# Patient Record
Sex: MALE | Race: WHITE | NOT HISPANIC OR LATINO | Employment: OTHER | URBAN - METROPOLITAN AREA
[De-identification: names, ages, dates, MRNs, and addresses within clinical notes are randomized per-mention and may not be internally consistent; named-entity substitution may affect disease eponyms.]

---

## 2018-01-22 ENCOUNTER — HOSPITAL ENCOUNTER (OUTPATIENT)
Dept: OTHER | Facility: HOSPITAL | Age: 68
Setting detail: SPECIMEN
Discharge: HOME | End: 2018-01-22
Attending: NURSE PRACTITIONER | Admitting: NURSE PRACTITIONER

## 2018-01-22 LAB
ALBUMIN SERPL-MCNC: 4.4 G/DL (ref 3.5–4.8)
ALBUMIN/GLOB SERPL: 1.7 {RATIO} (ref 1–1.7)
ALP SERPL-CCNC: 55 IU/L (ref 32–91)
ALT SERPL-CCNC: 12 IU/L (ref 17–63)
ANION GAP SERPL CALC-SCNC: 10.4 MMOL/L (ref 10–20)
AST SERPL-CCNC: 17 IU/L (ref 15–41)
BILIRUB SERPL-MCNC: 1.1 MG/DL (ref 0.3–1.2)
BUN SERPL-MCNC: 8 MG/DL (ref 8–20)
BUN/CREAT SERPL: 10 (ref 6.2–20.3)
CALCIUM SERPL-MCNC: 9.5 MG/DL (ref 8.9–10.3)
CHLORIDE SERPL-SCNC: 101 MMOL/L (ref 101–111)
CHOLEST SERPL-MCNC: 165 MG/DL
CHOLEST/HDLC SERPL: 3.8 {RATIO}
CONV CO2: 33 MMOL/L (ref 22–32)
CONV LDL CHOLESTEROL DIRECT: 104 MG/DL (ref 0–100)
CONV TOTAL PROTEIN: 7 G/DL (ref 6.1–7.9)
CREAT UR-MCNC: 0.8 MG/DL (ref 0.7–1.2)
GLOBULIN UR ELPH-MCNC: 2.6 G/DL (ref 2.5–3.8)
GLUCOSE SERPL-MCNC: 85 MG/DL (ref 65–99)
HDLC SERPL-MCNC: 43 MG/DL
LDLC/HDLC SERPL: 2.4 {RATIO}
LIPID INTERPRETATION: ABNORMAL
POTASSIUM SERPL-SCNC: 4.4 MMOL/L (ref 3.6–5.1)
SODIUM SERPL-SCNC: 140 MMOL/L (ref 136–144)
TRIGL SERPL-MCNC: 98 MG/DL
VLDLC SERPL CALC-MCNC: 18.2 MG/DL

## 2018-01-23 LAB
HCV AB SER DONR QL: NORMAL
HCV AB SER DONR QL: NORMAL

## 2018-01-29 ENCOUNTER — HOSPITAL ENCOUNTER (OUTPATIENT)
Dept: CARDIOLOGY | Facility: HOSPITAL | Age: 68
Discharge: HOME | End: 2018-01-29
Attending: NURSE PRACTITIONER | Admitting: NURSE PRACTITIONER

## 2019-03-07 ENCOUNTER — HOSPITAL ENCOUNTER (OUTPATIENT)
Dept: OTHER | Facility: HOSPITAL | Age: 69
Discharge: HOME | End: 2019-03-07
Attending: FAMILY MEDICINE | Admitting: FAMILY MEDICINE

## 2019-03-07 LAB
ALBUMIN SERPL-MCNC: 4.5 G/DL (ref 3.5–4.8)
ALBUMIN/GLOB SERPL: 2 {RATIO} (ref 1–1.7)
ALP SERPL-CCNC: 76 IU/L (ref 32–91)
ALT SERPL-CCNC: 10 IU/L (ref 17–63)
ANION GAP SERPL CALC-SCNC: 15.2 MMOL/L (ref 10–20)
AST SERPL-CCNC: 17 IU/L (ref 15–41)
BASOPHILS # BLD AUTO: 0 10*3/UL (ref 0–0.2)
BASOPHILS NFR BLD AUTO: 1 % (ref 0–2)
BILIRUB SERPL-MCNC: 0.9 MG/DL (ref 0.3–1.2)
BILIRUB UR QL STRIP: NEGATIVE MG/DL
BUN SERPL-MCNC: 5 MG/DL (ref 8–20)
BUN/CREAT SERPL: 4.5 (ref 6.2–20.3)
CALCIUM SERPL-MCNC: 9.5 MG/DL (ref 8.9–10.3)
CASTS URNS QL MICRO: NORMAL /[LPF]
CHLORIDE SERPL-SCNC: 94 MMOL/L (ref 101–111)
CHOLEST SERPL-MCNC: 155 MG/DL
CHOLEST/HDLC SERPL: 3.8 {RATIO}
COLOR UR: YELLOW
CONV BACTERIA IN URINE MICRO: NEGATIVE
CONV CLARITY OF URINE: CLEAR
CONV CO2: 27 MMOL/L (ref 22–32)
CONV HYALINE CASTS IN URINE MICRO: 0 /[LPF] (ref 0–5)
CONV LDL CHOLESTEROL DIRECT: 94 MG/DL (ref 0–100)
CONV PROTEIN IN URINE BY AUTOMATED TEST STRIP: NEGATIVE MG/DL
CONV SMALL ROUND CELLS: NORMAL /[HPF]
CONV TOTAL PROTEIN: 6.8 G/DL (ref 6.1–7.9)
CONV UROBILINOGEN IN URINE BY AUTOMATED TEST STRIP: 1 MG/DL
CREAT UR-MCNC: 1.1 MG/DL (ref 0.7–1.2)
CULTURE INDICATED?: NORMAL
DIFFERENTIAL METHOD BLD: (no result)
EOSINOPHIL # BLD AUTO: 0.2 10*3/UL (ref 0–0.3)
EOSINOPHIL # BLD AUTO: 4 % (ref 0–3)
ERYTHROCYTE [DISTWIDTH] IN BLOOD BY AUTOMATED COUNT: 13.6 % (ref 11.5–14.5)
GLOBULIN UR ELPH-MCNC: 2.3 G/DL (ref 2.5–3.8)
GLUCOSE SERPL-MCNC: 91 MG/DL (ref 65–99)
GLUCOSE UR QL: NEGATIVE MG/DL
HCT VFR BLD AUTO: 39.4 % (ref 40–54)
HDLC SERPL-MCNC: 41 MG/DL
HGB BLD-MCNC: 13.4 G/DL (ref 14–18)
HGB UR QL STRIP: NEGATIVE
KETONES UR QL STRIP: NEGATIVE MG/DL
LDLC/HDLC SERPL: 2.3 {RATIO}
LEUKOCYTE ESTERASE UR QL STRIP: NEGATIVE
LIPID INTERPRETATION: NORMAL
LYMPHOCYTES # BLD AUTO: 2 10*3/UL (ref 0.8–4.8)
LYMPHOCYTES NFR BLD AUTO: 36 % (ref 18–42)
MAGNESIUM SERPL-MCNC: 1.9 MG/DL (ref 1.8–2.5)
MCH RBC QN AUTO: 32.5 PG (ref 26–32)
MCHC RBC AUTO-ENTMCNC: 34.1 G/DL (ref 32–36)
MCV RBC AUTO: 95.3 FL (ref 80–94)
MONOCYTES # BLD AUTO: 0.6 10*3/UL (ref 0.1–1.3)
MONOCYTES NFR BLD AUTO: 10 % (ref 2–11)
NEUTROPHILS # BLD AUTO: 2.8 10*3/UL (ref 2.3–8.6)
NEUTROPHILS NFR BLD AUTO: 49 % (ref 50–75)
NITRITE UR QL STRIP: NEGATIVE
NRBC BLD AUTO-RTO: 0 /100{WBCS}
NRBC/RBC NFR BLD MANUAL: 0 10*3/UL
PH UR STRIP.AUTO: 6 [PH] (ref 4.5–8)
PLATELET # BLD AUTO: 201 10*3/UL (ref 150–450)
PMV BLD AUTO: 7.1 FL (ref 7.4–10.4)
POTASSIUM SERPL-SCNC: 4.2 MMOL/L (ref 3.6–5.1)
RBC # BLD AUTO: 4.14 10*6/UL (ref 4.6–6)
RBC #/AREA URNS HPF: 0 /[HPF] (ref 0–3)
SODIUM SERPL-SCNC: 132 MMOL/L (ref 136–144)
SP GR UR: 1.01 (ref 1–1.03)
SPERM URNS QL MICRO: NORMAL /[HPF]
SQUAMOUS SPT QL MICRO: 0 /[HPF] (ref 0–5)
TRIGL SERPL-MCNC: 112 MG/DL
UNIDENT CRYS URNS QL MICRO: NORMAL /[HPF]
VLDLC SERPL CALC-MCNC: 20.1 MG/DL
WBC # BLD AUTO: 5.5 10*3/UL (ref 4.5–11.5)
WBC #/AREA URNS HPF: 1 /[HPF] (ref 0–5)
YEAST SPEC QL WET PREP: NORMAL /[HPF]

## 2019-03-21 ENCOUNTER — HOSPITAL ENCOUNTER (OUTPATIENT)
Dept: CT IMAGING | Facility: HOSPITAL | Age: 69
Discharge: HOME | End: 2019-03-21
Attending: FAMILY MEDICINE | Admitting: FAMILY MEDICINE

## 2019-03-21 LAB — CREAT BLDA-MCNC: 1.2 MG/DL (ref 0.6–1.3)

## 2019-03-22 ENCOUNTER — HOSPITAL ENCOUNTER (OUTPATIENT)
Dept: GENERAL RADIOLOGY | Facility: HOSPITAL | Age: 69
Discharge: HOME | End: 2019-03-22
Attending: FAMILY MEDICINE | Admitting: FAMILY MEDICINE

## 2019-04-18 ENCOUNTER — HOSPITAL ENCOUNTER (OUTPATIENT)
Dept: GENERAL RADIOLOGY | Facility: HOSPITAL | Age: 69
Discharge: HOME | End: 2019-04-18
Attending: FAMILY MEDICINE | Admitting: FAMILY MEDICINE

## 2019-05-02 ENCOUNTER — CONVERSION ENCOUNTER (OUTPATIENT)
Dept: CARDIOLOGY | Facility: CLINIC | Age: 69
End: 2019-05-02

## 2019-06-03 VITALS — BODY MASS INDEX: 18.44 KG/M2 | WEIGHT: 136 LBS

## 2019-07-02 PROBLEM — E78.5 HYPERLIPIDEMIA: Status: ACTIVE | Noted: 2019-07-02

## 2019-11-07 ENCOUNTER — TELEPHONE (OUTPATIENT)
Dept: FAMILY MEDICINE CLINIC | Facility: CLINIC | Age: 69
End: 2019-11-07

## 2019-11-08 ENCOUNTER — TRANSCRIBE ORDERS (OUTPATIENT)
Dept: ADMINISTRATIVE | Facility: HOSPITAL | Age: 69
End: 2019-11-08

## 2019-11-08 DIAGNOSIS — I71.40 ABDOMINAL AORTIC ANEURYSM (AAA) WITHOUT RUPTURE (HCC): Primary | ICD-10-CM

## 2019-11-08 RX ORDER — ONDANSETRON 4 MG/1
4 TABLET, FILM COATED ORAL EVERY 8 HOURS PRN
Qty: 60 TABLET | Refills: 1 | Status: SHIPPED | OUTPATIENT
Start: 2019-11-08 | End: 2020-01-06 | Stop reason: SDUPTHER

## 2019-11-08 NOTE — TELEPHONE ENCOUNTER
Pt has AAA, no appetite and frequent nausea. I called and discussed condition with Charmaine, pt is also hearing impaired. He has a f/u with vascular Dr. Rafael cantrell as well. Sent jeancarlos to [facility].

## 2019-11-12 RX ORDER — ONDANSETRON 4 MG/1
TABLET, FILM COATED ORAL
Qty: 20 TABLET | Refills: 0 | OUTPATIENT
Start: 2019-11-12

## 2020-01-06 ENCOUNTER — OFFICE VISIT (OUTPATIENT)
Dept: FAMILY MEDICINE CLINIC | Facility: CLINIC | Age: 70
End: 2020-01-06

## 2020-01-06 VITALS
HEART RATE: 76 BPM | TEMPERATURE: 98.4 F | BODY MASS INDEX: 21.54 KG/M2 | SYSTOLIC BLOOD PRESSURE: 112 MMHG | RESPIRATION RATE: 16 BRPM | DIASTOLIC BLOOD PRESSURE: 70 MMHG | HEIGHT: 72 IN | WEIGHT: 159 LBS

## 2020-01-06 DIAGNOSIS — I10 ESSENTIAL HYPERTENSION: ICD-10-CM

## 2020-01-06 DIAGNOSIS — I71.40 ABDOMINAL AORTIC ANEURYSM (AAA) WITHOUT RUPTURE (HCC): ICD-10-CM

## 2020-01-06 DIAGNOSIS — Z12.2 ENCOUNTER FOR SCREENING FOR LUNG CANCER: ICD-10-CM

## 2020-01-06 DIAGNOSIS — Z72.0 TOBACCO ABUSE: ICD-10-CM

## 2020-01-06 DIAGNOSIS — F41.8 ANXIETY WITH DEPRESSION: ICD-10-CM

## 2020-01-06 DIAGNOSIS — I25.810 CORONARY ARTERY DISEASE INVOLVING CORONARY BYPASS GRAFT OF NATIVE HEART WITHOUT ANGINA PECTORIS: Primary | ICD-10-CM

## 2020-01-06 DIAGNOSIS — E78.5 HYPERLIPIDEMIA, UNSPECIFIED HYPERLIPIDEMIA TYPE: ICD-10-CM

## 2020-01-06 DIAGNOSIS — R35.1 BENIGN PROSTATIC HYPERPLASIA WITH NOCTURIA: ICD-10-CM

## 2020-01-06 DIAGNOSIS — J43.9 PULMONARY EMPHYSEMA, UNSPECIFIED EMPHYSEMA TYPE (HCC): ICD-10-CM

## 2020-01-06 DIAGNOSIS — Z87.891 PERSONAL HISTORY OF NICOTINE DEPENDENCE: ICD-10-CM

## 2020-01-06 DIAGNOSIS — N40.1 BENIGN PROSTATIC HYPERPLASIA WITH NOCTURIA: ICD-10-CM

## 2020-01-06 PROBLEM — Z12.11 ENCOUNTER FOR SCREENING FOR MALIGNANT NEOPLASM OF COLON: Status: ACTIVE | Noted: 2018-01-22

## 2020-01-06 PROBLEM — K04.7 DENTAL INFECTION: Status: ACTIVE | Noted: 2017-03-14

## 2020-01-06 PROBLEM — Z12.5 ENCOUNTER FOR SCREENING FOR MALIGNANT NEOPLASM OF PROSTATE: Status: ACTIVE | Noted: 2018-01-22

## 2020-01-06 PROBLEM — F17.210 SMOKING GREATER THAN 30 PACK YEARS: Status: ACTIVE | Noted: 2018-01-22

## 2020-01-06 PROBLEM — R05.3 CHRONIC COUGH: Status: ACTIVE | Noted: 2019-03-07

## 2020-01-06 PROBLEM — K30 DELAYED GASTRIC EMPTYING: Status: ACTIVE | Noted: 2019-03-13

## 2020-01-06 PROBLEM — R26.81 UNSTEADY GAIT: Status: ACTIVE | Noted: 2019-03-12

## 2020-01-06 PROBLEM — R63.4 WEIGHT LOSS: Status: ACTIVE | Noted: 2019-03-07

## 2020-01-06 PROBLEM — F03.90 DEMENTIA: Status: ACTIVE | Noted: 2019-03-07

## 2020-01-06 PROBLEM — R03.0 ELEVATED BLOOD PRESSURE READING WITHOUT DIAGNOSIS OF HYPERTENSION: Status: ACTIVE | Noted: 2018-04-24

## 2020-01-06 PROBLEM — Z20.2 EXPOSURE TO VENEREAL DISEASE: Status: ACTIVE | Noted: 2018-01-22

## 2020-01-06 PROBLEM — R53.83 FATIGUE: Status: ACTIVE | Noted: 2019-03-07

## 2020-01-06 PROBLEM — L57.0 ACTINIC KERATOSES: Status: ACTIVE | Noted: 2017-12-20

## 2020-01-06 PROBLEM — R11.2 NAUSEA AND VOMITING: Status: ACTIVE | Noted: 2019-03-13

## 2020-01-06 PROBLEM — Z23 ENCOUNTER FOR IMMUNIZATION: Status: ACTIVE | Noted: 2018-01-22

## 2020-01-06 PROCEDURE — 99214 OFFICE O/P EST MOD 30 MIN: CPT | Performed by: FAMILY MEDICINE

## 2020-01-06 RX ORDER — ONDANSETRON 4 MG/1
4 TABLET, FILM COATED ORAL EVERY 8 HOURS PRN
Qty: 60 TABLET | Refills: 3 | Status: SHIPPED | OUTPATIENT
Start: 2020-01-06 | End: 2020-04-23

## 2020-01-06 RX ORDER — HYDROXYZINE HYDROCHLORIDE 25 MG/1
25 TABLET, FILM COATED ORAL EVERY 6 HOURS PRN
Qty: 90 TABLET | Refills: 3 | Status: SHIPPED | OUTPATIENT
Start: 2020-01-06 | End: 2020-03-24

## 2020-01-06 RX ORDER — TAMSULOSIN HYDROCHLORIDE 0.4 MG/1
1 CAPSULE ORAL DAILY
Qty: 90 CAPSULE | Refills: 3 | Status: SHIPPED | OUTPATIENT
Start: 2020-01-06 | End: 2020-12-28

## 2020-01-06 RX ORDER — ESCITALOPRAM OXALATE 10 MG/1
10 TABLET ORAL DAILY
Qty: 90 TABLET | Refills: 3 | Status: SHIPPED | OUTPATIENT
Start: 2020-01-06 | End: 2020-12-28

## 2020-01-06 RX ORDER — CHOLECALCIFEROL (VITAMIN D3) 125 MCG
5 CAPSULE ORAL
COMMUNITY
End: 2021-08-20

## 2020-01-06 RX ORDER — CARVEDILOL 3.12 MG/1
3.12 TABLET ORAL 2 TIMES DAILY WITH MEALS
Qty: 90 TABLET | Refills: 3 | Status: SHIPPED | OUTPATIENT
Start: 2020-01-06 | End: 2020-08-26

## 2020-01-06 RX ORDER — ATORVASTATIN CALCIUM 20 MG/1
20 TABLET, FILM COATED ORAL DAILY
Qty: 90 TABLET | Refills: 3 | Status: SHIPPED | OUTPATIENT
Start: 2020-01-06 | End: 2020-12-28

## 2020-01-06 NOTE — PROGRESS NOTES
Chief Complaint   Patient presents with   • Anxiety     New pt     HPI  Alexei Merino III is a 69 y.o. male that presents to establish care and discuss multiple medical problems.    AAA: 4.6cm aneurysm- has been stable for the last year. Due for repeat CT scan in 6 months. Follows w/ Dr Hall    HTN: 112/70 today. He is complaint w/ Coreg.     HLD: not taking any cholesterol medicine. Last LDL 94    CAD: hx MI 3/2016. Patient had 3V CABG. He follows w/ Ernestina. Patient is complaint w/ ASA and Coreg but has discontinued his statin.     COPD: not much cough but gets SOB w/ minimal activity. This makes him anxious. He goes on to later deny much of any SOB     Review of Systems   Constitutional: Negative for chills, fever and unexpected weight loss.   HENT: Negative for congestion, rhinorrhea and sore throat.    Eyes: Negative for visual disturbance.   Respiratory: Positive for cough and shortness of breath.    Cardiovascular: Negative for chest pain and palpitations.   Gastrointestinal: Negative for abdominal pain, constipation, diarrhea, nausea and vomiting.   Genitourinary: Negative for difficulty urinating and dysuria.   Musculoskeletal: Negative for arthralgias and joint swelling.   Skin: Negative for rash and skin lesions.   Neurological: Negative for weakness and headache.   Psychiatric/Behavioral: Negative for depressed mood. The patient is nervous/anxious.      The following portions of the patient's history were reviewed and updated as appropriate: problem list, past medical history, past surgical history, allergies, current medications, past social history and past family history.    Problem List Tab  Patient History Tab  Immunizations Tab  Medications Tab  Chart Review Tab  Care Everywhere Tab  Synopsis Tab    PE  Vitals:    01/06/20 1452   BP: 112/70   Pulse: 76   Resp: 16   Temp: 98.4 °F (36.9 °C)     Body mass index is 21.56 kg/m².  General: Well nourished, NAD  Head: AT/NC  Eyes: EOMI, anicteric  sclera  ENT: MMM w/o erythema  Neck: Supple, no thyromegaly or LAD. No carotid bruit  Resp: CTAB, SCR, BS equal  CV: RRR w/o m/r/g; 2+ pulses  GI: Soft, NT/ND, +BS  MSK: FROM, no deformity, no edema  Skin: Warm, dry, intact  Neuro: Alert and oriented. No focal deficits  Psych: Appropriate mood and affect    Imaging  No Images in the past 120 days found..    Assessment/Plan   Alexei Merino III is a 69 y.o. male that presents for   Chief Complaint   Patient presents with   • Anxiety     New pt     Alexei was seen today for anxiety.    Diagnoses and all orders for this visit:    Coronary artery disease involving coronary bypass graft of native heart without angina pectoris: follows w/ Ernestina. Has not been on statin, stating that his cholesterol has been good. Will restart today given hx CAD s/p CABG x3  -     atorvastatin (LIPITOR) 20 MG tablet; Take 1 tablet by mouth Daily.   - Cont home ASA, Coreg    Abdominal aortic aneurysm (AAA) without rupture (CMS/HCC): 4.6cm on last scan but has been stable over the last year   - Due for repeat CT in 6 months   - Follows w/ Dr Hall    Essential hypertension: well controlled  -     carvedilol (COREG) 3.125 MG tablet; Take 1 tablet by mouth 2 (Two) Times a Day With Meals.    Hyperlipidemia, unspecified hyperlipidemia type  -     atorvastatin (LIPITOR) 20 MG tablet; Take 1 tablet by mouth Daily.    Anxiety with depression  -     hydrOXYzine (ATARAX) 25 MG tablet; Take 1 tablet by mouth Every 6 (Six) Hours As Needed for Anxiety.  -     escitalopram (LEXAPRO) 10 MG tablet; Take 1 tablet by mouth Daily.    Pulmonary emphysema, unspecified emphysema type (CMS/HCC): low threshold to initiate Spiriva but pt inconsistent w/ complaints  -     Full Pulmonary Function Test With Bronchodilator; Future    Tobacco abuse  -     CT chest low dose wo; Future    Personal history of nicotine dependence   -     CT chest low dose wo; Future    Encounter for screening for lung cancer    Benign  prostatic hyperplasia with nocturia  -    Start tamsulosin (FLOMAX) 0.4 MG capsule 24 hr capsule; Take 1 capsule by mouth Daily.    Other orders  -     ondansetron (ZOFRAN) 4 MG tablet; Take 1 tablet by mouth Every 8 (Eight) Hours As Needed for Nausea or Vomiting.      Preventative:  Colonoscopy: 4/2019, due 2024  PSA: 0.67 (3/2019)  CT chest: 4/2019  Shingles: Recommended  Pneumonia: Completed  Tdap: Deferred  Influenza: out, deferred    F/U in 3 months for Medicare Wellness

## 2020-02-24 ENCOUNTER — OFFICE VISIT (OUTPATIENT)
Dept: FAMILY MEDICINE CLINIC | Facility: CLINIC | Age: 70
End: 2020-02-24

## 2020-02-24 VITALS
HEART RATE: 96 BPM | HEIGHT: 72 IN | WEIGHT: 140.8 LBS | RESPIRATION RATE: 20 BRPM | BODY MASS INDEX: 19.07 KG/M2 | TEMPERATURE: 98.4 F | SYSTOLIC BLOOD PRESSURE: 135 MMHG | DIASTOLIC BLOOD PRESSURE: 94 MMHG

## 2020-02-24 DIAGNOSIS — R63.4 WEIGHT LOSS: ICD-10-CM

## 2020-02-24 DIAGNOSIS — J01.01 ACUTE RECURRENT MAXILLARY SINUSITIS: Primary | ICD-10-CM

## 2020-02-24 PROCEDURE — 99213 OFFICE O/P EST LOW 20 MIN: CPT | Performed by: INTERNAL MEDICINE

## 2020-02-24 RX ORDER — CEFUROXIME AXETIL 500 MG/1
500 TABLET ORAL 2 TIMES DAILY
Qty: 20 TABLET | Refills: 0 | Status: SHIPPED | OUTPATIENT
Start: 2020-02-24 | End: 2021-05-19

## 2020-02-24 NOTE — PROGRESS NOTES
Subjective   Alexei Merino III is a 69 y.o. male.     Pt says his sxs have been ongoing since nov  Has had nasal congestion  Lack of appetite  Foods don't taste right    No cough or sore throat  No abd pain  Has had unintentional weight loss bc foods don't taste good to him    Pt says these sxs have occurred in the past  And resolved when he's treated for sinusitis       The following portions of the patient's history were reviewed and updated as appropriate: allergies, current medications, past family history, past medical history, past social history, past surgical history and problem list.    Review of Systems   Constitutional: Negative for fatigue and fever.   HENT: Positive for congestion. Negative for ear pain, rhinorrhea and sore throat.    Eyes: Negative for blurred vision and itching.   Respiratory: Negative for cough and shortness of breath.    Cardiovascular: Negative for chest pain and palpitations.   Gastrointestinal: Negative for abdominal pain, diarrhea and vomiting.   Endocrine: Negative for polydipsia and polyuria.   Genitourinary: Negative for dysuria, frequency, hematuria and urgency.   Musculoskeletal: Negative for joint swelling and myalgias.   Skin: Negative for rash and skin lesions.   Neurological: Negative for dizziness, numbness and headache.   Psychiatric/Behavioral: Negative for depressed mood. The patient is not nervous/anxious.          Current Outpatient Medications:   •  aspirin 325 MG tablet, Take 81 mg by mouth Daily., Disp: , Rfl:   •  atorvastatin (LIPITOR) 20 MG tablet, Take 1 tablet by mouth Daily., Disp: 90 tablet, Rfl: 3  •  carvedilol (COREG) 3.125 MG tablet, Take 1 tablet by mouth 2 (Two) Times a Day With Meals., Disp: 90 tablet, Rfl: 3  •  escitalopram (LEXAPRO) 10 MG tablet, Take 1 tablet by mouth Daily., Disp: 90 tablet, Rfl: 3  •  hydrOXYzine (ATARAX) 25 MG tablet, Take 1 tablet by mouth Every 6 (Six) Hours As Needed for Anxiety., Disp: 90 tablet, Rfl: 3  •  melatonin 5  "MG tablet tablet, Take 5 mg by mouth., Disp: , Rfl:   •  ondansetron (ZOFRAN) 4 MG tablet, Take 1 tablet by mouth Every 8 (Eight) Hours As Needed for Nausea or Vomiting., Disp: 60 tablet, Rfl: 3  •  tamsulosin (FLOMAX) 0.4 MG capsule 24 hr capsule, Take 1 capsule by mouth Daily., Disp: 90 capsule, Rfl: 3    Objective   /94 (BP Location: Left arm, Patient Position: Sitting, Cuff Size: Adult)   Pulse 96   Temp 98.4 °F (36.9 °C) (Oral)   Resp 20   Ht 182.9 cm (72\")   Wt 63.9 kg (140 lb 12.8 oz)   BMI 19.10 kg/m²   Physical Exam   Constitutional: He is oriented to person, place, and time. He appears well-developed and well-nourished. No distress.   HENT:   Head: Normocephalic and atraumatic.   Right Ear: External ear normal.   Left Ear: External ear normal.   Mouth/Throat: Oropharynx is clear and moist. No oropharyngeal exudate.   Eyes: Conjunctivae and EOM are normal. Right eye exhibits no discharge. Left eye exhibits no discharge. No scleral icterus.   Neck: Normal range of motion. Neck supple. No thyromegaly present.   Cardiovascular: Normal rate, regular rhythm and normal heart sounds. Exam reveals no gallop and no friction rub.   No murmur heard.  Pulmonary/Chest: Effort normal and breath sounds normal. No respiratory distress. He has no wheezes. He has no rales.   Abdominal: Soft. Bowel sounds are normal. He exhibits no distension. There is no tenderness. There is no guarding.   Musculoskeletal: Normal range of motion. He exhibits no edema or deformity.   Lymphadenopathy:     He has no cervical adenopathy.   Neurological: He is alert and oriented to person, place, and time. No cranial nerve deficit.   Skin: Skin is warm and dry. No rash noted. He is not diaphoretic. No erythema.   Psychiatric: He has a normal mood and affect. His behavior is normal. Thought content normal.   Vitals reviewed.        Assessment/Plan   Problems Addressed this Visit        Respiratory    Sinusitis, acute - Primary       " Other    Weight loss        Will treat with ceftin but since sxs are not all c/w sinusitis (I.e. TTP of sinuses)  Worried that unintentional weight loss, loss of taste and appetite  Are secondary to another occult disease, such as cancer  Pt has f/u appt with dr lópez in about 6 weeks  And pt agrees to keep appt unless sxs do not improve - then will f/u sooner         Procedures

## 2020-03-24 DIAGNOSIS — F41.8 ANXIETY WITH DEPRESSION: ICD-10-CM

## 2020-03-24 RX ORDER — HYDROXYZINE HYDROCHLORIDE 25 MG/1
TABLET, FILM COATED ORAL
Qty: 90 TABLET | Refills: 2 | Status: SHIPPED | OUTPATIENT
Start: 2020-03-24 | End: 2020-03-27

## 2020-03-26 DIAGNOSIS — F41.8 ANXIETY WITH DEPRESSION: ICD-10-CM

## 2020-03-27 RX ORDER — HYDROXYZINE HYDROCHLORIDE 25 MG/1
TABLET, FILM COATED ORAL
Qty: 90 TABLET | Refills: 2 | Status: SHIPPED | OUTPATIENT
Start: 2020-03-27 | End: 2020-06-17

## 2020-04-23 RX ORDER — ONDANSETRON 4 MG/1
TABLET, FILM COATED ORAL
Qty: 60 TABLET | Refills: 2 | Status: SHIPPED | OUTPATIENT
Start: 2020-04-23 | End: 2020-07-29

## 2020-05-28 RX ORDER — DOXYCYCLINE HYCLATE 100 MG/1
100 CAPSULE ORAL 2 TIMES DAILY
Qty: 20 CAPSULE | Refills: 0 | Status: SHIPPED | OUTPATIENT
Start: 2020-05-28 | End: 2020-11-04

## 2020-06-17 DIAGNOSIS — F41.8 ANXIETY WITH DEPRESSION: ICD-10-CM

## 2020-06-17 RX ORDER — HYDROXYZINE HYDROCHLORIDE 25 MG/1
TABLET, FILM COATED ORAL
Qty: 90 TABLET | Refills: 1 | Status: SHIPPED | OUTPATIENT
Start: 2020-06-17 | End: 2020-09-17

## 2020-07-29 RX ORDER — ONDANSETRON 4 MG/1
TABLET, FILM COATED ORAL
Qty: 60 TABLET | Refills: 1 | Status: SHIPPED | OUTPATIENT
Start: 2020-07-29 | End: 2020-09-09

## 2020-08-26 DIAGNOSIS — I10 ESSENTIAL HYPERTENSION: ICD-10-CM

## 2020-08-26 RX ORDER — CARVEDILOL 3.12 MG/1
TABLET ORAL
Qty: 90 TABLET | Refills: 2 | Status: SHIPPED | OUTPATIENT
Start: 2020-08-26 | End: 2021-01-17

## 2020-09-09 RX ORDER — ONDANSETRON 4 MG/1
TABLET, FILM COATED ORAL
Qty: 60 TABLET | Refills: 0 | Status: SHIPPED | OUTPATIENT
Start: 2020-09-09 | End: 2020-09-30

## 2020-09-17 DIAGNOSIS — F41.8 ANXIETY WITH DEPRESSION: ICD-10-CM

## 2020-09-17 RX ORDER — HYDROXYZINE HYDROCHLORIDE 25 MG/1
TABLET, FILM COATED ORAL
Qty: 32 TABLET | Refills: 1 | Status: SHIPPED | OUTPATIENT
Start: 2020-09-17 | End: 2020-10-08

## 2020-09-30 RX ORDER — ONDANSETRON 4 MG/1
TABLET, FILM COATED ORAL
Qty: 60 TABLET | Refills: 0 | Status: SHIPPED | OUTPATIENT
Start: 2020-09-30 | End: 2020-11-04 | Stop reason: SDUPTHER

## 2020-10-08 DIAGNOSIS — F41.8 ANXIETY WITH DEPRESSION: ICD-10-CM

## 2020-10-08 RX ORDER — HYDROXYZINE HYDROCHLORIDE 25 MG/1
TABLET, FILM COATED ORAL
Qty: 32 TABLET | Refills: 1 | Status: SHIPPED | OUTPATIENT
Start: 2020-10-08 | End: 2020-10-26

## 2020-10-26 DIAGNOSIS — F41.8 ANXIETY WITH DEPRESSION: ICD-10-CM

## 2020-10-26 RX ORDER — HYDROXYZINE HYDROCHLORIDE 25 MG/1
TABLET, FILM COATED ORAL
Qty: 60 TABLET | Refills: 3 | Status: SHIPPED | OUTPATIENT
Start: 2020-10-26 | End: 2020-12-31

## 2020-10-27 RX ORDER — ONDANSETRON 4 MG/1
TABLET, FILM COATED ORAL
Qty: 60 TABLET | Refills: 0 | OUTPATIENT
Start: 2020-10-27

## 2020-11-02 ENCOUNTER — TELEPHONE (OUTPATIENT)
Dept: FAMILY MEDICINE CLINIC | Facility: CLINIC | Age: 70
End: 2020-11-02

## 2020-11-02 NOTE — TELEPHONE ENCOUNTER
I am concerned as to why he continues to need Zofran. That is why my previous response was-    Needs appt to discuss need. Overdue for MW appt

## 2020-11-02 NOTE — TELEPHONE ENCOUNTER
Spoke with patient's significant other Jessica and she said he has to take Zofran three times a day every day or he cannot eat.  Said this is due to the AAA.  Scheduled an appt with [facility] on 11/4/2020 at 3pm to discuss.  Couldn't wait to see MEB.

## 2020-11-02 NOTE — TELEPHONE ENCOUNTER
ANGELA IS CALLING IN TO REQUEST NEW MEDICATION PRESCRIPTIONS ON PTS        ondansetron (ZOFRAN) 4 MG tablet   PT HAS NO MORE REFILLS ON THIS BUT PT NEEDS TO HAVE THIS FILLED.  WAS DENIED BY DR VINES    CALL BACK  [phone]  PHARMACY    [address]   [phone]

## 2020-11-04 ENCOUNTER — OFFICE VISIT (OUTPATIENT)
Dept: FAMILY MEDICINE CLINIC | Facility: CLINIC | Age: 70
End: 2020-11-04

## 2020-11-04 VITALS
BODY MASS INDEX: 20.05 KG/M2 | TEMPERATURE: 98.4 F | SYSTOLIC BLOOD PRESSURE: 120 MMHG | DIASTOLIC BLOOD PRESSURE: 80 MMHG | WEIGHT: 148 LBS | HEART RATE: 84 BPM | HEIGHT: 72 IN | OXYGEN SATURATION: 98 %

## 2020-11-04 DIAGNOSIS — R11.0 NAUSEA: Primary | ICD-10-CM

## 2020-11-04 PROCEDURE — 99213 OFFICE O/P EST LOW 20 MIN: CPT | Performed by: NURSE PRACTITIONER

## 2020-11-04 RX ORDER — ONDANSETRON 4 MG/1
4 TABLET, FILM COATED ORAL EVERY 8 HOURS PRN
Qty: 30 TABLET | Refills: 0 | Status: SHIPPED | OUTPATIENT
Start: 2020-11-04 | End: 2020-11-23

## 2020-11-04 NOTE — PROGRESS NOTES
"Cruzito Merino III is a 70 y.o. male.     Chief Complaint   Patient presents with   • Nausea       /80 (BP Location: Left arm, Patient Position: Sitting, Cuff Size: Adult)   Pulse 84   Temp 98.4 °F (36.9 °C) (Temporal)   Ht 182.9 cm (72\")   Wt 67.1 kg (148 lb)   SpO2 98%   BMI 20.07 kg/m²     BP Readings from Last 3 Encounters:   11/04/20 120/80   02/24/20 135/94   01/06/20 112/70       Wt Readings from Last 3 Encounters:   11/04/20 67.1 kg (148 lb)   02/24/20 63.9 kg (140 lb 12.8 oz)   01/06/20 72.1 kg (159 lb)       Pt comes in today to discuss upset stomach and need for zofran. He was asked to make an appt by Dr. Mckeon to discuss the need for taking it TID. Pt states he has always been told that he has a \"nervous stomach\" and the zofran helps to calm it. Says he gets the urge to belch and takes zofran, belches,  and then feels better. No vomiting.   Feels it all started after having his CABG and he developed anxiety.   Pepto also helps. Has been without it for about 1 week and doing ok.          The following portions of the patient's history were reviewed and updated as appropriate: allergies, current medications, past family history, past medical history, past social history, past surgical history and problem list.    Review of Systems   Gastrointestinal: Positive for nausea and indigestion. Negative for abdominal distention, abdominal pain, constipation, diarrhea and vomiting.       Objective   Physical Exam  Constitutional:       Appearance: He is well-developed.   Eyes:      Pupils: Pupils are equal, round, and reactive to light.   Cardiovascular:      Rate and Rhythm: Normal rate and regular rhythm.   Pulmonary:      Effort: Pulmonary effort is normal.      Breath sounds: Normal breath sounds.   Neurological:      Mental Status: He is alert and oriented to person, place, and time.           Diagnoses and all orders for this visit:    1. Nausea (Primary)  -     ondansetron (ZOFRAN) " 4 MG tablet; Take 1 tablet by mouth Every 8 (Eight) Hours As Needed for Nausea or Vomiting.  Dispense: 30 tablet; Refill: 0    will rx zofran to take prn. Discussed need to only take it as needed. He will also try gas-x otc and other options so he is not overusing zofran  During this office visit, we discussed the pertinent aspects of the visit and treatment recommendations. Pt verbalizes understanding. Follow up was discussed. Patient was given the opportunity to ask questions and discuss other concerns.       Return if symptoms worsen or fail to improve.

## 2020-11-23 DIAGNOSIS — R11.0 NAUSEA: ICD-10-CM

## 2020-11-23 RX ORDER — ONDANSETRON 4 MG/1
4 TABLET, FILM COATED ORAL DAILY PRN
Qty: 30 TABLET | Refills: 0 | Status: SHIPPED | OUTPATIENT
Start: 2020-11-23 | End: 2020-12-15

## 2020-12-15 DIAGNOSIS — R11.0 NAUSEA: ICD-10-CM

## 2020-12-15 RX ORDER — ONDANSETRON 4 MG/1
TABLET, FILM COATED ORAL
Qty: 30 TABLET | Refills: 0 | Status: SHIPPED | OUTPATIENT
Start: 2020-12-15 | End: 2021-01-08

## 2020-12-27 DIAGNOSIS — R35.1 BENIGN PROSTATIC HYPERPLASIA WITH NOCTURIA: ICD-10-CM

## 2020-12-27 DIAGNOSIS — F41.8 ANXIETY WITH DEPRESSION: ICD-10-CM

## 2020-12-27 DIAGNOSIS — N40.1 BENIGN PROSTATIC HYPERPLASIA WITH NOCTURIA: ICD-10-CM

## 2020-12-27 DIAGNOSIS — E78.5 HYPERLIPIDEMIA, UNSPECIFIED HYPERLIPIDEMIA TYPE: ICD-10-CM

## 2020-12-27 DIAGNOSIS — I25.810 CORONARY ARTERY DISEASE INVOLVING CORONARY BYPASS GRAFT OF NATIVE HEART WITHOUT ANGINA PECTORIS: ICD-10-CM

## 2020-12-28 RX ORDER — ESCITALOPRAM OXALATE 10 MG/1
TABLET ORAL
Qty: 90 TABLET | Refills: 2 | Status: SHIPPED | OUTPATIENT
Start: 2020-12-28 | End: 2021-08-25

## 2020-12-28 RX ORDER — ATORVASTATIN CALCIUM 20 MG/1
TABLET, FILM COATED ORAL
Qty: 90 TABLET | Refills: 2 | Status: SHIPPED | OUTPATIENT
Start: 2020-12-28 | End: 2021-05-21 | Stop reason: SDUPTHER

## 2020-12-28 RX ORDER — TAMSULOSIN HYDROCHLORIDE 0.4 MG/1
CAPSULE ORAL
Qty: 90 CAPSULE | Refills: 2 | Status: SHIPPED | OUTPATIENT
Start: 2020-12-28 | End: 2021-08-25

## 2020-12-31 DIAGNOSIS — F41.8 ANXIETY WITH DEPRESSION: ICD-10-CM

## 2020-12-31 RX ORDER — HYDROXYZINE HYDROCHLORIDE 25 MG/1
TABLET, FILM COATED ORAL
Qty: 60 TABLET | Refills: 3 | Status: SHIPPED | OUTPATIENT
Start: 2020-12-31 | End: 2021-03-02

## 2021-01-07 DIAGNOSIS — R11.0 NAUSEA: ICD-10-CM

## 2021-01-08 RX ORDER — ONDANSETRON 4 MG/1
TABLET, FILM COATED ORAL
Qty: 30 TABLET | Refills: 0 | Status: SHIPPED | OUTPATIENT
Start: 2021-01-08 | End: 2021-01-25

## 2021-01-17 DIAGNOSIS — I10 ESSENTIAL HYPERTENSION: ICD-10-CM

## 2021-01-17 RX ORDER — CARVEDILOL 3.12 MG/1
TABLET ORAL
Qty: 90 TABLET | Refills: 1 | Status: SHIPPED | OUTPATIENT
Start: 2021-01-17 | End: 2021-04-19

## 2021-01-25 DIAGNOSIS — R11.0 NAUSEA: ICD-10-CM

## 2021-01-25 RX ORDER — ONDANSETRON 4 MG/1
TABLET, FILM COATED ORAL
Qty: 30 TABLET | Refills: 0 | Status: SHIPPED | OUTPATIENT
Start: 2021-01-25 | End: 2021-02-15

## 2021-02-15 DIAGNOSIS — R11.0 NAUSEA: ICD-10-CM

## 2021-02-15 RX ORDER — ONDANSETRON 4 MG/1
4 TABLET, FILM COATED ORAL EVERY 12 HOURS PRN
Qty: 30 TABLET | Refills: 0 | Status: SHIPPED | OUTPATIENT
Start: 2021-02-15 | End: 2021-03-10

## 2021-02-15 NOTE — TELEPHONE ENCOUNTER
Last saw ernestina    Airway patent, Nasal mucosa clear. Mouth with normal mucosa. Throat has no vesicles, no oropharyngeal exudates and uvula is midline.

## 2021-03-02 DIAGNOSIS — F41.8 ANXIETY WITH DEPRESSION: ICD-10-CM

## 2021-03-02 RX ORDER — HYDROXYZINE HYDROCHLORIDE 25 MG/1
TABLET, FILM COATED ORAL
Qty: 60 TABLET | Refills: 1 | Status: SHIPPED | OUTPATIENT
Start: 2021-03-02 | End: 2021-03-18

## 2021-03-10 DIAGNOSIS — R11.0 NAUSEA: ICD-10-CM

## 2021-03-10 RX ORDER — ONDANSETRON 4 MG/1
TABLET, FILM COATED ORAL
Qty: 30 TABLET | Refills: 0 | Status: SHIPPED | OUTPATIENT
Start: 2021-03-10 | End: 2021-03-24

## 2021-03-18 DIAGNOSIS — F41.8 ANXIETY WITH DEPRESSION: ICD-10-CM

## 2021-03-18 RX ORDER — HYDROXYZINE HYDROCHLORIDE 25 MG/1
TABLET, FILM COATED ORAL
Qty: 60 TABLET | Refills: 1 | Status: SHIPPED | OUTPATIENT
Start: 2021-03-18 | End: 2021-03-24

## 2021-03-23 DIAGNOSIS — R11.0 NAUSEA: ICD-10-CM

## 2021-03-24 DIAGNOSIS — F41.8 ANXIETY WITH DEPRESSION: ICD-10-CM

## 2021-03-24 DIAGNOSIS — R11.0 NAUSEA: ICD-10-CM

## 2021-03-24 RX ORDER — ONDANSETRON 4 MG/1
TABLET, FILM COATED ORAL
Qty: 30 TABLET | Refills: 0 | OUTPATIENT
Start: 2021-03-24

## 2021-03-24 RX ORDER — HYDROXYZINE HYDROCHLORIDE 25 MG/1
TABLET, FILM COATED ORAL
Qty: 32 TABLET | Refills: 1 | Status: SHIPPED | OUTPATIENT
Start: 2021-03-24 | End: 2021-06-01

## 2021-03-24 RX ORDER — ONDANSETRON 4 MG/1
TABLET, FILM COATED ORAL
Qty: 30 TABLET | Refills: 0 | Status: SHIPPED | OUTPATIENT
Start: 2021-03-24 | End: 2021-04-07

## 2021-04-06 DIAGNOSIS — R11.0 NAUSEA: ICD-10-CM

## 2021-04-07 RX ORDER — ONDANSETRON 4 MG/1
TABLET, FILM COATED ORAL
Qty: 30 TABLET | Refills: 0 | Status: SHIPPED | OUTPATIENT
Start: 2021-04-07 | End: 2021-04-19 | Stop reason: SDUPTHER

## 2021-04-09 ENCOUNTER — TRANSCRIBE ORDERS (OUTPATIENT)
Dept: ADMINISTRATIVE | Facility: HOSPITAL | Age: 71
End: 2021-04-09

## 2021-04-09 DIAGNOSIS — I71.40 ABDOMINAL AORTIC ANEURYSM (AAA) WITHOUT RUPTURE (HCC): Primary | ICD-10-CM

## 2021-04-12 DIAGNOSIS — R11.0 NAUSEA: ICD-10-CM

## 2021-04-13 ENCOUNTER — HOSPITAL ENCOUNTER (OUTPATIENT)
Dept: ULTRASOUND IMAGING | Facility: HOSPITAL | Age: 71
Discharge: HOME | End: 2021-04-13

## 2021-04-13 RX ORDER — ONDANSETRON 4 MG/1
TABLET, FILM COATED ORAL
Qty: 30 TABLET | Refills: 0 | OUTPATIENT
Start: 2021-04-13

## 2021-04-15 ENCOUNTER — APPOINTMENT (OUTPATIENT)
Dept: ULTRASOUND IMAGING | Facility: HOSPITAL | Age: 71
End: 2021-04-15

## 2021-04-18 DIAGNOSIS — R11.0 NAUSEA: ICD-10-CM

## 2021-04-18 DIAGNOSIS — I10 ESSENTIAL HYPERTENSION: ICD-10-CM

## 2021-04-19 ENCOUNTER — HOSPITAL ENCOUNTER (OUTPATIENT)
Dept: ULTRASOUND IMAGING | Facility: HOSPITAL | Age: 71
End: 2021-04-19

## 2021-04-19 ENCOUNTER — TELEPHONE (OUTPATIENT)
Dept: FAMILY MEDICINE CLINIC | Facility: CLINIC | Age: 71
End: 2021-04-19

## 2021-04-19 DIAGNOSIS — R11.0 NAUSEA: ICD-10-CM

## 2021-04-19 RX ORDER — ONDANSETRON 4 MG/1
TABLET, FILM COATED ORAL
Qty: 30 TABLET | Refills: 0 | OUTPATIENT
Start: 2021-04-19

## 2021-04-19 RX ORDER — CARVEDILOL 3.12 MG/1
TABLET ORAL
Qty: 90 TABLET | Refills: 0 | Status: SHIPPED | OUTPATIENT
Start: 2021-04-19 | End: 2021-08-25

## 2021-04-19 RX ORDER — ONDANSETRON 4 MG/1
4 TABLET, FILM COATED ORAL EVERY 12 HOURS PRN
Qty: 30 TABLET | Refills: 0 | Status: SHIPPED | OUTPATIENT
Start: 2021-04-19 | End: 2021-05-19

## 2021-04-19 RX ORDER — ONDANSETRON 4 MG/1
4 TABLET, FILM COATED ORAL EVERY 12 HOURS PRN
Qty: 30 TABLET | Refills: 0 | OUTPATIENT
Start: 2021-04-19

## 2021-04-19 NOTE — TELEPHONE ENCOUNTER
Gave complete message to Jessica at 5:03pm.  Patient is scheduled to see you on 5/19.  That was the soonest afternoon appt they could get a few weeks ago.  Jessica is wondering if there is a lower dose of Zofran you can send in to get him thru until he sees you.  He has had to cancel two tests at the hospital because he is so weak from not eating.  They will definitely keep the appt on 5/19.

## 2021-04-19 NOTE — TELEPHONE ENCOUNTER
May issue with the Zofran as that he has been requiring the medication multiple times a day for months.  Zofran is typically used for acute illness or perhaps chemotherapy as a cause of nausea and vomiting.  However, the fact that this is chronic and ongoing for many months now makes me concerned about an underlying etiology for his nausea that we are missing.  Zofran is simply a Band-Aid and I am concerned that by continued refills we are missing the bigger picture.  I have requested numerous times that he make an appointment over the last 6 months.  I understand that he saw Dejah Owen back in November but I have never had an opportunity to evaluate this increased in severe nausea requiring such high amounts of Zofran.  Furthermore taking this medication multiple times a day every single day can cause abnormalities to the electrical conduction of his heart.  I do not have an EKG that shows that his electrical conduction is appropriate and taking such large amounts of Zofran for so long could put him at risk for a life-threatening arrhythmia.  This is my concern with continuing to write a medication without the opportunity to examine or evaluate him personally.  With all of that being said, if he would like more Zofran, I will send it in.  However, I think all of this is more appropriately discussed at an appointment

## 2021-04-19 NOTE — TELEPHONE ENCOUNTER
Patient's generic Zofran has been denied the past two days and they are wondering why.  Patient has only eaten a bowl of soup in the last week and he has thrown that up.  He needs his Zofran to try to keep something down.  It does cause his dizziness but he is going to have to deal with that if you would please reconsider and send in his Zofran.  He has not taken the Carvedilol for the past month because it makes him thrown up more.  If you won't prescribe the Zofran, they would like to know why.

## 2021-04-19 NOTE — TELEPHONE ENCOUNTER
Caller: Jessica Arias    Relationship: Emergency Contact    Best call back number: [phone]    Medication needed:   Requested Prescriptions     Pending Prescriptions Disp Refills   • ondansetron (ZOFRAN) 4 MG tablet 30 tablet 0     Sig: Take 1 tablet by mouth Every 12 (Twelve) Hours As Needed for Nausea or Vomiting.       When do you need the refill by: ASAP    What additional details did the patient provide when requesting the medication: PATIENT IS OUT OF MEDICATION    Does the patient have less than a 3 day supply:  [x] Yes  [] No    What is the patient's preferred pharmacy: [facility] [place] [address] - [phone] [facility] [phone] FX

## 2021-04-19 NOTE — TELEPHONE ENCOUNTER
I have tried to wean this by altering his prescriptions but it does not appear he is taking it as prescribed.  I have attempted to reduce the tablets to 4 mg and only allow it to be taken twice daily but seeing as he needs a refill now, he must be taking more than it is prescribed.  This is another reason that it is difficult for me to continue to give refills.  Nonetheless, I have sent it into his pharmacy.  I recommend taking it sparingly and only when absolutely necessary.  I generally do not see a dependence to Zofran but given the severity of his nausea and the chronicity of Zofran use, I am concerned that that this could be occurring

## 2021-04-19 NOTE — TELEPHONE ENCOUNTER
Caller: Angela Arias    Relationship: Emergency Contact    Best call back number: [phone]    What medications are you currently taking:   Current Outpatient Medications on File Prior to Visit   Medication Sig Dispense Refill   • aspirin 325 MG tablet Take 81 mg by mouth Daily.     • atorvastatin (LIPITOR) 20 MG tablet TAKE ONE TABLET BY MOUTH DAILY 90 tablet 2   • carvedilol (COREG) 3.125 MG tablet TAKE ONE TABLET BY MOUTH TWICE A DAY WITH MEALS 90 tablet 0   • cefuroxime (CEFTIN) 500 MG tablet Take 1 tablet by mouth 2 (Two) Times a Day. 20 tablet 0   • escitalopram (LEXAPRO) 10 MG tablet TAKE ONE TABLET BY MOUTH DAILY 90 tablet 2   • hydrOXYzine (ATARAX) 25 MG tablet TAKE ONE TABLET BY MOUTH EVERY 6 HOURS AS NEEDED FOR ANXIETY 32 tablet 1   • melatonin 5 MG tablet tablet Take 5 mg by mouth.     • ondansetron (ZOFRAN) 4 MG tablet TAKE ONE TABLET BY MOUTH EVERY 12 HOURS AS NEEDED FOR NAUSEA AND VOMITING 30 tablet 0   • tamsulosin (FLOMAX) 0.4 MG capsule 24 hr capsule TAKE ONE CAPSULE BY MOUTH DAILY 90 capsule 2   • [DISCONTINUED] carvedilol (COREG) 3.125 MG tablet TAKE ONE TABLET BY MOUTH TWICE A DAY WITH MEALS 90 tablet 1     No current facility-administered medications on file prior to visit.        When did you start taking these medications: OVER YEAR    Which medication are you concerned about:  ondansetron (ZOFRAN) 4 MG tablet    carvedilol (COREG) 3.125 MG             Who prescribed you this medication: DR. VINES     What are your concerns:ANGELA SAYS THAT HE HAS BEEN WITHOUT OF HIS NAUSEA MEDICATION FOR A WEEK , SHE IS WANTING TO KNOW IF DR. VINES WOULD PRESCRIBE A DIFFERENT MEDICATION FOR HIS NAUSEA BECAUSE ZOPHAN IS CAUSING DIZZINESS. SHE SAYS HE HAS BEEN WITHOUT MEDICATION FOR A WEEK. HE IS NEEDING A MEDICATION SENT TO PHARMACY TO CONTROLL VOMITING, NAUSEA. HE IS CURRENTLY NOT TAKING CARVEDILOL AS WELL , BECAUSE CAUSING  VOMITING.     How long have you been taking these medications: 1 YEAR      How long have you had these concerns: WEEK

## 2021-05-14 ENCOUNTER — HOSPITAL ENCOUNTER (OUTPATIENT)
Dept: ULTRASOUND IMAGING | Facility: HOSPITAL | Age: 71
Discharge: HOME | End: 2021-05-14
Admitting: THORACIC SURGERY (CARDIOTHORACIC VASCULAR SURGERY)

## 2021-05-14 DIAGNOSIS — I71.40 ABDOMINAL AORTIC ANEURYSM (AAA) WITHOUT RUPTURE (HCC): ICD-10-CM

## 2021-05-14 PROCEDURE — 76775 US EXAM ABDO BACK WALL LIM: CPT

## 2021-05-18 ENCOUNTER — TRANSCRIBE ORDERS (OUTPATIENT)
Dept: ADMINISTRATIVE | Facility: HOSPITAL | Age: 71
End: 2021-05-18

## 2021-05-18 DIAGNOSIS — R11.0 NAUSEA: ICD-10-CM

## 2021-05-18 DIAGNOSIS — I71.40 ABDOMINAL AORTIC ANEURYSM WITHOUT RUPTURE (HCC): Primary | ICD-10-CM

## 2021-05-19 ENCOUNTER — OFFICE VISIT (OUTPATIENT)
Dept: FAMILY MEDICINE CLINIC | Facility: CLINIC | Age: 71
End: 2021-05-19

## 2021-05-19 ENCOUNTER — LAB (OUTPATIENT)
Dept: FAMILY MEDICINE CLINIC | Facility: CLINIC | Age: 71
End: 2021-05-19

## 2021-05-19 VITALS
BODY MASS INDEX: 17.88 KG/M2 | OXYGEN SATURATION: 98 % | HEIGHT: 72 IN | SYSTOLIC BLOOD PRESSURE: 130 MMHG | HEART RATE: 89 BPM | RESPIRATION RATE: 16 BRPM | DIASTOLIC BLOOD PRESSURE: 60 MMHG | WEIGHT: 132 LBS | TEMPERATURE: 97.8 F

## 2021-05-19 DIAGNOSIS — Z12.5 ENCOUNTER FOR SCREENING FOR MALIGNANT NEOPLASM OF PROSTATE: ICD-10-CM

## 2021-05-19 DIAGNOSIS — I25.810 CORONARY ARTERY DISEASE INVOLVING CORONARY BYPASS GRAFT OF NATIVE HEART WITHOUT ANGINA PECTORIS: ICD-10-CM

## 2021-05-19 DIAGNOSIS — Z00.00 PREVENTATIVE HEALTH CARE: ICD-10-CM

## 2021-05-19 DIAGNOSIS — D64.9 ANEMIA, UNSPECIFIED TYPE: ICD-10-CM

## 2021-05-19 DIAGNOSIS — I71.40 ABDOMINAL AORTIC ANEURYSM (AAA) WITHOUT RUPTURE (HCC): ICD-10-CM

## 2021-05-19 DIAGNOSIS — J44.9 CHRONIC OBSTRUCTIVE PULMONARY DISEASE, UNSPECIFIED COPD TYPE (HCC): ICD-10-CM

## 2021-05-19 DIAGNOSIS — E55.9 VITAMIN D DEFICIENCY, UNSPECIFIED: ICD-10-CM

## 2021-05-19 DIAGNOSIS — R11.0 NAUSEA: Primary | ICD-10-CM

## 2021-05-19 PROBLEM — F41.9 ANXIETY: Status: ACTIVE | Noted: 2021-05-19

## 2021-05-19 PROBLEM — Z23 ENCOUNTER FOR IMMUNIZATION: Status: RESOLVED | Noted: 2018-01-22 | Resolved: 2021-05-19

## 2021-05-19 PROBLEM — R63.6 UNDERWEIGHT: Status: ACTIVE | Noted: 2021-05-19

## 2021-05-19 PROBLEM — R03.0 ELEVATED BLOOD PRESSURE READING WITHOUT DIAGNOSIS OF HYPERTENSION: Status: RESOLVED | Noted: 2018-04-24 | Resolved: 2021-05-19

## 2021-05-19 PROCEDURE — 84439 ASSAY OF FREE THYROXINE: CPT | Performed by: FAMILY MEDICINE

## 2021-05-19 PROCEDURE — 82607 VITAMIN B-12: CPT | Performed by: FAMILY MEDICINE

## 2021-05-19 PROCEDURE — 82306 VITAMIN D 25 HYDROXY: CPT | Performed by: FAMILY MEDICINE

## 2021-05-19 PROCEDURE — 83036 HEMOGLOBIN GLYCOSYLATED A1C: CPT | Performed by: FAMILY MEDICINE

## 2021-05-19 PROCEDURE — 36415 COLL VENOUS BLD VENIPUNCTURE: CPT | Performed by: FAMILY MEDICINE

## 2021-05-19 PROCEDURE — G0103 PSA SCREENING: HCPCS | Performed by: FAMILY MEDICINE

## 2021-05-19 PROCEDURE — 99214 OFFICE O/P EST MOD 30 MIN: CPT | Performed by: FAMILY MEDICINE

## 2021-05-19 PROCEDURE — 82728 ASSAY OF FERRITIN: CPT | Performed by: FAMILY MEDICINE

## 2021-05-19 PROCEDURE — 85025 COMPLETE CBC W/AUTO DIFF WBC: CPT | Performed by: FAMILY MEDICINE

## 2021-05-19 PROCEDURE — 80061 LIPID PANEL: CPT | Performed by: FAMILY MEDICINE

## 2021-05-19 PROCEDURE — 80053 COMPREHEN METABOLIC PANEL: CPT | Performed by: FAMILY MEDICINE

## 2021-05-19 PROCEDURE — 83540 ASSAY OF IRON: CPT | Performed by: FAMILY MEDICINE

## 2021-05-19 PROCEDURE — 84466 ASSAY OF TRANSFERRIN: CPT | Performed by: FAMILY MEDICINE

## 2021-05-19 PROCEDURE — 84443 ASSAY THYROID STIM HORMONE: CPT | Performed by: FAMILY MEDICINE

## 2021-05-19 PROCEDURE — 93000 ELECTROCARDIOGRAM COMPLETE: CPT | Performed by: FAMILY MEDICINE

## 2021-05-19 RX ORDER — METOCLOPRAMIDE 5 MG/1
5 TABLET ORAL
Qty: 90 TABLET | Refills: 2 | Status: SHIPPED | OUTPATIENT
Start: 2021-05-19 | End: 2021-12-28

## 2021-05-19 RX ORDER — TIOTROPIUM BROMIDE INHALATION SPRAY 3.12 UG/1
2 SPRAY, METERED RESPIRATORY (INHALATION)
Qty: 4 G | Refills: 5 | Status: SHIPPED | OUTPATIENT
Start: 2021-05-19 | End: 2023-02-14 | Stop reason: SDUPTHER

## 2021-05-19 RX ORDER — ONDANSETRON 4 MG/1
TABLET, FILM COATED ORAL
Qty: 30 TABLET | Refills: 0 | Status: SHIPPED | OUTPATIENT
Start: 2021-05-19 | End: 2021-06-08

## 2021-05-19 NOTE — PROGRESS NOTES
"Chief Complaint   Patient presents with   • Nausea   • Anxiety     HPI  Alexei Merino III is a 70 y.o. male that presents for   Chief Complaint   Patient presents with   • Nausea   • Anxiety     Nausea: patient reports nausea for the last 3 years. This began w/ CABG 3 years ago. Not eating makes his diet worse. Denies heartburn/reflux. Does regurgitate food if he doesn't eat. Rare vomiting, just nauseous. No abd pain/distention, dysphagia. Taking one Zofran daily and hydroxyzine BID w/ reasonable control. Never tried metoclopramide. He has always eaten small meals- \"like a bird.\"    AAA: recent US reveals growing aneurysm w/ size 5.3cm. Currently following w/ vascular surgery- Rafael. Plans for surgical repair following CT to better visualize aneurysm    COPD: continues to smoke. He complains of notable SOB but only mild, non-productive cough.    CAD: s/p 3V CABG 3/2016. Maintained on ASA 81, atorvastatin 20 daily. Unable to tolerate COREG due to nausea. He denies CP. He continues to smoke. Follows w/ [facility]- Ernestina    Review of Systems   Constitutional: Negative for chills and fever.   HENT: Negative for trouble swallowing.    Eyes: Negative for visual disturbance.   Respiratory: Positive for cough and shortness of breath.    Cardiovascular: Negative for chest pain and palpitations.   Gastrointestinal: Positive for nausea. Negative for abdominal distention, abdominal pain, vomiting, GERD and indigestion.     The following portions of the patient's history were reviewed and updated as appropriate: problem list, past medical history, past surgical history, allergies, current medication    Problem List Tab  Patient History Tab  Immunizations Tab  Medications Tab  Chart Review Tab  Care Everywhere Tab  Synopsis Tab    PE  Vitals:    05/19/21 1450   BP: 130/60   Pulse: 89   Resp: 16   Temp: 97.8 °F (36.6 °C)   SpO2: 98%     Body mass index is 17.9 kg/m².  General: Well nourished, NAD  Head: AT/NC  Eyes: EOMI, anicteric " sclera  ENT: Hearing loss  Resp: CTAB, SCR, BS equal. Diffusely diminished  CV: RRR w/o m/r/g; 2+ pulses  GI: Soft, NT/ND, +BS  MSK: FROM, no deformity, no edema  Skin: Warm, dry, intact  Neuro: Alert and oriented. No focal deficits  Psych: Appropriate mood and affect    Imaging  US Aorta Limited    Result Date: 5/14/2021  IMPRESSION :  1. There is evidence of an infrarenal abdominal aortic aneurysm measuring approximately 4.5 x 5.3 cm in its greatest dimensions. This is an interval change. Previously the abdominal aorta measured 3.7 x 4.3 cm in its greatest dimension. I would recommend vascular surgical consultation in light of the interval change from the previous examination.  Electronically Signed By-Ford Morris MD On:5/14/2021 3:37 PM This report was finalized on [id number] by  Ford Morris MD.      Assessment/Plan   Alexei Merino III is a 70 y.o. male that presents for   Chief Complaint   Patient presents with   • Nausea   • Anxiety     Diagnoses and all orders for this visit:    1. Nausea (Primary): Based on history of chronic nausea and regurgitation of food, I am concerned about delayed gastric emptying or poor motility.  Will obtain emptying study as below.  Would also like to trial metoclopramide to see how this does for patient's symptoms.  Patient has been maintained on regular Zofran multiple times a day for months.  We will thus obtain EKG today to obtain baseline QT interval.  This was obtained today and .  -     NM Gastric Emptying; Future  -     ECG 12 Lead  -     Start metoclopramide (REGLAN) 5 MG tablet; Take 1 tablet by mouth 3 (Three) Times a Day Before Meals.  Dispense: 90 tablet; Refill: 2  - Continue Zofran 1-2 times daily as needed  - Consider GI referral    2. Abdominal aortic aneurysm (AAA) without rupture (CMS/HCC): Recent imaging with 5.3 cm abdominal aortic aneurysm.  This is grown more than 1 cm in the last 2 years.  Currently following with vascular surgery with  plan for upcoming surgery   -CT abdomen per vascular   -Continue vascular surgery [id number]   -Counseled regarding importance of smoking cessation    3. Chronic obstructive pulmonary disease, unspecified COPD type (CMS/HCC): Emphysema seen on imaging.  He has not had PFTs.  -     Start tiotropium bromide monohydrate (Spiriva Respimat) 2.5 MCG/ACT aerosol solution inhaler; Inhale 2 puffs Daily.  Dispense: 4 g; Refill: 5  -     Full Pulmonary Function Test With Bronchodilator & ABG; Future  - Counseled regarding importance of smoking cessation    4. Coronary artery disease involving coronary bypass graft of native heart without angina pectoris: s/p 3V CABG 3/2016   -Continue home aspirin 81, atorvastatin 40, Coreg 3.125   -Continue cardiology [id number]    5. Preventative health care  -     CBC & Differential  -     Comprehensive Metabolic Panel  -     Hemoglobin A1c  -     Lipid Panel  -     TSH  -     T4, Free  -     Vitamin D 25 Hydroxy  -     Vitamin B12  -     PSA Screen    6. Vitamin D deficiency, unspecified   -     Vitamin D 25 Hydroxy    7. Encounter for screening for malignant neoplasm of prostate   -     PSA Screen    8. Anemia, unspecified type: This was added on after initial labs revealed anemia with hemoglobin 12.0  -     Ferritin  -     Iron and TIBC     Return in about 3 months (around 8/19/2021) for Medicare Wellness.

## 2021-05-20 DIAGNOSIS — D64.9 ANEMIA, UNSPECIFIED TYPE: Primary | ICD-10-CM

## 2021-05-20 LAB
25(OH)D3 SERPL-MCNC: 12 NG/ML (ref 30–100)
ALBUMIN SERPL-MCNC: 4.6 G/DL (ref 3.5–5.2)
ALBUMIN/GLOB SERPL: 2 G/DL
ALP SERPL-CCNC: 89 U/L (ref 39–117)
ALT SERPL W P-5'-P-CCNC: 16 U/L (ref 1–41)
ANION GAP SERPL CALCULATED.3IONS-SCNC: 9.6 MMOL/L (ref 5–15)
AST SERPL-CCNC: 14 U/L (ref 1–40)
BASOPHILS # BLD AUTO: 0.03 10*3/MM3 (ref 0–0.2)
BASOPHILS NFR BLD AUTO: 0.6 % (ref 0–1.5)
BILIRUB SERPL-MCNC: 0.4 MG/DL (ref 0–1.2)
BUN SERPL-MCNC: 9 MG/DL (ref 8–23)
BUN/CREAT SERPL: 11 (ref 7–25)
CALCIUM SPEC-SCNC: 9.4 MG/DL (ref 8.6–10.5)
CHLORIDE SERPL-SCNC: 94 MMOL/L (ref 98–107)
CHOLEST SERPL-MCNC: 220 MG/DL (ref 0–200)
CO2 SERPL-SCNC: 29.4 MMOL/L (ref 22–29)
CREAT SERPL-MCNC: 0.82 MG/DL (ref 0.76–1.27)
DEPRECATED RDW RBC AUTO: 42.8 FL (ref 37–54)
EOSINOPHIL # BLD AUTO: 0.11 10*3/MM3 (ref 0–0.4)
EOSINOPHIL NFR BLD AUTO: 2.1 % (ref 0.3–6.2)
ERYTHROCYTE [DISTWIDTH] IN BLOOD BY AUTOMATED COUNT: 13.9 % (ref 12.3–15.4)
FERRITIN SERPL-MCNC: 190 NG/ML (ref 30–400)
GFR SERPL CREATININE-BSD FRML MDRD: 93 ML/MIN/1.73
GLOBULIN UR ELPH-MCNC: 2.3 GM/DL
GLUCOSE SERPL-MCNC: 89 MG/DL (ref 65–99)
HBA1C MFR BLD: 5.6 % (ref 3.5–5.6)
HCT VFR BLD AUTO: 34.9 % (ref 37.5–51)
HDLC SERPL-MCNC: 60 MG/DL (ref 40–60)
HGB BLD-MCNC: 12 G/DL (ref 13–17.7)
IMM GRANULOCYTES # BLD AUTO: 0.02 10*3/MM3 (ref 0–0.05)
IMM GRANULOCYTES NFR BLD AUTO: 0.4 % (ref 0–0.5)
IRON 24H UR-MRATE: 65 MCG/DL (ref 59–158)
IRON SATN MFR SERPL: 13 % (ref 20–50)
LDLC SERPL CALC-MCNC: 142 MG/DL (ref 0–100)
LDLC/HDLC SERPL: 2.32 {RATIO}
LYMPHOCYTES # BLD AUTO: 1.47 10*3/MM3 (ref 0.7–3.1)
LYMPHOCYTES NFR BLD AUTO: 28.5 % (ref 19.6–45.3)
MCH RBC QN AUTO: 29.6 PG (ref 26.6–33)
MCHC RBC AUTO-ENTMCNC: 34.4 G/DL (ref 31.5–35.7)
MCV RBC AUTO: 86.2 FL (ref 79–97)
MONOCYTES # BLD AUTO: 0.75 10*3/MM3 (ref 0.1–0.9)
MONOCYTES NFR BLD AUTO: 14.6 % (ref 5–12)
NEUTROPHILS NFR BLD AUTO: 2.77 10*3/MM3 (ref 1.7–7)
NEUTROPHILS NFR BLD AUTO: 53.8 % (ref 42.7–76)
NRBC BLD AUTO-RTO: 0 /100 WBC (ref 0–0.2)
PLATELET # BLD AUTO: 245 10*3/MM3 (ref 140–450)
PMV BLD AUTO: 9 FL (ref 6–12)
POTASSIUM SERPL-SCNC: 5.2 MMOL/L (ref 3.5–5.2)
PROT SERPL-MCNC: 6.9 G/DL (ref 6–8.5)
PSA SERPL-MCNC: 0.9 NG/ML (ref 0–4)
RBC # BLD AUTO: 4.05 10*6/MM3 (ref 4.14–5.8)
SODIUM SERPL-SCNC: 133 MMOL/L (ref 136–145)
T4 FREE SERPL-MCNC: 1 NG/DL (ref 0.93–1.7)
TIBC SERPL-MCNC: 487 MCG/DL (ref 298–536)
TRANSFERRIN SERPL-MCNC: 327 MG/DL (ref 200–360)
TRIGL SERPL-MCNC: 104 MG/DL (ref 0–150)
TSH SERPL DL<=0.05 MIU/L-ACNC: 1.4 UIU/ML (ref 0.27–4.2)
VIT B12 BLD-MCNC: 279 PG/ML (ref 211–946)
VLDLC SERPL-MCNC: 18 MG/DL (ref 5–40)
WBC # BLD AUTO: 5.15 10*3/MM3 (ref 3.4–10.8)

## 2021-05-21 DIAGNOSIS — E78.5 HYPERLIPIDEMIA, UNSPECIFIED HYPERLIPIDEMIA TYPE: ICD-10-CM

## 2021-05-21 DIAGNOSIS — I25.810 CORONARY ARTERY DISEASE INVOLVING CORONARY BYPASS GRAFT OF NATIVE HEART WITHOUT ANGINA PECTORIS: ICD-10-CM

## 2021-05-21 RX ORDER — ATORVASTATIN CALCIUM 40 MG/1
40 TABLET, FILM COATED ORAL DAILY
Qty: 90 TABLET | Refills: 3 | Status: SHIPPED | OUTPATIENT
Start: 2021-05-21 | End: 2021-08-25

## 2021-05-21 RX ORDER — ERGOCALCIFEROL 1.25 MG/1
50000 CAPSULE ORAL WEEKLY
Qty: 12 CAPSULE | Refills: 1 | Status: SHIPPED | OUTPATIENT
Start: 2021-05-21 | End: 2021-11-23

## 2021-05-24 ENCOUNTER — HOSPITAL ENCOUNTER (OUTPATIENT)
Dept: CT IMAGING | Facility: HOSPITAL | Age: 71
Discharge: HOME | End: 2021-05-24
Admitting: THORACIC SURGERY (CARDIOTHORACIC VASCULAR SURGERY)

## 2021-05-24 DIAGNOSIS — I71.40 ABDOMINAL AORTIC ANEURYSM WITHOUT RUPTURE (HCC): ICD-10-CM

## 2021-05-24 PROCEDURE — 0 IOPAMIDOL PER 1 ML: Performed by: THORACIC SURGERY (CARDIOTHORACIC VASCULAR SURGERY)

## 2021-05-24 PROCEDURE — 74174 CTA ABD&PLVS W/CONTRAST: CPT

## 2021-05-24 RX ADMIN — IOPAMIDOL 100 ML: 755 INJECTION, SOLUTION INTRAVENOUS at 13:16

## 2021-05-28 DIAGNOSIS — F41.8 ANXIETY WITH DEPRESSION: ICD-10-CM

## 2021-06-01 RX ORDER — HYDROXYZINE HYDROCHLORIDE 25 MG/1
TABLET, FILM COATED ORAL
Qty: 60 TABLET | Refills: 2 | Status: SHIPPED | OUTPATIENT
Start: 2021-06-01 | End: 2021-07-27

## 2021-06-07 DIAGNOSIS — R11.0 NAUSEA: ICD-10-CM

## 2021-06-08 RX ORDER — ONDANSETRON 4 MG/1
TABLET, FILM COATED ORAL
Qty: 30 TABLET | Refills: 0 | Status: SHIPPED | OUTPATIENT
Start: 2021-06-08 | End: 2021-07-23

## 2021-07-22 DIAGNOSIS — R11.0 NAUSEA: ICD-10-CM

## 2021-07-23 RX ORDER — ONDANSETRON 4 MG/1
TABLET, FILM COATED ORAL
Qty: 30 TABLET | Refills: 0 | Status: SHIPPED | OUTPATIENT
Start: 2021-07-23 | End: 2021-08-06

## 2021-07-24 DIAGNOSIS — F41.8 ANXIETY WITH DEPRESSION: ICD-10-CM

## 2021-07-27 RX ORDER — HYDROXYZINE HYDROCHLORIDE 25 MG/1
TABLET, FILM COATED ORAL
Qty: 60 TABLET | Refills: 1 | Status: SHIPPED | OUTPATIENT
Start: 2021-07-27 | End: 2021-08-20

## 2021-08-06 DIAGNOSIS — R11.0 NAUSEA: ICD-10-CM

## 2021-08-09 RX ORDER — ONDANSETRON 4 MG/1
TABLET, FILM COATED ORAL
Qty: 30 TABLET | Refills: 1 | Status: SHIPPED | OUTPATIENT
Start: 2021-08-09 | End: 2021-09-03

## 2021-08-20 DIAGNOSIS — F41.8 ANXIETY WITH DEPRESSION: ICD-10-CM

## 2021-08-20 RX ORDER — HYDROXYZINE HYDROCHLORIDE 25 MG/1
TABLET, FILM COATED ORAL
Qty: 60 TABLET | Refills: 1 | Status: SHIPPED | OUTPATIENT
Start: 2021-08-20 | End: 2021-09-03 | Stop reason: SDUPTHER

## 2021-08-20 RX ORDER — HYDROXYZINE HYDROCHLORIDE 25 MG/1
TABLET, FILM COATED ORAL
Qty: 32 TABLET | Refills: 3 | Status: SHIPPED | OUTPATIENT
Start: 2021-08-20 | End: 2021-12-23 | Stop reason: SDUPTHER

## 2021-08-25 ENCOUNTER — OFFICE VISIT (OUTPATIENT)
Dept: FAMILY MEDICINE CLINIC | Facility: CLINIC | Age: 71
End: 2021-08-25

## 2021-08-25 VITALS
HEIGHT: 72 IN | SYSTOLIC BLOOD PRESSURE: 138 MMHG | HEART RATE: 82 BPM | BODY MASS INDEX: 19.37 KG/M2 | OXYGEN SATURATION: 98 % | DIASTOLIC BLOOD PRESSURE: 80 MMHG | RESPIRATION RATE: 16 BRPM | TEMPERATURE: 98.3 F | WEIGHT: 143 LBS

## 2021-08-25 DIAGNOSIS — I71.40 ABDOMINAL AORTIC ANEURYSM (AAA) WITHOUT RUPTURE (HCC): ICD-10-CM

## 2021-08-25 DIAGNOSIS — R93.7 ABNORMAL FINDINGS ON DIAGNOSTIC IMAGING OF OTHER PARTS OF MUSCULOSKELETAL SYSTEM: ICD-10-CM

## 2021-08-25 DIAGNOSIS — I25.810 CORONARY ARTERY DISEASE INVOLVING CORONARY BYPASS GRAFT OF NATIVE HEART WITHOUT ANGINA PECTORIS: ICD-10-CM

## 2021-08-25 DIAGNOSIS — R11.2 NAUSEA AND VOMITING, INTRACTABILITY OF VOMITING NOT SPECIFIED, UNSPECIFIED VOMITING TYPE: ICD-10-CM

## 2021-08-25 DIAGNOSIS — Z87.891 PERSONAL HISTORY OF NICOTINE DEPENDENCE: ICD-10-CM

## 2021-08-25 DIAGNOSIS — Z00.00 MEDICARE ANNUAL WELLNESS VISIT, SUBSEQUENT: Primary | ICD-10-CM

## 2021-08-25 DIAGNOSIS — D64.9 ANEMIA, UNSPECIFIED TYPE: ICD-10-CM

## 2021-08-25 DIAGNOSIS — E78.5 HYPERLIPIDEMIA, UNSPECIFIED HYPERLIPIDEMIA TYPE: ICD-10-CM

## 2021-08-25 PROBLEM — F41.9 ANXIETY: Status: RESOLVED | Noted: 2021-05-19 | Resolved: 2021-08-25

## 2021-08-25 PROCEDURE — 99213 OFFICE O/P EST LOW 20 MIN: CPT | Performed by: FAMILY MEDICINE

## 2021-08-25 PROCEDURE — G0439 PPPS, SUBSEQ VISIT: HCPCS | Performed by: FAMILY MEDICINE

## 2021-08-25 NOTE — PROGRESS NOTES
The ABCs of the Annual Wellness Visit  Subsequent Medicare Wellness Visit    Chief Complaint   Patient presents with   • Medicare Wellness-subsequent       Subjective   History of Present Illness:  Alexei Merino III is a 70 y.o. male who presents for a Subsequent Medicare Wellness Visit.    Nausea: patient reports that he was never contacted about a gastric emptying study. He has been taking the metoclopramide and feels it has been helping. He is currently using the Zofran just once daily.     AAA: follows w/ surgery- [facility] for growing aneurysm. Patient is not sure when his f/u appt is.     CAD: s/p 3V CABG 3/2016. Maintained on ASA 81, atorvastatin 40 daily. He denies CP, palpitations, SOB. He continues to smoke. BP well controlled today. Follows w/ [facility]- Ernest[facility]    HLD: lipid panel from 3 months ago revealed persistently elevated cholesterol and atorvastatin was increased to 40mg daily. No myalgias or other complaints on this medicine.     Anemia: patient was started on Fe supplement 3 months ago due to concern for Fe deficiency. He has been taking this most everyday. He denies bloody stool    HEALTH RISK ASSESSMENT    Recent Hospitalizations:  No hospitalization(s) within the last year.    Current Medical Providers:  Patient Care Team:  Raheem Mckeon MD as PCP - General (Internal Medicine)    Smoking Status:  Social History     Tobacco Use   Smoking Status Current Every Day Smoker   • Packs/day: 2.00   • Years: 50.00   • Pack years: 100.00   Smokeless Tobacco Never Used   Trying to cut back    Alcohol Consumption:  Social History     Substance and Sexual Activity   Alcohol Use No     Depression Screen:   PHQ-2/PHQ-9 Depression Screening 5/19/2021   Little interest or pleasure in doing things 2   Feeling down, depressed, or hopeless 3   Trouble falling or staying asleep, or sleeping too much 2   Feeling tired or having little energy 3   Poor appetite or overeating 3   Feeling bad about yourself - or that  you are a failure or have let yourself or your family down 0   Trouble concentrating on things, such as reading the newspaper or watching television 0   Moving or speaking so slowly that other people could have noticed. Or the opposite - being so fidgety or restless that you have been moving around a lot more than usual 0   Thoughts that you would be better off dead, or of hurting yourself in some way 0   Total Score 13   If you checked off any problems, how difficult have these problems made it for you to do your work, take care of things at home, or get along with other people? Somewhat difficult   Denies feeling down/depressed at this time. Mostly related to being inside so much.     Fall Risk Screen:  FREDI Fall Risk Assessment was completed, and patient is at HIGH risk for falls. Assessment completed on:5/19/2021  No falls in the last year    Health Habits and Functional and Cognitive Screening:  Functional & Cognitive Status 8/25/2021   Do you have difficulty preparing food and eating? No   Do you have difficulty bathing yourself, getting dressed or grooming yourself? No   Do you have difficulty using the toilet? No   Do you have difficulty moving around from place to place? No   Do you have trouble with steps or getting out of a bed or a chair? No   Current Diet Well Balanced Diet   Dental Exam Up to date   Eye Exam Not up to date   Exercise (times per week) 7 times per week   Current Exercises Include Walking   Do you need help using the phone?  No   Are you deaf or do you have serious difficulty hearing?  No   Do you need help with transportation? No   Do you need help shopping? No   Do you need help preparing meals?  No   Do you need help with housework?  No   Do you need help with laundry? No   Do you need help taking your medications? No   Do you need help managing money? No   Do you ever drive or ride in a car without wearing a seat belt? No   Have you felt unusual stress, anger or loneliness in the  last month? Yes   Who do you live with? Spouse   If you need help, do you have trouble finding someone available to you? No   Have you been bothered in the last four weeks by sexual problems? No   Do you have difficulty concentrating, remembering or making decisions? No     Does the patient have evidence of cognitive impairment? No    Asprin use counseling:Does not need ASA (and currently is not on it)    Age-appropriate Screening Schedule:  Refer to the list below for future screening recommendations based on patient's age, sex and/or medical conditions. Orders for these recommended tests are listed in the plan section. The patient has been provided with a written plan.    Health Maintenance   Topic Date Due   • TDAP/TD VACCINES (1 - Tdap) Never done   • ZOSTER VACCINE (1 of 2) 05/28/2022 (Originally 10/7/2000)   • INFLUENZA VACCINE  10/01/2021   • LIPID PANEL  05/19/2022          The following portions of the patient's history were reviewed and updated as appropriate: allergies, current medications, past family history, past medical history, past social history, past surgical history and problem list.    Outpatient Medications Prior to Visit   Medication Sig Dispense Refill   • aspirin (aspirin) 81 MG EC tablet Take 81 mg by mouth Daily.     • ergocalciferol (ERGOCALCIFEROL) 1.25 MG (17203 UT) capsule Take 1 capsule by mouth 1 (One) Time Per Week. 12 capsule 1   • hydrOXYzine (ATARAX) 25 MG tablet TAKE ONE TABLET BY MOUTH EVERY 6 HOURS AS NEEDED FOR ANXIETY 60 tablet 1   • hydrOXYzine (ATARAX) 25 MG tablet TAKE ONE TABLET BY MOUTH EVERY 6 HOURS AS NEEDED FOR ANXIETY 32 tablet 3   • Iron, Ferrous Sulfate, 325 (65 Fe) MG tablet Take 1 tablet by mouth Daily. 90 tablet 1   • metoclopramide (REGLAN) 5 MG tablet Take 1 tablet by mouth 3 (Three) Times a Day Before Meals. 90 tablet 2   • tiotropium bromide monohydrate (Spiriva Respimat) 2.5 MCG/ACT aerosol solution inhaler Inhale 2 puffs Daily. 4 g 5   • ondansetron  (ZOFRAN) 4 MG tablet TAKE ONE TABLET BY MOUTH EVERY 12 HOURS AS NEEDED FOR NAUSEA AND VOMITING 30 tablet 1   • atorvastatin (LIPITOR) 40 MG tablet Take 1 tablet by mouth Daily. 90 tablet 3   • carvedilol (COREG) 3.125 MG tablet TAKE ONE TABLET BY MOUTH TWICE A DAY WITH MEALS 90 tablet 0   • escitalopram (LEXAPRO) 10 MG tablet TAKE ONE TABLET BY MOUTH DAILY 90 tablet 2   • tamsulosin (FLOMAX) 0.4 MG capsule 24 hr capsule TAKE ONE CAPSULE BY MOUTH DAILY 90 capsule 2     No facility-administered medications prior to visit.     Patient Active Problem List   Diagnosis   • S/P CABG x 3   • Coronary artery disease   • Hyperlipidemia   • Hypertension   • Abdominal aortic aneurysm (AAA) (CMS/HCC)   • Actinic keratoses   • Anxiety with depression   • Chronic cough   • Decreased hearing, bilateral   • Delayed gastric emptying   • Dementia (CMS/HCC)   • Dental infection   • Encounter for screening for malignant neoplasm of colon   • Exposure to venereal disease   • Fatigue   • Hay fever   • Nausea and vomiting   • Tobacco abuse counseling   • Chronic sinusitis   • Sinusitis, acute   • Smoking greater than 30 pack years   • Tobacco use   • Unsteady gait   • Weight loss   • Underweight   • Chronic obstructive pulmonary disease (CMS/HCC)     Advanced Care Planning:  ACP discussion was held with the patient during this visit. Patient has an advance directive in EMR which is still valid.     Review of Systems   Constitutional: Negative for chills, fatigue and unexpected weight change.   HENT: Negative for congestion and rhinorrhea.    Eyes: Negative for visual disturbance.   Respiratory: Negative for cough and shortness of breath.    Cardiovascular: Negative for chest pain and palpitations.   Gastrointestinal: Positive for nausea. Negative for abdominal pain, blood in stool, constipation, diarrhea and vomiting.   Genitourinary: Negative for difficulty urinating.   Musculoskeletal: Positive for arthralgias. Negative for joint  "swelling and myalgias.   Skin: Negative for rash.   Neurological: Negative for dizziness, weakness, light-headedness and headaches.   Psychiatric/Behavioral: Negative for dysphoric mood. The patient is not nervous/anxious.      Compared to one year ago, the patient feels his physical health is better.  Compared to one year ago, the patient feels his mental health is better.    Reviewed chart for potential of high risk medication in the elderly: yes  Reviewed chart for potential of harmful drug interactions in the elderly:yes    Objective     Vitals:    08/25/21 1311   BP: 138/80   BP Location: Right arm   Patient Position: Sitting   Cuff Size: Adult   Pulse: 82   Resp: 16   Temp: 98.3 °F (36.8 °C)   TempSrc: Oral   SpO2: 98%   Weight: 64.9 kg (143 lb)   Height: 182.9 cm (72\")     Body mass index is 19.39 kg/m².  Discussed the patient's BMI with him. The BMI is in the acceptable range.    Physical Exam  Constitutional:       General: He is not in acute distress.     Appearance: He is well-developed.   HENT:      Head: Normocephalic and atraumatic.      Right Ear: Tympanic membrane and external ear normal. There is no impacted cerumen.      Left Ear: Tympanic membrane and external ear normal. There is no impacted cerumen.      Ears:      Comments: Hearing loss     Nose: Nose normal.      Mouth/Throat:      Mouth: Mucous membranes are moist.      Pharynx: No oropharyngeal exudate or posterior oropharyngeal erythema.   Eyes:      General: No scleral icterus.        Right eye: No discharge.         Left eye: No discharge.      Extraocular Movements: Extraocular movements intact.      Conjunctiva/sclera: Conjunctivae normal.      Pupils: Pupils are equal, round, and reactive to light.   Neck:      Thyroid: No thyromegaly.      Vascular: No carotid bruit.   Cardiovascular:      Rate and Rhythm: Normal rate and regular rhythm.      Heart sounds: Normal heart sounds. No murmur heard.     Pulmonary:      Effort: Pulmonary " effort is normal. No respiratory distress.      Breath sounds: Normal breath sounds. No wheezing or rales.   Abdominal:      General: Bowel sounds are normal. There is no distension.      Palpations: Abdomen is soft.      Tenderness: There is no abdominal tenderness.   Musculoskeletal:         General: No deformity. Normal range of motion.      Cervical back: Normal range of motion and neck supple.   Lymphadenopathy:      Cervical: No cervical adenopathy.   Skin:     General: Skin is warm and dry.      Findings: No rash.   Neurological:      General: No focal deficit present.      Mental Status: He is alert and oriented to person, place, and time. Mental status is at baseline.      Cranial Nerves: No cranial nerve deficit.      Sensory: No sensory deficit.   Psychiatric:         Behavior: Behavior normal.         Thought Content: Thought content normal.           Assessment/Plan   Medicare Risks and Personalized Health Plan  CMS Preventative Services Quick Reference  Abdominal Aortic Aneurysm Screening  Advance Directive Discussion  Cardiovascular risk  Chronic Pain   Colon Cancer Screening  Dementia/Memory   Depression/Dysphoria  Diabetic Lab Screening   Fall Risk  Immunizations Discussed/Encouraged (specific immunizations; Tdap, Influenza, Shingrix and COVID19 )  Lung Cancer Risk  Osteoporosis Risk  Polypharmacy  Prostate Cancer Screening     The above risks/problems have been discussed with the patient.  Pertinent information has been shared with the patient in the After Visit Summary.  Follow up plans and orders are seen below in the Assessment/Plan Section.    Diagnoses and all orders for this visit:    1. Medicare annual wellness visit, subsequent (Primary)  -      CT Chest Low Dose Cancer Screening WO; Future  -     CBC (No Diff)  -     Lipid Panel  -     DEXA Bone Density Axial; Future  -     Comprehensive Metabolic Panel  - Recent labs reviewed.  No need to repeat entire panel today  -  Counseled regarding  diet, exercise and preventative health maintenance items/immunizations below  - Counseled regarding receiving 2nd code immunization    2. Nausea and vomiting, intractability of vomiting not specified, unspecified vomiting type: Patient with chronic nausea/vomiting concerning for gastroparesis.  Gastric emptying study previously ordered but never completed.  Patient states he was never contacted.  Will reorder today.  In the meantime, patient reports his symptoms have improved on metoclopramide  -     NM Gastric Emptying; Future  - Continue home metoclopramide 5 mg 3 times daily  - Continue home Zofran as needed    3. Abdominal aortic aneurysm (AAA) without rupture (CMS/HCC): Currently with 4.6 cm infrarenal abdominal aortic aneurysm.  This will require monitoring every 6 months at this time.  Currently following with surgeon.    -Continue surgery [id number]   -Repeat imaging due November 2021    4. Coronary artery disease involving coronary bypass graft of native heart without angina pectoris: s/p 3V CABG 3/2016   -Continue home aspirin 81 and atorvastatin 40 daily    5. Hyperlipidemia, unspecified hyperlipidemia type  -     Lipid Panel  - Continue home atorvastatin 40 mg daily    6. Anemia, unspecified type: As seen on labs from May 2021.  Concerning for iron deficiency.  Patient was started on iron supplement and will repeat labs at this time.  May need colonoscopy  -     CBC (No Diff)  -     Iron and TIBC  -     Ferritin  - Continue home iron supplement    7. Personal history of nicotine dependence   -      CT Chest Low Dose Cancer Screening WO; Future    8. Abnormal findings on diagnostic imaging of other parts of musculoskeletal system   -     DEXA Bone Density Axial; Future    Preventative:  Colonoscopy: 4/2019, due 2024- report requested  PSA: 0.897 (5/2021)  CT chest: 4/2019, ordered today  AAA: follows w/ thoracic surgery ([facility])  DEXA: Ordered today  Shingles: Recommended  Pneumonia: Completed  Pneumovax 11/2015  Tdap: Recommended, deferred  Influenza: 11/2020, recommended  COVID: Completed 1 of 2 ([employer])    Follow Up:  Return in about 4 months (around 12/25/2021) for Recheck- 30min- N/V, AAA.     An After Visit Summary and PPPS were given to the patient.

## 2021-08-26 RX ORDER — ATORVASTATIN CALCIUM 40 MG/1
40 TABLET, FILM COATED ORAL DAILY
Qty: 90 TABLET | Refills: 3 | Status: SHIPPED | OUTPATIENT
Start: 2021-08-26 | End: 2021-10-27 | Stop reason: SDUPTHER

## 2021-09-02 ENCOUNTER — TELEPHONE (OUTPATIENT)
Dept: FAMILY MEDICINE CLINIC | Facility: CLINIC | Age: 71
End: 2021-09-02

## 2021-09-02 NOTE — TELEPHONE ENCOUNTER
Pt was supposed to have labs drawl while at the office and he left without them getting drawn. Can you please call pt and have him schedule a lab appt. Please and thank you. Labs are ordered

## 2021-09-03 DIAGNOSIS — F41.8 ANXIETY WITH DEPRESSION: ICD-10-CM

## 2021-09-03 DIAGNOSIS — R11.0 NAUSEA: ICD-10-CM

## 2021-09-03 RX ORDER — HYDROXYZINE HYDROCHLORIDE 25 MG/1
25 TABLET, FILM COATED ORAL EVERY 6 HOURS PRN
Qty: 60 TABLET | Refills: 1 | Status: SHIPPED | OUTPATIENT
Start: 2021-09-03 | End: 2021-10-28 | Stop reason: SDUPTHER

## 2021-09-03 RX ORDER — ONDANSETRON 4 MG/1
TABLET, FILM COATED ORAL
Qty: 30 TABLET | Refills: 1 | Status: SHIPPED | OUTPATIENT
Start: 2021-09-03 | End: 2021-10-15 | Stop reason: SDUPTHER

## 2021-09-03 RX ORDER — ONDANSETRON 4 MG/1
4 TABLET, FILM COATED ORAL EVERY 12 HOURS PRN
Qty: 30 TABLET | Refills: 1 | Status: SHIPPED | OUTPATIENT
Start: 2021-09-03 | End: 2021-10-15 | Stop reason: SDUPTHER

## 2021-10-15 RX ORDER — ONDANSETRON 4 MG/1
TABLET, FILM COATED ORAL
Qty: 30 TABLET | Refills: 0 | Status: SHIPPED | OUTPATIENT
Start: 2021-10-15 | End: 2021-10-27 | Stop reason: SDUPTHER

## 2021-10-27 DIAGNOSIS — I25.810 CORONARY ARTERY DISEASE INVOLVING CORONARY BYPASS GRAFT OF NATIVE HEART WITHOUT ANGINA PECTORIS: ICD-10-CM

## 2021-10-27 DIAGNOSIS — E78.5 HYPERLIPIDEMIA, UNSPECIFIED HYPERLIPIDEMIA TYPE: ICD-10-CM

## 2021-10-27 RX ORDER — ATORVASTATIN CALCIUM 40 MG/1
40 TABLET, FILM COATED ORAL DAILY
Qty: 90 TABLET | Refills: 1 | Status: SHIPPED | OUTPATIENT
Start: 2021-10-27 | End: 2022-01-14

## 2021-10-27 RX ORDER — ONDANSETRON 4 MG/1
4 TABLET, FILM COATED ORAL EVERY 12 HOURS PRN
Qty: 30 TABLET | Refills: 0 | Status: SHIPPED | OUTPATIENT
Start: 2021-10-27 | End: 2021-11-09

## 2021-10-28 DIAGNOSIS — F41.8 ANXIETY WITH DEPRESSION: ICD-10-CM

## 2021-10-28 RX ORDER — HYDROXYZINE HYDROCHLORIDE 25 MG/1
25 TABLET, FILM COATED ORAL EVERY 6 HOURS PRN
Qty: 60 TABLET | Refills: 1 | Status: SHIPPED | OUTPATIENT
Start: 2021-10-28 | End: 2021-11-18

## 2021-11-09 RX ORDER — ONDANSETRON 4 MG/1
TABLET, FILM COATED ORAL
Qty: 30 TABLET | Refills: 0 | Status: SHIPPED | OUTPATIENT
Start: 2021-11-09 | End: 2021-11-18

## 2021-11-17 DIAGNOSIS — F41.8 ANXIETY WITH DEPRESSION: ICD-10-CM

## 2021-11-17 RX ORDER — HYDROXYZINE HYDROCHLORIDE 25 MG/1
25 TABLET, FILM COATED ORAL EVERY 6 HOURS PRN
Qty: 60 TABLET | Refills: 1 | Status: CANCELLED | OUTPATIENT
Start: 2021-11-17

## 2021-11-17 RX ORDER — ONDANSETRON 4 MG/1
4 TABLET, FILM COATED ORAL EVERY 12 HOURS PRN
Qty: 30 TABLET | Refills: 0 | Status: CANCELLED | OUTPATIENT
Start: 2021-11-17

## 2021-11-18 RX ORDER — ONDANSETRON 4 MG/1
TABLET, FILM COATED ORAL
Qty: 30 TABLET | Refills: 0 | Status: SHIPPED | OUTPATIENT
Start: 2021-11-18 | End: 2021-11-28 | Stop reason: SDUPTHER

## 2021-11-18 RX ORDER — HYDROXYZINE HYDROCHLORIDE 25 MG/1
TABLET, FILM COATED ORAL
Qty: 60 TABLET | Refills: 1 | Status: SHIPPED | OUTPATIENT
Start: 2021-11-18 | End: 2021-12-28

## 2021-11-23 RX ORDER — ERGOCALCIFEROL 1.25 MG/1
CAPSULE ORAL
Qty: 12 CAPSULE | Refills: 1 | Status: SHIPPED | OUTPATIENT
Start: 2021-11-23

## 2021-11-29 RX ORDER — ONDANSETRON 4 MG/1
TABLET, FILM COATED ORAL
Qty: 30 TABLET | Refills: 0 | OUTPATIENT
Start: 2021-11-29

## 2021-11-29 RX ORDER — ONDANSETRON 4 MG/1
4 TABLET, FILM COATED ORAL EVERY 12 HOURS PRN
Qty: 30 TABLET | Refills: 0 | Status: SHIPPED | OUTPATIENT
Start: 2021-11-29 | End: 2021-12-28

## 2021-11-29 RX ORDER — ONDANSETRON 4 MG/1
4 TABLET, FILM COATED ORAL EVERY 12 HOURS PRN
Qty: 30 TABLET | Refills: 0 | Status: SHIPPED | OUTPATIENT
Start: 2021-11-29 | End: 2021-12-23 | Stop reason: SDUPTHER

## 2021-12-20 ENCOUNTER — TRANSCRIBE ORDERS (OUTPATIENT)
Dept: ADMINISTRATIVE | Facility: HOSPITAL | Age: 71
End: 2021-12-20

## 2021-12-20 DIAGNOSIS — I71.40 ABDOMINAL AORTIC ANEURYSM (AAA) WITHOUT RUPTURE (HCC): Primary | ICD-10-CM

## 2021-12-23 ENCOUNTER — HOSPITAL ENCOUNTER (OUTPATIENT)
Dept: ULTRASOUND IMAGING | Facility: HOSPITAL | Age: 71
Discharge: HOME | End: 2021-12-23
Admitting: THORACIC SURGERY (CARDIOTHORACIC VASCULAR SURGERY)

## 2021-12-23 DIAGNOSIS — F41.8 ANXIETY WITH DEPRESSION: ICD-10-CM

## 2021-12-23 DIAGNOSIS — I71.40 ABDOMINAL AORTIC ANEURYSM (AAA) WITHOUT RUPTURE (HCC): ICD-10-CM

## 2021-12-23 PROCEDURE — 76775 US EXAM ABDO BACK WALL LIM: CPT

## 2021-12-23 RX ORDER — ONDANSETRON 4 MG/1
4 TABLET, FILM COATED ORAL EVERY 12 HOURS PRN
Qty: 30 TABLET | Refills: 0 | Status: SHIPPED | OUTPATIENT
Start: 2021-12-23 | End: 2022-01-30

## 2021-12-23 RX ORDER — HYDROXYZINE HYDROCHLORIDE 25 MG/1
TABLET, FILM COATED ORAL
Qty: 60 TABLET | Refills: 1 | Status: CANCELLED | OUTPATIENT
Start: 2021-12-23

## 2021-12-23 RX ORDER — HYDROXYZINE HYDROCHLORIDE 25 MG/1
25 TABLET, FILM COATED ORAL EVERY 6 HOURS PRN
Qty: 32 TABLET | Refills: 0 | Status: SHIPPED | OUTPATIENT
Start: 2021-12-23 | End: 2021-12-28 | Stop reason: SDUPTHER

## 2021-12-28 ENCOUNTER — LAB (OUTPATIENT)
Dept: FAMILY MEDICINE CLINIC | Facility: CLINIC | Age: 71
End: 2021-12-28

## 2021-12-28 ENCOUNTER — OFFICE VISIT (OUTPATIENT)
Dept: FAMILY MEDICINE CLINIC | Facility: CLINIC | Age: 71
End: 2021-12-28

## 2021-12-28 VITALS
SYSTOLIC BLOOD PRESSURE: 140 MMHG | HEIGHT: 72 IN | OXYGEN SATURATION: 99 % | RESPIRATION RATE: 18 BRPM | WEIGHT: 133.6 LBS | HEART RATE: 84 BPM | DIASTOLIC BLOOD PRESSURE: 82 MMHG | BODY MASS INDEX: 18.1 KG/M2 | TEMPERATURE: 97.8 F

## 2021-12-28 DIAGNOSIS — R26.81 UNSTEADY GAIT: ICD-10-CM

## 2021-12-28 DIAGNOSIS — F41.8 ANXIETY WITH DEPRESSION: ICD-10-CM

## 2021-12-28 DIAGNOSIS — R63.6 LOW WEIGHT: ICD-10-CM

## 2021-12-28 DIAGNOSIS — I71.40 ABDOMINAL AORTIC ANEURYSM (AAA) WITHOUT RUPTURE (HCC): Primary | ICD-10-CM

## 2021-12-28 DIAGNOSIS — H91.90 HEARING LOSS, UNSPECIFIED HEARING LOSS TYPE, UNSPECIFIED LATERALITY: ICD-10-CM

## 2021-12-28 DIAGNOSIS — R11.2 NAUSEA AND VOMITING, INTRACTABILITY OF VOMITING NOT SPECIFIED, UNSPECIFIED VOMITING TYPE: ICD-10-CM

## 2021-12-28 DIAGNOSIS — D64.9 ANEMIA, UNSPECIFIED TYPE: ICD-10-CM

## 2021-12-28 PROBLEM — Z12.11 ENCOUNTER FOR SCREENING FOR MALIGNANT NEOPLASM OF COLON: Status: RESOLVED | Noted: 2018-01-22 | Resolved: 2021-12-28

## 2021-12-28 PROCEDURE — 36415 COLL VENOUS BLD VENIPUNCTURE: CPT | Performed by: FAMILY MEDICINE

## 2021-12-28 PROCEDURE — 85045 AUTOMATED RETICULOCYTE COUNT: CPT | Performed by: FAMILY MEDICINE

## 2021-12-28 PROCEDURE — 99214 OFFICE O/P EST MOD 30 MIN: CPT | Performed by: FAMILY MEDICINE

## 2021-12-28 PROCEDURE — 80061 LIPID PANEL: CPT | Performed by: FAMILY MEDICINE

## 2021-12-28 PROCEDURE — 85027 COMPLETE CBC AUTOMATED: CPT | Performed by: FAMILY MEDICINE

## 2021-12-28 PROCEDURE — 84466 ASSAY OF TRANSFERRIN: CPT | Performed by: FAMILY MEDICINE

## 2021-12-28 PROCEDURE — 80053 COMPREHEN METABOLIC PANEL: CPT | Performed by: FAMILY MEDICINE

## 2021-12-28 PROCEDURE — 82728 ASSAY OF FERRITIN: CPT | Performed by: FAMILY MEDICINE

## 2021-12-28 PROCEDURE — 83540 ASSAY OF IRON: CPT | Performed by: FAMILY MEDICINE

## 2021-12-28 RX ORDER — TAMSULOSIN HYDROCHLORIDE 0.4 MG/1
1 CAPSULE ORAL DAILY
COMMUNITY
End: 2023-03-14

## 2021-12-28 RX ORDER — HYDROXYZINE HYDROCHLORIDE 25 MG/1
25 TABLET, FILM COATED ORAL EVERY 6 HOURS PRN
Qty: 60 TABLET | Refills: 3 | Status: SHIPPED | OUTPATIENT
Start: 2021-12-28 | End: 2022-01-03 | Stop reason: SDUPTHER

## 2021-12-28 NOTE — PROGRESS NOTES
Chief Complaint   Patient presents with   • COPD   • Hypertension   • Hyperlipidemia   • GI Problem     HPI  Alexei Merino III is a 71 y.o. male that presents for   Chief Complaint   Patient presents with   • COPD   • Hypertension   • Hyperlipidemia   • GI Problem     AAA: recent US revealed AAA has increased to 5.9cm. He was recently seen by vascular surgery and has f/u on Jan 17. They are currently planning repair. No abdominal or back pain.    Nausea: chronic for years. Reports having had this to a certain degree all his life. Patient is maintained on Zofran 1-2x/day. He was unable to tolerate metoclopramide. Has not had the gastric emptying study that was ordered at last visit. Nausea is largely stable. Weight down 10lbs from visit 4 months ago.    Anemia: denies blood in stool. Labs from May 2021 concerning for anemia. He was started on Fe supplement but never obtained repeat labs in August. Denies blood in stool    Falls: patient has had 3 falls since his appt 4 months ago. He denies dizziness. Patient also reports wearing out easy and diffuse weakness.     Review of Systems   Constitutional: Positive for fatigue and unexpected weight loss. Negative for fever.   HENT: Positive for hearing loss.    Gastrointestinal: Positive for nausea. Negative for abdominal pain and blood in stool.   Musculoskeletal: Positive for gait problem. Negative for back pain.   Neurological: Positive for memory problem.     The following portions of the patient's history were reviewed and updated as appropriate: problem list, past medical history, past surgical history, allergies, current medication    Problem List Tab  Patient History Tab  Immunizations Tab  Medications Tab  Chart Review Tab  Care Everywhere Tab  Synopsis Tab    PE  Vitals:    12/28/21 1412   BP: 140/82   Pulse: 84   Resp: 18   Temp: 97.8 °F (36.6 °C)   SpO2: 99%     Body mass index is 18.12 kg/m².  General: Well nourished, NAD.  Severe hearing loss  Head:  AT/NC  Eyes: EOMI, anicteric sclera  Resp: CTAB, SCR, BS equal  CV: RRR w/o m/r/g; 2+ pulses  GI: Soft, NT/ND, +BS  MSK: FROM, no deformity, no edema  Skin: Warm, dry, intact  Neuro: Alert and oriented. No focal deficits  Psych: Appropriate mood and affect    Pulsatile abdominal mass    Imaging  US Aorta Limited    Result Date: 12/23/2021  IMPRESSION : Interval increase in the size of the distal abdominal aortic aneurysm.  Electronically Signed By-Marco Lombardo MD On:12/23/2021 1:22 PM This report was finalized on [id number] by  Marco Lombardo MD.      Assessment/Plan   Alexei Merino III is a 71 y.o. male that presents for   Chief Complaint   Patient presents with   • COPD   • Hypertension   • Hyperlipidemia   • GI Problem     Diagnoses and all orders for this visit:    1. Abdominal aortic aneurysm (AAA) without rupture (HCC) (Primary): Recent ultrasound reviewed revealing 5.9 cm aneurysm.  This is notably increased from previous   -Recommend vascular surgery follow-up for repair    2. Nausea and vomiting, intractability of vomiting not specified, unspecified vomiting type: Continues to have nausea which has been chronic for years.  Maintained on Zofran daily.  This is concerning to me.  Gastric emptying study ordered at last visit was never completed.  Will reorder today as well as referral to gastroenterology for other potential recommendations  -     Ambulatory Referral to Physical Therapy Evaluate and treat  -     NM Gastric Emptying; Future  -     Ambulatory Referral to Gastroenterology    3. Anemia, unspecified type: As noted on previous labs.  Repeat blood counts and iron stores never repeated.  Will obtain those today  -     Ambulatory Referral to Physical Therapy Evaluate and treat    4. Unsteady gait: Patient reports numerous falls since his last visit.  This is likely combination of multiple factors including low weight as consequence of his persistent nausea/vomiting, deconditioning/poor exercise  tolerance  -     Ambulatory Referral to Physical Therapy Evaluate and treat    5. Low weight  -     Ambulatory Referral to Physical Therapy Evaluate and treat    6. Hearing loss, unspecified hearing loss type, unspecified laterality  -     Ambulatory Referral to Audiology     Return in about 4 months (around 4/28/2022) for Recheck- 30min.

## 2021-12-29 ENCOUNTER — TELEPHONE (OUTPATIENT)
Dept: ONCOLOGY | Facility: CLINIC | Age: 71
End: 2021-12-29

## 2021-12-29 DIAGNOSIS — D64.9 ANEMIA, UNSPECIFIED TYPE: Primary | ICD-10-CM

## 2021-12-29 LAB
ALBUMIN SERPL-MCNC: 4.3 G/DL (ref 3.5–5.2)
ALBUMIN/GLOB SERPL: 2 G/DL
ALP SERPL-CCNC: 90 U/L (ref 39–117)
ALT SERPL W P-5'-P-CCNC: 12 U/L (ref 1–41)
ANION GAP SERPL CALCULATED.3IONS-SCNC: 9.1 MMOL/L (ref 5–15)
AST SERPL-CCNC: 16 U/L (ref 1–40)
BILIRUB SERPL-MCNC: 0.6 MG/DL (ref 0–1.2)
BUN SERPL-MCNC: 11 MG/DL (ref 8–23)
BUN/CREAT SERPL: 10.5 (ref 7–25)
CALCIUM SPEC-SCNC: 9.4 MG/DL (ref 8.6–10.5)
CHLORIDE SERPL-SCNC: 99 MMOL/L (ref 98–107)
CHOLEST SERPL-MCNC: 166 MG/DL (ref 0–200)
CO2 SERPL-SCNC: 27.9 MMOL/L (ref 22–29)
CREAT SERPL-MCNC: 1.05 MG/DL (ref 0.76–1.27)
DEPRECATED RDW RBC AUTO: 51.4 FL (ref 37–54)
ERYTHROCYTE [DISTWIDTH] IN BLOOD BY AUTOMATED COUNT: 16.4 % (ref 12.3–15.4)
FERRITIN SERPL-MCNC: 141 NG/ML (ref 30–400)
GFR SERPL CREATININE-BSD FRML MDRD: 70 ML/MIN/1.73
GLOBULIN UR ELPH-MCNC: 2.2 GM/DL
GLUCOSE SERPL-MCNC: 91 MG/DL (ref 65–99)
HCT VFR BLD AUTO: 33.2 % (ref 37.5–51)
HDLC SERPL-MCNC: 46 MG/DL (ref 40–60)
HGB BLD-MCNC: 11.6 G/DL (ref 13–17.7)
IRON 24H UR-MRATE: 94 MCG/DL (ref 59–158)
IRON SATN MFR SERPL: 22 % (ref 20–50)
LDLC SERPL CALC-MCNC: 108 MG/DL (ref 0–100)
LDLC/HDLC SERPL: 2.34 {RATIO}
MCH RBC QN AUTO: 30.3 PG (ref 26.6–33)
MCHC RBC AUTO-ENTMCNC: 34.9 G/DL (ref 31.5–35.7)
MCV RBC AUTO: 86.7 FL (ref 79–97)
PLATELET # BLD AUTO: 244 10*3/MM3 (ref 140–450)
PMV BLD AUTO: 9.6 FL (ref 6–12)
POTASSIUM SERPL-SCNC: 4.8 MMOL/L (ref 3.5–5.2)
PROT SERPL-MCNC: 6.5 G/DL (ref 6–8.5)
RBC # BLD AUTO: 3.83 10*6/MM3 (ref 4.14–5.8)
RETICS # AUTO: 0.03 10*6/MM3 (ref 0.02–0.13)
RETICS/RBC NFR AUTO: 0.79 % (ref 0.7–1.9)
SODIUM SERPL-SCNC: 136 MMOL/L (ref 136–145)
TIBC SERPL-MCNC: 426 MCG/DL (ref 298–536)
TRANSFERRIN SERPL-MCNC: 286 MG/DL (ref 200–360)
TRIGL SERPL-MCNC: 62 MG/DL (ref 0–150)
VLDLC SERPL-MCNC: 12 MG/DL (ref 5–40)
WBC NRBC COR # BLD: 4.74 10*3/MM3 (ref 3.4–10.8)

## 2022-01-03 DIAGNOSIS — F41.8 ANXIETY WITH DEPRESSION: ICD-10-CM

## 2022-01-03 RX ORDER — HYDROXYZINE HYDROCHLORIDE 25 MG/1
25 TABLET, FILM COATED ORAL EVERY 6 HOURS PRN
Qty: 60 TABLET | Refills: 3 | Status: SHIPPED | OUTPATIENT
Start: 2022-01-03 | End: 2022-06-01

## 2022-01-12 ENCOUNTER — HOSPITAL ENCOUNTER (OUTPATIENT)
Dept: NUCLEAR MEDICINE | Facility: HOSPITAL | Age: 72
Discharge: HOME | End: 2022-01-12

## 2022-01-12 DIAGNOSIS — R11.2 NAUSEA AND VOMITING, INTRACTABILITY OF VOMITING NOT SPECIFIED, UNSPECIFIED VOMITING TYPE: ICD-10-CM

## 2022-01-12 PROCEDURE — A9540 TC99M MAA: HCPCS | Performed by: FAMILY MEDICINE

## 2022-01-12 PROCEDURE — 0 TECHNETIUM ALBUMIN AGGREGATED: Performed by: FAMILY MEDICINE

## 2022-01-12 PROCEDURE — 78264 GASTRIC EMPTYING IMG STUDY: CPT

## 2022-01-12 RX ADMIN — KIT FOR THE PREPARATION OF TECHNETIUM TC 99M ALBUMIN AGGREGATED 1 DOSE: 2.5 INJECTION, POWDER, FOR SOLUTION INTRAVENOUS at 07:51

## 2022-01-13 RX ORDER — ERYTHROMYCIN 250 MG/1
250 TABLET, COATED ORAL 3 TIMES DAILY
Qty: 90 TABLET | Refills: 2 | Status: SHIPPED | OUTPATIENT
Start: 2022-01-13 | End: 2022-02-10

## 2022-01-13 NOTE — PROGRESS NOTES
Hematology/Oncology Outpatient Consultation    Patient name: Alexei Merino III  : 1950  MRN: [id number]  Primary Care Physician: Raheem Mckeon MD  Referring Physician: Raheem Mckeon, *  Reason For Consult:     Chief Complaint   Patient presents with   • Appointment     Anemia       History of Present Illness:    This is a 71-year-old male who was referred secondary to anemia.  Review of his blood work from 2021 white count was 4.7, hemoglobin 11.6 and platelets 244.  His ferritin was 114 the rest of his iron panel was essentially unremarkable  In May 2021 patient had B12 level which was low normal at 279, his ferritin at that time was 190 and his dresser his iron panel showed a slightly iron saturation decreased to 13% had normal thyroid function test and PSA was less than 1.    He had chemistry panel BUN was 11, creatinine is 1 and liver function tests were normal.    Today his white count is 5.56, hemoglobin is 11.8, MCV is normal at 86 and platelets are 198 with 46% neutrophils, 36% lymphocytes there is mild monocytosis at 13.5% otherwise    He had a colonoscopy approximately 3 years ago and four polyps were removed by Dr. Calloway.  He denies any obvious GI source of blood loss or blood in his urine    He has a longtime history of significant fatigue which has been going on for years    He was placed on oral iron supplementation 1 tablet daily for about 4 weeks now.  He denies any significant issues with oral iron supplementation.    He was recently diagnosed with AAA at 5.9 cm and will be scheduled for surgery for repair in the near future.  Patient is scheduled to see Dr. Mendez next week for surgical discussions    Past Medical History:   Diagnosis Date   • Anxiety    • Aortic aneurysm (HCC)    • Coronary artery disease    • Emphysema lung (HCC)    • [place] (hard of hearing)    • Hyperlipidemia    • Hypertension    • Myocardial infarction (HCC)        Past Surgical History:    Procedure Laterality Date   • CORONARY ARTERY BYPASS GRAFT  03/13/2016    X3  Dr Hong         Current Outpatient Medications:   •  ferrous gluconate 324 (37.5 Fe) MG tablet tablet, Take  by mouth Daily With Breakfast., Disp: , Rfl:   •  aspirin (aspirin) 81 MG EC tablet, Take 81 mg by mouth Daily., Disp: , Rfl:   •  erythromycin base (E-MYCIN) 250 MG tablet, Take 1 tablet by mouth 3 (Three) Times a Day., Disp: 90 tablet, Rfl: 2  •  hydrOXYzine (ATARAX) 25 MG tablet, Take 1 tablet by mouth Every 6 (Six) Hours As Needed for Anxiety. for anxiety, Disp: 60 tablet, Rfl: 3  •  ondansetron (ZOFRAN) 4 MG tablet, Take 1 tablet by mouth Every 12 (Twelve) Hours As Needed for Nausea or Vomiting., Disp: 30 tablet, Rfl: 0  •  tamsulosin (FLOMAX) 0.4 MG capsule 24 hr capsule, Take 1 capsule by mouth Daily., Disp: , Rfl:   •  tiotropium bromide monohydrate (Spiriva Respimat) 2.5 MCG/ACT aerosol solution inhaler, Inhale 2 puffs Daily., Disp: 4 g, Rfl: 5  •  vitamin D (ERGOCALCIFEROL) 1.25 MG (73337 UT) capsule capsule, TAKE ONE CAPSULE BY MOUTH ONE TIME PER WEEK, Disp: 12 capsule, Rfl: 1    Allergies   Allergen Reactions   • Morphine Anaphylaxis       Immunization History   Administered Date(s) Administered   • COVID-19 (PFIZER) 03/17/2021   • Flu Vaccine Intradermal Quad 18-64YR 11/25/2013, 01/06/2015   • Flu Vaccine Quad PF >36MO 11/30/2015   • Fluzone High Dose =>65 Years ([employer] ONLY) 01/19/2017, 11/10/2020   • Pneumococcal Polysaccharide (PPSV23) 11/30/2015   • Pneumococcal, Unspecified 01/22/2018       Family History   Problem Relation Age of Onset   • Hypertension Other    • Heart disease Mother    • Heart disease Father        Cancer-related family history is not on file.    Social History     Tobacco Use   • Smoking status: Current Every Day Smoker     Packs/day: 2.00     Years: 50.00     Pack years: 100.00   • Smokeless tobacco: Never Used   Vaping Use   • Vaping Use: Never used   Substance Use Topics   • Alcohol use:  "No   • Drug use: No       ROS:    Review of Systems   Constitutional: Negative for chills and fever.   HENT: Negative for ear pain, mouth sores, nosebleeds and sore throat.    Eyes: Negative for photophobia and visual disturbance.   Respiratory: Negative for wheezing and stridor.    Cardiovascular: Negative for chest pain and palpitations.   Gastrointestinal: Negative for abdominal pain, diarrhea, nausea and vomiting.   Endocrine: Negative for cold intolerance and heat intolerance.   Genitourinary: Negative for dysuria and hematuria.   Musculoskeletal: Negative for joint swelling and neck stiffness.   Skin: Negative for color change and rash.   Neurological: Negative for seizures and syncope.   Hematological: Negative for adenopathy.        No obvious bleeding   Psychiatric/Behavioral: Negative for agitation, confusion and hallucinations.       Objective:    Vitals:    01/14/22 1156   BP: 162/92   Pulse: 72   Resp: 18   Temp: 97.5 °F (36.4 °C)   TempSrc: Infrared   Weight: 62.1 kg (137 lb)   Height: 182.9 cm (72\")   PainSc: 0-No pain     Body mass index is 18.58 kg/m².    ECOG  (0) Fully active, able to carry on all predisease performance without restriction    Physical Exam:  Physical Exam  Vitals and nursing note reviewed.   Constitutional:       General: He is not in acute distress.     Appearance: He is not diaphoretic.   HENT:      Head: Normocephalic and atraumatic.   Eyes:      General: No scleral icterus.        Right eye: No discharge.         Left eye: No discharge.      Conjunctiva/sclera: Conjunctivae normal.   Neck:      Thyroid: No thyromegaly.   Cardiovascular:      Rate and Rhythm: Normal rate and regular rhythm.      Heart sounds: Normal heart sounds. No friction rub. No gallop.    Pulmonary:      Effort: Pulmonary effort is normal. No respiratory distress.      Breath sounds: No stridor. No wheezing.   Abdominal:      General: Bowel sounds are normal.      Palpations: Abdomen is soft. There is no " mass.      Tenderness: There is no abdominal tenderness. There is no guarding or rebound.   Musculoskeletal:         General: No tenderness. Normal range of motion.      Cervical back: Normal range of motion and neck supple.   Lymphadenopathy:      Cervical: No cervical adenopathy.   Skin:     General: Skin is warm.      Findings: No erythema or rash.   Neurological:      Mental Status: He is alert and oriented to person, place, and time.      Motor: No abnormal muscle tone.   Psychiatric:         Behavior: Behavior normal.         RECENT LABS    WBC   Date Value Ref Range Status   01/14/2022 5.56 3.40 - 10.80 10*3/mm3 Final     RBC   Date Value Ref Range Status   01/14/2022 3.99 (L) 4.14 - 5.80 10*6/mm3 Final     Hemoglobin   Date Value Ref Range Status   01/14/2022 11.8 (L) 13.0 - 17.7 g/dL Final     Hematocrit   Date Value Ref Range Status   01/14/2022 34.6 (L) 37.5 - 51.0 % Final     MCV   Date Value Ref Range Status   01/14/2022 86.7 79.0 - 97.0 fL Final     MCH   Date Value Ref Range Status   01/14/2022 29.6 26.6 - 33.0 pg Final     MCHC   Date Value Ref Range Status   01/14/2022 34.1 31.5 - 35.7 g/dL Final     RDW   Date Value Ref Range Status   01/14/2022 18.3 (H) 12.3 - 15.4 % Final     RDW-SD   Date Value Ref Range Status   01/14/2022 56.6 (H) 37.0 - 54.0 fl Final     MPV   Date Value Ref Range Status   01/14/2022 8.9 6.0 - 12.0 fL Final     Platelets   Date Value Ref Range Status   01/14/2022 198 140 - 450 10*3/mm3 Final     Neutrophil %   Date Value Ref Range Status   01/14/2022 46.9 42.7 - 76.0 % Final     Lymphocyte %   Date Value Ref Range Status   01/14/2022 36.3 19.6 - 45.3 % Final     Monocyte %   Date Value Ref Range Status   01/14/2022 13.5 (H) 5.0 - 12.0 % Final     Eosinophil %   Date Value Ref Range Status   01/14/2022 2.2 0.3 - 6.2 % Final     Basophil %   Date Value Ref Range Status   01/14/2022 1.1 0.0 - 1.5 % Final     Immature Grans %   Date Value Ref Range Status   05/19/2021 0.4 0.0 -  0.5 % Final     Neutrophils, Absolute   Date Value Ref Range Status   01/14/2022 2.61 1.70 - 7.00 10*3/mm3 Final     Lymphocytes, Absolute   Date Value Ref Range Status   01/14/2022 2.02 0.70 - 3.10 10*3/mm3 Final     Monocytes, Absolute   Date Value Ref Range Status   01/14/2022 0.75 0.10 - 0.90 10*3/mm3 Final     Eosinophils, Absolute   Date Value Ref Range Status   01/14/2022 0.12 0.00 - 0.40 10*3/mm3 Final     Basophils, Absolute   Date Value Ref Range Status   01/14/2022 0.06 0.00 - 0.20 10*3/mm3 Final     Immature Grans, Absolute   Date Value Ref Range Status   05/19/2021 0.02 0.00 - 0.05 10*3/mm3 Final     nRBC   Date Value Ref Range Status   05/19/2021 0.0 0.0 - 0.2 /100 WBC Final       Lab Results   Component Value Date    GLUCOSE 91 12/28/2021    BUN 11 12/28/2021    CREATININE 1.05 12/28/2021    EGFRIFNONA 70 12/28/2021    EGFRIFAFRI >60 03/21/2019    BCR 10.5 12/28/2021    K 4.8 12/28/2021    CO2 27.9 12/28/2021    CALCIUM 9.4 12/28/2021    ALBUMIN 4.30 12/28/2021    LABIL2 2.0 (H) 03/07/2019    AST 16 12/28/2021    ALT 12 12/28/2021         Assessment/Plan   Anemia, unspecified type  - CBC & Differential      1. Normocytic anemia  2. Low normal B12 rule out B12 deficiency  3. AAA 5.9 for surgical repair soon.  Patient follows up with Dr. Mendez      Plans    · Begin B12 injection 1000 mcg IM daily for 3 days   · Check serum methylmalonic acid level, serum transferrin receptor assay, SPEP with ESSIE, reticulocyte count, haptoglobin and LDH as well as serum folate level  · Follow-up with Dr. Mendez for repair of AAA  · Follow-up with me in 4 weeks  · Begin B12 injections today  · All questions answered    Patient verbalized understanding and is in agreement of the above plan.              I spent 45 total minutes, face-to-face, caring for Alexei today.  80% of this time involved counseling and/or coordination of care as documented within this note.

## 2022-01-14 ENCOUNTER — CONSULT (OUTPATIENT)
Dept: ONCOLOGY | Facility: CLINIC | Age: 72
End: 2022-01-14

## 2022-01-14 ENCOUNTER — HOSPITAL ENCOUNTER (OUTPATIENT)
Dept: ONCOLOGY | Facility: HOSPITAL | Age: 72
Setting detail: INFUSION SERIES
Discharge: HOME | End: 2022-01-14

## 2022-01-14 ENCOUNTER — LAB (OUTPATIENT)
Dept: LAB | Facility: HOSPITAL | Age: 72
End: 2022-01-14

## 2022-01-14 VITALS
HEART RATE: 72 BPM | SYSTOLIC BLOOD PRESSURE: 162 MMHG | TEMPERATURE: 97.5 F | HEIGHT: 72 IN | DIASTOLIC BLOOD PRESSURE: 92 MMHG | RESPIRATION RATE: 18 BRPM | BODY MASS INDEX: 18.56 KG/M2 | WEIGHT: 137 LBS

## 2022-01-14 DIAGNOSIS — E53.8 B12 DEFICIENCY: Primary | ICD-10-CM

## 2022-01-14 DIAGNOSIS — D64.9 ANEMIA, UNSPECIFIED TYPE: Primary | ICD-10-CM

## 2022-01-14 DIAGNOSIS — D64.9 ANEMIA, UNSPECIFIED TYPE: ICD-10-CM

## 2022-01-14 LAB
BASOPHILS # BLD AUTO: 0.06 10*3/MM3 (ref 0–0.2)
BASOPHILS NFR BLD AUTO: 1.1 % (ref 0–1.5)
DEPRECATED RDW RBC AUTO: 56.6 FL (ref 37–54)
EOSINOPHIL # BLD AUTO: 0.12 10*3/MM3 (ref 0–0.4)
EOSINOPHIL NFR BLD AUTO: 2.2 % (ref 0.3–6.2)
ERYTHROCYTE [DISTWIDTH] IN BLOOD BY AUTOMATED COUNT: 18.3 % (ref 12.3–15.4)
FOLATE SERPL-MCNC: <2 NG/ML (ref 4.78–24.2)
HAPTOGLOB SERPL-MCNC: 124 MG/DL (ref 30–200)
HCT VFR BLD AUTO: 34.6 % (ref 37.5–51)
HGB BLD-MCNC: 11.8 G/DL (ref 13–17.7)
LYMPHOCYTES # BLD AUTO: 2.02 10*3/MM3 (ref 0.7–3.1)
LYMPHOCYTES NFR BLD AUTO: 36.3 % (ref 19.6–45.3)
MCH RBC QN AUTO: 29.6 PG (ref 26.6–33)
MCHC RBC AUTO-ENTMCNC: 34.1 G/DL (ref 31.5–35.7)
MCV RBC AUTO: 86.7 FL (ref 79–97)
MONOCYTES # BLD AUTO: 0.75 10*3/MM3 (ref 0.1–0.9)
MONOCYTES NFR BLD AUTO: 13.5 % (ref 5–12)
NEUTROPHILS NFR BLD AUTO: 2.61 10*3/MM3 (ref 1.7–7)
NEUTROPHILS NFR BLD AUTO: 46.9 % (ref 42.7–76)
PLATELET # BLD AUTO: 198 10*3/MM3 (ref 140–450)
PMV BLD AUTO: 8.9 FL (ref 6–12)
RBC # BLD AUTO: 3.99 10*6/MM3 (ref 4.14–5.8)
RETICS # AUTO: 0.02 10*6/MM3 (ref 0.02–0.13)
RETICS/RBC NFR AUTO: 0.62 % (ref 0.7–1.9)
WBC NRBC COR # BLD: 5.56 10*3/MM3 (ref 3.4–10.8)

## 2022-01-14 PROCEDURE — 85025 COMPLETE CBC W/AUTO DIFF WBC: CPT

## 2022-01-14 PROCEDURE — 36415 COLL VENOUS BLD VENIPUNCTURE: CPT

## 2022-01-14 PROCEDURE — 25010000002 CYANOCOBALAMIN PER 1000 MCG: Performed by: INTERNAL MEDICINE

## 2022-01-14 PROCEDURE — 82746 ASSAY OF FOLIC ACID SERUM: CPT

## 2022-01-14 PROCEDURE — 85045 AUTOMATED RETICULOCYTE COUNT: CPT

## 2022-01-14 PROCEDURE — 83010 ASSAY OF HAPTOGLOBIN QUANT: CPT

## 2022-01-14 PROCEDURE — 96372 THER/PROPH/DIAG INJ SC/IM: CPT

## 2022-01-14 PROCEDURE — 99204 OFFICE O/P NEW MOD 45 MIN: CPT | Performed by: INTERNAL MEDICINE

## 2022-01-14 RX ORDER — CYANOCOBALAMIN 1000 UG/ML
1000 INJECTION, SOLUTION INTRAMUSCULAR; SUBCUTANEOUS ONCE
Status: COMPLETED | OUTPATIENT
Start: 2022-01-14 | End: 2022-01-14

## 2022-01-14 RX ORDER — FERROUS GLUCONATE 324(37.5)
TABLET ORAL
COMMUNITY

## 2022-01-14 RX ADMIN — CYANOCOBALAMIN 1000 MCG: 1000 INJECTION INTRAMUSCULAR; SUBCUTANEOUS at 13:00

## 2022-01-14 NOTE — PROGRESS NOTES
Patient was added on after seeing the MD for his first B12 injection. Injection given and patient back Monday for another injection. Appts given, patient denies further needs at this time.

## 2022-01-17 ENCOUNTER — TELEPHONE (OUTPATIENT)
Dept: ONCOLOGY | Facility: CLINIC | Age: 72
End: 2022-01-17

## 2022-01-17 ENCOUNTER — PATIENT ROUNDING (BHMG ONLY) (OUTPATIENT)
Dept: ONCOLOGY | Facility: CLINIC | Age: 72
End: 2022-01-17

## 2022-01-17 ENCOUNTER — TRANSCRIBE ORDERS (OUTPATIENT)
Dept: ADMINISTRATIVE | Facility: HOSPITAL | Age: 72
End: 2022-01-17

## 2022-01-17 ENCOUNTER — HOSPITAL ENCOUNTER (OUTPATIENT)
Dept: ONCOLOGY | Facility: HOSPITAL | Age: 72
Setting detail: INFUSION SERIES
Discharge: HOME | End: 2022-01-17

## 2022-01-17 VITALS — BODY MASS INDEX: 18.83 KG/M2 | HEIGHT: 72 IN | WEIGHT: 139 LBS

## 2022-01-17 DIAGNOSIS — E53.8 B12 DEFICIENCY: Primary | ICD-10-CM

## 2022-01-17 DIAGNOSIS — I71.40 ABDOMINAL AORTIC ANEURYSM (AAA) WITHOUT RUPTURE: Primary | ICD-10-CM

## 2022-01-17 LAB
ALBUMIN SERPL ELPH-MCNC: 4.1 G/DL (ref 2.9–4.4)
ALBUMIN/GLOB SERPL: 1.6 {RATIO} (ref 0.7–1.7)
ALPHA1 GLOB SERPL ELPH-MCNC: 0.3 G/DL (ref 0–0.4)
ALPHA2 GLOB SERPL ELPH-MCNC: 0.7 G/DL (ref 0.4–1)
B-GLOBULIN SERPL ELPH-MCNC: 1 G/DL (ref 0.7–1.3)
GAMMA GLOB SERPL ELPH-MCNC: 0.7 G/DL (ref 0.4–1.8)
GLOBULIN SER-MCNC: 2.7 G/DL (ref 2.2–3.9)
IGA SERPL-MCNC: 179 MG/DL (ref 61–437)
IGG SERPL-MCNC: 713 MG/DL (ref 603–1613)
IGM SERPL-MCNC: 35 MG/DL (ref 15–143)
INTERPRETATION SERPL IEP-IMP: NORMAL
LABORATORY COMMENT REPORT: NORMAL
M PROTEIN SERPL ELPH-MCNC: NORMAL G/DL
PROT SERPL-MCNC: 6.8 G/DL (ref 6–8.5)

## 2022-01-17 PROCEDURE — 96372 THER/PROPH/DIAG INJ SC/IM: CPT

## 2022-01-17 PROCEDURE — 25010000002 CYANOCOBALAMIN PER 1000 MCG: Performed by: INTERNAL MEDICINE

## 2022-01-17 RX ORDER — CYANOCOBALAMIN 1000 UG/ML
1000 INJECTION, SOLUTION INTRAMUSCULAR; SUBCUTANEOUS ONCE
Status: COMPLETED | OUTPATIENT
Start: 2022-01-17 | End: 2022-01-17

## 2022-01-17 RX ORDER — FOLIC ACID 1 MG/1
1 TABLET ORAL DAILY
Qty: 30 TABLET | Refills: 3 | Status: SHIPPED | OUTPATIENT
Start: 2022-01-17

## 2022-01-17 RX ADMIN — CYANOCOBALAMIN 1000 MCG: 1000 INJECTION INTRAMUSCULAR; SUBCUTANEOUS at 13:07

## 2022-01-17 NOTE — TELEPHONE ENCOUNTER
----- Message from Georgiana Jefferson MD sent at 1/17/2022 10:37 AM EST -----  Begin folic acid 1 mg po daily #30 refills 3

## 2022-01-17 NOTE — PROGRESS NOTES
January 17, 2022    Hello, may I speak with Alexei Merino III?    My name is Kimberlee      I am  with [facility]  [address] [place] [address] [phone].    Before we get started may I verify your date of birth? 1950    I am calling to officially welcome you to our practice and ask about your recent visit. Is this a good time to talk? no    Tell me about your visit with us. What things went well?  I was able to send a My Chart message       We're always looking for ways to make our patients' experiences even better. Do you have recommendations on ways we may improve?  no    Overall were you satisfied with your first visit to our practice? yes       I appreciate you taking the time to speak with me today. Is there anything else I can do for you? no      Thank you, and have a great day.

## 2022-01-17 NOTE — TELEPHONE ENCOUNTER
Spoke to pt's partner about need for folic acid. She asked if folic acid is different from iron and I explained that it is different and he should continue his iron. She verbalized understanding.

## 2022-01-18 ENCOUNTER — HOSPITAL ENCOUNTER (OUTPATIENT)
Dept: ONCOLOGY | Facility: HOSPITAL | Age: 72
Setting detail: INFUSION SERIES
Discharge: HOME | End: 2022-01-18

## 2022-01-18 VITALS — BODY MASS INDEX: 18.31 KG/M2 | WEIGHT: 135.2 LBS | HEIGHT: 72 IN

## 2022-01-18 DIAGNOSIS — E53.8 B12 DEFICIENCY: Primary | ICD-10-CM

## 2022-01-18 PROCEDURE — 25010000002 CYANOCOBALAMIN PER 1000 MCG: Performed by: INTERNAL MEDICINE

## 2022-01-18 PROCEDURE — 96372 THER/PROPH/DIAG INJ SC/IM: CPT

## 2022-01-18 RX ORDER — CYANOCOBALAMIN 1000 UG/ML
1000 INJECTION, SOLUTION INTRAMUSCULAR; SUBCUTANEOUS ONCE
Status: COMPLETED | OUTPATIENT
Start: 2022-01-18 | End: 2022-01-18

## 2022-01-18 RX ADMIN — CYANOCOBALAMIN 1000 MCG: 1000 INJECTION INTRAMUSCULAR; SUBCUTANEOUS at 13:15

## 2022-01-19 LAB
METHYLMALONATE SERPL-SCNC: 325 NMOL/L (ref 0–378)
STFR SERPL-SCNC: 11 NMOL/L (ref 12.2–27.3)

## 2022-01-24 ENCOUNTER — HOSPITAL ENCOUNTER (OUTPATIENT)
Dept: CT IMAGING | Facility: HOSPITAL | Age: 72
Discharge: HOME | End: 2022-01-24
Admitting: THORACIC SURGERY (CARDIOTHORACIC VASCULAR SURGERY)

## 2022-01-24 DIAGNOSIS — I71.40 ABDOMINAL AORTIC ANEURYSM (AAA) WITHOUT RUPTURE: ICD-10-CM

## 2022-01-24 PROCEDURE — 0 IOPAMIDOL PER 1 ML: Performed by: THORACIC SURGERY (CARDIOTHORACIC VASCULAR SURGERY)

## 2022-01-24 PROCEDURE — 74174 CTA ABD&PLVS W/CONTRAST: CPT

## 2022-01-24 RX ADMIN — IOPAMIDOL 100 ML: 755 INJECTION, SOLUTION INTRAVENOUS at 14:10

## 2022-01-26 ENCOUNTER — HOSPITAL ENCOUNTER (EMERGENCY)
Facility: HOSPITAL | Age: 72
Discharge: HOME | End: 2022-01-27
Attending: EMERGENCY MEDICINE | Admitting: EMERGENCY MEDICINE

## 2022-01-26 ENCOUNTER — TELEPHONE (OUTPATIENT)
Dept: FAMILY MEDICINE CLINIC | Facility: CLINIC | Age: 72
End: 2022-01-26

## 2022-01-26 ENCOUNTER — APPOINTMENT (OUTPATIENT)
Dept: GENERAL RADIOLOGY | Facility: HOSPITAL | Age: 72
End: 2022-01-26

## 2022-01-26 DIAGNOSIS — K62.89 RECTAL PAIN: ICD-10-CM

## 2022-01-26 DIAGNOSIS — K56.41 FECAL IMPACTION: Primary | ICD-10-CM

## 2022-01-26 LAB
ALBUMIN SERPL-MCNC: 4.2 G/DL (ref 3.5–5.2)
ALBUMIN/GLOB SERPL: 1.7 G/DL
ALP SERPL-CCNC: 73 U/L (ref 39–117)
ALT SERPL W P-5'-P-CCNC: 11 U/L (ref 1–41)
ANION GAP SERPL CALCULATED.3IONS-SCNC: 11 MMOL/L (ref 5–15)
AST SERPL-CCNC: 15 U/L (ref 1–40)
BASOPHILS # BLD AUTO: 0 10*3/MM3 (ref 0–0.2)
BASOPHILS NFR BLD AUTO: 0.5 % (ref 0–1.5)
BILIRUB SERPL-MCNC: 1.3 MG/DL (ref 0–1.2)
BUN SERPL-MCNC: 14 MG/DL (ref 8–23)
BUN/CREAT SERPL: 14.1 (ref 7–25)
CALCIUM SPEC-SCNC: 9.5 MG/DL (ref 8.6–10.5)
CHLORIDE SERPL-SCNC: 96 MMOL/L (ref 98–107)
CO2 SERPL-SCNC: 27 MMOL/L (ref 22–29)
CREAT SERPL-MCNC: 0.99 MG/DL (ref 0.76–1.27)
DEPRECATED RDW RBC AUTO: 58.6 FL (ref 37–54)
EOSINOPHIL # BLD AUTO: 0 10*3/MM3 (ref 0–0.4)
EOSINOPHIL NFR BLD AUTO: 0.1 % (ref 0.3–6.2)
ERYTHROCYTE [DISTWIDTH] IN BLOOD BY AUTOMATED COUNT: 19.9 % (ref 12.3–15.4)
GFR SERPL CREATININE-BSD FRML MDRD: 75 ML/MIN/1.73
GLOBULIN UR ELPH-MCNC: 2.5 GM/DL
GLUCOSE SERPL-MCNC: 111 MG/DL (ref 65–99)
HCT VFR BLD AUTO: 33.7 % (ref 37.5–51)
HGB BLD-MCNC: 11.8 G/DL (ref 13–17.7)
LYMPHOCYTES # BLD AUTO: 1.3 10*3/MM3 (ref 0.7–3.1)
LYMPHOCYTES NFR BLD AUTO: 15.5 % (ref 19.6–45.3)
MCH RBC QN AUTO: 30.6 PG (ref 26.6–33)
MCHC RBC AUTO-ENTMCNC: 35 G/DL (ref 31.5–35.7)
MCV RBC AUTO: 87.5 FL (ref 79–97)
MONOCYTES # BLD AUTO: 1.1 10*3/MM3 (ref 0.1–0.9)
MONOCYTES NFR BLD AUTO: 13.4 % (ref 5–12)
NEUTROPHILS NFR BLD AUTO: 5.7 10*3/MM3 (ref 1.7–7)
NEUTROPHILS NFR BLD AUTO: 70.5 % (ref 42.7–76)
NRBC BLD AUTO-RTO: 0 /100 WBC (ref 0–0.2)
PLATELET # BLD AUTO: 190 10*3/MM3 (ref 140–450)
PMV BLD AUTO: 6.6 FL (ref 6–12)
POTASSIUM SERPL-SCNC: 4.6 MMOL/L (ref 3.5–5.2)
PROT SERPL-MCNC: 6.7 G/DL (ref 6–8.5)
RBC # BLD AUTO: 3.85 10*6/MM3 (ref 4.14–5.8)
SARS-COV-2 RNA PNL SPEC NAA+PROBE: NOT DETECTED
SODIUM SERPL-SCNC: 134 MMOL/L (ref 136–145)
WBC NRBC COR # BLD: 8.1 10*3/MM3 (ref 3.4–10.8)

## 2022-01-26 PROCEDURE — 25010000002 HYDROMORPHONE 1 MG/ML SOLUTION: Performed by: EMERGENCY MEDICINE

## 2022-01-26 PROCEDURE — 99284 EMERGENCY DEPT VISIT MOD MDM: CPT

## 2022-01-26 PROCEDURE — 87086 URINE CULTURE/COLONY COUNT: CPT | Performed by: NURSE PRACTITIONER

## 2022-01-26 PROCEDURE — 80053 COMPREHEN METABOLIC PANEL: CPT | Performed by: EMERGENCY MEDICINE

## 2022-01-26 PROCEDURE — 85025 COMPLETE CBC W/AUTO DIFF WBC: CPT | Performed by: EMERGENCY MEDICINE

## 2022-01-26 PROCEDURE — 81001 URINALYSIS AUTO W/SCOPE: CPT | Performed by: EMERGENCY MEDICINE

## 2022-01-26 PROCEDURE — 87635 SARS-COV-2 COVID-19 AMP PRB: CPT | Performed by: EMERGENCY MEDICINE

## 2022-01-26 PROCEDURE — 71045 X-RAY EXAM CHEST 1 VIEW: CPT

## 2022-01-26 PROCEDURE — 96374 THER/PROPH/DIAG INJ IV PUSH: CPT

## 2022-01-26 RX ORDER — SODIUM CHLORIDE 0.9 % (FLUSH) 0.9 %
10 SYRINGE (ML) INJECTION AS NEEDED
Status: DISCONTINUED | OUTPATIENT
Start: 2022-01-26 | End: 2022-01-27 | Stop reason: HOSPADM

## 2022-01-26 RX ORDER — BISACODYL 5 MG/1
10 TABLET, DELAYED RELEASE ORAL ONCE
Status: COMPLETED | OUTPATIENT
Start: 2022-01-26 | End: 2022-01-26

## 2022-01-26 RX ADMIN — BISACODYL 10 MG: 5 TABLET, COATED ORAL at 21:03

## 2022-01-26 RX ADMIN — HYDROMORPHONE HYDROCHLORIDE 0.25 MG: 1 INJECTION, SOLUTION INTRAMUSCULAR; INTRAVENOUS; SUBCUTANEOUS at 21:40

## 2022-01-26 NOTE — TELEPHONE ENCOUNTER
Caller: Angela Arias    Relationship to patient: Emergency Contact    Best call back number:[phone]    Where are you experiencing symptoms: DEMENTIA, FALLS, ABDOMINAL ANEURYSM, INCONTINENCE     CAREGIVER ANGELA WANTED TO INFORM DR VINES THAT SHE IS TAKING CHIP TO [facility] TO BE ADMITTED. NO CALL BACK NECCESSARY

## 2022-01-27 VITALS
WEIGHT: 132 LBS | HEIGHT: 69 IN | TEMPERATURE: 98.5 F | BODY MASS INDEX: 19.55 KG/M2 | OXYGEN SATURATION: 98 % | HEART RATE: 60 BPM | RESPIRATION RATE: 18 BRPM | SYSTOLIC BLOOD PRESSURE: 160 MMHG | DIASTOLIC BLOOD PRESSURE: 80 MMHG

## 2022-01-27 LAB
BACTERIA UR QL AUTO: ABNORMAL /HPF
BILIRUB UR QL STRIP: NEGATIVE
CLARITY UR: CLEAR
COLOR UR: ABNORMAL
GLUCOSE UR STRIP-MCNC: NEGATIVE MG/DL
HGB UR QL STRIP.AUTO: ABNORMAL
HYALINE CASTS UR QL AUTO: ABNORMAL /LPF
KETONES UR QL STRIP: NEGATIVE
LEUKOCYTE ESTERASE UR QL STRIP.AUTO: ABNORMAL
NITRITE UR QL STRIP: NEGATIVE
PH UR STRIP.AUTO: 5.5 [PH] (ref 5–8)
PROT UR QL STRIP: ABNORMAL
RBC # UR STRIP: ABNORMAL /HPF
REF LAB TEST METHOD: ABNORMAL
SP GR UR STRIP: 1.02 (ref 1–1.03)
SQUAMOUS #/AREA URNS HPF: ABNORMAL /HPF
UROBILINOGEN UR QL STRIP: ABNORMAL
WBC # UR STRIP: ABNORMAL /HPF

## 2022-01-27 RX ORDER — DOCUSATE SODIUM 100 MG/1
100 CAPSULE, LIQUID FILLED ORAL 2 TIMES DAILY PRN
Qty: 14 CAPSULE | Refills: 0 | Status: SHIPPED | OUTPATIENT
Start: 2022-01-27 | End: 2023-03-14

## 2022-01-28 LAB — BACTERIA SPEC AEROBE CULT: NO GROWTH

## 2022-01-30 RX ORDER — ONDANSETRON 4 MG/1
TABLET, FILM COATED ORAL
Qty: 30 TABLET | Refills: 0 | Status: SHIPPED | OUTPATIENT
Start: 2022-01-30 | End: 2022-02-14

## 2022-01-31 ENCOUNTER — PATIENT OUTREACH (OUTPATIENT)
Dept: CASE MANAGEMENT | Facility: OTHER | Age: 72
End: 2022-01-31

## 2022-01-31 NOTE — OUTREACH NOTE
Ambulatory Case Management Note    General & Health Literacy Assessment    Questions/Answers      Most Recent Value   Assessment Completed With --  [Jessica Arias]   Living Arrangement --  [Caregiver]   Type of Residence Private Residence   Home Care Services No   Equiptment Used at Home --  [Shower chair]   Other Issues Hearing Impairment   Bed or Wheelchair Confined No   Difficulty Keeping Appointments No   Chronic Pain No   How often do you have someone help you read hospital materials? Often   How often do you have problems learning about your medical condition because of difficulty understanding written information? Often   How often do you have a problem understanding what is told to you about your medical condition? Often   How confident are you filling out medical forms by yourself? Not at all   Health Literacy Moderate        Care Evaluation    Questions/Answers      Most Recent Value   Suggested Appointments --  [Make appointment with PCP]   Other Patient Education/Resources  24/[facility] Nurse Call Line   24/7 Nurse Call Line Education Method Verbal   Advanced Directives: Patient Has   Medication Adherence Medications understood   Healthy Lifestyle (Self-Efficacy) recognizes when to contact medical assistance,  self-reports important symptoms to medical professional      Patient Outreach    Spoke with patient's caregiver, Jessica, over the phone. Follow up call for ED visit on 1/26/2022. Colace not picked up as she has concerns this will upset his stomach, patient is using Miralax. Discussed AVS with Caregiver, encouraged water intake. Reminded caregiver of 24/7 Nurse Call Line. Jessica will scheduled PCP appointment. Per Jessica, patient has an appointment with surgery today regarding AAA. Unable to further outreach, caregiver getting patient ready for his appointment. Encouraged caregiver to call Nurse Line or PCP with further needs/questions.     Candi Rahman RN  Ambulatory Case  Management    1/31/2022, 10:45 EST

## 2022-02-08 NOTE — PROGRESS NOTES
Hematology/Oncology Outpatient Consultation    Patient name: Alexei Merino III  : 1950  MRN: [id number]  Primary Care Physician: Raheem Mckeon MD  Referring Physician: Raheem Mckeon, *  Reason For Consult:     Chief Complaint   Patient presents with   • Follow-up     Anemia       History of Present Illness:    This is a 71-year-old male who was referred secondary to anemia.  Review of his blood work from 2021 white count was 4.7, hemoglobin 11.6 and platelets 244.  His ferritin was 114 the rest of his iron panel was essentially unremarkable  In May 2021 patient had B12 level which was low normal at 279, his ferritin at that time was 190 and his dresser his iron panel showed a slightly iron saturation decreased to 13% had normal thyroid function test and PSA was less than 1.    He had chemistry panel BUN was 11, creatinine is 1 and liver function tests were normal.    Today his white count is 5.56, hemoglobin is 11.8, MCV is normal at 86 and platelets are 198 with 46% neutrophils, 36% lymphocytes there is mild monocytosis at 13.5% otherwise    He had a colonoscopy approximately 3 years ago and four polyps were removed by Dr. Calloway.  He denies any obvious GI source of blood loss or blood in his urine    He has a longtime history of significant fatigue which has been going on for years    He was placed on oral iron supplementation 1 tablet daily for about 4 weeks now.  He denies any significant issues with oral iron supplementation.    He was recently diagnosed with AAA at 5.9 cm and will be scheduled for surgery for repair in the near future.  Patient is scheduled to see Dr. Mendez next week for surgical discussions      · 2021 serum folate was less than 2.  Patient was placed on folic acid 1 mg p.o. daily soluble transferrin receptor assay was low at 11 ruling out iron deficiency, methylmalonic acid level was normal at 325 SPEP with ESSIE did not reveal any monoclonal protein,  reticulocyte count was low at 0.62, haptoglobin was normal at 124.    Past Medical History:   Diagnosis Date   • Anxiety    • Aortic aneurysm (HCC)    • Coronary artery disease    • Emphysema lung (HCC)    • [place] (hard of hearing)    • Hyperlipidemia    • Hypertension    • Myocardial infarction (HCC)        Past Surgical History:   Procedure Laterality Date   • CORONARY ARTERY BYPASS GRAFT  03/13/2016    X3  Dr Hong         Current Outpatient Medications:   •  aspirin (aspirin) 81 MG EC tablet, Take 81 mg by mouth Daily., Disp: , Rfl:   •  docusate sodium (COLACE) 100 MG capsule, Take 1 capsule by mouth 2 (Two) Times a Day As Needed for Constipation., Disp: 14 capsule, Rfl: 0  •  ferrous gluconate 324 (37.5 Fe) MG tablet tablet, Take  by mouth Daily With Breakfast., Disp: , Rfl:   •  folic acid (FOLVITE) 1 MG tablet, Take 1 tablet by mouth Daily., Disp: 30 tablet, Rfl: 3  •  hydrOXYzine (ATARAX) 25 MG tablet, Take 1 tablet by mouth Every 6 (Six) Hours As Needed for Anxiety. for anxiety, Disp: 60 tablet, Rfl: 3  •  ondansetron (ZOFRAN) 4 MG tablet, TAKE ONE TABLET BY MOUTH EVERY 12 HOURS AS NEEDED FOR NAUSEA AND VOMITING, Disp: 30 tablet, Rfl: 0  •  tamsulosin (FLOMAX) 0.4 MG capsule 24 hr capsule, Take 1 capsule by mouth Daily., Disp: , Rfl:   •  tiotropium bromide monohydrate (Spiriva Respimat) 2.5 MCG/ACT aerosol solution inhaler, Inhale 2 puffs Daily., Disp: 4 g, Rfl: 5  •  vitamin D (ERGOCALCIFEROL) 1.25 MG (75716 UT) capsule capsule, TAKE ONE CAPSULE BY MOUTH ONE TIME PER WEEK, Disp: 12 capsule, Rfl: 1    Allergies   Allergen Reactions   • Morphine Anaphylaxis       Immunization History   Administered Date(s) Administered   • COVID-19 (PFIZER) PURPLE CAP 03/17/2021   • Flu Vaccine Intradermal Quad 18-64YR 11/25/2013, 01/06/2015   • Flu Vaccine Quad PF >36MO 11/30/2015   • Fluzone High Dose =>65 Years ([employer] ONLY) 01/19/2017, 11/10/2020   • Pneumococcal Polysaccharide (PPSV23) 11/30/2015   • Pneumococcal,  "Unspecified 01/22/2018       Family History   Problem Relation Age of Onset   • Hypertension Other    • Heart disease Mother    • Heart disease Father        Cancer-related family history is not on file.    Social History     Tobacco Use   • Smoking status: Current Every Day Smoker     Packs/day: 2.00     Years: 50.00     Pack years: 100.00   • Smokeless tobacco: Never Used   Vaping Use   • Vaping Use: Never used   Substance Use Topics   • Alcohol use: No   • Drug use: No       I have reviewed and confirmed the accuracy of the patient's history: Chief complaint, HPI, ROS and Subjective as entered by the MA/LPN/RN. Georgiana Jefferson MD 02/10/22       ROS:    Review of Systems   Constitutional: Negative for chills and fever.   HENT: Negative for ear pain, mouth sores, nosebleeds and sore throat.    Eyes: Negative for photophobia and visual disturbance.   Respiratory: Negative for wheezing and stridor.    Cardiovascular: Negative for chest pain and palpitations.   Gastrointestinal: Negative for abdominal pain, diarrhea, nausea and vomiting.   Endocrine: Negative for cold intolerance and heat intolerance.   Genitourinary: Negative for dysuria and hematuria.   Musculoskeletal: Negative for joint swelling and neck stiffness.   Skin: Negative for color change and rash.   Neurological: Negative for seizures and syncope.   Hematological: Negative for adenopathy.        No obvious bleeding   Psychiatric/Behavioral: Negative for agitation, confusion and hallucinations.       Objective:    Vitals:    02/10/22 1410   BP: 156/83   Pulse: 69   Resp: 18   Temp: 97.3 °F (36.3 °C)   TempSrc: Infrared   Weight: 61.2 kg (135 lb)   Height: 182.9 cm (72\")   PainSc: 0-No pain     Body mass index is 18.31 kg/m².    ECOG  (0) Fully active, able to carry on all predisease performance without restriction    Physical Exam:  Physical Exam  Vitals and nursing note reviewed.   Constitutional:       General: He is not in acute distress.     " Appearance: He is not diaphoretic.   HENT:      Head: Normocephalic and atraumatic.   Eyes:      General: No scleral icterus.        Right eye: No discharge.         Left eye: No discharge.      Conjunctiva/sclera: Conjunctivae normal.   Neck:      Thyroid: No thyromegaly.   Cardiovascular:      Rate and Rhythm: Normal rate and regular rhythm.      Heart sounds: Normal heart sounds. No friction rub. No gallop.    Pulmonary:      Effort: Pulmonary effort is normal. No respiratory distress.      Breath sounds: No stridor. No wheezing.   Abdominal:      General: Bowel sounds are normal.      Palpations: Abdomen is soft. There is no mass.      Tenderness: There is no abdominal tenderness. There is no guarding or rebound.   Musculoskeletal:         General: No tenderness. Normal range of motion.      Cervical back: Normal range of motion and neck supple.   Lymphadenopathy:      Cervical: No cervical adenopathy.   Skin:     General: Skin is warm.      Findings: No erythema or rash.   Neurological:      Mental Status: He is alert and oriented to person, place, and time.      Motor: No abnormal muscle tone.   Psychiatric:         Behavior: Behavior normal.     I have reexamined the patient and the results are consistent with the previously documented exam. Georgiana Jefferson MD     RECENT LABS    WBC   Date Value Ref Range Status   02/10/2022 5.59 3.40 - 10.80 10*3/mm3 Final     RBC   Date Value Ref Range Status   02/10/2022 3.77 (L) 4.14 - 5.80 10*6/mm3 Final     Hemoglobin   Date Value Ref Range Status   02/10/2022 11.7 (L) 13.0 - 17.7 g/dL Final     Hematocrit   Date Value Ref Range Status   02/10/2022 35.2 (L) 37.5 - 51.0 % Final     MCV   Date Value Ref Range Status   02/10/2022 93.4 79.0 - 97.0 fL Final     MCH   Date Value Ref Range Status   02/10/2022 31.0 26.6 - 33.0 pg Final     MCHC   Date Value Ref Range Status   02/10/2022 33.2 31.5 - 35.7 g/dL Final     RDW   Date Value Ref Range Status   02/10/2022 17.5  (H) 12.3 - 15.4 % Final     RDW-SD   Date Value Ref Range Status   02/10/2022 57.0 (H) 37.0 - 54.0 fl Final     MPV   Date Value Ref Range Status   02/10/2022 8.8 6.0 - 12.0 fL Final     Platelets   Date Value Ref Range Status   02/10/2022 225 140 - 450 10*3/mm3 Final     Neutrophil %   Date Value Ref Range Status   02/10/2022 48.9 42.7 - 76.0 % Final     Lymphocyte %   Date Value Ref Range Status   02/10/2022 35.8 19.6 - 45.3 % Final     Monocyte %   Date Value Ref Range Status   02/10/2022 12.3 (H) 5.0 - 12.0 % Final     Eosinophil %   Date Value Ref Range Status   02/10/2022 2.3 0.3 - 6.2 % Final     Basophil %   Date Value Ref Range Status   02/10/2022 0.7 0.0 - 1.5 % Final     Immature Grans %   Date Value Ref Range Status   05/19/2021 0.4 0.0 - 0.5 % Final     Neutrophils, Absolute   Date Value Ref Range Status   02/10/2022 2.73 1.70 - 7.00 10*3/mm3 Final     Lymphocytes, Absolute   Date Value Ref Range Status   02/10/2022 2.00 0.70 - 3.10 10*3/mm3 Final     Monocytes, Absolute   Date Value Ref Range Status   02/10/2022 0.69 0.10 - 0.90 10*3/mm3 Final     Eosinophils, Absolute   Date Value Ref Range Status   02/10/2022 0.13 0.00 - 0.40 10*3/mm3 Final     Basophils, Absolute   Date Value Ref Range Status   02/10/2022 0.04 0.00 - 0.20 10*3/mm3 Final     Immature Grans, Absolute   Date Value Ref Range Status   05/19/2021 0.02 0.00 - 0.05 10*3/mm3 Final     nRBC   Date Value Ref Range Status   01/26/2022 0.0 0.0 - 0.2 /100 WBC Final       Lab Results   Component Value Date    GLUCOSE 111 (H) 01/26/2022    BUN 14 01/26/2022    CREATININE 0.99 01/26/2022    EGFRIFNONA 75 01/26/2022    EGFRIFAFRI >60 03/21/2019    BCR 14.1 01/26/2022    K 4.6 01/26/2022    CO2 27.0 01/26/2022    CALCIUM 9.5 01/26/2022    PROTENTOTREF 6.8 01/14/2022    ALBUMIN 4.20 01/26/2022    LABIL2 1.6 01/14/2022    AST 15 01/26/2022    ALT 11 01/26/2022         Assessment/Plan   B12 deficiency  - CBC & Differential      1. Normocytic anemia, iron  studies ruled out iron deficiency  2. Low normal B12 rule out B12 deficiency.  Methylmalonic acid level was within acceptable limits  3. Folate deficiency: Folic acid 1 mg p.o. daily was initiated  4. AAA 5.9 for surgical repair soon.  Patient follows up with Dr. Mendez.  Surgery has been scheduled third week in February 2022      Plans    · Discontinue B12 injections since his methylmalonic acid level was within normal limits  · Reviewed results of serum methylmalonic acid level, serum transferrin receptor assay, SPEP with ESSIE, reticulocyte count, haptoglobin and LDH as well as serum folate level  · Continue folic acid 1 mg p.o. daily  · Follow-up with Dr. Mendez for repair of AAA  · Follow-up with me in end of April 2022  · All questions answered    Patient verbalized understanding and is in agreement of the above plan.              I spent 30 total minutes, face-to-face, caring for Alexei today.  90% of this time involved counseling and/or coordination of care as documented within this note.

## 2022-02-10 ENCOUNTER — APPOINTMENT (OUTPATIENT)
Dept: LAB | Facility: HOSPITAL | Age: 72
End: 2022-02-10

## 2022-02-10 ENCOUNTER — OFFICE VISIT (OUTPATIENT)
Dept: ONCOLOGY | Facility: CLINIC | Age: 72
End: 2022-02-10

## 2022-02-10 VITALS
HEIGHT: 72 IN | BODY MASS INDEX: 18.28 KG/M2 | DIASTOLIC BLOOD PRESSURE: 83 MMHG | WEIGHT: 135 LBS | RESPIRATION RATE: 18 BRPM | TEMPERATURE: 97.3 F | HEART RATE: 69 BPM | SYSTOLIC BLOOD PRESSURE: 156 MMHG

## 2022-02-10 DIAGNOSIS — E53.8 B12 DEFICIENCY: Primary | ICD-10-CM

## 2022-02-10 DIAGNOSIS — D64.9 ANEMIA, UNSPECIFIED TYPE: Primary | ICD-10-CM

## 2022-02-10 LAB
BASOPHILS # BLD AUTO: 0.04 10*3/MM3 (ref 0–0.2)
BASOPHILS NFR BLD AUTO: 0.7 % (ref 0–1.5)
DEPRECATED RDW RBC AUTO: 57 FL (ref 37–54)
EOSINOPHIL # BLD AUTO: 0.13 10*3/MM3 (ref 0–0.4)
EOSINOPHIL NFR BLD AUTO: 2.3 % (ref 0.3–6.2)
ERYTHROCYTE [DISTWIDTH] IN BLOOD BY AUTOMATED COUNT: 17.5 % (ref 12.3–15.4)
HCT VFR BLD AUTO: 35.2 % (ref 37.5–51)
HGB BLD-MCNC: 11.7 G/DL (ref 13–17.7)
HOLD SPECIMEN: NORMAL
LYMPHOCYTES # BLD AUTO: 2 10*3/MM3 (ref 0.7–3.1)
LYMPHOCYTES NFR BLD AUTO: 35.8 % (ref 19.6–45.3)
MCH RBC QN AUTO: 31 PG (ref 26.6–33)
MCHC RBC AUTO-ENTMCNC: 33.2 G/DL (ref 31.5–35.7)
MCV RBC AUTO: 93.4 FL (ref 79–97)
MONOCYTES # BLD AUTO: 0.69 10*3/MM3 (ref 0.1–0.9)
MONOCYTES NFR BLD AUTO: 12.3 % (ref 5–12)
NEUTROPHILS NFR BLD AUTO: 2.73 10*3/MM3 (ref 1.7–7)
NEUTROPHILS NFR BLD AUTO: 48.9 % (ref 42.7–76)
PLATELET # BLD AUTO: 225 10*3/MM3 (ref 140–450)
PMV BLD AUTO: 8.8 FL (ref 6–12)
RBC # BLD AUTO: 3.77 10*6/MM3 (ref 4.14–5.8)
WBC NRBC COR # BLD: 5.59 10*3/MM3 (ref 3.4–10.8)

## 2022-02-10 PROCEDURE — 36415 COLL VENOUS BLD VENIPUNCTURE: CPT | Performed by: INTERNAL MEDICINE

## 2022-02-10 PROCEDURE — 85025 COMPLETE CBC W/AUTO DIFF WBC: CPT | Performed by: INTERNAL MEDICINE

## 2022-02-10 PROCEDURE — 99214 OFFICE O/P EST MOD 30 MIN: CPT | Performed by: INTERNAL MEDICINE

## 2022-02-14 RX ORDER — ONDANSETRON 4 MG/1
TABLET, FILM COATED ORAL
Qty: 30 TABLET | Refills: 0 | Status: SHIPPED | OUTPATIENT
Start: 2022-02-14 | End: 2022-03-04

## 2022-03-04 RX ORDER — ONDANSETRON 4 MG/1
TABLET, FILM COATED ORAL
Qty: 30 TABLET | Refills: 0 | Status: SHIPPED | OUTPATIENT
Start: 2022-03-04 | End: 2022-03-22

## 2022-03-07 ENCOUNTER — TELEPHONE (OUTPATIENT)
Dept: CASE MANAGEMENT | Facility: OTHER | Age: 72
End: 2022-03-07

## 2022-03-14 ENCOUNTER — PATIENT OUTREACH (OUTPATIENT)
Dept: CASE MANAGEMENT | Facility: OTHER | Age: 72
End: 2022-03-14

## 2022-03-14 NOTE — OUTREACH NOTE
AMBULATORY CASE MANAGEMENT NOTE    Name and Relationship of Patient/Support Person: Alexei Merino III E - Self     Patient Outreach    Attempted to contact patient today as a routine follow up call. Left  for return call. Unable to reach patient since last outreach on 1/31/2022. ACKAYLIN-RN can be reached at [phone] should patient or his caregiver have questions/needs in the future.     RUBIA EWING  Ambulatory Case Management    3/14/2022, 09:52 EDT

## 2022-03-22 RX ORDER — ONDANSETRON 4 MG/1
TABLET, FILM COATED ORAL
Qty: 30 TABLET | Refills: 0 | Status: SHIPPED | OUTPATIENT
Start: 2022-03-22 | End: 2022-04-06

## 2022-04-06 RX ORDER — ONDANSETRON 4 MG/1
TABLET, FILM COATED ORAL
Qty: 30 TABLET | Refills: 0 | Status: SHIPPED | OUTPATIENT
Start: 2022-04-06 | End: 2022-04-19

## 2022-04-19 RX ORDER — ONDANSETRON 4 MG/1
TABLET, FILM COATED ORAL
Qty: 30 TABLET | Refills: 0 | Status: SHIPPED | OUTPATIENT
Start: 2022-04-19 | End: 2022-05-09

## 2022-04-26 ENCOUNTER — APPOINTMENT (OUTPATIENT)
Dept: LAB | Facility: HOSPITAL | Age: 72
End: 2022-04-26

## 2022-05-09 RX ORDER — ONDANSETRON 4 MG/1
TABLET, FILM COATED ORAL
Qty: 30 TABLET | Refills: 0 | Status: SHIPPED | OUTPATIENT
Start: 2022-05-09 | End: 2022-06-09

## 2022-06-01 DIAGNOSIS — F41.8 ANXIETY WITH DEPRESSION: ICD-10-CM

## 2022-06-01 RX ORDER — HYDROXYZINE HYDROCHLORIDE 25 MG/1
TABLET, FILM COATED ORAL
Qty: 60 TABLET | Refills: 3 | Status: SHIPPED | OUTPATIENT
Start: 2022-06-01 | End: 2022-08-15

## 2022-06-09 RX ORDER — ONDANSETRON 4 MG/1
TABLET, FILM COATED ORAL
Qty: 30 TABLET | Refills: 0 | Status: SHIPPED | OUTPATIENT
Start: 2022-06-09 | End: 2022-07-01 | Stop reason: SDUPTHER

## 2022-07-01 RX ORDER — ONDANSETRON 4 MG/1
4 TABLET, FILM COATED ORAL EVERY 12 HOURS PRN
Qty: 30 TABLET | Refills: 1 | Status: SHIPPED | OUTPATIENT
Start: 2022-07-01 | End: 2022-08-29

## 2022-08-12 DIAGNOSIS — F41.8 ANXIETY WITH DEPRESSION: ICD-10-CM

## 2022-08-15 RX ORDER — HYDROXYZINE HYDROCHLORIDE 25 MG/1
TABLET, FILM COATED ORAL
Qty: 60 TABLET | Refills: 3 | Status: SHIPPED | OUTPATIENT
Start: 2022-08-15 | End: 2022-10-13

## 2022-08-29 RX ORDER — ONDANSETRON 4 MG/1
TABLET, FILM COATED ORAL
Qty: 30 TABLET | Refills: 0 | Status: SHIPPED | OUTPATIENT
Start: 2022-08-29 | End: 2022-09-06 | Stop reason: SDUPTHER

## 2022-09-06 RX ORDER — ONDANSETRON 4 MG/1
4 TABLET, FILM COATED ORAL EVERY 12 HOURS PRN
Qty: 30 TABLET | Refills: 0 | Status: SHIPPED | OUTPATIENT
Start: 2022-09-06 | End: 2022-09-27

## 2022-09-27 RX ORDER — ONDANSETRON 4 MG/1
TABLET, FILM COATED ORAL
Qty: 30 TABLET | Refills: 0 | Status: SHIPPED | OUTPATIENT
Start: 2022-09-27 | End: 2022-10-13

## 2022-10-13 DIAGNOSIS — F41.8 ANXIETY WITH DEPRESSION: ICD-10-CM

## 2022-10-13 RX ORDER — HYDROXYZINE HYDROCHLORIDE 25 MG/1
TABLET, FILM COATED ORAL
Qty: 60 TABLET | Refills: 3 | Status: SHIPPED | OUTPATIENT
Start: 2022-10-13 | End: 2022-12-19

## 2022-10-13 RX ORDER — ONDANSETRON 4 MG/1
TABLET, FILM COATED ORAL
Qty: 30 TABLET | Refills: 0 | Status: SHIPPED | OUTPATIENT
Start: 2022-10-13 | End: 2022-11-01

## 2022-11-01 RX ORDER — ONDANSETRON 4 MG/1
TABLET, FILM COATED ORAL
Qty: 30 TABLET | Refills: 0 | Status: SHIPPED | OUTPATIENT
Start: 2022-11-01 | End: 2022-11-20

## 2022-11-20 RX ORDER — ONDANSETRON 4 MG/1
TABLET, FILM COATED ORAL
Qty: 30 TABLET | Refills: 0 | Status: SHIPPED | OUTPATIENT
Start: 2022-11-20 | End: 2022-12-07

## 2022-12-07 RX ORDER — ONDANSETRON 4 MG/1
TABLET, FILM COATED ORAL
Qty: 30 TABLET | Refills: 0 | Status: SHIPPED | OUTPATIENT
Start: 2022-12-07 | End: 2022-12-21 | Stop reason: SDUPTHER

## 2022-12-19 DIAGNOSIS — F41.8 ANXIETY WITH DEPRESSION: ICD-10-CM

## 2022-12-19 RX ORDER — ONDANSETRON 4 MG/1
TABLET, FILM COATED ORAL
Qty: 30 TABLET | Refills: 0 | OUTPATIENT
Start: 2022-12-19

## 2022-12-19 RX ORDER — HYDROXYZINE HYDROCHLORIDE 25 MG/1
TABLET, FILM COATED ORAL
Qty: 60 TABLET | Refills: 3 | Status: SHIPPED | OUTPATIENT
Start: 2022-12-19 | End: 2023-02-27

## 2022-12-21 RX ORDER — ONDANSETRON 4 MG/1
4 TABLET, FILM COATED ORAL EVERY 8 HOURS PRN
Qty: 30 TABLET | Refills: 0 | Status: SHIPPED | OUTPATIENT
Start: 2022-12-21 | End: 2023-01-12 | Stop reason: SDUPTHER

## 2023-01-12 RX ORDER — ONDANSETRON 4 MG/1
4 TABLET, FILM COATED ORAL EVERY 8 HOURS PRN
Qty: 30 TABLET | Refills: 0 | Status: SHIPPED | OUTPATIENT
Start: 2023-01-12 | End: 2023-02-01

## 2023-02-01 RX ORDER — ONDANSETRON 4 MG/1
TABLET, FILM COATED ORAL
Qty: 30 TABLET | Refills: 0 | Status: SHIPPED | OUTPATIENT
Start: 2023-02-01 | End: 2023-02-14 | Stop reason: SDUPTHER

## 2023-02-10 RX ORDER — ONDANSETRON 4 MG/1
TABLET, FILM COATED ORAL
Qty: 30 TABLET | Refills: 0 | OUTPATIENT
Start: 2023-02-10

## 2023-02-13 RX ORDER — ONDANSETRON 4 MG/1
4 TABLET, FILM COATED ORAL EVERY 8 HOURS PRN
Qty: 30 TABLET | Refills: 0 | OUTPATIENT
Start: 2023-02-13

## 2023-02-14 DIAGNOSIS — J44.9 CHRONIC OBSTRUCTIVE PULMONARY DISEASE, UNSPECIFIED COPD TYPE: ICD-10-CM

## 2023-02-14 RX ORDER — ONDANSETRON 4 MG/1
4 TABLET, FILM COATED ORAL EVERY 8 HOURS PRN
Qty: 30 TABLET | Refills: 2 | Status: SHIPPED | OUTPATIENT
Start: 2023-02-14 | End: 2023-03-27

## 2023-02-14 RX ORDER — TIOTROPIUM BROMIDE INHALATION SPRAY 3.12 UG/1
2 SPRAY, METERED RESPIRATORY (INHALATION)
Qty: 4 G | Refills: 5 | Status: SHIPPED | OUTPATIENT
Start: 2023-02-14 | End: 2023-03-14

## 2023-02-14 RX ORDER — ONDANSETRON 4 MG/1
4 TABLET, FILM COATED ORAL EVERY 8 HOURS PRN
Qty: 30 TABLET | Refills: 0 | OUTPATIENT
Start: 2023-02-14

## 2023-02-14 NOTE — TELEPHONE ENCOUNTER
Signed this a week ago and we keep getting multiple requests daily.   Patient has not been here since 2021.     Last refill was 2/1/23 #30  Do you want to give more than #30?

## 2023-02-15 ENCOUNTER — TELEPHONE (OUTPATIENT)
Dept: FAMILY MEDICINE CLINIC | Facility: CLINIC | Age: 73
End: 2023-02-15
Payer: COMMERCIAL

## 2023-02-15 NOTE — TELEPHONE ENCOUNTER
Caller: Jessica Arias    Relationship to patient: Emergency Contact    Best call back number: [phone]    Patient is needing:     PATIENT'S FIANCE CALLED AND SAID THAT SHE THINKS THE PATIENT NEEDS A COLONOSCOPY    SHE SAID HE HAD BEEN TOLD BY PROVIDERS TO GET ONE FOR YEARS AND NEVER HAS, SHE SAID LAST YEAR HE HAD A FECAL IMPACTION AND SHE IS CONCERNED THAT HE MAY HAVE ONE AGAIN, BUT DID NOT GIVE A REASON TO THINK THAT     SHE SAID THAT DR. VINES HAD TOLD HER TO GIVE HIM MIRALAX AND SHE HAS BEEN DOING THAT AND SHE SAID HE HAS BEEN HAVING REGULAR BOWEL MOVEMENTS, BUT SHE IS JUST CONCERNED AND WANTING TO SEE IF DR. VINES WILL ORDER THE COLONOSCOPY    HUB ADVISED THE PATIENT'S FIANCE THAT USUALLY THE PROVIDER WOULD ORDER THAT AT AN APPOINTMENT, SHE SAID SHE IS WANTING TO SEE IF EITHER HIS NEXT APPOINTMENT CAN BE MOVED UP OR IF HE CAN ORDER THE COLONOSCOPY NOW     SHE IS ON HIS  VERBAL    REQUESTED CALLBACK

## 2023-02-16 NOTE — TELEPHONE ENCOUNTER
HUB TO SHARE: LEFT VM LETTING PATIENT KNOW THAT WE CANNOT MOVE HIS WELLNESS EXAM SOONER BUT HE CAN SCHEDULE A SEPARATE VISIT WITH AN APRN TO DISCUSS HIS ISSUES THEN SEE DR VINES IN MARCH AS WELL.

## 2023-02-26 DIAGNOSIS — F41.8 ANXIETY WITH DEPRESSION: ICD-10-CM

## 2023-02-27 RX ORDER — HYDROXYZINE HYDROCHLORIDE 25 MG/1
TABLET, FILM COATED ORAL
Qty: 60 TABLET | Refills: 3 | Status: SHIPPED | OUTPATIENT
Start: 2023-02-27

## 2023-03-14 ENCOUNTER — OFFICE VISIT (OUTPATIENT)
Dept: FAMILY MEDICINE CLINIC | Facility: CLINIC | Age: 73
End: 2023-03-14
Payer: MEDICARE

## 2023-03-14 VITALS
RESPIRATION RATE: 18 BRPM | WEIGHT: 137 LBS | HEART RATE: 67 BPM | BODY MASS INDEX: 18.58 KG/M2 | OXYGEN SATURATION: 97 % | SYSTOLIC BLOOD PRESSURE: 160 MMHG | DIASTOLIC BLOOD PRESSURE: 80 MMHG

## 2023-03-14 DIAGNOSIS — Z12.5 ENCOUNTER FOR SCREENING FOR MALIGNANT NEOPLASM OF PROSTATE: ICD-10-CM

## 2023-03-14 DIAGNOSIS — I71.40 ABDOMINAL AORTIC ANEURYSM (AAA) WITHOUT RUPTURE, UNSPECIFIED PART: ICD-10-CM

## 2023-03-14 DIAGNOSIS — Z87.891 PERSONAL HISTORY OF NICOTINE DEPENDENCE: ICD-10-CM

## 2023-03-14 DIAGNOSIS — E55.9 VITAMIN D DEFICIENCY, UNSPECIFIED: ICD-10-CM

## 2023-03-14 DIAGNOSIS — I25.810 CORONARY ARTERY DISEASE INVOLVING CORONARY BYPASS GRAFT OF NATIVE HEART WITHOUT ANGINA PECTORIS: ICD-10-CM

## 2023-03-14 DIAGNOSIS — I10 PRIMARY HYPERTENSION: ICD-10-CM

## 2023-03-14 DIAGNOSIS — Z23 ENCOUNTER FOR IMMUNIZATION: ICD-10-CM

## 2023-03-14 DIAGNOSIS — E78.5 HYPERLIPIDEMIA, UNSPECIFIED HYPERLIPIDEMIA TYPE: ICD-10-CM

## 2023-03-14 DIAGNOSIS — K59.00 CONSTIPATION, UNSPECIFIED CONSTIPATION TYPE: ICD-10-CM

## 2023-03-14 DIAGNOSIS — K30 DELAYED GASTRIC EMPTYING: ICD-10-CM

## 2023-03-14 DIAGNOSIS — Z00.00 MEDICARE ANNUAL WELLNESS VISIT, SUBSEQUENT: Primary | ICD-10-CM

## 2023-03-14 DIAGNOSIS — D64.9 ANEMIA, UNSPECIFIED TYPE: ICD-10-CM

## 2023-03-14 NOTE — PROGRESS NOTES
The ABCs of the Annual Wellness Visit  Subsequent Medicare Wellness Visit    Cruzito Merino III is a 72 y.o. male who presents for a Subsequent Medicare Wellness Visit.    The following portions of the patient's history were reviewed and   updated as appropriate: allergies, current medications, past family history, past medical history, past social history, past surgical history and problem list.    Compared to one year ago, the patient feels his physical   health is the same.    Compared to one year ago, the patient feels his mental   health is worse.    Recent Hospitalizations:  He was not admitted to the hospital during the last year.     Current Medical Providers:  Patient Care Team:  Raheem Mckeon MD as PCP - General (Internal Medicine)    Outpatient Medications Prior to Visit   Medication Sig Dispense Refill   • aspirin 81 MG EC tablet Take 1 tablet by mouth Daily.     • ferrous gluconate 324 (37.5 Fe) MG tablet tablet Take  by mouth Daily With Breakfast.     • folic acid (FOLVITE) 1 MG tablet Take 1 tablet by mouth Daily. 30 tablet 3   • hydrOXYzine (ATARAX) 25 MG tablet TAKE ONE TABLET BY MOUTH EVERY 6 HOURS AS NEEDED FOR ANXIETY 60 tablet 3   • vitamin D (ERGOCALCIFEROL) 1.25 MG (96149 UT) capsule capsule TAKE ONE CAPSULE BY MOUTH ONE TIME PER WEEK 12 capsule 1   • ondansetron (ZOFRAN) 4 MG tablet Take 1 tablet by mouth Every 8 (Eight) Hours As Needed for Nausea or Vomiting. 30 tablet 2   • docusate sodium (COLACE) 100 MG capsule Take 1 capsule by mouth 2 (Two) Times a Day As Needed for Constipation. 14 capsule 0   • tamsulosin (FLOMAX) 0.4 MG capsule 24 hr capsule Take 1 capsule by mouth Daily.     • tiotropium bromide monohydrate (Spiriva Respimat) 2.5 MCG/ACT aerosol solution inhaler Inhale 2 puffs Daily. 4 g 5     No facility-administered medications prior to visit.     No opioid medication identified on active medication list. I have reviewed chart for other potential  high risk  "medication/s and harmful drug interactions in the elderly.        Aspirin is on active medication list. Aspirin use is indicated based on review of current medical condition/s. Pros and cons of this therapy have been discussed today. Benefits of this medication outweigh potential harm.  Patient has been encouraged to continue taking this medication.  .    Patient Active Problem List   Diagnosis   • S/P CABG x 3   • Coronary artery disease   • Hyperlipidemia   • Hypertension   • Abdominal aortic aneurysm (AAA)   • Actinic keratoses   • Anxiety with depression   • Chronic cough   • Decreased hearing, bilateral   • Delayed gastric emptying   • Dementia (HCC)   • Dental infection   • Exposure to venereal disease   • Fatigue   • Hay fever   • Nausea and vomiting   • Chronic sinusitis   • Sinusitis, acute   • Smoking greater than 30 pack years   • Tobacco use   • Unsteady gait   • Weight loss   • Underweight   • Chronic obstructive pulmonary disease (HCC)   • B12 deficiency     Advance Care Planning  Advance Directive is on file.  ACP discussion was declined by the patient. Patient has an advance directive in EMR which is still valid.      Objective    Vitals:    03/14/23 1541   BP: 160/80   BP Location: Right arm   Patient Position: Sitting   Pulse: 67   Resp: 18   SpO2: 97%   Weight: 62.1 kg (137 lb)     Estimated body mass index is 18.58 kg/m² as calculated from the following:    Height as of 2/10/22: 182.9 cm (72\").    Weight as of this encounter: 62.1 kg (137 lb).    BMI is within normal parameters. No other follow-up for BMI required.    Does the patient have evidence of cognitive impairment? No        HEALTH RISK ASSESSMENT    Smoking Status:  Social History     Tobacco Use   Smoking Status Every Day   • Packs/day: 2.00   • Years: 50.00   • Pack years: 100.00   • Types: Cigarettes   Smokeless Tobacco Never   Weaned down to 0.5ppd    Alcohol Consumption:  Social History     Substance and Sexual Activity   Alcohol " Use No     Fall Risk Screen:  FREDI Fall Risk Assessment has not been completed.  Gets up too quickly    Depression Screening:  PHQ-2/PHQ-9 Depression Screening 3/14/2023   Little Interest or Pleasure in Doing Things 0-->not at all   Feeling Down, Depressed or Hopeless 0-->not at all   PHQ-9: Brief Depression Severity Measure Score 0     Health Habits and Functional and Cognitive Screening:  Functional & Cognitive Status 3/14/2023   Do you have difficulty preparing food and eating? No   Do you have difficulty bathing yourself, getting dressed or grooming yourself? Yes   Do you have difficulty using the toilet? No   Do you have difficulty moving around from place to place? No   Do you have trouble with steps or getting out of a bed or a chair? Yes   Current Diet Well Balanced Diet   Dental Exam Up to date   Eye Exam Up to date   Exercise (times per week) 4 times per week   Current Exercises Include Walking   Do you need help using the phone?  -   Are you deaf or do you have serious difficulty hearing?  Yes   Do you need help with transportation? Yes   Do you need help shopping? Yes   Do you need help preparing meals?  Yes   Do you need help with housework?  Yes   Do you need help with laundry? Yes   Do you need help taking your medications? Yes   Do you need help managing money? No   Do you ever drive or ride in a car without wearing a seat belt? No   Have you felt unusual stress, anger or loneliness in the last month? No   Who do you live with? Spouse   If you need help, do you have trouble finding someone available to you? No   Have you been bothered in the last four weeks by sexual problems? No   Do you have difficulty concentrating, remembering or making decisions? Yes   Independent in ADLs. Dependent in IADLs    Age-appropriate Screening Schedule:  Refer to the list below for future screening recommendations based on patient's age, sex and/or medical conditions. Orders for these recommended tests are listed in  the plan section. The patient has been provided with a written plan.    Health Maintenance   Topic Date Due   • COLORECTAL CANCER SCREENING  Never done   • TDAP/TD VACCINES (1 - Tdap) Never done   • ZOSTER VACCINE (1 of 2) Never done   • LUNG CANCER SCREENING  04/18/2020   • COVID-19 Vaccine (2 - Pfizer series) 04/07/2021   • INFLUENZA VACCINE  08/01/2022   • LIPID PANEL  12/28/2022   • ANNUAL WELLNESS VISIT  03/14/2024   • HEPATITIS C SCREENING  Completed   • Pneumococcal Vaccine 65+  Completed   • AAA SCREEN (ONE-TIME)  Completed        CMS Preventative Services Quick Reference  Risk Factors Identified During Encounter  Immunizations Discussed/Encouraged: Tdap, Influenza, Prevnar 20 (Pneumococcal 20-valent conjugate), Shingrix and COVID19  The above risks/problems have been discussed with the patient.  Pertinent information has been shared with the patient in the After Visit Summary.  An After Visit Summary and PPPS were made available to the patient.    Preventative:  Colonoscopy: Ordered today  PSA: 0.897 (5/2021), ordered today  CT chest: 4/2019, ordered today  AAA: 5.7cm on 1/2022 CTA. Follows w/ thoracic surgery ([facility])  DEXA: Deferred  Shingles: Recommended  Pneumonia: Pneumovax 11/2015, PCV20 ordered today  Tdap: Recommended, deferred  Influenza: 11/2020, recommended  COVID: Completed 1 of 2 (Pfizer)    Follow Up:   Next Medicare Wellness visit to be scheduled in 1 year.       Additional E&M Note during same encounter follows:  Patient has multiple medical problems which are significant and separately identifiable that require additional work above and beyond the Medicare Wellness Visit.      Chief Complaint  Medicare Wellness-subsequent    Subjective        HPI  Alexei Merino III is also being seen today for multiple medical problems  Much of history obtained from wife due to hearing impairment    Gastroparesis: as seen on 1/2022 emptying study. Continues to have nausea and is maintained on Zofran 3x/day.  Previously maintained on metoclopramide but this reportedly made him feel funny and discontinued. Tolerating meals 3x/day      AAA: 5.7cm AAA on 1/2022 CTA and underwent surgical repair 3/2022. Following w/ vascular surgery- [facility]. Doing well at this time     CAD: s/p 3V CABG 3/2016. Maintained on ASA 81. No longer taking atorvastatin 40 daily. He denies CP, palpitations, SOB. He continues to smoke. BP well controlled today. No longer following w/ [facility]- [facility] as he was reportedly told to only f/u if having issues.     HTN: 160/80 today. Generally runs 120-130s/70s at home. Not maintained on any medication    HLD: no longer taking atorvastatin due to vague side effects.      Anemia: hx Fe deficiency anemia. No longer taking iron supplement    Constipation: patient reports hx chronic constipation w/ need for intermittent disimpaction. He has recently started on Miralax and doing well     Review of Systems   Constitutional: Negative for chills and fever.   HENT: Positive for hearing loss. Negative for congestion and rhinorrhea.    Eyes: Negative for visual disturbance.   Respiratory: Negative for cough and shortness of breath.    Cardiovascular: Negative for chest pain and palpitations.   Gastrointestinal: Positive for constipation and nausea. Negative for abdominal pain and vomiting.   Genitourinary: Negative for difficulty urinating and dysuria.   Musculoskeletal: Negative for arthralgias and back pain.   Skin: Negative for rash.   Neurological: Negative for dizziness, light-headedness and numbness.   Psychiatric/Behavioral: Negative for dysphoric mood and sleep disturbance. The patient is not nervous/anxious.      Objective   Vital Signs:  /80 (BP Location: Right arm, Patient Position: Sitting)   Pulse 67   Resp 18   Wt 62.1 kg (137 lb)   SpO2 97%   BMI 18.58 kg/m²     Physical Exam  Constitutional:       General: He is not in acute distress.     Appearance: He is well-developed.   HENT:      Head:  Normocephalic and atraumatic.      Right Ear: Tympanic membrane and external ear normal. There is no impacted cerumen.      Left Ear: Tympanic membrane and external ear normal. There is no impacted cerumen.      Ears:      Comments: Profound hearing loss     Nose: Nose normal.      Mouth/Throat:      Mouth: Mucous membranes are moist.      Pharynx: No oropharyngeal exudate or posterior oropharyngeal erythema.   Eyes:      General: No scleral icterus.        Right eye: No discharge.         Left eye: No discharge.      Extraocular Movements: Extraocular movements intact.      Conjunctiva/sclera: Conjunctivae normal.      Pupils: Pupils are equal, round, and reactive to light.   Neck:      Thyroid: No thyromegaly.      Vascular: No carotid bruit.   Cardiovascular:      Rate and Rhythm: Normal rate and regular rhythm.      Heart sounds: Normal heart sounds. No murmur heard.  Pulmonary:      Effort: Pulmonary effort is normal. No respiratory distress.      Breath sounds: Normal breath sounds. No wheezing or rales.   Abdominal:      General: Bowel sounds are normal. There is no distension.      Palpations: Abdomen is soft.      Tenderness: There is no abdominal tenderness.   Musculoskeletal:         General: No deformity. Normal range of motion.      Cervical back: Normal range of motion and neck supple.   Lymphadenopathy:      Cervical: No cervical adenopathy.   Skin:     General: Skin is warm and dry.      Findings: No rash.   Neurological:      General: No focal deficit present.      Mental Status: He is alert and oriented to person, place, and time. Mental status is at baseline.      Cranial Nerves: No cranial nerve deficit.      Sensory: No sensory deficit.   Psychiatric:         Behavior: Behavior normal.         Thought Content: Thought content normal.             Assessment and Plan   Diagnoses and all orders for this visit:    1. Medicare annual wellness visit, subsequent (Primary)  -     CBC & Differential  -      Comprehensive Metabolic Panel  -     Hemoglobin A1c  -     Lipid Panel  -     TSH  -     T4, Free  -     Vitamin D,25-Hydroxy  -     Vitamin B12  -     PSA Screen  -     Ambulatory Referral For Screening Colonoscopy  -      CT Chest Low Dose Cancer Screening WO; Future  -     Pneumococcal Conjugate Vaccine 20-Valent All  -  Counseled regarding exercise and preventative health maintenance items/immunizations below    2. Delayed gastric emptying: as seen on 1/2022 emptying study.    - Continue home Zofran 4 mg 3 times daily as needed    3. Abdominal aortic aneurysm (AAA) without rupture, unspecified part: 5.7cm AAA on 1/2022 CTA and underwent surgical repair 3/2022   -Continue vascular follow-up- [facility]   -Continue home aspirin 81 daily   -Unable to tolerate statin    4. Coronary artery disease involving coronary bypass graft of native heart without angina pectoris: s/p 3V CABG 3/2016.  No longer following with cardiology   -Continue home aspirin 81 daily   -Unable to tolerate statin   -Recommend follow-up nuclear stress.  Wife reports this was done recently at [facility].  She states she will work to forward results    5. Primary hypertension: 160/80 today.   -     Comprehensive Metabolic Panel  - Monitor.  Low threshold to add beta-blocker versus ACE/ARB  - Instructed to bring home blood pressure cuff to next appointment as he reportedly runs well at home    6. Hyperlipidemia, unspecified hyperlipidemia type: Unable to tolerate statin  -     Lipid Panel    7. Anemia, unspecified type  -     CBC & Differential  - Restart daily iron supplement pending labs    8. Constipation, unspecified constipation type   -Counseled regarding regular bowel regimen of MiraLAX    9. Vitamin D deficiency, unspecified  -     Vitamin D,25-Hydroxy    10. Encounter for screening for malignant neoplasm of prostate  -     PSA Screen    11. Personal history of nicotine dependence  -      CT Chest Low Dose Cancer Screening WO; Future    12.  Encounter for immunization  -     Pneumococcal Conjugate Vaccine 20-Valent All     I spent 50 minutes caring for Alexei on this date of service. This time includes time spent by me in the following activities:reviewing tests, obtaining and/or reviewing a separately obtained history, performing a medically appropriate examination and/or evaluation , counseling and educating the patient/family/caregiver, ordering medications, tests, or procedures and documenting information in the medical record  Follow Up   Return in about 6 months (around 9/14/2023) for Recheck- 30min.  Patient was given instructions and counseling regarding his condition or for health maintenance advice. Please see specific information pulled into the AVS if appropriate.

## 2023-03-14 NOTE — PATIENT INSTRUCTIONS
Consider bowel regimen of 32oz gatorade w/ 7 caps miralax (x2)    Wife reports stress test at [facility]. She will work to obtain record    Bring home cuff to next appt

## 2023-03-27 RX ORDER — ONDANSETRON 4 MG/1
TABLET, FILM COATED ORAL
Qty: 30 TABLET | Refills: 2 | Status: SHIPPED | OUTPATIENT
Start: 2023-03-27

## 2023-04-06 ENCOUNTER — LAB (OUTPATIENT)
Dept: FAMILY MEDICINE CLINIC | Facility: CLINIC | Age: 73
End: 2023-04-06
Payer: MEDICARE

## 2023-04-06 PROCEDURE — 82607 VITAMIN B-12: CPT | Performed by: FAMILY MEDICINE

## 2023-04-06 PROCEDURE — 80053 COMPREHEN METABOLIC PANEL: CPT | Performed by: FAMILY MEDICINE

## 2023-04-06 PROCEDURE — 80061 LIPID PANEL: CPT | Performed by: FAMILY MEDICINE

## 2023-04-06 PROCEDURE — 83036 HEMOGLOBIN GLYCOSYLATED A1C: CPT | Performed by: FAMILY MEDICINE

## 2023-04-06 PROCEDURE — 84439 ASSAY OF FREE THYROXINE: CPT | Performed by: FAMILY MEDICINE

## 2023-04-06 PROCEDURE — 36415 COLL VENOUS BLD VENIPUNCTURE: CPT | Performed by: FAMILY MEDICINE

## 2023-04-06 PROCEDURE — 82306 VITAMIN D 25 HYDROXY: CPT | Performed by: FAMILY MEDICINE

## 2023-04-06 PROCEDURE — 85025 COMPLETE CBC W/AUTO DIFF WBC: CPT | Performed by: FAMILY MEDICINE

## 2023-04-06 PROCEDURE — 84443 ASSAY THYROID STIM HORMONE: CPT | Performed by: FAMILY MEDICINE

## 2023-04-06 PROCEDURE — G0103 PSA SCREENING: HCPCS | Performed by: FAMILY MEDICINE

## 2023-04-07 ENCOUNTER — TELEPHONE (OUTPATIENT)
Dept: FAMILY MEDICINE CLINIC | Facility: CLINIC | Age: 73
End: 2023-04-07
Payer: COMMERCIAL

## 2023-04-07 LAB
25(OH)D3 SERPL-MCNC: 6.9 NG/ML (ref 30–100)
ALBUMIN SERPL-MCNC: 4.7 G/DL (ref 3.5–5.2)
ALBUMIN/GLOB SERPL: 2.1 G/DL
ALP SERPL-CCNC: 91 U/L (ref 39–117)
ALT SERPL W P-5'-P-CCNC: 11 U/L (ref 1–41)
ANION GAP SERPL CALCULATED.3IONS-SCNC: 7.4 MMOL/L (ref 5–15)
AST SERPL-CCNC: 12 U/L (ref 1–40)
BASOPHILS # BLD AUTO: 0.03 10*3/MM3 (ref 0–0.2)
BASOPHILS NFR BLD AUTO: 0.6 % (ref 0–1.5)
BILIRUB SERPL-MCNC: 0.9 MG/DL (ref 0–1.2)
BUN SERPL-MCNC: 14 MG/DL (ref 8–23)
BUN/CREAT SERPL: 11.6 (ref 7–25)
CALCIUM SPEC-SCNC: 9.9 MG/DL (ref 8.6–10.5)
CHLORIDE SERPL-SCNC: 99 MMOL/L (ref 98–107)
CHOLEST SERPL-MCNC: 195 MG/DL (ref 0–200)
CO2 SERPL-SCNC: 28.6 MMOL/L (ref 22–29)
CREAT SERPL-MCNC: 1.21 MG/DL (ref 0.76–1.27)
DEPRECATED RDW RBC AUTO: 38.8 FL (ref 37–54)
EGFRCR SERPLBLD CKD-EPI 2021: 63.6 ML/MIN/1.73
EOSINOPHIL # BLD AUTO: 0.12 10*3/MM3 (ref 0–0.4)
EOSINOPHIL NFR BLD AUTO: 2.3 % (ref 0.3–6.2)
ERYTHROCYTE [DISTWIDTH] IN BLOOD BY AUTOMATED COUNT: 11.7 % (ref 12.3–15.4)
GLOBULIN UR ELPH-MCNC: 2.2 GM/DL
GLUCOSE SERPL-MCNC: 92 MG/DL (ref 65–99)
HBA1C MFR BLD: 5.6 % (ref 4.8–5.6)
HCT VFR BLD AUTO: 35.3 % (ref 37.5–51)
HDLC SERPL-MCNC: 50 MG/DL (ref 40–60)
HGB BLD-MCNC: 12.6 G/DL (ref 13–17.7)
IMM GRANULOCYTES # BLD AUTO: 0.01 10*3/MM3 (ref 0–0.05)
IMM GRANULOCYTES NFR BLD AUTO: 0.2 % (ref 0–0.5)
LDLC SERPL CALC-MCNC: 133 MG/DL (ref 0–100)
LDLC/HDLC SERPL: 2.65 {RATIO}
LYMPHOCYTES # BLD AUTO: 1.34 10*3/MM3 (ref 0.7–3.1)
LYMPHOCYTES NFR BLD AUTO: 25.6 % (ref 19.6–45.3)
MCH RBC QN AUTO: 31.7 PG (ref 26.6–33)
MCHC RBC AUTO-ENTMCNC: 35.7 G/DL (ref 31.5–35.7)
MCV RBC AUTO: 88.9 FL (ref 79–97)
MONOCYTES # BLD AUTO: 0.61 10*3/MM3 (ref 0.1–0.9)
MONOCYTES NFR BLD AUTO: 11.6 % (ref 5–12)
NEUTROPHILS NFR BLD AUTO: 3.13 10*3/MM3 (ref 1.7–7)
NEUTROPHILS NFR BLD AUTO: 59.7 % (ref 42.7–76)
NRBC BLD AUTO-RTO: 0 /100 WBC (ref 0–0.2)
PLATELET # BLD AUTO: 174 10*3/MM3 (ref 140–450)
PMV BLD AUTO: 8.9 FL (ref 6–12)
POTASSIUM SERPL-SCNC: 4.6 MMOL/L (ref 3.5–5.2)
PROT SERPL-MCNC: 6.9 G/DL (ref 6–8.5)
PSA SERPL-MCNC: 0.44 NG/ML (ref 0–4)
RBC # BLD AUTO: 3.97 10*6/MM3 (ref 4.14–5.8)
SODIUM SERPL-SCNC: 135 MMOL/L (ref 136–145)
T4 FREE SERPL-MCNC: 1.14 NG/DL (ref 0.93–1.7)
TRIGL SERPL-MCNC: 63 MG/DL (ref 0–150)
TSH SERPL DL<=0.05 MIU/L-ACNC: 0.87 UIU/ML (ref 0.27–4.2)
VIT B12 BLD-MCNC: 272 PG/ML (ref 211–946)
VLDLC SERPL-MCNC: 12 MG/DL (ref 5–40)
WBC NRBC COR # BLD: 5.24 10*3/MM3 (ref 3.4–10.8)

## 2023-04-07 RX ORDER — PRAVASTATIN SODIUM 20 MG
20 TABLET ORAL DAILY
Qty: 90 TABLET | Refills: 3 | Status: SHIPPED | OUTPATIENT
Start: 2023-04-07

## 2023-04-07 NOTE — TELEPHONE ENCOUNTER
Spoke to patient's wife as pt is hearing impaired.  Gave her results and recommendations.  She stated understanding and had no further questions.

## 2023-04-07 NOTE — TELEPHONE ENCOUNTER
----- Message from Raheem Mckeon MD sent at 4/7/2023  9:53 AM EDT -----  Labs reveal very low VitD and mildly elevated cholesterol. I want you to begin a low dose cholesterol medicine and a vitamin D supplement indefinitely. These have been sent to your pharmacy

## 2023-05-03 DIAGNOSIS — F41.8 ANXIETY WITH DEPRESSION: ICD-10-CM

## 2023-05-03 RX ORDER — HYDROXYZINE HYDROCHLORIDE 25 MG/1
TABLET, FILM COATED ORAL
Qty: 60 TABLET | Refills: 3 | Status: SHIPPED | OUTPATIENT
Start: 2023-05-03

## 2023-05-17 RX ORDER — ONDANSETRON 4 MG/1
TABLET, FILM COATED ORAL
Qty: 30 TABLET | Refills: 2 | Status: SHIPPED | OUTPATIENT
Start: 2023-05-17

## 2023-08-10 RX ORDER — ONDANSETRON 4 MG/1
TABLET, FILM COATED ORAL
Qty: 30 TABLET | Refills: 2 | Status: SHIPPED | OUTPATIENT
Start: 2023-08-10

## 2023-08-25 ENCOUNTER — APPOINTMENT (OUTPATIENT)
Dept: MRI IMAGING | Facility: HOSPITAL | Age: 73
End: 2023-08-25
Payer: MEDICARE

## 2023-08-25 ENCOUNTER — HOSPITAL ENCOUNTER (EMERGENCY)
Facility: HOSPITAL | Age: 73
Discharge: HOME | End: 2023-08-25
Attending: EMERGENCY MEDICINE
Payer: MEDICARE

## 2023-08-25 ENCOUNTER — TELEPHONE (OUTPATIENT)
Dept: FAMILY MEDICINE CLINIC | Facility: CLINIC | Age: 73
End: 2023-08-25
Payer: COMMERCIAL

## 2023-08-25 VITALS
BODY MASS INDEX: 22.53 KG/M2 | SYSTOLIC BLOOD PRESSURE: 179 MMHG | OXYGEN SATURATION: 98 % | HEART RATE: 68 BPM | TEMPERATURE: 99 F | RESPIRATION RATE: 20 BRPM | WEIGHT: 152.12 LBS | DIASTOLIC BLOOD PRESSURE: 89 MMHG | HEIGHT: 69 IN

## 2023-08-25 DIAGNOSIS — I10 HYPERTENSION, UNSPECIFIED TYPE: ICD-10-CM

## 2023-08-25 DIAGNOSIS — G51.0 BELL'S PALSY: Primary | ICD-10-CM

## 2023-08-25 LAB
ANION GAP SERPL CALCULATED.3IONS-SCNC: 11 MMOL/L (ref 5–15)
APTT PPP: 31 SECONDS (ref 61–76.5)
BASOPHILS # BLD AUTO: 0 10*3/MM3 (ref 0–0.2)
BASOPHILS NFR BLD AUTO: 0.5 % (ref 0–1.5)
BUN SERPL-MCNC: 13 MG/DL (ref 8–23)
BUN/CREAT SERPL: 12.5 (ref 7–25)
CALCIUM SPEC-SCNC: 9.7 MG/DL (ref 8.6–10.5)
CHLORIDE SERPL-SCNC: 99 MMOL/L (ref 98–107)
CO2 SERPL-SCNC: 28 MMOL/L (ref 22–29)
CREAT SERPL-MCNC: 1.04 MG/DL (ref 0.76–1.27)
DEPRECATED RDW RBC AUTO: 47.3 FL (ref 37–54)
EGFRCR SERPLBLD CKD-EPI 2021: 76.3 ML/MIN/1.73
EOSINOPHIL # BLD AUTO: 0 10*3/MM3 (ref 0–0.4)
EOSINOPHIL NFR BLD AUTO: 0.8 % (ref 0.3–6.2)
ERYTHROCYTE [DISTWIDTH] IN BLOOD BY AUTOMATED COUNT: 13.9 % (ref 12.3–15.4)
GLUCOSE BLDC GLUCOMTR-MCNC: 105 MG/DL (ref 70–105)
GLUCOSE SERPL-MCNC: 106 MG/DL (ref 65–99)
HCT VFR BLD AUTO: 38.2 % (ref 37.5–51)
HGB BLD-MCNC: 12.8 G/DL (ref 13–17.7)
INR PPP: 0.99 (ref 0.93–1.1)
LYMPHOCYTES # BLD AUTO: 1.6 10*3/MM3 (ref 0.7–3.1)
LYMPHOCYTES NFR BLD AUTO: 28.8 % (ref 19.6–45.3)
MCH RBC QN AUTO: 31.2 PG (ref 26.6–33)
MCHC RBC AUTO-ENTMCNC: 33.6 G/DL (ref 31.5–35.7)
MCV RBC AUTO: 92.8 FL (ref 79–97)
MONOCYTES # BLD AUTO: 0.6 10*3/MM3 (ref 0.1–0.9)
MONOCYTES NFR BLD AUTO: 11.4 % (ref 5–12)
NEUTROPHILS NFR BLD AUTO: 3.3 10*3/MM3 (ref 1.7–7)
NEUTROPHILS NFR BLD AUTO: 58.5 % (ref 42.7–76)
NRBC BLD AUTO-RTO: 0 /100 WBC (ref 0–0.2)
PLATELET # BLD AUTO: 207 10*3/MM3 (ref 140–450)
PMV BLD AUTO: 6.1 FL (ref 6–12)
POTASSIUM SERPL-SCNC: 4.6 MMOL/L (ref 3.5–5.2)
PROTHROMBIN TIME: 10.6 SECONDS (ref 9.6–11.7)
RBC # BLD AUTO: 4.11 10*6/MM3 (ref 4.14–5.8)
SODIUM SERPL-SCNC: 138 MMOL/L (ref 136–145)
WBC NRBC COR # BLD: 5.7 10*3/MM3 (ref 3.4–10.8)

## 2023-08-25 PROCEDURE — 82948 REAGENT STRIP/BLOOD GLUCOSE: CPT

## 2023-08-25 PROCEDURE — 70553 MRI BRAIN STEM W/O & W/DYE: CPT

## 2023-08-25 PROCEDURE — A9579 GAD-BASE MR CONTRAST NOS,1ML: HCPCS | Performed by: EMERGENCY MEDICINE

## 2023-08-25 PROCEDURE — 85730 THROMBOPLASTIN TIME PARTIAL: CPT | Performed by: EMERGENCY MEDICINE

## 2023-08-25 PROCEDURE — 63710000001 PREDNISONE PER 5 MG: Performed by: EMERGENCY MEDICINE

## 2023-08-25 PROCEDURE — 99285 EMERGENCY DEPT VISIT HI MDM: CPT

## 2023-08-25 PROCEDURE — 80048 BASIC METABOLIC PNL TOTAL CA: CPT | Performed by: EMERGENCY MEDICINE

## 2023-08-25 PROCEDURE — 25010000002 GADOTERIDOL PER 1 ML: Performed by: EMERGENCY MEDICINE

## 2023-08-25 PROCEDURE — 85025 COMPLETE CBC W/AUTO DIFF WBC: CPT | Performed by: EMERGENCY MEDICINE

## 2023-08-25 PROCEDURE — 85610 PROTHROMBIN TIME: CPT | Performed by: EMERGENCY MEDICINE

## 2023-08-25 RX ORDER — SODIUM CHLORIDE 0.9 % (FLUSH) 0.9 %
10 SYRINGE (ML) INJECTION AS NEEDED
Status: DISCONTINUED | OUTPATIENT
Start: 2023-08-25 | End: 2023-08-25 | Stop reason: HOSPADM

## 2023-08-25 RX ORDER — PREDNISONE 20 MG/1
60 TABLET ORAL DAILY
Qty: 21 TABLET | Refills: 0 | Status: SHIPPED | OUTPATIENT
Start: 2023-08-25

## 2023-08-25 RX ORDER — VALACYCLOVIR HYDROCHLORIDE 1 G/1
1000 TABLET, FILM COATED ORAL 3 TIMES DAILY
Qty: 21 TABLET | Refills: 0 | Status: SHIPPED | OUTPATIENT
Start: 2023-08-25

## 2023-08-25 RX ORDER — MINERAL OIL, PETROLATUM 425; 568 MG/G; MG/G
OINTMENT OPHTHALMIC
Qty: 3.5 G | Refills: 0 | Status: SHIPPED | OUTPATIENT
Start: 2023-08-25

## 2023-08-25 RX ORDER — VALACYCLOVIR HYDROCHLORIDE 500 MG/1
1000 TABLET, FILM COATED ORAL ONCE
Status: COMPLETED | OUTPATIENT
Start: 2023-08-25 | End: 2023-08-25

## 2023-08-25 RX ADMIN — VALACYCLOVIR HYDROCHLORIDE 1000 MG: 500 TABLET, FILM COATED ORAL at 20:46

## 2023-08-25 RX ADMIN — GADOTERIDOL 14 ML: 279.3 INJECTION, SOLUTION INTRAVENOUS at 19:08

## 2023-08-25 RX ADMIN — PREDNISONE 60 MG: 50 TABLET ORAL at 20:45

## 2023-08-25 RX ADMIN — MINERAL OIL AND WHITE PETROLATUM: 150; 830 OINTMENT OPHTHALMIC at 20:46

## 2023-08-25 NOTE — TELEPHONE ENCOUNTER
Caller: Jessica Arias    Relationship to patient: Emergency Contact    Best call back number: [phone]    Chief complaint: DROOPING FACE, WEAKNESS    Patient directed to call 911 or go to their nearest emergency room.     Patient verbalized understanding: [x] Yes  [] No  If no, why?    Additional notes: PATIENTS EMERGENCY CONTACT CALLED AND SAID SHE THOUGHT THE PATIENT HAD A STROKE, AND WAS WANTING TO SEE WHAT SHE SHOULD DO    HUB ADVISED HE NEEDED TO BE SEEN AT THE EMERGENCY ROOM, AND IF SHE WAS UNABLE TO TRANSPORT HIM TO CALL 911    SHE SAID HE WAS ABLE TO WALK, AND SHE WOULD DRIVE HIM TO [facility]

## 2023-08-25 NOTE — ED PROVIDER NOTES
Subjective   History of Present Illness  Chief complaint: Patient is 72-year-old who has right-sided facial paralysis.  He has had this since last night.  Family member with him confirms having symptoms of this yesterday.  He does have dementia.  He has difficulty hearing.  He has no weakness in his arm or his leg.  He has no more confusion than at baseline.    Context:    Duration: Since yesterday    Timing:    Severity: Severe    Associated Symptoms:        PCP:  LMP:    Review of Systems   Constitutional:  Negative for fever.   HENT: Negative.     Respiratory: Negative.     Cardiovascular: Negative.    Gastrointestinal:  Negative for abdominal pain.   Musculoskeletal: Negative.    Skin: Negative.    Neurological:  Positive for weakness and numbness.     Past Medical History:   Diagnosis Date    Anxiety     Aortic aneurysm     Coronary artery disease     Emphysema lung     [place] (hard of hearing)     Hyperlipidemia     Hypertension     Myocardial infarction        Allergies   Allergen Reactions    Morphine Anaphylaxis       Past Surgical History:   Procedure Laterality Date    ABDOMINAL AORTIC ANEURYSM REPAIR  03/23/2022    CORONARY ARTERY BYPASS GRAFT  03/13/2016    X3  Dr Hong       Family History   Problem Relation Age of Onset    Hypertension Other     Heart disease Mother     Heart disease Father        Social History     Socioeconomic History    Marital status:    Tobacco Use    Smoking status: Every Day     Packs/day: 2.00     Years: 50.00     Pack years: 100.00     Types: Cigarettes    Smokeless tobacco: Never   Vaping Use    Vaping Use: Never used   Substance and Sexual Activity    Alcohol use: No    Drug use: No    Sexual activity: Defer           Objective   Physical Exam  Vitals and nursing note reviewed.   Constitutional:       Appearance: Normal appearance.   HENT:      Head: Normocephalic and atraumatic.   Cardiovascular:      Rate and Rhythm: Normal rate and regular rhythm.      Heart  sounds: Normal heart sounds.   Pulmonary:      Effort: Pulmonary effort is normal.      Breath sounds: Normal breath sounds.   Abdominal:      Tenderness: There is no abdominal tenderness.   Musculoskeletal:         General: No swelling or tenderness.   Skin:     General: Skin is warm and dry.   Neurological:      Mental Status: He is alert and oriented to person, place, and time.      Cranial Nerves: Cranial nerve deficit present.      Sensory: Sensory deficit present.      Motor: Weakness present.   Psychiatric:         Mood and Affect: Mood normal.         Thought Content: Thought content normal.       Procedures           ED Course  ED Course as of 08/25/23 2015   Fri Aug 25, 2023   2015 I spoke with Dr. Zee.  Patient to be treated with eye care, eye patch, valacyclovir and prednisone.  Initial dose ordered here as it is late in the evening and their pharmacy is closed.  Also discussed the MRI.  The chronic microhemorrhage.  Patient can follow-up outpatient neurology for that per Dr. Zee. [LH]      ED Course User Index  [LH] Primo Vazquez DO           Results for orders placed or performed during the hospital encounter of 08/25/23   Basic Metabolic Panel    Specimen: Blood   Result Value Ref Range    Glucose 106 (H) 65 - 99 mg/dL    BUN 13 8 - 23 mg/dL    Creatinine 1.04 0.76 - 1.27 mg/dL    Sodium 138 136 - 145 mmol/L    Potassium 4.6 3.5 - 5.2 mmol/L    Chloride 99 98 - 107 mmol/L    CO2 28.0 22.0 - 29.0 mmol/L    Calcium 9.7 8.6 - 10.5 mg/dL    BUN/Creatinine Ratio 12.5 7.0 - 25.0    Anion Gap 11.0 5.0 - 15.0 mmol/L    eGFR 76.3 >60.0 mL/min/1.73   Protime-INR    Specimen: Blood   Result Value Ref Range    Protime 10.6 9.6 - 11.7 Seconds    INR 0.99 0.93 - 1.10   aPTT    Specimen: Blood   Result Value Ref Range    PTT 31.0 (L) 61.0 - 76.5 seconds   CBC Auto Differential    Specimen: Blood   Result Value Ref Range    WBC 5.70 3.40 - 10.80 10*3/mm3    RBC 4.11 (L) 4.14 - 5.80 10*6/mm3     Hemoglobin 12.8 (L) 13.0 - 17.7 g/dL    Hematocrit 38.2 37.5 - 51.0 %    MCV 92.8 79.0 - 97.0 fL    MCH 31.2 26.6 - 33.0 pg    MCHC 33.6 31.5 - 35.7 g/dL    RDW 13.9 12.3 - 15.4 %    RDW-SD 47.3 37.0 - 54.0 fl    MPV 6.1 6.0 - 12.0 fL    Platelets 207 140 - 450 10*3/mm3    Neutrophil % 58.5 42.7 - 76.0 %    Lymphocyte % 28.8 19.6 - 45.3 %    Monocyte % 11.4 5.0 - 12.0 %    Eosinophil % 0.8 0.3 - 6.2 %    Basophil % 0.5 0.0 - 1.5 %    Neutrophils, Absolute 3.30 1.70 - 7.00 10*3/mm3    Lymphocytes, Absolute 1.60 0.70 - 3.10 10*3/mm3    Monocytes, Absolute 0.60 0.10 - 0.90 10*3/mm3    Eosinophils, Absolute 0.00 0.00 - 0.40 10*3/mm3    Basophils, Absolute 0.00 0.00 - 0.20 10*3/mm3    nRBC 0.0 0.0 - 0.2 /100 WBC   POC Glucose Once    Specimen: Blood   Result Value Ref Range    Glucose 105 70 - 105 mg/dL                         MRI Brain With & Without Contrast    Result Date: 8/25/2023  Impression: 1.Focus of enhancement within right internal auditory canal. In the setting of right facial paralysis, this raises possibility of right Bell's palsy. 2.Findings suggestive of moderate chronic small vessel ischemic disease. 3.Areas of encephalomalacia and gliosis as described above. Electronically Signed: Cade Rubi MD  8/25/2023 7:35 PM EDT  Workstation ID: [id number]               Medical Decision Making  Patient seen evaluated for the above problem    Differential diagnose includes but is not limited to Bell's palsy, stroke    Discussed the case with Dr. Zee and we did obtain MRI with and without contrast.  There is a focus of internal auditory canal area that be consistent with Bell's palsy.  Also a chronic hemorrhagic lesion.  Discussed the findings with Dr. Zee.  He recommends eye care, eye lubrication patch antivirals and steroids.  To follow-up with neurology outpatient the patient does not require any further treatment for the findings on MRI.  I discussed this at length with the patient and  family.  They verbalized understanding.  His blood pressure is high but they state it usually runs right around 140.  He takes his blood pressure medicine sometimes and sometimes he does not.  He was provided I cover here in the emergency department.  I discussed the need for eye care and [place].  I educated family as he has dementia.  They verbalized understanding.  He they did receive the first round of medications here as the pharmacy is closed.    Amount and/or Complexity of Data Reviewed  Labs: ordered. Decision-making details documented in ED Course.     Details: Labs reviewed by myself  Radiology: ordered and independent interpretation performed.     Details: MRI reviewed by myself    Risk  Prescription drug management.  Decision regarding hospitalization.        Final diagnoses:   None   Bell's palsy    ED Disposition  ED Disposition       None            No follow-up provider specified.       Medication List      No changes were made to your prescriptions during this visit.            Primo Vazquez,   08/25/23 2048

## 2023-08-26 NOTE — DISCHARGE INSTRUCTIONS
You do need to lubricate your eye every hour.  Keep the eye patch on as she can damage your eye secondary to the inability to blink.  Please follow-up with neurology.  You had chronic changes of some chronic hemorrhage on your MRI.  Please return immediately to the emergency department if you have any other worsening symptoms signs or concerns.

## 2023-09-15 DIAGNOSIS — F41.8 ANXIETY WITH DEPRESSION: ICD-10-CM

## 2023-09-15 RX ORDER — HYDROXYZINE HYDROCHLORIDE 25 MG/1
TABLET, FILM COATED ORAL
Qty: 60 TABLET | Refills: 3 | Status: SHIPPED | OUTPATIENT
Start: 2023-09-15

## 2023-10-18 RX ORDER — ONDANSETRON 4 MG/1
TABLET, FILM COATED ORAL
Qty: 30 TABLET | Refills: 2 | Status: SHIPPED | OUTPATIENT
Start: 2023-10-18

## 2023-12-16 DIAGNOSIS — F41.8 ANXIETY WITH DEPRESSION: ICD-10-CM

## 2023-12-18 RX ORDER — ONDANSETRON 4 MG/1
4 TABLET, FILM COATED ORAL EVERY 8 HOURS PRN
Qty: 30 TABLET | Refills: 2 | Status: SHIPPED | OUTPATIENT
Start: 2023-12-18

## 2023-12-18 RX ORDER — HYDROXYZINE HYDROCHLORIDE 25 MG/1
TABLET, FILM COATED ORAL
Qty: 60 TABLET | Refills: 3 | Status: SHIPPED | OUTPATIENT
Start: 2023-12-18

## 2024-01-01 ENCOUNTER — APPOINTMENT (OUTPATIENT)
Dept: GENERAL RADIOLOGY | Facility: HOSPITAL | Age: 74
End: 2024-01-01
Payer: MEDICARE

## 2024-01-01 ENCOUNTER — HOSPITAL ENCOUNTER (INPATIENT)
Facility: HOSPITAL | Age: 74
LOS: 7 days | End: 2024-10-29
Attending: EMERGENCY MEDICINE | Admitting: FAMILY MEDICINE
Payer: MEDICARE

## 2024-01-01 ENCOUNTER — HOSPITAL ENCOUNTER (INPATIENT)
Facility: HOSPITAL | Age: 74
LOS: 1 days | DRG: 951 | End: 2024-10-29
Attending: INTERNAL MEDICINE | Admitting: INTERNAL MEDICINE
Payer: OTHER GOVERNMENT

## 2024-01-01 VITALS
BODY MASS INDEX: 17.33 KG/M2 | SYSTOLIC BLOOD PRESSURE: 149 MMHG | WEIGHT: 117 LBS | RESPIRATION RATE: 21 BRPM | HEART RATE: 100 BPM | HEIGHT: 69 IN | DIASTOLIC BLOOD PRESSURE: 78 MMHG | TEMPERATURE: 97.8 F | OXYGEN SATURATION: 100 %

## 2024-01-01 DIAGNOSIS — S72.001A CLOSED FRACTURE OF RIGHT HIP, INITIAL ENCOUNTER: Primary | ICD-10-CM

## 2024-01-01 LAB
ABO GROUP BLD: NORMAL
ABO GROUP BLD: NORMAL
ACANTHOCYTES BLD QL SMEAR: NORMAL
ALBUMIN SERPL-MCNC: 3.9 G/DL (ref 3.5–5.2)
ALBUMIN/GLOB SERPL: 2.3 G/DL
ALP SERPL-CCNC: 92 U/L (ref 39–117)
ALT SERPL W P-5'-P-CCNC: 15 U/L (ref 1–41)
AMORPH URATE CRY URNS QL MICRO: ABNORMAL /HPF
ANION GAP SERPL CALCULATED.3IONS-SCNC: 11 MMOL/L (ref 5–15)
ANION GAP SERPL CALCULATED.3IONS-SCNC: 14 MMOL/L (ref 5–15)
ANION GAP SERPL CALCULATED.3IONS-SCNC: 15.1 MMOL/L (ref 5–15)
ANION GAP SERPL CALCULATED.3IONS-SCNC: 7.8 MMOL/L (ref 5–15)
ANION GAP SERPL CALCULATED.3IONS-SCNC: 7.9 MMOL/L (ref 5–15)
ANION GAP SERPL CALCULATED.3IONS-SCNC: 9.1 MMOL/L (ref 5–15)
ANION GAP SERPL CALCULATED.3IONS-SCNC: 9.7 MMOL/L (ref 5–15)
ANION GAP SERPL CALCULATED.3IONS-SCNC: 9.8 MMOL/L (ref 5–15)
ANION GAP SERPL CALCULATED.3IONS-SCNC: 9.9 MMOL/L (ref 5–15)
APTT PPP: 29.6 SECONDS (ref 24–31)
APTT PPP: 33 SECONDS (ref 24–31)
ARTERIAL PATENCY WRIST A: POSITIVE
AST SERPL-CCNC: 23 U/L (ref 1–40)
ATMOSPHERIC PRESS: ABNORMAL MM[HG]
ATMOSPHERIC PRESS: ABNORMAL MM[HG]
BACTERIA UR QL AUTO: ABNORMAL /HPF
BASE EXCESS BLDA CALC-SCNC: -1.8 MMOL/L (ref 0–3)
BASE EXCESS BLDV CALC-SCNC: 1.5 MMOL/L (ref -2–2)
BASOPHILS # BLD AUTO: 0.01 10*3/MM3 (ref 0–0.2)
BASOPHILS # BLD AUTO: 0.02 10*3/MM3 (ref 0–0.2)
BASOPHILS # BLD AUTO: 0.02 10*3/MM3 (ref 0–0.2)
BASOPHILS # BLD AUTO: 0.03 10*3/MM3 (ref 0–0.2)
BASOPHILS NFR BLD AUTO: 0.1 % (ref 0–1.5)
BASOPHILS NFR BLD AUTO: 0.2 % (ref 0–1.5)
BASOPHILS NFR BLD AUTO: 0.2 % (ref 0–1.5)
BASOPHILS NFR BLD AUTO: 0.4 % (ref 0–1.5)
BDY SITE: ABNORMAL
BH BB BLOOD EXPIRATION DATE: NORMAL
BH BB BLOOD EXPIRATION DATE: NORMAL
BH BB BLOOD TYPE BARCODE: 6200
BH BB BLOOD TYPE BARCODE: 6200
BH BB DISPENSE STATUS: NORMAL
BH BB DISPENSE STATUS: NORMAL
BH BB PRODUCT CODE: NORMAL
BH BB PRODUCT CODE: NORMAL
BH BB UNIT NUMBER: NORMAL
BH BB UNIT NUMBER: NORMAL
BILIRUB SERPL-MCNC: 2.3 MG/DL (ref 0–1.2)
BILIRUB UR QL STRIP: ABNORMAL
BLD GP AB SCN SERPL QL: NEGATIVE
BLD GP AB SCN SERPL QL: NEGATIVE
BUN SERPL-MCNC: 20 MG/DL (ref 8–23)
BUN SERPL-MCNC: 20 MG/DL (ref 8–23)
BUN SERPL-MCNC: 21 MG/DL (ref 8–23)
BUN SERPL-MCNC: 22 MG/DL (ref 8–23)
BUN SERPL-MCNC: 22 MG/DL (ref 8–23)
BUN SERPL-MCNC: 23 MG/DL (ref 8–23)
BUN SERPL-MCNC: 24 MG/DL (ref 8–23)
BUN SERPL-MCNC: 33 MG/DL (ref 8–23)
BUN SERPL-MCNC: 61 MG/DL (ref 8–23)
BUN/CREAT SERPL: 17.4 (ref 7–25)
BUN/CREAT SERPL: 17.5 (ref 7–25)
BUN/CREAT SERPL: 19.7 (ref 7–25)
BUN/CREAT SERPL: 21.9 (ref 7–25)
BUN/CREAT SERPL: 22.7 (ref 7–25)
BUN/CREAT SERPL: 22.9 (ref 7–25)
BUN/CREAT SERPL: 25.8 (ref 7–25)
BUN/CREAT SERPL: 26.4 (ref 7–25)
BUN/CREAT SERPL: 26.8 (ref 7–25)
BURR CELLS BLD QL SMEAR: NORMAL
BURR CELLS BLD QL SMEAR: NORMAL
C AURIS DNA SPEC QL NAA+NON-PROBE: NOT DETECTED
C3 FRG RBC-MCNC: NORMAL
C3 FRG RBC-MCNC: NORMAL
CALCIUM SPEC-SCNC: 8.5 MG/DL (ref 8.6–10.5)
CALCIUM SPEC-SCNC: 8.8 MG/DL (ref 8.6–10.5)
CALCIUM SPEC-SCNC: 8.8 MG/DL (ref 8.6–10.5)
CALCIUM SPEC-SCNC: 8.9 MG/DL (ref 8.6–10.5)
CALCIUM SPEC-SCNC: 9 MG/DL (ref 8.6–10.5)
CALCIUM SPEC-SCNC: 9 MG/DL (ref 8.6–10.5)
CALCIUM SPEC-SCNC: 9.1 MG/DL (ref 8.6–10.5)
CALCIUM SPEC-SCNC: 9.2 MG/DL (ref 8.6–10.5)
CALCIUM SPEC-SCNC: 9.2 MG/DL (ref 8.6–10.5)
CHLORIDE SERPL-SCNC: 100 MMOL/L (ref 98–107)
CHLORIDE SERPL-SCNC: 100 MMOL/L (ref 98–107)
CHLORIDE SERPL-SCNC: 101 MMOL/L (ref 98–107)
CHLORIDE SERPL-SCNC: 104 MMOL/L (ref 98–107)
CHLORIDE SERPL-SCNC: 106 MMOL/L (ref 98–107)
CHLORIDE SERPL-SCNC: 109 MMOL/L (ref 98–107)
CHLORIDE SERPL-SCNC: 99 MMOL/L (ref 98–107)
CLARITY UR: ABNORMAL
CO2 BLDA-SCNC: 22.9 MMOL/L (ref 22–29)
CO2 BLDA-SCNC: 27.4 MMOL/L (ref 22–29)
CO2 SERPL-SCNC: 19.9 MMOL/L (ref 22–29)
CO2 SERPL-SCNC: 22 MMOL/L (ref 22–29)
CO2 SERPL-SCNC: 25.9 MMOL/L (ref 22–29)
CO2 SERPL-SCNC: 28.1 MMOL/L (ref 22–29)
CO2 SERPL-SCNC: 28.3 MMOL/L (ref 22–29)
CO2 SERPL-SCNC: 29 MMOL/L (ref 22–29)
CO2 SERPL-SCNC: 30.1 MMOL/L (ref 22–29)
CO2 SERPL-SCNC: 30.2 MMOL/L (ref 22–29)
CO2 SERPL-SCNC: 31.2 MMOL/L (ref 22–29)
COLOR UR: ABNORMAL
CREAT SERPL-MCNC: 0.91 MG/DL (ref 0.76–1.27)
CREAT SERPL-MCNC: 0.96 MG/DL (ref 0.76–1.27)
CREAT SERPL-MCNC: 0.96 MG/DL (ref 0.76–1.27)
CREAT SERPL-MCNC: 0.97 MG/DL (ref 0.76–1.27)
CREAT SERPL-MCNC: 1.14 MG/DL (ref 0.76–1.27)
CREAT SERPL-MCNC: 1.15 MG/DL (ref 0.76–1.27)
CREAT SERPL-MCNC: 1.17 MG/DL (ref 0.76–1.27)
CREAT SERPL-MCNC: 1.28 MG/DL (ref 0.76–1.27)
CREAT SERPL-MCNC: 2.28 MG/DL (ref 0.76–1.27)
CROSSMATCH INTERPRETATION: NORMAL
CROSSMATCH INTERPRETATION: NORMAL
DEPRECATED RDW RBC AUTO: 43.4 FL (ref 37–54)
DEPRECATED RDW RBC AUTO: 43.8 FL (ref 37–54)
DEPRECATED RDW RBC AUTO: 44 FL (ref 37–54)
DEPRECATED RDW RBC AUTO: 44.7 FL (ref 37–54)
DEPRECATED RDW RBC AUTO: 47.2 FL (ref 37–54)
DEPRECATED RDW RBC AUTO: 47.4 FL (ref 37–54)
DEPRECATED RDW RBC AUTO: 50.4 FL (ref 37–54)
EGFRCR SERPLBLD CKD-EPI 2021: 29.4 ML/MIN/1.73
EGFRCR SERPLBLD CKD-EPI 2021: 58.7 ML/MIN/1.73
EGFRCR SERPLBLD CKD-EPI 2021: 65.4 ML/MIN/1.73
EGFRCR SERPLBLD CKD-EPI 2021: 66.8 ML/MIN/1.73
EGFRCR SERPLBLD CKD-EPI 2021: 67.5 ML/MIN/1.73
EGFRCR SERPLBLD CKD-EPI 2021: 81.9 ML/MIN/1.73
EGFRCR SERPLBLD CKD-EPI 2021: 82.9 ML/MIN/1.73
EGFRCR SERPLBLD CKD-EPI 2021: 82.9 ML/MIN/1.73
EGFRCR SERPLBLD CKD-EPI 2021: 88.4 ML/MIN/1.73
ELLIPTOCYTES BLD QL SMEAR: NORMAL
EOSINOPHIL # BLD AUTO: 0 10*3/MM3 (ref 0–0.4)
EOSINOPHIL # BLD AUTO: 0.01 10*3/MM3 (ref 0–0.4)
EOSINOPHIL # BLD AUTO: 0.02 10*3/MM3 (ref 0–0.4)
EOSINOPHIL # BLD AUTO: 0.13 10*3/MM3 (ref 0–0.4)
EOSINOPHIL # BLD AUTO: 0.13 10*3/MM3 (ref 0–0.4)
EOSINOPHIL # BLD AUTO: 0.2 10*3/MM3 (ref 0–0.4)
EOSINOPHIL # BLD AUTO: 0.31 10*3/MM3 (ref 0–0.4)
EOSINOPHIL NFR BLD AUTO: 0 % (ref 0.3–6.2)
EOSINOPHIL NFR BLD AUTO: 0.1 % (ref 0.3–6.2)
EOSINOPHIL NFR BLD AUTO: 0.2 % (ref 0.3–6.2)
EOSINOPHIL NFR BLD AUTO: 1.1 % (ref 0.3–6.2)
EOSINOPHIL NFR BLD AUTO: 1.5 % (ref 0.3–6.2)
EOSINOPHIL NFR BLD AUTO: 2 % (ref 0.3–6.2)
EOSINOPHIL NFR BLD AUTO: 3.7 % (ref 0.3–6.2)
ERYTHROCYTE [DISTWIDTH] IN BLOOD BY AUTOMATED COUNT: 12.9 % (ref 12.3–15.4)
ERYTHROCYTE [DISTWIDTH] IN BLOOD BY AUTOMATED COUNT: 13 % (ref 12.3–15.4)
ERYTHROCYTE [DISTWIDTH] IN BLOOD BY AUTOMATED COUNT: 13 % (ref 12.3–15.4)
ERYTHROCYTE [DISTWIDTH] IN BLOOD BY AUTOMATED COUNT: 13.1 % (ref 12.3–15.4)
ERYTHROCYTE [DISTWIDTH] IN BLOOD BY AUTOMATED COUNT: 14.2 % (ref 12.3–15.4)
ERYTHROCYTE [DISTWIDTH] IN BLOOD BY AUTOMATED COUNT: 14.4 % (ref 12.3–15.4)
ERYTHROCYTE [DISTWIDTH] IN BLOOD BY AUTOMATED COUNT: 14.9 % (ref 12.3–15.4)
GLOBULIN UR ELPH-MCNC: 1.7 GM/DL
GLUCOSE SERPL-MCNC: 112 MG/DL (ref 65–99)
GLUCOSE SERPL-MCNC: 112 MG/DL (ref 65–99)
GLUCOSE SERPL-MCNC: 115 MG/DL (ref 65–99)
GLUCOSE SERPL-MCNC: 119 MG/DL (ref 65–99)
GLUCOSE SERPL-MCNC: 139 MG/DL (ref 65–99)
GLUCOSE SERPL-MCNC: 141 MG/DL (ref 65–99)
GLUCOSE SERPL-MCNC: 149 MG/DL (ref 65–99)
GLUCOSE SERPL-MCNC: 160 MG/DL (ref 65–99)
GLUCOSE SERPL-MCNC: 163 MG/DL (ref 65–99)
GLUCOSE UR STRIP-MCNC: NEGATIVE MG/DL
GRAN CASTS URNS QL MICRO: ABNORMAL /LPF
HCO3 BLDA-SCNC: 21.9 MMOL/L (ref 21–28)
HCO3 BLDV-SCNC: 26.1 MMOL/L (ref 22–26)
HCT VFR BLD AUTO: 21.4 % (ref 37.5–51)
HCT VFR BLD AUTO: 22.5 % (ref 37.5–51)
HCT VFR BLD AUTO: 23.4 % (ref 37.5–51)
HCT VFR BLD AUTO: 26.1 % (ref 37.5–51)
HCT VFR BLD AUTO: 26.3 % (ref 37.5–51)
HCT VFR BLD AUTO: 26.7 % (ref 37.5–51)
HCT VFR BLD AUTO: 27.4 % (ref 37.5–51)
HCT VFR BLD AUTO: 27.5 % (ref 37.5–51)
HCT VFR BLD AUTO: 27.8 % (ref 37.5–51)
HCT VFR BLD AUTO: 28.9 % (ref 37.5–51)
HEMODILUTION: NO
HGB BLD-MCNC: 6.9 G/DL (ref 13–17.7)
HGB BLD-MCNC: 7.5 G/DL (ref 13–17.7)
HGB BLD-MCNC: 7.9 G/DL (ref 13–17.7)
HGB BLD-MCNC: 8.7 G/DL (ref 13–17.7)
HGB BLD-MCNC: 8.7 G/DL (ref 13–17.7)
HGB BLD-MCNC: 8.9 G/DL (ref 13–17.7)
HGB BLD-MCNC: 8.9 G/DL (ref 13–17.7)
HGB BLD-MCNC: 9.1 G/DL (ref 13–17.7)
HGB BLD-MCNC: 9.1 G/DL (ref 13–17.7)
HGB BLD-MCNC: 9.5 G/DL (ref 13–17.7)
HGB UR QL STRIP.AUTO: ABNORMAL
HOLD SPECIMEN: NORMAL
HYALINE CASTS UR QL AUTO: ABNORMAL /LPF
IMM GRANULOCYTES # BLD AUTO: 0.02 10*3/MM3 (ref 0–0.05)
IMM GRANULOCYTES # BLD AUTO: 0.03 10*3/MM3 (ref 0–0.05)
IMM GRANULOCYTES # BLD AUTO: 0.04 10*3/MM3 (ref 0–0.05)
IMM GRANULOCYTES # BLD AUTO: 0.05 10*3/MM3 (ref 0–0.05)
IMM GRANULOCYTES # BLD AUTO: 0.06 10*3/MM3 (ref 0–0.05)
IMM GRANULOCYTES NFR BLD AUTO: 0.2 % (ref 0–0.5)
IMM GRANULOCYTES NFR BLD AUTO: 0.4 % (ref 0–0.5)
IMM GRANULOCYTES NFR BLD AUTO: 0.5 % (ref 0–0.5)
IMM GRANULOCYTES NFR BLD AUTO: 0.5 % (ref 0–0.5)
IMM GRANULOCYTES NFR BLD AUTO: 0.6 % (ref 0–0.5)
INHALED O2 CONCENTRATION: 30 %
INHALED O2 CONCENTRATION: 32 %
INR PPP: 1.17 (ref 0.93–1.1)
INR PPP: 1.2 (ref 0.93–1.1)
KETONES UR QL STRIP: NEGATIVE
LEUKOCYTE ESTERASE UR QL STRIP.AUTO: ABNORMAL
LYMPHOCYTES # BLD AUTO: 0.41 10*3/MM3 (ref 0.7–3.1)
LYMPHOCYTES # BLD AUTO: 0.43 10*3/MM3 (ref 0.7–3.1)
LYMPHOCYTES # BLD AUTO: 0.78 10*3/MM3 (ref 0.7–3.1)
LYMPHOCYTES # BLD AUTO: 0.86 10*3/MM3 (ref 0.7–3.1)
LYMPHOCYTES # BLD AUTO: 0.9 10*3/MM3 (ref 0.7–3.1)
LYMPHOCYTES # BLD AUTO: 0.9 10*3/MM3 (ref 0.7–3.1)
LYMPHOCYTES # BLD AUTO: 0.95 10*3/MM3 (ref 0.7–3.1)
LYMPHOCYTES NFR BLD AUTO: 10.2 % (ref 19.6–45.3)
LYMPHOCYTES NFR BLD AUTO: 10.2 % (ref 19.6–45.3)
LYMPHOCYTES NFR BLD AUTO: 4.1 % (ref 19.6–45.3)
LYMPHOCYTES NFR BLD AUTO: 4.9 % (ref 19.6–45.3)
LYMPHOCYTES NFR BLD AUTO: 7.3 % (ref 19.6–45.3)
LYMPHOCYTES NFR BLD AUTO: 9.1 % (ref 19.6–45.3)
LYMPHOCYTES NFR BLD AUTO: 9.6 % (ref 19.6–45.3)
MCH RBC QN AUTO: 29.8 PG (ref 26.6–33)
MCH RBC QN AUTO: 30 PG (ref 26.6–33)
MCH RBC QN AUTO: 30.1 PG (ref 26.6–33)
MCH RBC QN AUTO: 30.5 PG (ref 26.6–33)
MCH RBC QN AUTO: 30.6 PG (ref 26.6–33)
MCH RBC QN AUTO: 30.9 PG (ref 26.6–33)
MCH RBC QN AUTO: 31 PG (ref 26.6–33)
MCHC RBC AUTO-ENTMCNC: 32 G/DL (ref 31.5–35.7)
MCHC RBC AUTO-ENTMCNC: 32.2 G/DL (ref 31.5–35.7)
MCHC RBC AUTO-ENTMCNC: 33.1 G/DL (ref 31.5–35.7)
MCHC RBC AUTO-ENTMCNC: 33.1 G/DL (ref 31.5–35.7)
MCHC RBC AUTO-ENTMCNC: 33.3 G/DL (ref 31.5–35.7)
MCHC RBC AUTO-ENTMCNC: 33.3 G/DL (ref 31.5–35.7)
MCHC RBC AUTO-ENTMCNC: 33.8 G/DL (ref 31.5–35.7)
MCV RBC AUTO: 90.3 FL (ref 79–97)
MCV RBC AUTO: 91.1 FL (ref 79–97)
MCV RBC AUTO: 91.8 FL (ref 79–97)
MCV RBC AUTO: 92.9 FL (ref 79–97)
MCV RBC AUTO: 93 FL (ref 79–97)
MCV RBC AUTO: 93 FL (ref 79–97)
MCV RBC AUTO: 93.3 FL (ref 79–97)
MODALITY: ABNORMAL
MODALITY: ABNORMAL
MONOCYTES # BLD AUTO: 0.81 10*3/MM3 (ref 0.1–0.9)
MONOCYTES # BLD AUTO: 0.99 10*3/MM3 (ref 0.1–0.9)
MONOCYTES # BLD AUTO: 1.05 10*3/MM3 (ref 0.1–0.9)
MONOCYTES # BLD AUTO: 1.18 10*3/MM3 (ref 0.1–0.9)
MONOCYTES # BLD AUTO: 1.21 10*3/MM3 (ref 0.1–0.9)
MONOCYTES # BLD AUTO: 1.45 10*3/MM3 (ref 0.1–0.9)
MONOCYTES # BLD AUTO: 1.68 10*3/MM3 (ref 0.1–0.9)
MONOCYTES NFR BLD AUTO: 11.8 % (ref 5–12)
MONOCYTES NFR BLD AUTO: 11.8 % (ref 5–12)
MONOCYTES NFR BLD AUTO: 12 % (ref 5–12)
MONOCYTES NFR BLD AUTO: 12.2 % (ref 5–12)
MONOCYTES NFR BLD AUTO: 13.3 % (ref 5–12)
MONOCYTES NFR BLD AUTO: 16.7 % (ref 5–12)
MONOCYTES NFR BLD AUTO: 9.5 % (ref 5–12)
NEUTROPHILS NFR BLD AUTO: 6.19 10*3/MM3 (ref 1.7–7)
NEUTROPHILS NFR BLD AUTO: 6.57 10*3/MM3 (ref 1.7–7)
NEUTROPHILS NFR BLD AUTO: 6.9 10*3/MM3 (ref 1.7–7)
NEUTROPHILS NFR BLD AUTO: 7.22 10*3/MM3 (ref 1.7–7)
NEUTROPHILS NFR BLD AUTO: 7.52 10*3/MM3 (ref 1.7–7)
NEUTROPHILS NFR BLD AUTO: 7.93 10*3/MM3 (ref 1.7–7)
NEUTROPHILS NFR BLD AUTO: 73.7 % (ref 42.7–76)
NEUTROPHILS NFR BLD AUTO: 74.3 % (ref 42.7–76)
NEUTROPHILS NFR BLD AUTO: 75.7 % (ref 42.7–76)
NEUTROPHILS NFR BLD AUTO: 78.5 % (ref 42.7–76)
NEUTROPHILS NFR BLD AUTO: 79.2 % (ref 42.7–76)
NEUTROPHILS NFR BLD AUTO: 80.7 % (ref 42.7–76)
NEUTROPHILS NFR BLD AUTO: 82.4 % (ref 42.7–76)
NEUTROPHILS NFR BLD AUTO: 9.72 10*3/MM3 (ref 1.7–7)
NITRITE UR QL STRIP: POSITIVE
NOTIFIED WHO: ABNORMAL
NRBC BLD AUTO-RTO: 0 /100 WBC (ref 0–0.2)
NT-PROBNP SERPL-MCNC: ABNORMAL PG/ML (ref 0–900)
PCO2 BLDA: 31.9 MM HG (ref 35–48)
PCO2 BLDV: 40.3 MM HG (ref 42–51)
PH BLDA: 7.45 PH UNITS (ref 7.35–7.45)
PH BLDV: 7.42 PH UNITS (ref 7.32–7.43)
PH UR STRIP.AUTO: 5.5 [PH] (ref 5–8)
PLAT MORPH BLD: NORMAL
PLATELET # BLD AUTO: 128 10*3/MM3 (ref 140–450)
PLATELET # BLD AUTO: 135 10*3/MM3 (ref 140–450)
PLATELET # BLD AUTO: 137 10*3/MM3 (ref 140–450)
PLATELET # BLD AUTO: 146 10*3/MM3 (ref 140–450)
PLATELET # BLD AUTO: 148 10*3/MM3 (ref 140–450)
PLATELET # BLD AUTO: 151 10*3/MM3 (ref 140–450)
PLATELET # BLD AUTO: 55 10*3/MM3 (ref 140–450)
PMV BLD AUTO: 10.5 FL (ref 6–12)
PMV BLD AUTO: 10.6 FL (ref 6–12)
PMV BLD AUTO: 9.5 FL (ref 6–12)
PMV BLD AUTO: 9.5 FL (ref 6–12)
PMV BLD AUTO: 9.6 FL (ref 6–12)
PMV BLD AUTO: 9.7 FL (ref 6–12)
PMV BLD AUTO: ABNORMAL FL
PO2 BLD: 314 MM[HG] (ref 0–500)
PO2 BLDA: 94.2 MM HG (ref 83–108)
PO2 BLDV: 10.4 MM HG (ref 40–42)
POTASSIUM SERPL-SCNC: 3.7 MMOL/L (ref 3.5–5.2)
POTASSIUM SERPL-SCNC: 3.7 MMOL/L (ref 3.5–5.2)
POTASSIUM SERPL-SCNC: 3.8 MMOL/L (ref 3.5–5.2)
POTASSIUM SERPL-SCNC: 3.8 MMOL/L (ref 3.5–5.2)
POTASSIUM SERPL-SCNC: 3.9 MMOL/L (ref 3.5–5.2)
POTASSIUM SERPL-SCNC: 4 MMOL/L (ref 3.5–5.2)
POTASSIUM SERPL-SCNC: 4.1 MMOL/L (ref 3.5–5.2)
POTASSIUM SERPL-SCNC: 4.4 MMOL/L (ref 3.5–5.2)
POTASSIUM SERPL-SCNC: 5 MMOL/L (ref 3.5–5.2)
PROT SERPL-MCNC: 5.6 G/DL (ref 6–8.5)
PROT UR QL STRIP: ABNORMAL
PROTHROMBIN TIME: 12.6 SECONDS (ref 9.6–11.7)
PROTHROMBIN TIME: 12.9 SECONDS (ref 9.6–11.7)
QT INTERVAL: 404 MS
QT INTERVAL: 491 MS
QTC INTERVAL: 520 MS
QTC INTERVAL: 540 MS
RBC # BLD AUTO: 2.3 10*6/MM3 (ref 4.14–5.8)
RBC # BLD AUTO: 2.45 10*6/MM3 (ref 4.14–5.8)
RBC # BLD AUTO: 2.59 10*6/MM3 (ref 4.14–5.8)
RBC # BLD AUTO: 2.81 10*6/MM3 (ref 4.14–5.8)
RBC # BLD AUTO: 2.82 10*6/MM3 (ref 4.14–5.8)
RBC # BLD AUTO: 2.99 10*6/MM3 (ref 4.14–5.8)
RBC # BLD AUTO: 3.02 10*6/MM3 (ref 4.14–5.8)
RBC # UR STRIP: ABNORMAL /HPF
REF LAB TEST METHOD: ABNORMAL
RH BLD: POSITIVE
RH BLD: POSITIVE
SAO2 % BLDCOA: 97.7 % (ref 94–98)
SAO2 % BLDCOV: 10.7 % (ref 45–75)
SMALL PLATELETS BLD QL SMEAR: NORMAL
SMALL PLATELETS BLD QL SMEAR: NORMAL
SODIUM SERPL-SCNC: 137 MMOL/L (ref 136–145)
SODIUM SERPL-SCNC: 138 MMOL/L (ref 136–145)
SODIUM SERPL-SCNC: 138 MMOL/L (ref 136–145)
SODIUM SERPL-SCNC: 139 MMOL/L (ref 136–145)
SODIUM SERPL-SCNC: 140 MMOL/L (ref 136–145)
SODIUM SERPL-SCNC: 142 MMOL/L (ref 136–145)
SODIUM SERPL-SCNC: 144 MMOL/L (ref 136–145)
SP GR UR STRIP: 1.02 (ref 1–1.03)
SPERM URNS QL MICRO: ABNORMAL /HPF
SQUAMOUS #/AREA URNS HPF: ABNORMAL /HPF
T&S EXPIRATION DATE: NORMAL
T&S EXPIRATION DATE: NORMAL
UNIT  ABO: NORMAL
UNIT  ABO: NORMAL
UNIT  RH: NORMAL
UNIT  RH: NORMAL
UROBILINOGEN UR QL STRIP: ABNORMAL
WBC # UR STRIP: ABNORMAL /HPF
WBC CASTS #/AREA URNS LPF: ABNORMAL /LPF
WBC MORPH BLD: NORMAL
WBC NRBC COR # BLD AUTO: 10.09 10*3/MM3 (ref 3.4–10.8)
WBC NRBC COR # BLD AUTO: 12.27 10*3/MM3 (ref 3.4–10.8)
WBC NRBC COR # BLD AUTO: 8.4 10*3/MM3 (ref 3.4–10.8)
WBC NRBC COR # BLD AUTO: 8.55 10*3/MM3 (ref 3.4–10.8)
WBC NRBC COR # BLD AUTO: 8.76 10*3/MM3 (ref 3.4–10.8)
WBC NRBC COR # BLD AUTO: 8.84 10*3/MM3 (ref 3.4–10.8)
WBC NRBC COR # BLD AUTO: 9.93 10*3/MM3 (ref 3.4–10.8)
WHOLE BLOOD HOLD COAG: NORMAL
WHOLE BLOOD HOLD SPECIMEN: NORMAL
YEAST URNS QL MICRO: ABNORMAL /HPF

## 2024-01-01 PROCEDURE — 80048 BASIC METABOLIC PNL TOTAL CA: CPT | Performed by: INTERNAL MEDICINE

## 2024-01-01 PROCEDURE — 25810000003 DEXTROSE 5 % AND SODIUM CHLORIDE 0.9 % 5-0.9 % SOLUTION: Performed by: INTERNAL MEDICINE

## 2024-01-01 PROCEDURE — 86900 BLOOD TYPING SEROLOGIC ABO: CPT | Performed by: EMERGENCY MEDICINE

## 2024-01-01 PROCEDURE — 73552 X-RAY EXAM OF FEMUR 2/>: CPT

## 2024-01-01 PROCEDURE — 85014 HEMATOCRIT: CPT | Performed by: INTERNAL MEDICINE

## 2024-01-01 PROCEDURE — 85730 THROMBOPLASTIN TIME PARTIAL: CPT | Performed by: INTERNAL MEDICINE

## 2024-01-01 PROCEDURE — 94799 UNLISTED PULMONARY SVC/PX: CPT

## 2024-01-01 PROCEDURE — 25010000002 HYDROMORPHONE PER 4 MG: Performed by: INTERNAL MEDICINE

## 2024-01-01 PROCEDURE — 85014 HEMATOCRIT: CPT | Performed by: ORTHOPAEDIC SURGERY

## 2024-01-01 PROCEDURE — 86901 BLOOD TYPING SEROLOGIC RH(D): CPT | Performed by: EMERGENCY MEDICINE

## 2024-01-01 PROCEDURE — 36600 WITHDRAWAL OF ARTERIAL BLOOD: CPT

## 2024-01-01 PROCEDURE — 85025 COMPLETE CBC W/AUTO DIFF WBC: CPT | Performed by: EMERGENCY MEDICINE

## 2024-01-01 PROCEDURE — 0SRR0J9 REPLACEMENT OF RIGHT HIP JOINT, FEMORAL SURFACE WITH SYNTHETIC SUBSTITUTE, CEMENTED, OPEN APPROACH: ICD-10-PCS | Performed by: ORTHOPAEDIC SURGERY

## 2024-01-01 PROCEDURE — C1776 JOINT DEVICE (IMPLANTABLE): HCPCS | Performed by: ORTHOPAEDIC SURGERY

## 2024-01-01 PROCEDURE — 25010000002 HYDROMORPHONE 1 MG/ML SOLUTION: Performed by: INTERNAL MEDICINE

## 2024-01-01 PROCEDURE — 99221 1ST HOSP IP/OBS SF/LOW 40: CPT | Performed by: ORTHOPAEDIC SURGERY

## 2024-01-01 PROCEDURE — 73502 X-RAY EXAM HIP UNI 2-3 VIEWS: CPT

## 2024-01-01 PROCEDURE — 85007 BL SMEAR W/DIFF WBC COUNT: CPT | Performed by: INTERNAL MEDICINE

## 2024-01-01 PROCEDURE — 82803 BLOOD GASES ANY COMBINATION: CPT | Performed by: NURSE PRACTITIONER

## 2024-01-01 PROCEDURE — 87481 CANDIDA DNA AMP PROBE: CPT | Performed by: FAMILY MEDICINE

## 2024-01-01 PROCEDURE — 86901 BLOOD TYPING SEROLOGIC RH(D): CPT

## 2024-01-01 PROCEDURE — C1713 ANCHOR/SCREW BN/BN,TIS/BN: HCPCS | Performed by: ORTHOPAEDIC SURGERY

## 2024-01-01 PROCEDURE — 25010000002 HYDROMORPHONE 1 MG/ML SOLUTION

## 2024-01-01 PROCEDURE — 85025 COMPLETE CBC W/AUTO DIFF WBC: CPT | Performed by: INTERNAL MEDICINE

## 2024-01-01 PROCEDURE — 36415 COLL VENOUS BLD VENIPUNCTURE: CPT

## 2024-01-01 PROCEDURE — 93005 ELECTROCARDIOGRAM TRACING: CPT | Performed by: ORTHOPAEDIC SURGERY

## 2024-01-01 PROCEDURE — 86900 BLOOD TYPING SEROLOGIC ABO: CPT | Performed by: NURSE PRACTITIONER

## 2024-01-01 PROCEDURE — 71045 X-RAY EXAM CHEST 1 VIEW: CPT

## 2024-01-01 PROCEDURE — 25810000003 LACTATED RINGERS PER 1000 ML: Performed by: ORTHOPAEDIC SURGERY

## 2024-01-01 PROCEDURE — 86850 RBC ANTIBODY SCREEN: CPT | Performed by: NURSE PRACTITIONER

## 2024-01-01 PROCEDURE — 25010000002 LORAZEPAM PER 2 MG

## 2024-01-01 PROCEDURE — 80048 BASIC METABOLIC PNL TOTAL CA: CPT

## 2024-01-01 PROCEDURE — G0463 HOSPITAL OUTPT CLINIC VISIT: HCPCS | Performed by: INTERNAL MEDICINE

## 2024-01-01 PROCEDURE — 85014 HEMATOCRIT: CPT

## 2024-01-01 PROCEDURE — 83880 ASSAY OF NATRIURETIC PEPTIDE: CPT | Performed by: INTERNAL MEDICINE

## 2024-01-01 PROCEDURE — 85018 HEMOGLOBIN: CPT | Performed by: ORTHOPAEDIC SURGERY

## 2024-01-01 PROCEDURE — 25010000002 CEFAZOLIN PER 500 MG: Performed by: ORTHOPAEDIC SURGERY

## 2024-01-01 PROCEDURE — P9016 RBC LEUKOCYTES REDUCED: HCPCS

## 2024-01-01 PROCEDURE — 81001 URINALYSIS AUTO W/SCOPE: CPT | Performed by: UROLOGY

## 2024-01-01 PROCEDURE — 86900 BLOOD TYPING SEROLOGIC ABO: CPT

## 2024-01-01 PROCEDURE — 99233 SBSQ HOSP IP/OBS HIGH 50: CPT | Performed by: INTERNAL MEDICINE

## 2024-01-01 PROCEDURE — 25010000002 HYDROMORPHONE PER 4 MG: Performed by: ORTHOPAEDIC SURGERY

## 2024-01-01 PROCEDURE — 94660 CPAP INITIATION&MGMT: CPT

## 2024-01-01 PROCEDURE — 85730 THROMBOPLASTIN TIME PARTIAL: CPT | Performed by: ORTHOPAEDIC SURGERY

## 2024-01-01 PROCEDURE — 92610 EVALUATE SWALLOWING FUNCTION: CPT

## 2024-01-01 PROCEDURE — 92526 ORAL FUNCTION THERAPY: CPT

## 2024-01-01 PROCEDURE — 25010000002 LORAZEPAM PER 2 MG: Performed by: INTERNAL MEDICINE

## 2024-01-01 PROCEDURE — 85610 PROTHROMBIN TIME: CPT | Performed by: EMERGENCY MEDICINE

## 2024-01-01 PROCEDURE — 99232 SBSQ HOSP IP/OBS MODERATE 35: CPT

## 2024-01-01 PROCEDURE — 93010 ELECTROCARDIOGRAM REPORT: CPT | Performed by: INTERNAL MEDICINE

## 2024-01-01 PROCEDURE — 82803 BLOOD GASES ANY COMBINATION: CPT

## 2024-01-01 PROCEDURE — 94761 N-INVAS EAR/PLS OXIMETRY MLT: CPT

## 2024-01-01 PROCEDURE — 73501 X-RAY EXAM HIP UNI 1 VIEW: CPT

## 2024-01-01 PROCEDURE — 25810000003 DEXTROSE 5 % AND SODIUM CHLORIDE 0.9 % 5-0.9 % SOLUTION: Performed by: NURSE PRACTITIONER

## 2024-01-01 PROCEDURE — 25010000002 FUROSEMIDE PER 20 MG: Performed by: INTERNAL MEDICINE

## 2024-01-01 PROCEDURE — 25010000002 ONDANSETRON PER 1 MG: Performed by: EMERGENCY MEDICINE

## 2024-01-01 PROCEDURE — 85007 BL SMEAR W/DIFF WBC COUNT: CPT

## 2024-01-01 PROCEDURE — 36430 TRANSFUSION BLD/BLD COMPNT: CPT

## 2024-01-01 PROCEDURE — 86923 COMPATIBILITY TEST ELECTRIC: CPT

## 2024-01-01 PROCEDURE — 86901 BLOOD TYPING SEROLOGIC RH(D): CPT | Performed by: NURSE PRACTITIONER

## 2024-01-01 PROCEDURE — 25810000003 LACTATED RINGERS PER 1000 ML

## 2024-01-01 PROCEDURE — 85018 HEMOGLOBIN: CPT

## 2024-01-01 PROCEDURE — 25010000002 ONDANSETRON PER 1 MG

## 2024-01-01 PROCEDURE — 86850 RBC ANTIBODY SCREEN: CPT | Performed by: EMERGENCY MEDICINE

## 2024-01-01 PROCEDURE — 99285 EMERGENCY DEPT VISIT HI MDM: CPT

## 2024-01-01 PROCEDURE — 85610 PROTHROMBIN TIME: CPT | Performed by: INTERNAL MEDICINE

## 2024-01-01 PROCEDURE — 85025 COMPLETE CBC W/AUTO DIFF WBC: CPT

## 2024-01-01 PROCEDURE — 80053 COMPREHEN METABOLIC PANEL: CPT | Performed by: EMERGENCY MEDICINE

## 2024-01-01 PROCEDURE — 85018 HEMOGLOBIN: CPT | Performed by: INTERNAL MEDICINE

## 2024-01-01 PROCEDURE — 99222 1ST HOSP IP/OBS MODERATE 55: CPT | Performed by: INTERNAL MEDICINE

## 2024-01-01 PROCEDURE — 80048 BASIC METABOLIC PNL TOTAL CA: CPT | Performed by: ORTHOPAEDIC SURGERY

## 2024-01-01 PROCEDURE — 25010000002 BUPIVACAINE 0.25 % SOLUTION: Performed by: ORTHOPAEDIC SURGERY

## 2024-01-01 PROCEDURE — 93005 ELECTROCARDIOGRAM TRACING: CPT | Performed by: EMERGENCY MEDICINE

## 2024-01-01 PROCEDURE — 99232 SBSQ HOSP IP/OBS MODERATE 35: CPT | Performed by: INTERNAL MEDICINE

## 2024-01-01 DEVICE — CAP PRT HIP BIPOL: Type: IMPLANTABLE DEVICE | Site: HIP | Status: FUNCTIONAL

## 2024-01-01 DEVICE — BONE PREPARATION KIT
Type: IMPLANTABLE DEVICE | Site: HIP | Status: FUNCTIONAL
Brand: BIOPREP

## 2024-01-01 DEVICE — CMT BONE PALACOS R HI/VISC 1X40: Type: IMPLANTABLE DEVICE | Site: HIP | Status: FUNCTIONAL

## 2024-01-01 DEVICE — HD FEM/HIP UNIV COCR 12/14TPR 28MM MIN3.5: Type: IMPLANTABLE DEVICE | Site: HIP | Status: FUNCTIONAL

## 2024-01-01 DEVICE — CUP ACET RINGLOC BIPOL 28MM 54MM: Type: IMPLANTABLE DEVICE | Site: HIP | Status: FUNCTIONAL

## 2024-01-01 DEVICE — AVENIR® STANDARD STEM CEMENTED 4
Type: IMPLANTABLE DEVICE | Site: HIP | Status: FUNCTIONAL
Brand: AVENIR®

## 2024-01-01 RX ORDER — ACETAMINOPHEN 500 MG
1000 TABLET ORAL EVERY 6 HOURS SCHEDULED
Status: DISCONTINUED | OUTPATIENT
Start: 2024-01-01 | End: 2024-01-01

## 2024-01-01 RX ORDER — HYDRALAZINE HYDROCHLORIDE 25 MG/1
25 TABLET, FILM COATED ORAL 2 TIMES DAILY
COMMUNITY

## 2024-01-01 RX ORDER — HYDRALAZINE HYDROCHLORIDE 20 MG/ML
5 INJECTION INTRAMUSCULAR; INTRAVENOUS
Status: DISCONTINUED | OUTPATIENT
Start: 2024-01-01 | End: 2024-01-01 | Stop reason: HOSPADM

## 2024-01-01 RX ORDER — LORAZEPAM 2 MG/ML
0.5 INJECTION INTRAMUSCULAR
Status: COMPLETED | OUTPATIENT
Start: 2024-01-01 | End: 2024-01-01

## 2024-01-01 RX ORDER — ONDANSETRON 2 MG/ML
4 INJECTION INTRAMUSCULAR; INTRAVENOUS ONCE AS NEEDED
Status: DISCONTINUED | OUTPATIENT
Start: 2024-01-01 | End: 2024-01-01 | Stop reason: HOSPADM

## 2024-01-01 RX ORDER — HYDROXYZINE HYDROCHLORIDE 25 MG/1
25 TABLET, FILM COATED ORAL DAILY PRN
COMMUNITY

## 2024-01-01 RX ORDER — DIPHENHYDRAMINE HYDROCHLORIDE 50 MG/ML
12.5 INJECTION INTRAMUSCULAR; INTRAVENOUS
Status: DISCONTINUED | OUTPATIENT
Start: 2024-01-01 | End: 2024-01-01 | Stop reason: HOSPADM

## 2024-01-01 RX ORDER — ONDANSETRON 4 MG/1
4 TABLET, ORALLY DISINTEGRATING ORAL EVERY 6 HOURS PRN
Status: DISCONTINUED | OUTPATIENT
Start: 2024-01-01 | End: 2024-01-01

## 2024-01-01 RX ORDER — ACETAMINOPHEN 650 MG/1
650 SUPPOSITORY RECTAL EVERY 4 HOURS PRN
Status: DISCONTINUED | OUTPATIENT
Start: 2024-01-01 | End: 2024-01-01 | Stop reason: HOSPADM

## 2024-01-01 RX ORDER — DIPHENHYDRAMINE HYDROCHLORIDE 50 MG/ML
12.5 INJECTION INTRAMUSCULAR; INTRAVENOUS ONCE AS NEEDED
Status: DISCONTINUED | OUTPATIENT
Start: 2024-01-01 | End: 2024-01-01 | Stop reason: HOSPADM

## 2024-01-01 RX ORDER — HYDROCODONE BITARTRATE AND ACETAMINOPHEN 7.5; 325 MG/1; MG/1
2 TABLET ORAL EVERY 4 HOURS PRN
Status: DISCONTINUED | OUTPATIENT
Start: 2024-01-01 | End: 2024-01-01

## 2024-01-01 RX ORDER — LORAZEPAM 2 MG/ML
1 INJECTION INTRAMUSCULAR EVERY 4 HOURS
Status: DISCONTINUED | OUTPATIENT
Start: 2024-01-01 | End: 2024-01-01 | Stop reason: HOSPADM

## 2024-01-01 RX ORDER — DIPHENOXYLATE HYDROCHLORIDE AND ATROPINE SULFATE 2.5; .025 MG/1; MG/1
1 TABLET ORAL
Status: DISCONTINUED | OUTPATIENT
Start: 2024-01-01 | End: 2024-01-01 | Stop reason: HOSPADM

## 2024-01-01 RX ORDER — ONDANSETRON 2 MG/ML
4 INJECTION INTRAMUSCULAR; INTRAVENOUS EVERY 6 HOURS PRN
Status: DISCONTINUED | OUTPATIENT
Start: 2024-01-01 | End: 2024-01-01

## 2024-01-01 RX ORDER — LORAZEPAM 2 MG/ML
0.5 INJECTION INTRAMUSCULAR EVERY 4 HOURS PRN
Status: DISCONTINUED | OUTPATIENT
Start: 2024-01-01 | End: 2024-01-01

## 2024-01-01 RX ORDER — HYDROMORPHONE HYDROCHLORIDE 1 MG/ML
0.5 INJECTION, SOLUTION INTRAMUSCULAR; INTRAVENOUS; SUBCUTANEOUS
Status: DISCONTINUED | OUTPATIENT
Start: 2024-01-01 | End: 2024-01-01

## 2024-01-01 RX ORDER — FLUMAZENIL 0.1 MG/ML
0.2 INJECTION INTRAVENOUS AS NEEDED
Status: DISCONTINUED | OUTPATIENT
Start: 2024-01-01 | End: 2024-01-01 | Stop reason: HOSPADM

## 2024-01-01 RX ORDER — ASPIRIN 81 MG/1
81 TABLET ORAL EVERY 12 HOURS SCHEDULED
Status: DISCONTINUED | OUTPATIENT
Start: 2024-01-01 | End: 2024-01-01

## 2024-01-01 RX ORDER — ISOSORBIDE MONONITRATE 30 MG/1
30 TABLET, EXTENDED RELEASE ORAL DAILY
COMMUNITY

## 2024-01-01 RX ORDER — ONDANSETRON 2 MG/ML
4 INJECTION INTRAMUSCULAR; INTRAVENOUS EVERY 6 HOURS PRN
Status: DISCONTINUED | OUTPATIENT
Start: 2024-01-01 | End: 2024-01-01 | Stop reason: HOSPADM

## 2024-01-01 RX ORDER — ONDANSETRON 4 MG/1
4 TABLET, FILM COATED ORAL EVERY 6 HOURS PRN
COMMUNITY

## 2024-01-01 RX ORDER — FUROSEMIDE 10 MG/ML
20 INJECTION INTRAMUSCULAR; INTRAVENOUS EVERY 12 HOURS
Status: DISCONTINUED | OUTPATIENT
Start: 2024-01-01 | End: 2024-01-01

## 2024-01-01 RX ORDER — DEXTROSE MONOHYDRATE AND SODIUM CHLORIDE 5; .9 G/100ML; G/100ML
100 INJECTION, SOLUTION INTRAVENOUS CONTINUOUS
Status: DISCONTINUED | OUTPATIENT
Start: 2024-01-01 | End: 2024-01-01

## 2024-01-01 RX ORDER — SODIUM CHLORIDE, SODIUM LACTATE, POTASSIUM CHLORIDE, CALCIUM CHLORIDE 600; 310; 30; 20 MG/100ML; MG/100ML; MG/100ML; MG/100ML
25 INJECTION, SOLUTION INTRAVENOUS CONTINUOUS
Status: DISCONTINUED | OUTPATIENT
Start: 2024-01-01 | End: 2024-01-01

## 2024-01-01 RX ORDER — OXYCODONE HYDROCHLORIDE 5 MG/1
5 TABLET ORAL EVERY 4 HOURS PRN
Status: DISCONTINUED | OUTPATIENT
Start: 2024-01-01 | End: 2024-01-01

## 2024-01-01 RX ORDER — NALOXONE HCL 0.4 MG/ML
0.1 VIAL (ML) INJECTION
Status: DISCONTINUED | OUTPATIENT
Start: 2024-01-01 | End: 2024-01-01

## 2024-01-01 RX ORDER — SODIUM CHLORIDE 9 MG/ML
50 INJECTION, SOLUTION INTRAVENOUS CONTINUOUS
Status: DISCONTINUED | OUTPATIENT
Start: 2024-01-01 | End: 2024-01-01

## 2024-01-01 RX ORDER — POLYETHYLENE GLYCOL 3350 17 G/17G
17 POWDER, FOR SOLUTION ORAL DAILY
COMMUNITY

## 2024-01-01 RX ORDER — FUROSEMIDE 10 MG/ML
20 INJECTION INTRAMUSCULAR; INTRAVENOUS DAILY
Status: DISCONTINUED | OUTPATIENT
Start: 2024-01-01 | End: 2024-01-01

## 2024-01-01 RX ORDER — SODIUM CHLORIDE 9 MG/ML
40 INJECTION, SOLUTION INTRAVENOUS AS NEEDED
Status: DISCONTINUED | OUTPATIENT
Start: 2024-01-01 | End: 2024-01-01 | Stop reason: ALTCHOICE

## 2024-01-01 RX ORDER — LOSARTAN POTASSIUM 25 MG/1
12.5 TABLET ORAL
Status: DISCONTINUED | OUTPATIENT
Start: 2024-01-01 | End: 2024-01-01

## 2024-01-01 RX ORDER — TRAMADOL HYDROCHLORIDE 50 MG/1
50 TABLET ORAL EVERY 6 HOURS PRN
Status: DISCONTINUED | OUTPATIENT
Start: 2024-01-01 | End: 2024-01-01 | Stop reason: HOSPADM

## 2024-01-01 RX ORDER — HYDROXYZINE HYDROCHLORIDE 25 MG/1
25 TABLET, FILM COATED ORAL 2 TIMES DAILY
COMMUNITY

## 2024-01-01 RX ORDER — LABETALOL HYDROCHLORIDE 5 MG/ML
5 INJECTION, SOLUTION INTRAVENOUS
Status: DISCONTINUED | OUTPATIENT
Start: 2024-01-01 | End: 2024-01-01 | Stop reason: HOSPADM

## 2024-01-01 RX ORDER — PANTOPRAZOLE SODIUM 40 MG/1
40 TABLET, DELAYED RELEASE ORAL
Status: DISCONTINUED | OUTPATIENT
Start: 2024-01-01 | End: 2024-01-01 | Stop reason: ALTCHOICE

## 2024-01-01 RX ORDER — TRANEXAMIC ACID 10 MG/ML
INJECTION, SOLUTION INTRAVENOUS
Status: COMPLETED
Start: 2024-01-01 | End: 2024-01-01

## 2024-01-01 RX ORDER — BUPIVACAINE HYDROCHLORIDE 2.5 MG/ML
INJECTION, SOLUTION INFILTRATION; PERINEURAL AS NEEDED
Status: DISCONTINUED | OUTPATIENT
Start: 2024-01-01 | End: 2024-01-01 | Stop reason: HOSPADM

## 2024-01-01 RX ORDER — SODIUM CHLORIDE 0.9 % (FLUSH) 0.9 %
10 SYRINGE (ML) INJECTION EVERY 12 HOURS SCHEDULED
Status: DISCONTINUED | OUTPATIENT
Start: 2024-01-01 | End: 2024-01-01

## 2024-01-01 RX ORDER — SODIUM CHLORIDE 0.9 % (FLUSH) 0.9 %
10 SYRINGE (ML) INJECTION AS NEEDED
Status: DISCONTINUED | OUTPATIENT
Start: 2024-01-01 | End: 2024-01-01

## 2024-01-01 RX ORDER — HYDROCODONE BITARTRATE AND ACETAMINOPHEN 7.5; 325 MG/1; MG/1
1 TABLET ORAL EVERY 4 HOURS PRN
Status: DISCONTINUED | OUTPATIENT
Start: 2024-01-01 | End: 2024-01-01

## 2024-01-01 RX ORDER — CYCLOBENZAPRINE HCL 10 MG
5 TABLET ORAL 3 TIMES DAILY PRN
Status: DISCONTINUED | OUTPATIENT
Start: 2024-01-01 | End: 2024-01-01

## 2024-01-01 RX ORDER — DOCUSATE SODIUM 100 MG/1
200 CAPSULE, LIQUID FILLED ORAL 2 TIMES DAILY
Status: DISCONTINUED | OUTPATIENT
Start: 2024-01-01 | End: 2024-01-01

## 2024-01-01 RX ORDER — ATORVASTATIN CALCIUM 40 MG/1
40 TABLET, FILM COATED ORAL NIGHTLY
Status: DISCONTINUED | OUTPATIENT
Start: 2024-01-01 | End: 2024-01-01

## 2024-01-01 RX ORDER — NALOXONE HCL 0.4 MG/ML
0.4 VIAL (ML) INJECTION AS NEEDED
Status: DISCONTINUED | OUTPATIENT
Start: 2024-01-01 | End: 2024-01-01 | Stop reason: HOSPADM

## 2024-01-01 RX ORDER — TRANEXAMIC ACID 10 MG/ML
1000 INJECTION, SOLUTION INTRAVENOUS
Status: COMPLETED | OUTPATIENT
Start: 2024-01-01 | End: 2024-01-01

## 2024-01-01 RX ORDER — ACETAMINOPHEN 160 MG/5ML
650 SOLUTION ORAL EVERY 4 HOURS PRN
Status: DISCONTINUED | OUTPATIENT
Start: 2024-01-01 | End: 2024-01-01 | Stop reason: HOSPADM

## 2024-01-01 RX ORDER — EPHEDRINE SULFATE 5 MG/ML
5 INJECTION INTRAVENOUS ONCE AS NEEDED
Status: DISCONTINUED | OUTPATIENT
Start: 2024-01-01 | End: 2024-01-01 | Stop reason: HOSPADM

## 2024-01-01 RX ORDER — LOSARTAN POTASSIUM 100 MG/1
100 TABLET ORAL DAILY
COMMUNITY

## 2024-01-01 RX ORDER — FENTANYL CITRATE 50 UG/ML
75 INJECTION, SOLUTION INTRAMUSCULAR; INTRAVENOUS
Status: DISCONTINUED | OUTPATIENT
Start: 2024-01-01 | End: 2024-01-01 | Stop reason: HOSPADM

## 2024-01-01 RX ORDER — FENTANYL CITRATE 50 UG/ML
50 INJECTION, SOLUTION INTRAMUSCULAR; INTRAVENOUS
Status: DISCONTINUED | OUTPATIENT
Start: 2024-01-01 | End: 2024-01-01 | Stop reason: HOSPADM

## 2024-01-01 RX ORDER — ONDANSETRON 2 MG/ML
4 INJECTION INTRAMUSCULAR; INTRAVENOUS ONCE
Status: COMPLETED | OUTPATIENT
Start: 2024-01-01 | End: 2024-01-01

## 2024-01-01 RX ORDER — LORAZEPAM 2 MG/ML
2 INJECTION INTRAMUSCULAR
Status: DISCONTINUED | OUTPATIENT
Start: 2024-01-01 | End: 2024-01-01 | Stop reason: HOSPADM

## 2024-01-01 RX ORDER — HYDROXYZINE HYDROCHLORIDE 25 MG/1
25 TABLET, FILM COATED ORAL 3 TIMES DAILY PRN
Status: DISCONTINUED | OUTPATIENT
Start: 2024-01-01 | End: 2024-01-01

## 2024-01-01 RX ORDER — DEXTROSE MONOHYDRATE AND SODIUM CHLORIDE 5; .9 G/100ML; G/100ML
100 INJECTION, SOLUTION INTRAVENOUS CONTINUOUS
Status: DISPENSED | OUTPATIENT
Start: 2024-01-01 | End: 2024-01-01

## 2024-01-01 RX ORDER — LORAZEPAM 2 MG/ML
1 INJECTION INTRAMUSCULAR EVERY 4 HOURS PRN
Status: DISCONTINUED | OUTPATIENT
Start: 2024-01-01 | End: 2024-01-01

## 2024-01-01 RX ORDER — NALOXONE HCL 0.4 MG/ML
0.1 VIAL (ML) INJECTION
Status: DISCONTINUED | OUTPATIENT
Start: 2024-01-01 | End: 2024-01-01 | Stop reason: HOSPADM

## 2024-01-01 RX ORDER — HYDROMORPHONE HYDROCHLORIDE 1 MG/ML
0.5 INJECTION, SOLUTION INTRAMUSCULAR; INTRAVENOUS; SUBCUTANEOUS EVERY 4 HOURS
Status: DISCONTINUED | OUTPATIENT
Start: 2024-01-01 | End: 2024-01-01

## 2024-01-01 RX ORDER — DEXTROSE MONOHYDRATE AND SODIUM CHLORIDE 5; .9 G/100ML; G/100ML
50 INJECTION, SOLUTION INTRAVENOUS CONTINUOUS
Status: DISPENSED | OUTPATIENT
Start: 2024-01-01 | End: 2024-01-01

## 2024-01-01 RX ORDER — SODIUM BICARBONATE 650 MG/1
650 TABLET ORAL 3 TIMES DAILY
COMMUNITY

## 2024-01-01 RX ORDER — METOPROLOL SUCCINATE 25 MG/1
25 TABLET, EXTENDED RELEASE ORAL
Status: DISCONTINUED | OUTPATIENT
Start: 2024-01-01 | End: 2024-01-01

## 2024-01-01 RX ORDER — LOSARTAN POTASSIUM 50 MG/1
100 TABLET ORAL
Status: DISCONTINUED | OUTPATIENT
Start: 2024-01-01 | End: 2024-01-01

## 2024-01-01 RX ORDER — ACETAMINOPHEN 325 MG/1
650 TABLET ORAL EVERY 4 HOURS PRN
Status: DISCONTINUED | OUTPATIENT
Start: 2024-01-01 | End: 2024-01-01 | Stop reason: HOSPADM

## 2024-01-01 RX ORDER — ISOSORBIDE MONONITRATE 30 MG/1
30 TABLET, EXTENDED RELEASE ORAL
Status: DISCONTINUED | OUTPATIENT
Start: 2024-01-01 | End: 2024-01-01

## 2024-01-01 RX ORDER — SODIUM CHLORIDE 0.9 % (FLUSH) 0.9 %
10 SYRINGE (ML) INJECTION AS NEEDED
Status: DISCONTINUED | OUTPATIENT
Start: 2024-01-01 | End: 2024-01-01 | Stop reason: HOSPADM

## 2024-01-01 RX ORDER — LOSARTAN POTASSIUM 25 MG/1
25 TABLET ORAL
Status: DISCONTINUED | OUTPATIENT
Start: 2024-01-01 | End: 2024-01-01

## 2024-01-01 RX ORDER — GLYCOPYRROLATE 0.2 MG/ML
0.4 INJECTION INTRAMUSCULAR; INTRAVENOUS EVERY 4 HOURS PRN
Status: DISCONTINUED | OUTPATIENT
Start: 2024-01-01 | End: 2024-01-01 | Stop reason: HOSPADM

## 2024-01-01 RX ORDER — HYDRALAZINE HYDROCHLORIDE 20 MG/ML
10 INJECTION INTRAMUSCULAR; INTRAVENOUS EVERY 4 HOURS PRN
Status: DISCONTINUED | OUTPATIENT
Start: 2024-01-01 | End: 2024-01-01

## 2024-01-01 RX ORDER — TAMSULOSIN HYDROCHLORIDE 0.4 MG/1
0.4 CAPSULE ORAL DAILY
Status: DISCONTINUED | OUTPATIENT
Start: 2024-01-01 | End: 2024-01-01

## 2024-01-01 RX ORDER — IPRATROPIUM BROMIDE AND ALBUTEROL SULFATE 2.5; .5 MG/3ML; MG/3ML
3 SOLUTION RESPIRATORY (INHALATION) ONCE AS NEEDED
Status: DISCONTINUED | OUTPATIENT
Start: 2024-01-01 | End: 2024-01-01 | Stop reason: HOSPADM

## 2024-01-01 RX ORDER — MAGNESIUM HYDROXIDE 1200 MG/15ML
LIQUID ORAL AS NEEDED
Status: DISCONTINUED | OUTPATIENT
Start: 2024-01-01 | End: 2024-01-01 | Stop reason: HOSPADM

## 2024-01-01 RX ORDER — HYDROMORPHONE HYDROCHLORIDE 1 MG/ML
0.5 INJECTION, SOLUTION INTRAMUSCULAR; INTRAVENOUS; SUBCUTANEOUS
Status: DISCONTINUED | OUTPATIENT
Start: 2024-01-01 | End: 2024-01-01 | Stop reason: HOSPADM

## 2024-01-01 RX ORDER — TRANEXAMIC ACID 10 MG/ML
1000 INJECTION, SOLUTION INTRAVENOUS ONCE
Status: DISCONTINUED | OUTPATIENT
Start: 2024-01-01 | End: 2024-01-01 | Stop reason: HOSPADM

## 2024-01-01 RX ADMIN — ATORVASTATIN CALCIUM 40 MG: 40 TABLET, FILM COATED ORAL at 20:50

## 2024-01-01 RX ADMIN — ISOSORBIDE MONONITRATE 30 MG: 30 TABLET, EXTENDED RELEASE ORAL at 08:58

## 2024-01-01 RX ADMIN — HYDROMORPHONE HYDROCHLORIDE 1 MG: 1 INJECTION, SOLUTION INTRAMUSCULAR; INTRAVENOUS; SUBCUTANEOUS at 13:38

## 2024-01-01 RX ADMIN — SODIUM CHLORIDE 2000 MG: 900 INJECTION INTRAVENOUS at 10:25

## 2024-01-01 RX ADMIN — HYDROMORPHONE HYDROCHLORIDE 0.5 MG: 1 INJECTION, SOLUTION INTRAMUSCULAR; INTRAVENOUS; SUBCUTANEOUS at 15:30

## 2024-01-01 RX ADMIN — HYDROXYZINE HYDROCHLORIDE 25 MG: 25 TABLET, FILM COATED ORAL at 16:52

## 2024-01-01 RX ADMIN — HYDROMORPHONE HYDROCHLORIDE 0.5 MG: 1 INJECTION, SOLUTION INTRAMUSCULAR; INTRAVENOUS; SUBCUTANEOUS at 16:58

## 2024-01-01 RX ADMIN — HYDROMORPHONE HYDROCHLORIDE 1 MG: 1 INJECTION, SOLUTION INTRAMUSCULAR; INTRAVENOUS; SUBCUTANEOUS at 22:13

## 2024-01-01 RX ADMIN — LOSARTAN POTASSIUM 100 MG: 50 TABLET, FILM COATED ORAL at 11:39

## 2024-01-01 RX ADMIN — FUROSEMIDE 20 MG: 10 INJECTION, SOLUTION INTRAMUSCULAR; INTRAVENOUS at 08:00

## 2024-01-01 RX ADMIN — CYCLOBENZAPRINE 5 MG: 10 TABLET, FILM COATED ORAL at 20:41

## 2024-01-01 RX ADMIN — SODIUM CHLORIDE 2 G: 900 INJECTION INTRAVENOUS at 02:49

## 2024-01-01 RX ADMIN — FUROSEMIDE 20 MG: 10 INJECTION, SOLUTION INTRAMUSCULAR; INTRAVENOUS at 10:47

## 2024-01-01 RX ADMIN — ONDANSETRON 4 MG: 2 INJECTION, SOLUTION INTRAMUSCULAR; INTRAVENOUS at 16:19

## 2024-01-01 RX ADMIN — LORAZEPAM 2 MG: 2 INJECTION INTRAMUSCULAR; INTRAVENOUS at 00:56

## 2024-01-01 RX ADMIN — Medication 10 ML: at 22:25

## 2024-01-01 RX ADMIN — LORAZEPAM 2 MG: 2 INJECTION INTRAMUSCULAR; INTRAVENOUS at 20:51

## 2024-01-01 RX ADMIN — HYDROMORPHONE HYDROCHLORIDE 0.5 MG: 1 INJECTION, SOLUTION INTRAMUSCULAR; INTRAVENOUS; SUBCUTANEOUS at 14:00

## 2024-01-01 RX ADMIN — HYDROMORPHONE HYDROCHLORIDE 1 MG: 1 INJECTION, SOLUTION INTRAMUSCULAR; INTRAVENOUS; SUBCUTANEOUS at 12:24

## 2024-01-01 RX ADMIN — LORAZEPAM 0.5 MG: 2 INJECTION INTRAMUSCULAR; INTRAVENOUS at 01:07

## 2024-01-01 RX ADMIN — HYDROMORPHONE HYDROCHLORIDE 1 MG: 1 INJECTION, SOLUTION INTRAMUSCULAR; INTRAVENOUS; SUBCUTANEOUS at 23:31

## 2024-01-01 RX ADMIN — LORAZEPAM 0.5 MG: 2 INJECTION INTRAMUSCULAR; INTRAVENOUS at 13:12

## 2024-01-01 RX ADMIN — DEXTROSE AND SODIUM CHLORIDE 100 ML/HR: 5; 900 INJECTION, SOLUTION INTRAVENOUS at 21:06

## 2024-01-01 RX ADMIN — LORAZEPAM 0.5 MG: 2 INJECTION INTRAMUSCULAR; INTRAVENOUS at 06:01

## 2024-01-01 RX ADMIN — HYDROMORPHONE HYDROCHLORIDE 1 MG: 1 INJECTION, SOLUTION INTRAMUSCULAR; INTRAVENOUS; SUBCUTANEOUS at 14:57

## 2024-01-01 RX ADMIN — HYDROMORPHONE HYDROCHLORIDE 0.5 MG: 1 INJECTION, SOLUTION INTRAMUSCULAR; INTRAVENOUS; SUBCUTANEOUS at 00:15

## 2024-01-01 RX ADMIN — SODIUM CHLORIDE 2 G: 900 INJECTION INTRAVENOUS at 16:37

## 2024-01-01 RX ADMIN — CYCLOBENZAPRINE 5 MG: 10 TABLET, FILM COATED ORAL at 19:45

## 2024-01-01 RX ADMIN — ISOSORBIDE MONONITRATE 30 MG: 30 TABLET, EXTENDED RELEASE ORAL at 11:39

## 2024-01-01 RX ADMIN — LORAZEPAM 2 MG: 2 INJECTION INTRAMUSCULAR; INTRAVENOUS at 22:37

## 2024-01-01 RX ADMIN — HYDROMORPHONE HYDROCHLORIDE 0.5 MG: 1 INJECTION, SOLUTION INTRAMUSCULAR; INTRAVENOUS; SUBCUTANEOUS at 10:45

## 2024-01-01 RX ADMIN — HYDROMORPHONE HYDROCHLORIDE 0.5 MG: 1 INJECTION, SOLUTION INTRAMUSCULAR; INTRAVENOUS; SUBCUTANEOUS at 21:40

## 2024-01-01 RX ADMIN — LORAZEPAM 1 MG: 2 INJECTION INTRAMUSCULAR; INTRAVENOUS at 19:36

## 2024-01-01 RX ADMIN — ACETAMINOPHEN 1000 MG: 500 TABLET, FILM COATED ORAL at 16:36

## 2024-01-01 RX ADMIN — HYDROMORPHONE HYDROCHLORIDE 1 MG: 1 INJECTION, SOLUTION INTRAMUSCULAR; INTRAVENOUS; SUBCUTANEOUS at 08:31

## 2024-01-01 RX ADMIN — DEXTROSE AND SODIUM CHLORIDE 100 ML/HR: 5; 900 INJECTION, SOLUTION INTRAVENOUS at 07:03

## 2024-01-01 RX ADMIN — HYDROMORPHONE HYDROCHLORIDE 1 MG: 1 INJECTION, SOLUTION INTRAMUSCULAR; INTRAVENOUS; SUBCUTANEOUS at 01:11

## 2024-01-01 RX ADMIN — TAMSULOSIN HYDROCHLORIDE 0.4 MG: 0.4 CAPSULE ORAL at 18:19

## 2024-01-01 RX ADMIN — METOPROLOL SUCCINATE 25 MG: 25 TABLET, FILM COATED, EXTENDED RELEASE ORAL at 08:58

## 2024-01-01 RX ADMIN — HYDROMORPHONE HYDROCHLORIDE 1 MG: 1 INJECTION, SOLUTION INTRAMUSCULAR; INTRAVENOUS; SUBCUTANEOUS at 19:45

## 2024-01-01 RX ADMIN — HYDROMORPHONE HYDROCHLORIDE 1 MG: 1 INJECTION, SOLUTION INTRAMUSCULAR; INTRAVENOUS; SUBCUTANEOUS at 00:57

## 2024-01-01 RX ADMIN — DEXTROSE AND SODIUM CHLORIDE 100 ML/HR: 5; 900 INJECTION, SOLUTION INTRAVENOUS at 20:51

## 2024-01-01 RX ADMIN — HYDROMORPHONE HYDROCHLORIDE 1 MG: 1 INJECTION, SOLUTION INTRAMUSCULAR; INTRAVENOUS; SUBCUTANEOUS at 06:56

## 2024-01-01 RX ADMIN — LORAZEPAM 0.5 MG: 2 INJECTION INTRAMUSCULAR; INTRAVENOUS at 10:19

## 2024-01-01 RX ADMIN — HYDROMORPHONE HYDROCHLORIDE 1 MG: 1 INJECTION, SOLUTION INTRAMUSCULAR; INTRAVENOUS; SUBCUTANEOUS at 20:31

## 2024-01-01 RX ADMIN — HYDROMORPHONE HYDROCHLORIDE 1 MG: 1 INJECTION, SOLUTION INTRAMUSCULAR; INTRAVENOUS; SUBCUTANEOUS at 06:53

## 2024-01-01 RX ADMIN — HYDROMORPHONE HYDROCHLORIDE 0.5 MG: 1 INJECTION, SOLUTION INTRAMUSCULAR; INTRAVENOUS; SUBCUTANEOUS at 02:49

## 2024-01-01 RX ADMIN — Medication 10 ML: at 14:00

## 2024-01-01 RX ADMIN — Medication 10 ML: at 08:00

## 2024-01-01 RX ADMIN — CYCLOBENZAPRINE 5 MG: 10 TABLET, FILM COATED ORAL at 04:45

## 2024-01-01 RX ADMIN — HYDROMORPHONE HYDROCHLORIDE 1 MG: 1 INJECTION, SOLUTION INTRAMUSCULAR; INTRAVENOUS; SUBCUTANEOUS at 03:05

## 2024-01-01 RX ADMIN — DEXTROSE AND SODIUM CHLORIDE 50 ML/HR: 5; 900 INJECTION, SOLUTION INTRAVENOUS at 17:09

## 2024-01-01 RX ADMIN — LORAZEPAM 0.5 MG: 2 INJECTION INTRAMUSCULAR; INTRAVENOUS at 00:15

## 2024-01-01 RX ADMIN — SODIUM CHLORIDE, POTASSIUM CHLORIDE, SODIUM LACTATE AND CALCIUM CHLORIDE 25 ML/HR: 600; 310; 30; 20 INJECTION, SOLUTION INTRAVENOUS at 02:49

## 2024-01-01 RX ADMIN — HYDROMORPHONE HYDROCHLORIDE 1 MG: 1 INJECTION, SOLUTION INTRAMUSCULAR; INTRAVENOUS; SUBCUTANEOUS at 00:46

## 2024-01-01 RX ADMIN — HYDROMORPHONE HYDROCHLORIDE 0.5 MG: 1 INJECTION, SOLUTION INTRAMUSCULAR; INTRAVENOUS; SUBCUTANEOUS at 06:01

## 2024-01-01 RX ADMIN — METOPROLOL SUCCINATE 25 MG: 25 TABLET, FILM COATED, EXTENDED RELEASE ORAL at 09:23

## 2024-01-01 RX ADMIN — DEXTROSE AND SODIUM CHLORIDE 100 ML/HR: 5; 900 INJECTION, SOLUTION INTRAVENOUS at 15:30

## 2024-01-01 RX ADMIN — LORAZEPAM 1 MG: 2 INJECTION INTRAMUSCULAR; INTRAVENOUS at 23:55

## 2024-01-01 RX ADMIN — LOSARTAN POTASSIUM 100 MG: 50 TABLET, FILM COATED ORAL at 08:58

## 2024-01-01 RX ADMIN — HYDROMORPHONE HYDROCHLORIDE 1 MG: 1 INJECTION, SOLUTION INTRAMUSCULAR; INTRAVENOUS; SUBCUTANEOUS at 17:37

## 2024-01-01 RX ADMIN — METOPROLOL SUCCINATE 25 MG: 25 TABLET, FILM COATED, EXTENDED RELEASE ORAL at 11:39

## 2024-01-01 RX ADMIN — ATORVASTATIN CALCIUM 40 MG: 40 TABLET, FILM COATED ORAL at 20:41

## 2024-01-01 RX ADMIN — HYDROMORPHONE HYDROCHLORIDE 1 MG: 1 INJECTION, SOLUTION INTRAMUSCULAR; INTRAVENOUS; SUBCUTANEOUS at 05:56

## 2024-01-01 RX ADMIN — HYDROMORPHONE HYDROCHLORIDE 1 MG: 1 INJECTION, SOLUTION INTRAMUSCULAR; INTRAVENOUS; SUBCUTANEOUS at 00:52

## 2024-01-01 RX ADMIN — LORAZEPAM 1 MG: 2 INJECTION INTRAMUSCULAR; INTRAVENOUS at 21:18

## 2024-01-01 RX ADMIN — LORAZEPAM 0.5 MG: 2 INJECTION INTRAMUSCULAR; INTRAVENOUS at 17:58

## 2024-01-01 RX ADMIN — HYDROMORPHONE HYDROCHLORIDE 1 MG: 1 INJECTION, SOLUTION INTRAMUSCULAR; INTRAVENOUS; SUBCUTANEOUS at 20:51

## 2024-01-01 RX ADMIN — HYDROMORPHONE HYDROCHLORIDE 0.5 MG: 1 INJECTION, SOLUTION INTRAMUSCULAR; INTRAVENOUS; SUBCUTANEOUS at 19:36

## 2024-01-01 RX ADMIN — HYDROXYZINE HYDROCHLORIDE 25 MG: 25 TABLET, FILM COATED ORAL at 16:40

## 2024-01-01 RX ADMIN — HYDROMORPHONE HYDROCHLORIDE 1 MG: 1 INJECTION, SOLUTION INTRAMUSCULAR; INTRAVENOUS; SUBCUTANEOUS at 20:41

## 2024-01-01 RX ADMIN — HYDROMORPHONE HYDROCHLORIDE 1 MG: 1 INJECTION, SOLUTION INTRAMUSCULAR; INTRAVENOUS; SUBCUTANEOUS at 20:57

## 2024-01-01 RX ADMIN — LORAZEPAM 0.5 MG: 2 INJECTION INTRAMUSCULAR; INTRAVENOUS at 07:51

## 2024-01-01 RX ADMIN — LORAZEPAM 1 MG: 2 INJECTION INTRAMUSCULAR; INTRAVENOUS at 15:29

## 2024-01-01 RX ADMIN — HYDROMORPHONE HYDROCHLORIDE 1 MG: 1 INJECTION, SOLUTION INTRAMUSCULAR; INTRAVENOUS; SUBCUTANEOUS at 17:19

## 2024-01-01 RX ADMIN — ONDANSETRON 4 MG: 2 INJECTION, SOLUTION INTRAMUSCULAR; INTRAVENOUS at 20:31

## 2024-01-01 RX ADMIN — SODIUM CHLORIDE, POTASSIUM CHLORIDE, SODIUM LACTATE AND CALCIUM CHLORIDE 100 ML/HR: 600; 310; 30; 20 INJECTION, SOLUTION INTRAVENOUS at 13:59

## 2024-01-01 RX ADMIN — LORAZEPAM 2 MG: 2 INJECTION INTRAMUSCULAR; INTRAVENOUS at 03:05

## 2024-01-01 RX ADMIN — ACETAMINOPHEN 1000 MG: 500 TABLET, FILM COATED ORAL at 13:59

## 2024-02-13 RX ORDER — ONDANSETRON 4 MG/1
TABLET, FILM COATED ORAL
Qty: 30 TABLET | Refills: 2 | Status: SHIPPED | OUTPATIENT
Start: 2024-02-13

## 2024-03-06 DIAGNOSIS — F41.8 ANXIETY WITH DEPRESSION: ICD-10-CM

## 2024-03-07 RX ORDER — HYDROXYZINE HYDROCHLORIDE 25 MG/1
TABLET, FILM COATED ORAL
Qty: 60 TABLET | Refills: 3 | Status: SHIPPED | OUTPATIENT
Start: 2024-03-07

## 2024-04-10 RX ORDER — ONDANSETRON 4 MG/1
4 TABLET, FILM COATED ORAL EVERY 8 HOURS PRN
Qty: 30 TABLET | Refills: 2 | OUTPATIENT
Start: 2024-04-10

## 2024-04-29 RX ORDER — ONDANSETRON 4 MG/1
4 TABLET, FILM COATED ORAL EVERY 8 HOURS PRN
Qty: 30 TABLET | Refills: 1 | Status: SHIPPED | OUTPATIENT
Start: 2024-04-29

## 2024-05-15 DIAGNOSIS — F41.8 ANXIETY WITH DEPRESSION: ICD-10-CM

## 2024-05-16 RX ORDER — HYDROXYZINE HYDROCHLORIDE 25 MG/1
TABLET, FILM COATED ORAL
Qty: 60 TABLET | Refills: 3 | Status: SHIPPED | OUTPATIENT
Start: 2024-05-16

## 2024-06-18 RX ORDER — ONDANSETRON 4 MG/1
TABLET, FILM COATED ORAL
Qty: 30 TABLET | Refills: 1 | Status: SHIPPED | OUTPATIENT
Start: 2024-06-18

## 2024-06-25 ENCOUNTER — TELEPHONE (OUTPATIENT)
Dept: FAMILY MEDICINE CLINIC | Facility: CLINIC | Age: 74
End: 2024-06-25
Payer: COMMERCIAL

## 2024-06-25 NOTE — TELEPHONE ENCOUNTER
"----- Message from Adelina PÉREZ sent at 6/25/2024  9:10 AM EDT -----  Regarding: FW: Appointment Request  Contact: [phone]    ----- Message -----  From: Alexei Merino III \"Chip\"  Sent: 6/25/2024   9:02 AM EDT  To: [facility] Desk Pool  Subject: Appointment Request                              Appointment Request From: Alexei Merino III    With Provider: Raheem Mckeon [[facility]]    Preferred Date Range: 6/26/2024 – 7/5/2024    Preferred Times: Any Time    Reason for visit: Medicare Annual Wellness Visit    Comments:  Shortness of breathing just walking from room to room.  "

## 2024-07-16 DIAGNOSIS — F41.8 ANXIETY WITH DEPRESSION: ICD-10-CM

## 2024-07-16 RX ORDER — HYDROXYZINE HYDROCHLORIDE 25 MG/1
TABLET, FILM COATED ORAL
Qty: 60 TABLET | Refills: 3 | Status: SHIPPED | OUTPATIENT
Start: 2024-07-16

## 2024-07-31 RX ORDER — ONDANSETRON 4 MG/1
TABLET, FILM COATED ORAL
Qty: 30 TABLET | Refills: 1 | OUTPATIENT
Start: 2024-07-31

## 2024-08-23 ENCOUNTER — APPOINTMENT (OUTPATIENT)
Dept: GENERAL RADIOLOGY | Facility: HOSPITAL | Age: 74
DRG: 280 | End: 2024-08-23
Payer: COMMERCIAL

## 2024-08-23 ENCOUNTER — HOSPITAL ENCOUNTER (INPATIENT)
Facility: HOSPITAL | Age: 74
LOS: 4 days | Discharge: NURSING, REHAB OR LONG-TERM CARE | DRG: 280 | End: 2024-08-27
Attending: EMERGENCY MEDICINE | Admitting: STUDENT IN AN ORGANIZED HEALTH CARE EDUCATION/TRAINING PROGRAM
Payer: MEDICARE

## 2024-08-23 ENCOUNTER — APPOINTMENT (OUTPATIENT)
Dept: GENERAL RADIOLOGY | Facility: HOSPITAL | Age: 74
DRG: 280 | End: 2024-08-23
Payer: MEDICARE

## 2024-08-23 DIAGNOSIS — I21.4 NSTEMI (NON-ST ELEVATED MYOCARDIAL INFARCTION): Primary | ICD-10-CM

## 2024-08-23 DIAGNOSIS — Z72.0 TOBACCO USE: ICD-10-CM

## 2024-08-23 DIAGNOSIS — I25.810 CORONARY ARTERY DISEASE INVOLVING CORONARY BYPASS GRAFT OF NATIVE HEART WITHOUT ANGINA PECTORIS: ICD-10-CM

## 2024-08-23 DIAGNOSIS — E78.5 HYPERLIPIDEMIA, UNSPECIFIED HYPERLIPIDEMIA TYPE: ICD-10-CM

## 2024-08-23 DIAGNOSIS — Z95.1 S/P CABG X 3: ICD-10-CM

## 2024-08-23 DIAGNOSIS — F17.210 SMOKING GREATER THAN 30 PACK YEARS: ICD-10-CM

## 2024-08-23 LAB
ANION GAP SERPL CALCULATED.3IONS-SCNC: 10.4 MMOL/L (ref 5–15)
ANION GAP SERPL CALCULATED.3IONS-SCNC: 13.3 MMOL/L (ref 5–15)
APTT PPP: 30.3 SECONDS (ref 61–76.5)
BASOPHILS # BLD AUTO: 0.02 10*3/MM3 (ref 0–0.2)
BASOPHILS NFR BLD AUTO: 0.3 % (ref 0–1.5)
BUN SERPL-MCNC: 11 MG/DL (ref 8–23)
BUN SERPL-MCNC: 11 MG/DL (ref 8–23)
BUN/CREAT SERPL: 11 (ref 7–25)
BUN/CREAT SERPL: 9.6 (ref 7–25)
CALCIUM SPEC-SCNC: 8.7 MG/DL (ref 8.6–10.5)
CALCIUM SPEC-SCNC: 9.4 MG/DL (ref 8.6–10.5)
CHLORIDE SERPL-SCNC: 93 MMOL/L (ref 98–107)
CHLORIDE SERPL-SCNC: 95 MMOL/L (ref 98–107)
CHOLEST SERPL-MCNC: 235 MG/DL (ref 0–200)
CO2 SERPL-SCNC: 25.7 MMOL/L (ref 22–29)
CO2 SERPL-SCNC: 26.6 MMOL/L (ref 22–29)
CREAT SERPL-MCNC: 1 MG/DL (ref 0.76–1.27)
CREAT SERPL-MCNC: 1.15 MG/DL (ref 0.76–1.27)
DEPRECATED RDW RBC AUTO: 41.1 FL (ref 37–54)
EGFRCR SERPLBLD CKD-EPI 2021: 67.2 ML/MIN/1.73
EGFRCR SERPLBLD CKD-EPI 2021: 79.5 ML/MIN/1.73
EOSINOPHIL # BLD AUTO: 0.09 10*3/MM3 (ref 0–0.4)
EOSINOPHIL NFR BLD AUTO: 1.5 % (ref 0.3–6.2)
ERYTHROCYTE [DISTWIDTH] IN BLOOD BY AUTOMATED COUNT: 12.1 % (ref 12.3–15.4)
GEN 5 2HR TROPONIN T REFLEX: 234 NG/L
GLUCOSE SERPL-MCNC: 113 MG/DL (ref 65–99)
GLUCOSE SERPL-MCNC: 96 MG/DL (ref 65–99)
HBA1C MFR BLD: 5.92 % (ref 4.8–5.6)
HCT VFR BLD AUTO: 36.4 % (ref 37.5–51)
HDLC SERPL-MCNC: 46 MG/DL (ref 40–60)
HGB BLD-MCNC: 12.5 G/DL (ref 13–17.7)
HOLD SPECIMEN: NORMAL
IMM GRANULOCYTES # BLD AUTO: 0.01 10*3/MM3 (ref 0–0.05)
IMM GRANULOCYTES NFR BLD AUTO: 0.2 % (ref 0–0.5)
INR PPP: 1.1 (ref 0.93–1.1)
LDLC SERPL CALC-MCNC: 162 MG/DL (ref 0–100)
LDLC/HDLC SERPL: 3.47 {RATIO}
LYMPHOCYTES # BLD AUTO: 1.89 10*3/MM3 (ref 0.7–3.1)
LYMPHOCYTES NFR BLD AUTO: 31 % (ref 19.6–45.3)
MCH RBC QN AUTO: 31.7 PG (ref 26.6–33)
MCHC RBC AUTO-ENTMCNC: 34.3 G/DL (ref 31.5–35.7)
MCV RBC AUTO: 92.4 FL (ref 79–97)
MONOCYTES # BLD AUTO: 0.72 10*3/MM3 (ref 0.1–0.9)
MONOCYTES NFR BLD AUTO: 11.8 % (ref 5–12)
NEUTROPHILS NFR BLD AUTO: 3.36 10*3/MM3 (ref 1.7–7)
NEUTROPHILS NFR BLD AUTO: 55.2 % (ref 42.7–76)
NRBC BLD AUTO-RTO: 0 /100 WBC (ref 0–0.2)
PLATELET # BLD AUTO: 179 10*3/MM3 (ref 140–450)
PMV BLD AUTO: 8.6 FL (ref 6–12)
POTASSIUM SERPL-SCNC: 3.9 MMOL/L (ref 3.5–5.2)
POTASSIUM SERPL-SCNC: 4.1 MMOL/L (ref 3.5–5.2)
PROTHROMBIN TIME: 11.9 SECONDS (ref 9.6–11.7)
RBC # BLD AUTO: 3.94 10*6/MM3 (ref 4.14–5.8)
SODIUM SERPL-SCNC: 132 MMOL/L (ref 136–145)
SODIUM SERPL-SCNC: 132 MMOL/L (ref 136–145)
TRIGL SERPL-MCNC: 147 MG/DL (ref 0–150)
TROPONIN T DELTA: 39 NG/L
TROPONIN T SERPL HS-MCNC: 195 NG/L
VLDLC SERPL-MCNC: 27 MG/DL (ref 5–40)
WBC NRBC COR # BLD AUTO: 6.09 10*3/MM3 (ref 3.4–10.8)

## 2024-08-23 PROCEDURE — 25010000002 HEPARIN (PORCINE) 25000-0.45 UT/250ML-% SOLUTION: Performed by: EMERGENCY MEDICINE

## 2024-08-23 PROCEDURE — 99285 EMERGENCY DEPT VISIT HI MDM: CPT

## 2024-08-23 PROCEDURE — 83036 HEMOGLOBIN GLYCOSYLATED A1C: CPT | Performed by: NURSE PRACTITIONER

## 2024-08-23 PROCEDURE — 84484 ASSAY OF TROPONIN QUANT: CPT | Performed by: EMERGENCY MEDICINE

## 2024-08-23 PROCEDURE — 85025 COMPLETE CBC W/AUTO DIFF WBC: CPT | Performed by: EMERGENCY MEDICINE

## 2024-08-23 PROCEDURE — 85610 PROTHROMBIN TIME: CPT | Performed by: EMERGENCY MEDICINE

## 2024-08-23 PROCEDURE — 85730 THROMBOPLASTIN TIME PARTIAL: CPT | Performed by: EMERGENCY MEDICINE

## 2024-08-23 PROCEDURE — 93005 ELECTROCARDIOGRAM TRACING: CPT | Performed by: EMERGENCY MEDICINE

## 2024-08-23 PROCEDURE — 71045 X-RAY EXAM CHEST 1 VIEW: CPT

## 2024-08-23 PROCEDURE — 80061 LIPID PANEL: CPT | Performed by: NURSE PRACTITIONER

## 2024-08-23 PROCEDURE — 25010000002 CEFTRIAXONE PER 250 MG: Performed by: NURSE PRACTITIONER

## 2024-08-23 PROCEDURE — 80048 BASIC METABOLIC PNL TOTAL CA: CPT | Performed by: EMERGENCY MEDICINE

## 2024-08-23 PROCEDURE — 36415 COLL VENOUS BLD VENIPUNCTURE: CPT

## 2024-08-23 PROCEDURE — 80048 BASIC METABOLIC PNL TOTAL CA: CPT | Performed by: NURSE PRACTITIONER

## 2024-08-23 PROCEDURE — 73620 X-RAY EXAM OF FOOT: CPT

## 2024-08-23 RX ORDER — IBUPROFEN 200 MG
400 TABLET ORAL EVERY 6 HOURS PRN
COMMUNITY

## 2024-08-23 RX ORDER — SODIUM CHLORIDE 0.9 % (FLUSH) 0.9 %
10 SYRINGE (ML) INJECTION EVERY 12 HOURS SCHEDULED
Status: DISCONTINUED | OUTPATIENT
Start: 2024-08-23 | End: 2024-08-27 | Stop reason: HOSPADM

## 2024-08-23 RX ORDER — AMOXICILLIN 250 MG
2 CAPSULE ORAL 2 TIMES DAILY PRN
Status: DISCONTINUED | OUTPATIENT
Start: 2024-08-23 | End: 2024-08-27 | Stop reason: HOSPADM

## 2024-08-23 RX ORDER — DIAPER,BRIEF,INFANT-TODD,DISP
1 EACH MISCELLANEOUS EVERY 12 HOURS SCHEDULED
Status: DISCONTINUED | OUTPATIENT
Start: 2024-08-23 | End: 2024-08-27 | Stop reason: HOSPADM

## 2024-08-23 RX ORDER — ONDANSETRON 2 MG/ML
4 INJECTION INTRAMUSCULAR; INTRAVENOUS EVERY 6 HOURS PRN
Status: DISCONTINUED | OUTPATIENT
Start: 2024-08-23 | End: 2024-08-24

## 2024-08-23 RX ORDER — TRAMADOL HYDROCHLORIDE 50 MG/1
50 TABLET ORAL ONCE
Status: COMPLETED | OUTPATIENT
Start: 2024-08-23 | End: 2024-08-23

## 2024-08-23 RX ORDER — BISACODYL 10 MG
10 SUPPOSITORY, RECTAL RECTAL DAILY PRN
Status: DISCONTINUED | OUTPATIENT
Start: 2024-08-23 | End: 2024-08-27 | Stop reason: HOSPADM

## 2024-08-23 RX ORDER — SODIUM CHLORIDE 0.9 % (FLUSH) 0.9 %
10 SYRINGE (ML) INJECTION AS NEEDED
Status: DISCONTINUED | OUTPATIENT
Start: 2024-08-23 | End: 2024-08-27 | Stop reason: HOSPADM

## 2024-08-23 RX ORDER — POLYETHYLENE GLYCOL 3350 17 G/17G
17 POWDER, FOR SOLUTION ORAL DAILY PRN
Status: DISCONTINUED | OUTPATIENT
Start: 2024-08-23 | End: 2024-08-27 | Stop reason: HOSPADM

## 2024-08-23 RX ORDER — SODIUM CHLORIDE 9 MG/ML
40 INJECTION, SOLUTION INTRAVENOUS AS NEEDED
Status: DISCONTINUED | OUTPATIENT
Start: 2024-08-23 | End: 2024-08-27 | Stop reason: HOSPADM

## 2024-08-23 RX ORDER — NITROGLYCERIN 0.4 MG/1
0.4 TABLET SUBLINGUAL
Status: DISCONTINUED | OUTPATIENT
Start: 2024-08-23 | End: 2024-08-27 | Stop reason: HOSPADM

## 2024-08-23 RX ORDER — HEPARIN SODIUM 10000 [USP'U]/100ML
12 INJECTION, SOLUTION INTRAVENOUS
Status: DISCONTINUED | OUTPATIENT
Start: 2024-08-23 | End: 2024-08-24

## 2024-08-23 RX ORDER — BISACODYL 5 MG/1
5 TABLET, DELAYED RELEASE ORAL DAILY PRN
Status: DISCONTINUED | OUTPATIENT
Start: 2024-08-23 | End: 2024-08-27 | Stop reason: HOSPADM

## 2024-08-23 RX ADMIN — CEFTRIAXONE 2000 MG: 2 INJECTION, POWDER, FOR SOLUTION INTRAMUSCULAR; INTRAVENOUS at 21:53

## 2024-08-23 RX ADMIN — TRAMADOL HYDROCHLORIDE 50 MG: 50 TABLET ORAL at 21:53

## 2024-08-23 RX ADMIN — HEPARIN SODIUM 12 UNITS/KG/HR: 10000 INJECTION, SOLUTION INTRAVENOUS at 21:03

## 2024-08-23 NOTE — Clinical Note
Level of Care: Telemetry [5]   Admitting Physician: LLANES ALVAREZ, CARLOS [[id number]]   Attending Physician: LLANES ALVAREZ, CARLOS [[id number]]   Bed Request Comments: CINTIA

## 2024-08-23 NOTE — LETTER
EMS Transport Request  For use at [facility], [place], [place], [place], and [place] only   Patient Name: Alexei Merino III : 1950   Weight:56.2 kg (123 lb 14.4 oz) Pick-up Location: 2114 BLS/ALS: BLS/ALS: BLS   Insurance: MEDICARE Auth End Date: .   Pre-Cert #: D/C Summary complete:    [facility] [address]   Contact Precautions: None   Equipment (O2, Fluids, etc.): None   Arrive By Date/Time: 24 Stretcher/WC: Wheelchair   CM Requesting: Alessandra Morales Ext: [phone]   Notes/Medical Necessity: a/o, Min assist to transfer. WILL CALL     ______________________________________________________________________    *Only 2 patient bags OR 1 carry-on size bag are permitted.  Wheelchairs and walkers CANNOT transported with the patient. Acknowledge: Yes

## 2024-08-24 ENCOUNTER — APPOINTMENT (OUTPATIENT)
Dept: CARDIOLOGY | Facility: HOSPITAL | Age: 74
DRG: 280 | End: 2024-08-24
Payer: MEDICARE

## 2024-08-24 LAB
ACT BLD: 146 SECONDS (ref 89–137)
ACT BLD: 158 SECONDS (ref 89–137)
ANION GAP SERPL CALCULATED.3IONS-SCNC: 10.5 MMOL/L (ref 5–15)
AORTIC DIMENSIONLESS INDEX: 0.47 (DI)
APTT PPP: 47 SECONDS (ref 61–76.5)
APTT PPP: 67.1 SECONDS (ref 61–76.5)
BASOPHILS # BLD AUTO: 0.04 10*3/MM3 (ref 0–0.2)
BASOPHILS NFR BLD AUTO: 0.6 % (ref 0–1.5)
BH CV ECHO LEFT VENTRICLE GLOBAL LONGITUDINAL STRAIN: -5.7 %
BH CV ECHO MEAS - ACS: 1.4 CM
BH CV ECHO MEAS - AI P1/2T: 553.4 MSEC
BH CV ECHO MEAS - AO MAX PG: 6.5 MMHG
BH CV ECHO MEAS - AO MEAN PG: 3 MMHG
BH CV ECHO MEAS - AO V2 MAX: 127 CM/SEC
BH CV ECHO MEAS - AO V2 VTI: 29 CM
BH CV ECHO MEAS - AVA(I,D): 1.59 CM2
BH CV ECHO MEAS - EDV(CUBED): 185.2 ML
BH CV ECHO MEAS - EDV(MOD-SP4): 114 ML
BH CV ECHO MEAS - EF(MOD-BP): 20 %
BH CV ECHO MEAS - EF(MOD-SP4): 19.6 %
BH CV ECHO MEAS - ESV(CUBED): 140.6 ML
BH CV ECHO MEAS - ESV(MOD-SP4): 91.7 ML
BH CV ECHO MEAS - FS: 8.8 %
BH CV ECHO MEAS - IVS/LVPW: 1 CM
BH CV ECHO MEAS - IVSD: 1.1 CM
BH CV ECHO MEAS - LA DIMENSION: 5.2 CM
BH CV ECHO MEAS - LAT PEAK E' VEL: 4 CM/SEC
BH CV ECHO MEAS - LV DIASTOLIC VOL/BSA (35-75): 62 CM2
BH CV ECHO MEAS - LV MASS(C)D: 256.7 GRAMS
BH CV ECHO MEAS - LV MAX PG: 1.59 MMHG
BH CV ECHO MEAS - LV MEAN PG: 1 MMHG
BH CV ECHO MEAS - LV SYSTOLIC VOL/BSA (12-30): 49.9 CM2
BH CV ECHO MEAS - LV V1 MAX: 63 CM/SEC
BH CV ECHO MEAS - LV V1 VTI: 13.3 CM
BH CV ECHO MEAS - LVIDD: 5.7 CM
BH CV ECHO MEAS - LVIDS: 5.2 CM
BH CV ECHO MEAS - LVOT AREA: 3.5 CM2
BH CV ECHO MEAS - LVOT DIAM: 2.1 CM
BH CV ECHO MEAS - LVPWD: 1.1 CM
BH CV ECHO MEAS - MED PEAK E' VEL: 3.7 CM/SEC
BH CV ECHO MEAS - MR MAX PG: 63.7 MMHG
BH CV ECHO MEAS - MR MAX VEL: 399 CM/SEC
BH CV ECHO MEAS - MV A MAX VEL: 72.6 CM/SEC
BH CV ECHO MEAS - MV DEC SLOPE: 150 CM/SEC2
BH CV ECHO MEAS - MV DEC TIME: 0.27 SEC
BH CV ECHO MEAS - MV E MAX VEL: 55.1 CM/SEC
BH CV ECHO MEAS - MV E/A: 0.76
BH CV ECHO MEAS - MV MAX PG: 2.7 MMHG
BH CV ECHO MEAS - MV MEAN PG: 1 MMHG
BH CV ECHO MEAS - MV P1/2T: 111.3 MSEC
BH CV ECHO MEAS - MV V2 VTI: 22.6 CM
BH CV ECHO MEAS - MVA(P1/2T): 1.98 CM2
BH CV ECHO MEAS - MVA(VTI): 2.04 CM2
BH CV ECHO MEAS - PA ACC TIME: 0.11 SEC
BH CV ECHO MEAS - PA V2 MAX: 79.6 CM/SEC
BH CV ECHO MEAS - RAP SYSTOLE: 3 MMHG
BH CV ECHO MEAS - RV MAX PG: 2.7 MMHG
BH CV ECHO MEAS - RV V1 MAX: 81.4 CM/SEC
BH CV ECHO MEAS - RV V1 VTI: 13 CM
BH CV ECHO MEAS - RVDD: 2 CM
BH CV ECHO MEAS - RVSP: 20.3 MMHG
BH CV ECHO MEAS - SV(LVOT): 46.1 ML
BH CV ECHO MEAS - SV(MOD-SP4): 22.3 ML
BH CV ECHO MEAS - SVI(LVOT): 25.1 ML/M2
BH CV ECHO MEAS - SVI(MOD-SP4): 12.1 ML/M2
BH CV ECHO MEAS - TAPSE (>1.6): 1.48 CM
BH CV ECHO MEAS - TR MAX PG: 17.3 MMHG
BH CV ECHO MEAS - TR MAX VEL: 208 CM/SEC
BH CV ECHO MEASUREMENTS AVERAGE E/E' RATIO: 14.31
BH CV XLRA - TDI S': 10.1 CM/SEC
BUN SERPL-MCNC: 12 MG/DL (ref 8–23)
BUN/CREAT SERPL: 11.7 (ref 7–25)
CALCIUM SPEC-SCNC: 8.6 MG/DL (ref 8.6–10.5)
CHLORIDE SERPL-SCNC: 96 MMOL/L (ref 98–107)
CO2 SERPL-SCNC: 24.5 MMOL/L (ref 22–29)
CREAT SERPL-MCNC: 1.03 MG/DL (ref 0.76–1.27)
DEPRECATED RDW RBC AUTO: 40.7 FL (ref 37–54)
EGFRCR SERPLBLD CKD-EPI 2021: 76.7 ML/MIN/1.73
EOSINOPHIL # BLD AUTO: 0.03 10*3/MM3 (ref 0–0.4)
EOSINOPHIL NFR BLD AUTO: 0.4 % (ref 0.3–6.2)
ERYTHROCYTE [DISTWIDTH] IN BLOOD BY AUTOMATED COUNT: 12 % (ref 12.3–15.4)
GLUCOSE SERPL-MCNC: 98 MG/DL (ref 65–99)
HCT VFR BLD AUTO: 32.9 % (ref 37.5–51)
HGB BLD-MCNC: 11.4 G/DL (ref 13–17.7)
IMM GRANULOCYTES # BLD AUTO: 0.02 10*3/MM3 (ref 0–0.05)
IMM GRANULOCYTES NFR BLD AUTO: 0.3 % (ref 0–0.5)
INR PPP: 1.15 (ref 0.93–1.1)
LYMPHOCYTES # BLD AUTO: 1.68 10*3/MM3 (ref 0.7–3.1)
LYMPHOCYTES NFR BLD AUTO: 23.9 % (ref 19.6–45.3)
MCH RBC QN AUTO: 31.8 PG (ref 26.6–33)
MCHC RBC AUTO-ENTMCNC: 34.7 G/DL (ref 31.5–35.7)
MCV RBC AUTO: 91.9 FL (ref 79–97)
MONOCYTES # BLD AUTO: 0.8 10*3/MM3 (ref 0.1–0.9)
MONOCYTES NFR BLD AUTO: 11.4 % (ref 5–12)
NEUTROPHILS NFR BLD AUTO: 4.45 10*3/MM3 (ref 1.7–7)
NEUTROPHILS NFR BLD AUTO: 63.4 % (ref 42.7–76)
NRBC BLD AUTO-RTO: 0 /100 WBC (ref 0–0.2)
PLATELET # BLD AUTO: 153 10*3/MM3 (ref 140–450)
PMV BLD AUTO: 8.6 FL (ref 6–12)
POTASSIUM SERPL-SCNC: 4.7 MMOL/L (ref 3.5–5.2)
PROTHROMBIN TIME: 12.4 SECONDS (ref 9.6–11.7)
QT INTERVAL: 430 MS
QTC INTERVAL: 534 MS
RBC # BLD AUTO: 3.58 10*6/MM3 (ref 4.14–5.8)
SINUS: 3.4 CM
SODIUM SERPL-SCNC: 131 MMOL/L (ref 136–145)
TROPONIN T SERPL HS-MCNC: 278 NG/L
URATE SERPL-MCNC: 4.2 MG/DL (ref 3.4–7)
WBC NRBC COR # BLD AUTO: 7.02 10*3/MM3 (ref 3.4–10.8)

## 2024-08-24 PROCEDURE — 85347 COAGULATION TIME ACTIVATED: CPT

## 2024-08-24 PROCEDURE — 25810000003 SODIUM CHLORIDE 0.9 % SOLUTION: Performed by: INTERNAL MEDICINE

## 2024-08-24 PROCEDURE — 99152 MOD SED SAME PHYS/QHP 5/>YRS: CPT | Performed by: INTERNAL MEDICINE

## 2024-08-24 PROCEDURE — 25010000002 ONDANSETRON PER 1 MG: Performed by: INTERNAL MEDICINE

## 2024-08-24 PROCEDURE — 99222 1ST HOSP IP/OBS MODERATE 55: CPT | Performed by: STUDENT IN AN ORGANIZED HEALTH CARE EDUCATION/TRAINING PROGRAM

## 2024-08-24 PROCEDURE — C1769 GUIDE WIRE: HCPCS | Performed by: INTERNAL MEDICINE

## 2024-08-24 PROCEDURE — 93010 ELECTROCARDIOGRAM REPORT: CPT | Performed by: INTERNAL MEDICINE

## 2024-08-24 PROCEDURE — 25010000002 CEFTRIAXONE PER 250 MG: Performed by: INTERNAL MEDICINE

## 2024-08-24 PROCEDURE — 93005 ELECTROCARDIOGRAM TRACING: CPT | Performed by: NURSE PRACTITIONER

## 2024-08-24 PROCEDURE — 4A023N7 MEASUREMENT OF CARDIAC SAMPLING AND PRESSURE, LEFT HEART, PERCUTANEOUS APPROACH: ICD-10-PCS | Performed by: INTERNAL MEDICINE

## 2024-08-24 PROCEDURE — 80048 BASIC METABOLIC PNL TOTAL CA: CPT | Performed by: NURSE PRACTITIONER

## 2024-08-24 PROCEDURE — 93459 L HRT ART/GRFT ANGIO: CPT | Performed by: INTERNAL MEDICINE

## 2024-08-24 PROCEDURE — 25010000002 HYDROMORPHONE 1 MG/ML SOLUTION: Performed by: NURSE PRACTITIONER

## 2024-08-24 PROCEDURE — 93306 TTE W/DOPPLER COMPLETE: CPT | Performed by: INTERNAL MEDICINE

## 2024-08-24 PROCEDURE — B2111ZZ FLUOROSCOPY OF MULTIPLE CORONARY ARTERIES USING LOW OSMOLAR CONTRAST: ICD-10-PCS | Performed by: INTERNAL MEDICINE

## 2024-08-24 PROCEDURE — 25010000002 HYDRALAZINE PER 20 MG: Performed by: INTERNAL MEDICINE

## 2024-08-24 PROCEDURE — 85730 THROMBOPLASTIN TIME PARTIAL: CPT | Performed by: STUDENT IN AN ORGANIZED HEALTH CARE EDUCATION/TRAINING PROGRAM

## 2024-08-24 PROCEDURE — 85025 COMPLETE CBC W/AUTO DIFF WBC: CPT | Performed by: NURSE PRACTITIONER

## 2024-08-24 PROCEDURE — 85610 PROTHROMBIN TIME: CPT | Performed by: INTERNAL MEDICINE

## 2024-08-24 PROCEDURE — 25510000001 IOPAMIDOL PER 1 ML: Performed by: INTERNAL MEDICINE

## 2024-08-24 PROCEDURE — 25010000002 LIDOCAINE 1 % SOLUTION: Performed by: INTERNAL MEDICINE

## 2024-08-24 PROCEDURE — 94799 UNLISTED PULMONARY SVC/PX: CPT

## 2024-08-24 PROCEDURE — 84550 ASSAY OF BLOOD/URIC ACID: CPT | Performed by: NURSE PRACTITIONER

## 2024-08-24 PROCEDURE — C1894 INTRO/SHEATH, NON-LASER: HCPCS | Performed by: INTERNAL MEDICINE

## 2024-08-24 PROCEDURE — 93356 MYOCRD STRAIN IMG SPCKL TRCK: CPT | Performed by: INTERNAL MEDICINE

## 2024-08-24 PROCEDURE — 84484 ASSAY OF TROPONIN QUANT: CPT | Performed by: NURSE PRACTITIONER

## 2024-08-24 PROCEDURE — 93306 TTE W/DOPPLER COMPLETE: CPT

## 2024-08-24 PROCEDURE — B2151ZZ FLUOROSCOPY OF LEFT HEART USING LOW OSMOLAR CONTRAST: ICD-10-PCS | Performed by: INTERNAL MEDICINE

## 2024-08-24 PROCEDURE — 25010000002 ONDANSETRON PER 1 MG: Performed by: NURSE PRACTITIONER

## 2024-08-24 PROCEDURE — 25010000002 FENTANYL CITRATE (PF) 100 MCG/2ML SOLUTION: Performed by: INTERNAL MEDICINE

## 2024-08-24 PROCEDURE — 25010000002 MIDAZOLAM PER 1 MG: Performed by: INTERNAL MEDICINE

## 2024-08-24 PROCEDURE — 93005 ELECTROCARDIOGRAM TRACING: CPT | Performed by: STUDENT IN AN ORGANIZED HEALTH CARE EDUCATION/TRAINING PROGRAM

## 2024-08-24 PROCEDURE — 85730 THROMBOPLASTIN TIME PARTIAL: CPT | Performed by: NURSE PRACTITIONER

## 2024-08-24 PROCEDURE — 93356 MYOCRD STRAIN IMG SPCKL TRCK: CPT

## 2024-08-24 PROCEDURE — 99222 1ST HOSP IP/OBS MODERATE 55: CPT | Performed by: INTERNAL MEDICINE

## 2024-08-24 RX ORDER — SODIUM CHLORIDE 9 MG/ML
INJECTION, SOLUTION INTRAVENOUS
Status: COMPLETED | OUTPATIENT
Start: 2024-08-24 | End: 2024-08-24

## 2024-08-24 RX ORDER — ATORVASTATIN CALCIUM 40 MG/1
40 TABLET, FILM COATED ORAL NIGHTLY
Status: DISCONTINUED | OUTPATIENT
Start: 2024-08-24 | End: 2024-08-27 | Stop reason: HOSPADM

## 2024-08-24 RX ORDER — ACETAMINOPHEN 325 MG/1
325 TABLET ORAL EVERY 4 HOURS PRN
Status: DISCONTINUED | OUTPATIENT
Start: 2024-08-24 | End: 2024-08-24

## 2024-08-24 RX ORDER — FENTANYL CITRATE 50 UG/ML
INJECTION, SOLUTION INTRAMUSCULAR; INTRAVENOUS
Status: DISCONTINUED | OUTPATIENT
Start: 2024-08-24 | End: 2024-08-24 | Stop reason: HOSPADM

## 2024-08-24 RX ORDER — HYDRALAZINE HYDROCHLORIDE 25 MG/1
25 TABLET, FILM COATED ORAL EVERY 12 HOURS SCHEDULED
Status: DISCONTINUED | OUTPATIENT
Start: 2024-08-24 | End: 2024-08-27 | Stop reason: HOSPADM

## 2024-08-24 RX ORDER — METOPROLOL TARTRATE 1 MG/ML
INJECTION, SOLUTION INTRAVENOUS
Status: COMPLETED
Start: 2024-08-24 | End: 2024-08-24

## 2024-08-24 RX ORDER — ASPIRIN 81 MG/1
324 TABLET, CHEWABLE ORAL ONCE
Status: COMPLETED | OUTPATIENT
Start: 2024-08-24 | End: 2024-08-24

## 2024-08-24 RX ORDER — METOPROLOL TARTRATE 25 MG/1
25 TABLET, FILM COATED ORAL EVERY 12 HOURS SCHEDULED
Status: DISCONTINUED | OUTPATIENT
Start: 2024-08-24 | End: 2024-08-27 | Stop reason: HOSPADM

## 2024-08-24 RX ORDER — SODIUM CHLORIDE 9 MG/ML
250 INJECTION, SOLUTION INTRAVENOUS ONCE AS NEEDED
Status: DISCONTINUED | OUTPATIENT
Start: 2024-08-24 | End: 2024-08-27 | Stop reason: HOSPADM

## 2024-08-24 RX ORDER — RANOLAZINE 500 MG/1
500 TABLET, EXTENDED RELEASE ORAL EVERY 12 HOURS SCHEDULED
Status: DISCONTINUED | OUTPATIENT
Start: 2024-08-24 | End: 2024-08-27 | Stop reason: HOSPADM

## 2024-08-24 RX ORDER — MIDAZOLAM HYDROCHLORIDE 1 MG/ML
INJECTION INTRAMUSCULAR; INTRAVENOUS
Status: DISCONTINUED | OUTPATIENT
Start: 2024-08-24 | End: 2024-08-24 | Stop reason: HOSPADM

## 2024-08-24 RX ORDER — CLOPIDOGREL BISULFATE 75 MG/1
75 TABLET ORAL DAILY
Status: DISCONTINUED | OUTPATIENT
Start: 2024-08-24 | End: 2024-08-27 | Stop reason: HOSPADM

## 2024-08-24 RX ORDER — IOPAMIDOL 755 MG/ML
INJECTION, SOLUTION INTRAVASCULAR
Status: DISCONTINUED | OUTPATIENT
Start: 2024-08-24 | End: 2024-08-24 | Stop reason: HOSPADM

## 2024-08-24 RX ORDER — LIDOCAINE HYDROCHLORIDE 10 MG/ML
INJECTION, SOLUTION INFILTRATION; PERINEURAL
Status: DISCONTINUED | OUTPATIENT
Start: 2024-08-24 | End: 2024-08-24 | Stop reason: HOSPADM

## 2024-08-24 RX ORDER — ONDANSETRON 2 MG/ML
4 INJECTION INTRAMUSCULAR; INTRAVENOUS EVERY 6 HOURS PRN
Status: DISCONTINUED | OUTPATIENT
Start: 2024-08-24 | End: 2024-08-27 | Stop reason: HOSPADM

## 2024-08-24 RX ORDER — ASPIRIN 81 MG/1
81 TABLET ORAL DAILY
Status: DISCONTINUED | OUTPATIENT
Start: 2024-08-24 | End: 2024-08-24

## 2024-08-24 RX ORDER — ASPIRIN 81 MG/1
81 TABLET ORAL DAILY
Status: DISCONTINUED | OUTPATIENT
Start: 2024-08-25 | End: 2024-08-24

## 2024-08-24 RX ORDER — HYDRALAZINE HYDROCHLORIDE 20 MG/ML
10 INJECTION INTRAMUSCULAR; INTRAVENOUS EVERY 6 HOURS PRN
Status: DISCONTINUED | OUTPATIENT
Start: 2024-08-24 | End: 2024-08-27 | Stop reason: HOSPADM

## 2024-08-24 RX ORDER — HYDROXYZINE HYDROCHLORIDE 25 MG/1
25 TABLET, FILM COATED ORAL 4 TIMES DAILY
Status: DISCONTINUED | OUTPATIENT
Start: 2024-08-24 | End: 2024-08-26

## 2024-08-24 RX ORDER — ONDANSETRON 4 MG/1
4 TABLET, ORALLY DISINTEGRATING ORAL EVERY 6 HOURS PRN
Status: DISCONTINUED | OUTPATIENT
Start: 2024-08-24 | End: 2024-08-27 | Stop reason: HOSPADM

## 2024-08-24 RX ORDER — ALUMINA, MAGNESIA, AND SIMETHICONE 2400; 2400; 240 MG/30ML; MG/30ML; MG/30ML
15 SUSPENSION ORAL EVERY 6 HOURS PRN
Status: DISCONTINUED | OUTPATIENT
Start: 2024-08-24 | End: 2024-08-27 | Stop reason: HOSPADM

## 2024-08-24 RX ORDER — NITROGLYCERIN 0.4 MG/1
0.4 TABLET SUBLINGUAL
Status: DISCONTINUED | OUTPATIENT
Start: 2024-08-24 | End: 2024-08-24

## 2024-08-24 RX ORDER — DIPHENHYDRAMINE HCL 25 MG
25 CAPSULE ORAL EVERY 6 HOURS PRN
Status: DISCONTINUED | OUTPATIENT
Start: 2024-08-24 | End: 2024-08-27 | Stop reason: HOSPADM

## 2024-08-24 RX ORDER — ISOSORBIDE MONONITRATE 30 MG/1
30 TABLET, EXTENDED RELEASE ORAL
Status: DISCONTINUED | OUTPATIENT
Start: 2024-08-24 | End: 2024-08-27 | Stop reason: HOSPADM

## 2024-08-24 RX ORDER — ASPIRIN 81 MG/1
81 TABLET ORAL DAILY
Status: DISCONTINUED | OUTPATIENT
Start: 2024-08-25 | End: 2024-08-27 | Stop reason: HOSPADM

## 2024-08-24 RX ORDER — TAMSULOSIN HYDROCHLORIDE 0.4 MG/1
0.4 CAPSULE ORAL NIGHTLY
Status: DISCONTINUED | OUTPATIENT
Start: 2024-08-24 | End: 2024-08-27 | Stop reason: HOSPADM

## 2024-08-24 RX ORDER — METOPROLOL TARTRATE 1 MG/ML
5 INJECTION, SOLUTION INTRAVENOUS ONCE
Status: COMPLETED | OUTPATIENT
Start: 2024-08-24 | End: 2024-08-24

## 2024-08-24 RX ORDER — ASPIRIN 81 MG/1
324 TABLET, CHEWABLE ORAL ONCE
Status: DISCONTINUED | OUTPATIENT
Start: 2024-08-24 | End: 2024-08-24

## 2024-08-24 RX ORDER — ACETAMINOPHEN 325 MG/1
650 TABLET ORAL EVERY 4 HOURS PRN
Status: DISCONTINUED | OUTPATIENT
Start: 2024-08-24 | End: 2024-08-27 | Stop reason: HOSPADM

## 2024-08-24 RX ADMIN — TAMSULOSIN HYDROCHLORIDE 0.4 MG: 0.4 CAPSULE ORAL at 20:56

## 2024-08-24 RX ADMIN — HYDROXYZINE HYDROCHLORIDE 25 MG: 25 TABLET ORAL at 20:56

## 2024-08-24 RX ADMIN — BACITRACIN 0.9 G: 500 OINTMENT TOPICAL at 00:55

## 2024-08-24 RX ADMIN — ASPIRIN 81 MG CHEWABLE TABLET 324 MG: 81 TABLET CHEWABLE at 15:55

## 2024-08-24 RX ADMIN — BACITRACIN 0.9 G: 500 OINTMENT TOPICAL at 21:02

## 2024-08-24 RX ADMIN — Medication 10 ML: at 00:07

## 2024-08-24 RX ADMIN — ATORVASTATIN CALCIUM 40 MG: 40 TABLET, FILM COATED ORAL at 20:56

## 2024-08-24 RX ADMIN — METOPROLOL TARTRATE 25 MG: 25 TABLET, FILM COATED ORAL at 20:57

## 2024-08-24 RX ADMIN — HYDRALAZINE HYDROCHLORIDE 25 MG: 25 TABLET ORAL at 21:12

## 2024-08-24 RX ADMIN — CLOPIDOGREL BISULFATE 75 MG: 75 TABLET ORAL at 15:55

## 2024-08-24 RX ADMIN — ONDANSETRON 4 MG: 2 INJECTION INTRAMUSCULAR; INTRAVENOUS at 15:56

## 2024-08-24 RX ADMIN — METOPROLOL TARTRATE 25 MG: 25 TABLET, FILM COATED ORAL at 08:46

## 2024-08-24 RX ADMIN — ONDANSETRON 4 MG: 2 INJECTION INTRAMUSCULAR; INTRAVENOUS at 03:32

## 2024-08-24 RX ADMIN — ISOSORBIDE MONONITRATE 30 MG: 30 TABLET, EXTENDED RELEASE ORAL at 09:44

## 2024-08-24 RX ADMIN — Medication 10 ML: at 21:43

## 2024-08-24 RX ADMIN — ONDANSETRON 4 MG: 2 INJECTION INTRAMUSCULAR; INTRAVENOUS at 09:44

## 2024-08-24 RX ADMIN — CEFTRIAXONE 2000 MG: 2 INJECTION, POWDER, FOR SOLUTION INTRAMUSCULAR; INTRAVENOUS at 20:55

## 2024-08-24 RX ADMIN — Medication 10 ML: at 10:20

## 2024-08-24 RX ADMIN — ASPIRIN 81 MG: 81 TABLET, COATED ORAL at 08:46

## 2024-08-24 RX ADMIN — HYDROXYZINE HYDROCHLORIDE 25 MG: 25 TABLET ORAL at 08:46

## 2024-08-24 RX ADMIN — METOPROLOL TARTRATE 5 MG: 1 INJECTION, SOLUTION INTRAVENOUS at 03:45

## 2024-08-24 RX ADMIN — Medication 10 ML: at 00:03

## 2024-08-24 RX ADMIN — HYDROXYZINE HYDROCHLORIDE 25 MG: 25 TABLET ORAL at 11:36

## 2024-08-24 RX ADMIN — HYDROXYZINE HYDROCHLORIDE 25 MG: 25 TABLET ORAL at 17:58

## 2024-08-24 RX ADMIN — HYDROMORPHONE HYDROCHLORIDE 0.25 MG: 1 INJECTION, SOLUTION INTRAMUSCULAR; INTRAVENOUS; SUBCUTANEOUS at 00:02

## 2024-08-24 RX ADMIN — HYDRALAZINE HYDROCHLORIDE 25 MG: 25 TABLET ORAL at 09:44

## 2024-08-24 RX ADMIN — Medication 10 ML: at 03:33

## 2024-08-24 RX ADMIN — RANOLAZINE 500 MG: 500 TABLET, EXTENDED RELEASE ORAL at 20:56

## 2024-08-24 RX ADMIN — Medication 10 ML: at 20:55

## 2024-08-24 RX ADMIN — BACITRACIN 0.9 G: 500 OINTMENT TOPICAL at 12:15

## 2024-08-24 RX ADMIN — HYDRALAZINE HYDROCHLORIDE 10 MG: 20 INJECTION, SOLUTION INTRAMUSCULAR; INTRAVENOUS at 17:58

## 2024-08-24 NOTE — PROGRESS NOTES
[facility] MEDICINE SERVICE  DAILY PROGRESS NOTE    NAME: Alexei Merino III  : 1950  MRN: [id number]      LOS: 1 day     PROVIDER OF SERVICE: Baljit Higuera MD    Chief Complaint: NSTEMI (non-ST elevated myocardial infarction)    Subjective:     Interval History:  History taken from: patient    Remains n.p.o., completely chest pain-free this morning      Objective:     Vital Signs  Temp:  [97.6 °F (36.4 °C)-98.7 °F (37.1 °C)] 97.6 °F (36.4 °C)  Heart Rate:  [] 59  Resp:  [18-27] 20  BP: (144-200)/() 182/93  Flow (L/min):  [4] 4   Body mass index is 22.45 kg/m².    Physical Exam  General Appearance:  Alert, cooperative, no distress, appears stated age  Head:   Normocephalic, without obvious abnormality, atraumatic  Eyes:   PERRL, conjunctiva/corneas clear, EOM's intact, fundi benign, both eyes  Ears:    Normal TM's and external ear canals, both ears  Nose:   Nares normal, septum midline, mucosa normal, no drainage or sinus tenderness  Throat: Lips, mucosa, and tongue normal; teeth and gums normal  Neck:   Supple, symmetrical, trachea midline, no adenopathy, thyroid: not enlarged, symmetric, no tenderness/mass/nodules, no carotid bruit or JVD  Lungs:             Clear to auscultation bilaterally, respirations unlabored  Heart:  Regular rate and rhythm, S1, S2 normal, no murmur, rub or gallop  Abdomen:  Soft, non-tender, bowel sounds active all four quadrants,  no masses, no organomegaly  Extremities:     Extremities normal, atraumatic, no cyanosis or edema  Pulses:            2+ and symmetric  Skin:    Skin color, texture, turgor normal, no rashes or lesions  Neurologic: Normal    Scheduled Meds   aspirin, 81 mg, Oral, Daily  atorvastatin, 40 mg, Oral, Nightly  bacitracin, 1 Application, Topical, Q12H  cefTRIAXone, 2,000 mg, Intravenous, Q24H  hydrALAZINE, 25 mg, Oral, Q12H  hydrOXYzine, 25 mg, Oral, 4x Daily  isosorbide mononitrate, 30 mg, Oral, Q24H  metoprolol tartrate, 25 mg, Oral,  Q12H  sodium chloride, 10 mL, Intravenous, Q12H       PRN Meds     senna-docusate sodium **AND** polyethylene glycol **AND** bisacodyl **AND** bisacodyl    heparin    heparin    nitroglycerin    ondansetron    [COMPLETED] Insert Peripheral IV **AND** sodium chloride    sodium chloride    sodium chloride   Infusions  heparin, 12 Units/kg/hr, Last Rate: 12 Units/kg/hr (08/23/24 2242)          Diagnostic Data    Results from last 7 days   Lab Units 08/24/24  0315 08/23/24  2302   WBC 10*3/mm3 7.02  --    HEMOGLOBIN g/dL 11.4*  --    HEMATOCRIT % 32.9*  --    PLATELETS 10*3/mm3 153  --    GLUCOSE mg/dL  --  96   CREATININE mg/dL  --  1.00   BUN mg/dL  --  11   SODIUM mmol/L  --  132*   POTASSIUM mmol/L  --  4.1   ANION GAP mmol/L  --  10.4       XR Foot 2 View Left    Result Date: 8/23/2024  Impression: No acute osseous abnormality. Degenerative changes of the first MTP joint most likely represents osteoarthritis. Less likely, gouty arthritis is also a possibility Electronically Signed: Vinny Smyth DO  8/23/2024 10:57 PM EDT  Workstation ID: [id number]    XR Chest 1 View    Result Date: 8/23/2024  Impression: Mild diffuse interstitial thickening and areas of patchy opacity are nonspecific. This may represent mild pulmonary edema versus atypical/multifocal pneumonia. Electronically Signed: Vinny Smyth DO  8/23/2024 8:17 PM EDT  Workstation ID: [id number]       I reviewed the patient's new clinical results.    Assessment/Plan:     Active and Resolved Problems  Active Hospital Problems    Diagnosis  POA    **NSTEMI (non-ST elevated myocardial infarction) [I21.4]  Yes      Resolved Hospital Problems   No resolved problems to display.       Chest pain  NSTEMI  Hx CAD   - HS troponin 195 and 234  - EKG no ST elevation but new LBBB  -Continue heparin drip  -Start aspirin, beta-blocker, statin  -Keep n.p.o. until being evaluated by cardiology this morning, plan for [facility]       Left foot pain  Suspect 2/2 to possible  developing cellulitis, ruptured blister on bottom of foot   - cont rocephin for now   - bacitracin to open wound   - analgesics prn  - X-ray: No acute osseous abnormality. Degenerative changes of the first MTP joint most likely represents osteoarthritis. Less likely, gouty arthritis is also a possibility   - will check a uric acid      Anxiety  - atarax prn     HTN  HLD  - not on medication      Emphysema   - not on home O2  - wean oxygen as tolerated     VTE Prophylaxis:  Pharmacologic VTE prophylaxis orders are present.         Code status is   Code Status and Medical Interventions: CPR (Attempt to Resuscitate); Full Support   Ordered at: 08/23/24 1227     Code Status (Patient has no pulse and is not breathing):    CPR (Attempt to Resuscitate)     Medical Interventions (Patient has pulse or is breathing):    Full Support       Plan for disposition:1-2 days    Time: 30 minutes    Signature: Electronically signed by Baljit Higuera MD, 08/24/24, 10:46 EDT.  [facility] Hospitalist Team

## 2024-08-24 NOTE — PLAN OF CARE
Goal Outcome Evaluation:      Patient had cath this afternoon. No stents were placed. Order for vascular surgery consult placed by cardiology for peripheral vascular disease especially in the right common femoral. BP elevated, received order for hydralazine. Patient feeling urge to urinate after urinating. Patient voided 200 and had 175 post void residual on bladder scanner. Reached out to hospitalist and flomax started. Cath groin site soft, clean, and dry. Plan of care ongoing.

## 2024-08-24 NOTE — CODE DOCUMENTATION
RR called for HR in the 150s.  Upon arrival in room HR at 107, patient naseuated. RAMESH Cooper in room

## 2024-08-24 NOTE — PLAN OF CARE
Goal Outcome Evaluation:  Plan of Care Reviewed With: patient, significant other        Progress: no change    Patient came in here with a diagnosis of NSTEMI. With ongoing Heparin Drip at 12 Units/ kg/Hr. Troponin level are as follows: 195; 234    Marcia NPO    Plan of care is ongoing

## 2024-08-24 NOTE — CONSULTS
Referring Provider: Dr. lópez    Attending: Baljit Higuera MD    Reason for Consultation:    Unstable angina  Non-ST elevation myocardial infarction  CAD  CABG  Hypertension    Chief complaint:    Chest pain         History of present illness:     Patient is 73 years old white gentleman whose past medical history is significant for hypertension, hyperlipidemia, CAD, history of CABG, history of AAA repair, who came with symptom of chest pain and elevated troponin    In 2016, patient was diagnosed with three-vessel coronary artery disease and underwent CABG x 3.  Patient also had history of AAA repair.    On the day of admission patient complained of chest pain.  Patient is chest pain-free.  Patient is very hard of hearing and history is very difficult to take.  Patient denies any vomiting or vomiting.  Patient denies any syncope or presyncope.  No leg edema noted.        Review of Systems  Review of Systems   Constitutional: Negative for chills and fever.   HENT:  Negative for ear discharge and nosebleeds.    Eyes:  Negative for discharge and redness.   Cardiovascular:  Positive for chest pain. Negative for orthopnea, palpitations, paroxysmal nocturnal dyspnea and syncope.   Respiratory:  Positive for shortness of breath. Negative for cough and wheezing.    Endocrine: Negative for heat intolerance.   Skin:  Negative for rash.   Musculoskeletal:  Negative for arthritis and myalgias.   Gastrointestinal:  Negative for abdominal pain, melena, nausea and vomiting.   Genitourinary:  Negative for dysuria and hematuria.   Neurological:  Negative for dizziness, light-headedness, numbness and tremors.   Psychiatric/Behavioral:  Negative for depression. The patient is not nervous/anxious.        Past Medical History  Past Medical History:   Diagnosis Date    Anxiety     Aortic aneurysm     Coronary artery disease     Dementia 2017    per EASTON Lopez, patient was diagnosed 7 years ago    Emphysema lung     Gastroparesis     per  "Jessica    [place] (hard of hearing)     complete deaf    Hyperlipidemia     Hypertension     Myocardial infarction     and   Past Surgical History:   Procedure Laterality Date    ABDOMINAL AORTIC ANEURYSM REPAIR  03/23/2022    CORONARY ARTERY BYPASS GRAFT  03/13/2016    X3  Dr Hong       Family History  Family History   Problem Relation Age of Onset    Hypertension Other     Heart disease Mother     Heart disease Father        Social History  Social History     Socioeconomic History    Marital status:    Tobacco Use    Smoking status: Every Day     Current packs/day: 0.25     Average packs/day: 0.3 packs/day for 50.0 years (12.5 ttl pk-yrs)     Types: Cigarettes    Smokeless tobacco: Never   Vaping Use    Vaping status: Never Used   Substance and Sexual Activity    Alcohol use: No    Drug use: No    Sexual activity: Defer       Objective     Physical Exam:    Vital Signs  Vitals:    08/24/24 0944 08/24/24 1233 08/24/24 1250 08/24/24 1325   BP: (!) 182/93 152/80 174/84 141/73   BP Location:  Right arm     Patient Position:  Lying     Pulse: 59 57 58 56   Resp:  17 21 15   Temp:  98 °F (36.7 °C)     TempSrc:  Oral     SpO2:  98% 96% 100%   Weight: 68.9 kg (152 lb)      Height: 175.3 cm (69\")          Weight  Flowsheet Rows      Flowsheet Row First Filed Value   Admission Height 175.3 cm (69\") Documented at 08/23/2024 2001   Admission Weight 69 kg (152 lb 1.9 oz) Documented at 08/23/2024 2001             Constitutional:       Appearance: Well-developed.   Eyes:      General: No scleral icterus.        Right eye: No discharge.   HENT:      Head: Normocephalic and atraumatic.   Neck:      Thyroid: No thyromegaly.      Lymphadenopathy: No cervical adenopathy.   Pulmonary:      Effort: Pulmonary effort is normal. No respiratory distress.      Breath sounds: Normal breath sounds. No wheezing. No rales.   Cardiovascular:      Normal rate. Regular rhythm.      No gallop.    Edema:     Peripheral edema absent. "   Abdominal:      Tenderness: There is no abdominal tenderness.   Skin:     Findings: No erythema or rash.   Neurological:      Mental Status: Alert and oriented to person, place, and time.         Results Review:  Lab Results (last 24 hours)       Procedure Component Value Units Date/Time    Basic Metabolic Panel [[id number]]  (Abnormal) Collected: 08/24/24 0606    Specimen: Blood Updated: 08/24/24 1358     Glucose 98 mg/dL      BUN 12 mg/dL      Creatinine 1.03 mg/dL      Sodium 131 mmol/L      Potassium 4.7 mmol/L      Comment: Specimen hemolyzed.  Result may be falsely elevated.        Chloride 96 mmol/L      CO2 24.5 mmol/L      Calcium 8.6 mg/dL      BUN/Creatinine Ratio 11.7     Anion Gap 10.5 mmol/L      eGFR 76.7 mL/min/1.73     Narrative:      GFR Normal >60  Chronic Kidney Disease <60  Kidney Failure <15    The GFR formula is only valid for adults with stable renal function between ages 18 and 70.    aPTT [[id number]]  (Abnormal) Collected: 08/24/24 1019    Specimen: Blood from Arm, Right Updated: 08/24/24 1044     PTT 47.0 seconds     High Sensitivity Troponin T [[id number]]  (Abnormal) Collected: 08/24/24 0606    Specimen: Blood Updated: 08/24/24 0632     HS Troponin T 278 ng/L     Narrative:      High Sensitive Troponin T Reference Range:  <14.0 ng/L- Negative Female for AMI  <22.0 ng/L- Negative Male for AMI  >=14 - Abnormal Female indicating possible myocardial injury.  >=22 - Abnormal Male indicating possible myocardial injury.   Clinicians would have to utilize clinical acumen, EKG, Troponin, and serial changes to determine if it is an Acute Myocardial Infarction or myocardial injury due to an underlying chronic condition.         aPTT [[id number]]  (Normal) Collected: 08/24/24 0315    Specimen: Blood Updated: 08/24/24 0409     PTT 67.1 seconds     Uric Acid [[id number]]  (Normal) Collected: 08/24/24 0318    Specimen: Blood Updated: 08/24/24 0403     Uric Acid 4.2 mg/dL     CBC Auto Differential  [[id number]]  (Abnormal) Collected: 08/24/24 0315    Specimen: Blood Updated: 08/24/24 0332     WBC 7.02 10*3/mm3      RBC 3.58 10*6/mm3      Hemoglobin 11.4 g/dL      Hematocrit 32.9 %      MCV 91.9 fL      MCH 31.8 pg      MCHC 34.7 g/dL      RDW 12.0 %      RDW-SD 40.7 fl      MPV 8.6 fL      Platelets 153 10*3/mm3      Neutrophil % 63.4 %      Lymphocyte % 23.9 %      Monocyte % 11.4 %      Eosinophil % 0.4 %      Basophil % 0.6 %      Immature Grans % 0.3 %      Neutrophils, Absolute 4.45 10*3/mm3      Lymphocytes, Absolute 1.68 10*3/mm3      Monocytes, Absolute 0.80 10*3/mm3      Eosinophils, Absolute 0.03 10*3/mm3      Basophils, Absolute 0.04 10*3/mm3      Immature Grans, Absolute 0.02 10*3/mm3      nRBC 0.0 /100 WBC     High Sensitivity Troponin T 2Hr [[id number]]  (Abnormal) Collected: 08/23/24 2302    Specimen: Blood Updated: 08/23/24 2349     HS Troponin T 234 ng/L      Troponin T Delta 39 ng/L     Narrative:      High Sensitive Troponin T Reference Range:  <14.0 ng/L- Negative Female for AMI  <22.0 ng/L- Negative Male for AMI  >=14 - Abnormal Female indicating possible myocardial injury.  >=22 - Abnormal Male indicating possible myocardial injury.   Clinicians would have to utilize clinical acumen, EKG, Troponin, and serial changes to determine if it is an Acute Myocardial Infarction or myocardial injury due to an underlying chronic condition.         Basic Metabolic Panel [[id number]]  (Abnormal) Collected: 08/23/24 2302    Specimen: Blood Updated: 08/23/24 2344     Glucose 96 mg/dL      BUN 11 mg/dL      Creatinine 1.00 mg/dL      Sodium 132 mmol/L      Potassium 4.1 mmol/L      Chloride 95 mmol/L      CO2 26.6 mmol/L      Calcium 8.7 mg/dL      BUN/Creatinine Ratio 11.0     Anion Gap 10.4 mmol/L      eGFR 79.5 mL/min/1.73     Narrative:      GFR Normal >60  Chronic Kidney Disease <60  Kidney Failure <15    The GFR formula is only valid for adults with stable renal function between ages 18 and 70.     Hemoglobin A1c [[id number]]  (Abnormal) Collected: 08/23/24 2004    Specimen: Blood Updated: 08/23/24 2150     Hemoglobin A1C 5.92 %     Lipid Panel [[id number]]  (Abnormal) Collected: 08/23/24 2004    Specimen: Blood Updated: 08/23/24 2148     Total Cholesterol 235 mg/dL      Triglycerides 147 mg/dL      HDL Cholesterol 46 mg/dL      LDL Cholesterol  162 mg/dL      VLDL Cholesterol 27 mg/dL      LDL/HDL Ratio 3.47    Narrative:      Cholesterol Reference Ranges  (U.S. Department of Health and Human Services ATP III Classifications)    Desirable          <200 mg/dL  Borderline High    200-239 mg/dL  High Risk          >240 mg/dL      Triglyceride Reference Ranges  (U.S. Department of Health and Human Services ATP III Classifications)    Normal           <150 mg/dL  Borderline High  150-199 mg/dL  High             200-499 mg/dL  Very High        >500 mg/dL    HDL Reference Ranges  (U.S. Department of Health and Human Services ATP III Classifications)    Low     <40 mg/dl (major risk factor for CHD)  High    >60 mg/dl ('negative' risk factor for CHD)        LDL Reference Ranges  (U.S. Department of Health and Human Services ATP III Classifications)    Optimal          <100 mg/dL  Near Optimal     100-129 mg/dL  Borderline High  130-159 mg/dL  High             160-189 mg/dL  Very High        >189 mg/dL    Single High Sensitivity Troponin T [[id number]]  (Abnormal) Collected: 08/23/24 2004    Specimen: Blood Updated: 08/23/24 2036     HS Troponin T 195 ng/L     Narrative:      High Sensitive Troponin T Reference Range:  <14.0 ng/L- Negative Female for AMI  <22.0 ng/L- Negative Male for AMI  >=14 - Abnormal Female indicating possible myocardial injury.  >=22 - Abnormal Male indicating possible myocardial injury.   Clinicians would have to utilize clinical acumen, EKG, Troponin, and serial changes to determine if it is an Acute Myocardial Infarction or myocardial injury due to an underlying chronic condition.          Protime-INR [[id number]]  (Abnormal) Collected: 08/23/24 2004    Specimen: Blood Updated: 08/23/24 2036     Protime 11.9 Seconds      INR 1.10    aPTT [[id number]]  (Abnormal) Collected: 08/23/24 2004    Specimen: Blood Updated: 08/23/24 2036     PTT 30.3 seconds     Basic Metabolic Panel [[id number]]  (Abnormal) Collected: 08/23/24 2004    Specimen: Blood Updated: 08/23/24 2035     Glucose 113 mg/dL      BUN 11 mg/dL      Creatinine 1.15 mg/dL      Sodium 132 mmol/L      Potassium 3.9 mmol/L      Comment: Specimen hemolyzed.  Result may be falsely elevated.        Chloride 93 mmol/L      CO2 25.7 mmol/L      Calcium 9.4 mg/dL      BUN/Creatinine Ratio 9.6     Anion Gap 13.3 mmol/L      eGFR 67.2 mL/min/1.73     Narrative:      GFR Normal >60  Chronic Kidney Disease <60  Kidney Failure <15    The GFR formula is only valid for adults with stable renal function between ages 18 and 70.    Extra Tubes [[id number]] Collected: 08/23/24 2004    Specimen: Blood, Venous Line Updated: 08/23/24 2015    Narrative:      The following orders were created for panel order Extra Tubes.  Procedure                               Abnormality         Status                     ---------                               -----------         ------                     Gold Top - SST[[id number]]                                   Final result                 Please view results for these tests on the individual orders.    Gold Top - SST [[id number]] Collected: 08/23/24 2004    Specimen: Blood Updated: 08/23/24 2015     Extra Tube Hold for add-ons.     Comment: Auto resulted.       CBC & Differential [[id number]]  (Abnormal) Collected: 08/23/24 2004    Specimen: Blood Updated: 08/23/24 2011    Narrative:      The following orders were created for panel order CBC & Differential.  Procedure                               Abnormality         Status                     ---------                               -----------         ------                     CBC  Auto Differential[[id number]]        Abnormal            Final result                 Please view results for these tests on the individual orders.    CBC Auto Differential [[id number]]  (Abnormal) Collected: 08/23/24 2004    Specimen: Blood Updated: 08/23/24 2011     WBC 6.09 10*3/mm3      RBC 3.94 10*6/mm3      Hemoglobin 12.5 g/dL      Hematocrit 36.4 %      MCV 92.4 fL      MCH 31.7 pg      MCHC 34.3 g/dL      RDW 12.1 %      RDW-SD 41.1 fl      MPV 8.6 fL      Platelets 179 10*3/mm3      Neutrophil % 55.2 %      Lymphocyte % 31.0 %      Monocyte % 11.8 %      Eosinophil % 1.5 %      Basophil % 0.3 %      Immature Grans % 0.2 %      Neutrophils, Absolute 3.36 10*3/mm3      Lymphocytes, Absolute 1.89 10*3/mm3      Monocytes, Absolute 0.72 10*3/mm3      Eosinophils, Absolute 0.09 10*3/mm3      Basophils, Absolute 0.02 10*3/mm3      Immature Grans, Absolute 0.01 10*3/mm3      nRBC 0.0 /100 WBC           Imaging Results (Last 72 Hours)       Procedure Component Value Units Date/Time    XR Foot 2 View Left [[id number]] Collected: 08/23/24 2254     Updated: 08/23/24 2259    Narrative:      XR FOOT 2 VW LEFT    Date of Exam: 8/23/2024 10:41 PM EDT    Indication: acute pain    Comparison: None available.    Findings:  There is no evidence of fracture or dislocation.  No focal lesions identified. No erosions.    Mild degenerative changes are noted throughout the foot, most significantly involving the first MTP joint..    No focal soft tissue abnormalities identified.      Impression:      Impression:  No acute osseous abnormality. Degenerative changes of the first MTP joint most likely represents osteoarthritis. Less likely, gouty arthritis is also a possibility      Electronically Signed: Vinny Smyth DO    8/23/2024 10:57 PM EDT    Workstation ID: [id number]    XR Chest 1 View [[id number]] Collected: 08/23/24 2016     Updated: 08/23/24 2019    Narrative:      XR CHEST 1 VW    Date of Exam: 8/23/2024 8:07 PM  EDT    Indication: chest pain    Comparison: 1/29/2022    Findings:  Lines: None    Lungs: Mild diffuse interstitial thickening with possible superimposed patchy opacities in the lower lungs. No definite consolidation.  Pleura: No pleural effusion or pneumothorax.    Cardiomediastinum: Postsurgical changes in the mediastinum. Otherwise unremarkable    Soft Tissues: Unremarkable.    Bones: No acute osseous abnormality.      Impression:      Impression:  Mild diffuse interstitial thickening and areas of patchy opacity are nonspecific. This may represent mild pulmonary edema versus atypical/multifocal pneumonia.      Electronically Signed: Vinny Smyth DO    8/23/2024 8:17 PM EDT    Workstation ID: [id number]          ECG 12 Lead Rhythm Change   Preliminary Result   HEART MOEH=973  bpm   RR Ojdgfqgd=533  ms   SD Jnrdxorv=810  ms   P Horizontal Axis=  deg   P Front Axis=85  deg   QRSD Assneets=217  ms   QT Eopeozpt=038  ms   BFkM=358  ms   QRS Axis=-65  deg   T Wave Axis=97  deg   - ABNORMAL ECG -   Sinus tachycardia   Left atrial enlargement   Left bundle branch block   ST elevation secondary to IVCD   When compared with ECG of 23-Aug-2024 19:55:51,   Nonspecific significant change   Date and Time of Study:2024-08-24 03:35:47      ECG 12 Lead Chest Pain   Final Result   HEART RATE=93  bpm   RR Rjqizexx=995  ms   SD Rfdhatmr=157  ms   P Horizontal Axis=-36  deg   P Front Axis=62  deg   QRSD Ccuxoiyi=543  ms   QT Iplheder=570  ms   RWwV=120  ms   QRS Axis=-77  deg   T Wave Axis=97  deg   - ABNORMAL ECG -   Sinus rhythm   LAE, consider biatrial enlargement   Left bundle branch block   ST elevation secondary to IVCD   Electronically Signed By: Iron Harris (Rishabh) 2024-08-24 09:12:06   Date and Time of Study:2024-08-23 19:55:51      Telemetry Scan   Final Result      Telemetry Scan   Final Result      ECG 12 Lead Chest Pain    (Results Pending)     CBC    Results from last 7 days   Lab Units 08/24/24  0315 08/23/24 2004    WBC 10*3/mm3 7.02 6.09   HEMOGLOBIN g/dL 11.4* 12.5*   PLATELETS 10*3/mm3 153 179     BMP   Results from last 7 days   Lab Units 24   SODIUM mmol/L 131* 132* 132*   POTASSIUM mmol/L 4.7 4.1 3.9   CHLORIDE mmol/L 96* 95* 93*   CO2 mmol/L 24.5 26.6 25.7   BUN mg/dL 12 11 11   CREATININE mg/dL 1.03 1.00 1.15   GLUCOSE mg/dL 98 96 113*     CMP   Results from last 7 days   Lab Units 24  0624   SODIUM mmol/L 131* 132* 132*   POTASSIUM mmol/L 4.7 4.1 3.9   CHLORIDE mmol/L 96* 95* 93*   CO2 mmol/L 24.5 26.6 25.7   BUN mg/dL 12 11 11   CREATININE mg/dL 1.03 1.00 1.15   GLUCOSE mg/dL 98 96 113*     Medication Review  Scheduled Meds:atorvastatin, 40 mg, Oral, Nightly  bacitracin, 1 Application, Topical, Q12H  cefTRIAXone, 2,000 mg, Intravenous, Q24H  clopidogrel, 75 mg, Oral, Daily  hydrALAZINE, 25 mg, Oral, Q12H  hydrOXYzine, 25 mg, Oral, 4x Daily  isosorbide mononitrate, 30 mg, Oral, Q24H  metoprolol tartrate, 25 mg, Oral, Q12H  ranolazine, 500 mg, Oral, Q12H  sodium chloride, 10 mL, Intravenous, Q12H      Continuous Infusions:heparin, 12 Units/kg/hr, Last Rate: Stopped (24 1232)      PRN Meds:.  acetaminophen    aluminum-magnesium hydroxide-simethicone    atropine    senna-docusate sodium **AND** polyethylene glycol **AND** bisacodyl **AND** bisacodyl    diphenhydrAMINE    nitroglycerin    ondansetron    ondansetron ODT **OR** ondansetron    [COMPLETED] Insert Peripheral IV **AND** sodium chloride    sodium chloride    sodium chloride    sodium chloride    Assessment:      NSTEMI (non-ST elevated myocardial infarction)    S/P CABG x 3    Coronary artery disease    Hyperlipidemia    Smoking greater than 30 pack years    Tobacco use        Plan:    MDM:    1.  Chest pain:    Patient was admitted with chest pain.  Patient ruled in for myocardial infarction I would start Imdur and beta-blocker.  Ranexa would be added.    2.  CAD/CAB/2  bypasses are patent.  However patient has diffuse disease of small vessel could be because of his angina and myocardial infarction.  They are not amenable to percutaneous intervention.  Recommend medical treatment.    I would recommend to start aspirin and Plavix.  Ranexa would be added.  Patient is started on Imdur and beta-blocker    3.  Hypertension:    Blood pressure is controlled    I discussed the patient's findings and my recommendations with patient and family.    Electronically signed by Omer Vieyra MD, 08/24/24, 2:03 PM EDT.      Omer Vieyra MD  08/24/24  13:58 EDT

## 2024-08-24 NOTE — CONSULTS
Name: Alexei Merino III ADMIT: 2024   : 1950  PCP: Raheem Mckeon MD    MRN: [id number] LOS: 1 days   AGE/SEX: 73 y.o. male  ROOM:      Inpatient Vascular Surgery Consult  Consult performed by: Mitali Saavedra MD  Consult ordered by: Omer Vieyra MD Ashlee Ann Vinyard, MD     LOS: 1 day   Patient Care Team:  Raheem Mckeon MD as PCP - General (Internal Medicine)  Don Thakkar RN as Ambulatory  ([facility])    Subjective     Chief complaint: PAD, AAA    History of Present Illness  73 y.o. male with coronary artery disease, emphysema, gastroparesis, hypertension, hyperlipidemia, and deafness who was admitted to [facility] [place] overnight with chest pain and an NSTEMI.  He underwent a cardiac catheterization earlier today by Dr. Vieyra via a right femoral approach that showed severe disease of the native coronary arteries but patency of all 3 coronary artery bypass grafts.  There was a significant plaque of the distal common femoral artery at the origin of the superficial femoral artery on today's cath images.  We were consulted for evaluation of peripheral arterial disease.  He does have superficial ulcerations of the left foot.  He complains of foot pain bilaterally that is worse with walking.  He also has a history of abdominal aortic aneurysm and bilateral common iliac artery aneurysms that have not been repaired.  He is deaf and is not able to read lips.  Obtaining history from him was quite difficult but there was no family at bedside to provide supplemental history.      Review of Systems   All other systems reviewed and are negative.      Past Medical History:   Diagnosis Date    Anxiety     Aortic aneurysm     Coronary artery disease     Dementia 2017    per EASTON Lopez, patient was diagnosed 7 years ago    Emphysema lung     Gastroparesis     per Jessica ESTRADA (hard of hearing)     complete deaf    Hyperlipidemia     Hypertension     Myocardial  infarction        Past Surgical History:   Procedure Laterality Date    ABDOMINAL AORTIC ANEURYSM REPAIR  03/23/2022    CORONARY ARTERY BYPASS GRAFT  03/13/2016    X3  Dr Hong       Family History   Problem Relation Age of Onset    Hypertension Other     Heart disease Mother     Heart disease Father          Social History     Tobacco Use    Smoking status: Every Day     Current packs/day: 0.25     Average packs/day: 0.3 packs/day for 50.0 years (12.5 ttl pk-yrs)     Types: Cigarettes    Smokeless tobacco: Never   Vaping Use    Vaping status: Never Used   Substance Use Topics    Alcohol use: No    Drug use: No       Allergies: Morphine    Medications Prior to Admission   Medication Sig Dispense Refill Last Dose    aspirin 81 MG EC tablet Take 1 tablet by mouth Daily.   8/23/2024    ferrous gluconate 324 (37.5 Fe) MG tablet tablet Take 1 tablet by mouth Daily As Needed.   8/23/2024    hydrOXYzine (ATARAX) 25 MG tablet TAKE ONE TABLET BY MOUTH EVERY 6 HOURS AS NEEDED FOR ANXIETY (Patient taking differently: Take 1 tablet by mouth 4 (Four) Times a Day. 4 x daily  (not prn)    Per significant other) 60 tablet 3 8/23/2024 at 1330    ibuprofen (ADVIL,MOTRIN) 200 MG tablet Take 2 tablets by mouth Every 6 (Six) Hours As Needed for Mild Pain.   8/23/2024     [START ON 8/25/2024] aspirin, 81 mg, Oral, Daily  atorvastatin, 40 mg, Oral, Nightly  bacitracin, 1 Application, Topical, Q12H  cefTRIAXone, 2,000 mg, Intravenous, Q24H  clopidogrel, 75 mg, Oral, Daily  hydrALAZINE, 25 mg, Oral, Q12H  hydrOXYzine, 25 mg, Oral, 4x Daily  isosorbide mononitrate, 30 mg, Oral, Q24H  metoprolol tartrate, 25 mg, Oral, Q12H  ranolazine, 500 mg, Oral, Q12H  sodium chloride, 10 mL, Intravenous, Q12H  tamsulosin, 0.4 mg, Oral, Nightly           acetaminophen    aluminum-magnesium hydroxide-simethicone    atropine    senna-docusate sodium **AND** polyethylene glycol **AND** bisacodyl **AND** bisacodyl    diphenhydrAMINE    hydrALAZINE     nitroglycerin    ondansetron ODT **OR** ondansetron    [COMPLETED] Insert Peripheral IV **AND** sodium chloride    sodium chloride    sodium chloride    sodium chloride      Objective   Temp:  [97.6 °F (36.4 °C)-98.7 °F (37.1 °C)] 98.5 °F (36.9 °C)  Heart Rate:  [] 57  Resp:  [14-27] 14  BP: (141-200)/() 157/78    I/O this shift:  In: -   Out: 300 [Urine:300]    Physical Exam  Vitals reviewed.   Constitutional:       General: He is not in acute distress.     Appearance: Normal appearance.   HENT:      Head: Normocephalic and atraumatic.      Right Ear: External ear normal.      Left Ear: External ear normal.      Nose: Nose normal.      Mouth/Throat:      Mouth: Mucous membranes are moist.      Pharynx: Oropharynx is clear.   Eyes:      Extraocular Movements: Extraocular movements intact.      Conjunctiva/sclera: Conjunctivae normal.      Pupils: Pupils are equal, round, and reactive to light.   Cardiovascular:      Rate and Rhythm: Normal rate and regular rhythm.      Pulses:           Carotid pulses are 2+ on the right side and 2+ on the left side.       Radial pulses are 2+ on the right side and 2+ on the left side.        Femoral pulses are 2+ on the right side and 2+ on the left side.       Dorsalis pedis pulses are detected w/ Doppler on the right side and detected w/ Doppler on the left side.        Posterior tibial pulses are detected w/ Doppler on the left side.      Comments: Multiple superficial ulcerations of the left toes, no ulcerations of the right foot  Pulmonary:      Comments: Non labored respirations on room air, equal chest rise  Abdominal:      General: Abdomen is flat. There is no distension.      Palpations: Abdomen is soft.   Musculoskeletal:      Cervical back: Normal range of motion. No rigidity.      Right lower leg: No edema.      Left lower leg: No edema.   Skin:     General: Skin is warm and dry.      Capillary Refill: Capillary refill takes less than 2 seconds.    Neurological:      General: No focal deficit present.      Mental Status: He is alert and oriented to person, place, and time.   Psychiatric:         Mood and Affect: Mood normal.         Behavior: Behavior normal.         Results from last 7 days   Lab Units 08/24/24  0315 08/23/24 2004   WBC 10*3/mm3 7.02 6.09   HEMOGLOBIN g/dL 11.4* 12.5*   PLATELETS 10*3/mm3 153 179     Results from last 7 days   Lab Units 08/24/24  0606 08/23/24  2302 08/23/24 2004   SODIUM mmol/L 131* 132* 132*   POTASSIUM mmol/L 4.7 4.1 3.9   CHLORIDE mmol/L 96* 95* 93*   CO2 mmol/L 24.5 26.6 25.7   BUN mg/dL 12 11 11   CREATININE mg/dL 1.03 1.00 1.15   GLUCOSE mg/dL 98 96 113*   Estimated Creatinine Clearance: 62.2 mL/min (by C-G formula based on SCr of 1.03 mg/dL).  Results from last 7 days   Lab Units 08/24/24  1019 08/23/24 2004   PROTIME Seconds 12.4* 11.9*   INR  1.15* 1.10       Imaging Studies:  CT Angiogram Abdomen Pelvis [[id number]] (Order [id number])  Order  Status: Final result     Patient Location    Patient Class Location   Inpatient [facility], [address], [address]     [phone]     Study Notes     Fanny Amador on 1/24/2022  2:09 PM EST   ISOVUE 370  100CC  CR 1.05 eGFR 70   Pastora Sheppard on 1/24/2022  1:48 PM EST   : Abdominal aortic aneurysm (AAA) without rupture without repair  No surgeries or cancer  No complaints today     Appointment Information    Display Notes    v-jason                  PACS Images     Radiology Images      Study Result    Narrative & Impression      DATE OF EXAM:  1/24/2022 1:46 PM     PROCEDURE:  CT ANGIOGRAM ABDOMEN PELVIS-     INDICATIONS:   I71.4; I71.4-Abdominal aortic aneurysm, without rupture     COMPARISON:   05/24/2021     TECHNIQUE:  Routine transaxial slices were obtained through the abdomen without the  administration of intravenous contrast. Additional scanning of the  abdomen and pelvis followed by the intravenous administration of 100 mL  Isovue 370. Reconstructed coronal and  sagittal images were also  obtained. In addition, a 3-D volume rendered image was obtained after  postprocessing. Automated exposure control and iterative reconstruction  methods were used.     FINDINGS:  Vascular:     There has been slight further increase in size of the balloon-shaped  aneurysm of the distal abdominal aorta, now measuring 5.7 cm maximally  (previously 5.5 cm maximally). Both common iliac arteries are again  noted to be aneurysmal, measuring 1.7 cm on the right and 2.2 cm on the  left. There is widespread calcified plaquing throughout the aneurysm  neck, sac and iliac arteries. The external iliac arteries are ectatic  but nonaneurysmal. There is calcific disease involving the internal  iliac arteries origins bilaterally, right greater than left. There is  leftward angulation of the aneurysm neck. There is minimal thrombus  within the aneurysm sac and aorta. The left renal artery origin is  aneurysmal, measuring 1.2 cm. There is moderate calcific disease  involving the origin of the celiac trunk and mild calcific disease  involving the origin of the SMA. The HANNA is patent and arises from the  nonaneurysmal portion of the aorta. No significant renal artery stenosis  is identified. Extensive coronary artery calcification is partially  imaged. Mild cardiomegaly is present.     Nonvascular:     Visualized lung bases are clear. There are calcified granulomatous  changes in the spleen. The pancreas, adrenals, liver, have a grossly  normal appearance. The kidneys are unremarkable. There are layering  stones within gallbladder. There is no evidence of bowel obstruction,  free air or free fluid. Right colonic and rectal fecal retention are  present suggesting constipation. The appendix has a normal caliber.  There is a small appendicolith present. There is a small fat-containing  umbilical hernia. No gross adenopathy is identified. No aggressive  osseous lesions are seen. There is degenerative change in the  spine, SI  joints and hips.     IMPRESSION:     1. Interval slight further increase in size of infrarenal abdominal  aortic aneurysm, now measuring 5.7 cm (previously 5.5 cm). Please see  above discussion.  2. Bilateral common iliac artery aneurysms, measuring 1.7 cm on the  right and 2.2 cm on the left.  3. Additional vascular and nonvascular findings as given above.     Electronically Signed By-Marco Morales MD On:1/25/2022 10:18 AM  This report was finalized on [id number] by  Marco Morales MD.             Data Points:  During this visit the following were done:  Labs Reviewed [x]    Labs Ordered []    Radiology Reports Reviewed []    Radiology Images Reviewed [x]    Radiology Ordered [x]    EKG, echo, and/or stress test reviewed [x]    Vascular lab results reviewed  [x]    Vascular lab images reviewed and interpreted per myself   []    Referring Provider Records Reviewed []    ER Records Reviewed []    Hospital Records Reviewed/Summarized [x]    History Obtained From Family []    Radiological images view and Interpreted per myself [x]    Case Discussed with referring provider []     Decision to obtain and request outside records  []    Total data points reviewed: 7      Active Hospital Problems    Diagnosis  POA    **NSTEMI (non-ST elevated myocardial infarction) [I21.4]  Yes    Hyperlipidemia [E78.5]  Unknown    Smoking greater than 30 pack years [F17.210]  Unknown    S/P CABG x 3 [Z95.1]  Not Applicable    Coronary artery disease [I25.10]  Unknown    Tobacco use [Z72.0]  Unknown      Resolved Hospital Problems   No resolved problems to display.         Assessment & Plan       NSTEMI (non-ST elevated myocardial infarction)    S/P CABG x 3    Coronary artery disease    Hyperlipidemia    Smoking greater than 30 pack years    Tobacco use        73 y.o. male who was admitted for an NSTEMI with multiple medical comorbidities who has significant peripheral arterial disease and infrarenal abdominal aortic aneurysm  that measured 5.7 cm in 2022.    -I personally and independently reviewed his last CT angiogram of the abdomen and pelvis from January 24, 2022.  There is a large, 5.7 cm fusiform infrarenal abdominal aortic aneurysm present.  There are single renal arteries bilaterally.  The inferior mesenteric artery is patent.  Bilateral common iliac arteries are aneurysmal, the left common iliac artery is 2.2 cm and the right common iliac artery is 1.8 cm.  Bilateral hypogastric arteries are patent.  There is calcified plaque of bilateral common femoral arteries with at least moderate stenosis of bilateral common femoral arteries.  On the right, the calcification involves the origin of the superficial femoral and profundofemoral arteries.    -I will start by obtaining ABIs bilaterally and a CT angiogram with runoff.  I will obtain a CTA tomorrow since he already had a load of contrast today.  His kidney function is normal, his creatinine was 1.03 mg/dL and GFR was 77 mL/min this morning    I discussed the patients findings and my recommendations with patient.  Please call my office with any question: [phone]    Mitali Saavedra MD  08/24/24  18:55 EDT

## 2024-08-24 NOTE — SIGNIFICANT NOTE
Nursing confirms decline in medical stability and requesting PT hold. Pt with rapid response early this AM secondary to severe tachycardia and HTN. Heparin initiated in PM on 8/23/24. Will follow-up next treatment date as able/appropriate.

## 2024-08-24 NOTE — ED PROVIDER NOTES
"Subjective   History of Present Illness  Chief complaint: Chest pain     73-year-old male with a history of coronary artery disease presents with chest pain.  Patient states symptoms have been intermittent over the past few days.  Pain is in the center of his chest without radiation.  He denies any shortness of breath.  He denies any alleviating or exacerbating factors.  He denies any chest pain currently.  He was given aspirin by EMS.    History provided by:  Patient      Review of Systems   Constitutional:  Negative for fever.   HENT:  Negative for congestion.    Respiratory:  Negative for cough and shortness of breath.    Cardiovascular:  Positive for chest pain.   Gastrointestinal:  Negative for abdominal pain, diarrhea and vomiting.   Musculoskeletal:  Negative for back pain.   Neurological:  Negative for headaches.   Psychiatric/Behavioral:  Negative for confusion.        Past Medical History:   Diagnosis Date    Anxiety     Aortic aneurysm     Coronary artery disease     Emphysema lung     Ruby (hard of hearing)     Hyperlipidemia     Hypertension     Myocardial infarction        Allergies   Allergen Reactions    Morphine Anaphylaxis       Past Surgical History:   Procedure Laterality Date    ABDOMINAL AORTIC ANEURYSM REPAIR  03/23/2022    CORONARY ARTERY BYPASS GRAFT  03/13/2016    X3  Dr Hong       Family History   Problem Relation Age of Onset    Hypertension Other     Heart disease Mother     Heart disease Father        Social History     Socioeconomic History    Marital status:    Tobacco Use    Smoking status: Every Day     Current packs/day: 2.00     Average packs/day: 2.0 packs/day for 50.0 years (100.0 ttl pk-yrs)     Types: Cigarettes    Smokeless tobacco: Never   Vaping Use    Vaping status: Never Used   Substance and Sexual Activity    Alcohol use: No    Drug use: No    Sexual activity: Defer       /83   Pulse 70   Temp 98.7 °F (37.1 °C) (Oral)   Resp 18   Ht 175.3 cm (69\")   Wt " 69 kg (152 lb 1.9 oz)   SpO2 94%   BMI 22.46 kg/m²       Objective   Physical Exam  Vitals and nursing note reviewed.   Constitutional:       Appearance: He is well-developed.   HENT:      Head: Normocephalic and atraumatic.   Cardiovascular:      Rate and Rhythm: Normal rate and regular rhythm.      Heart sounds: Normal heart sounds.   Pulmonary:      Effort: Pulmonary effort is normal. No respiratory distress.      Breath sounds: Normal breath sounds.   Abdominal:      General: Bowel sounds are normal.      Palpations: Abdomen is soft.      Tenderness: There is no abdominal tenderness.   Musculoskeletal:      Right lower leg: No tenderness. No edema.      Left lower leg: No tenderness. No edema.   Skin:     General: Skin is warm and dry.   Neurological:      Mental Status: He is alert and oriented to person, place, and time.         Procedures           ED Course      My interpretation of EKG shows sinus rhythm, rate of 93, left bundle branch block which is new compared to an old EKG from 2016          HEART Score: 8                  Results for orders placed or performed during the hospital encounter of 08/23/24   Basic Metabolic Panel    Specimen: Blood   Result Value Ref Range    Glucose 113 (H) 65 - 99 mg/dL    BUN 11 8 - 23 mg/dL    Creatinine 1.15 0.76 - 1.27 mg/dL    Sodium 132 (L) 136 - 145 mmol/L    Potassium 3.9 3.5 - 5.2 mmol/L    Chloride 93 (L) 98 - 107 mmol/L    CO2 25.7 22.0 - 29.0 mmol/L    Calcium 9.4 8.6 - 10.5 mg/dL    BUN/Creatinine Ratio 9.6 7.0 - 25.0    Anion Gap 13.3 5.0 - 15.0 mmol/L    eGFR 67.2 >60.0 mL/min/1.73   Protime-INR    Specimen: Blood   Result Value Ref Range    Protime 11.9 (H) 9.6 - 11.7 Seconds    INR 1.10 0.93 - 1.10   aPTT    Specimen: Blood   Result Value Ref Range    PTT 30.3 (L) 61.0 - 76.5 seconds   Single High Sensitivity Troponin T    Specimen: Blood   Result Value Ref Range    HS Troponin T 195 (C) <22 ng/L   CBC Auto Differential    Specimen: Blood   Result Value  Ref Range    WBC 6.09 3.40 - 10.80 10*3/mm3    RBC 3.94 (L) 4.14 - 5.80 10*6/mm3    Hemoglobin 12.5 (L) 13.0 - 17.7 g/dL    Hematocrit 36.4 (L) 37.5 - 51.0 %    MCV 92.4 79.0 - 97.0 fL    MCH 31.7 26.6 - 33.0 pg    MCHC 34.3 31.5 - 35.7 g/dL    RDW 12.1 (L) 12.3 - 15.4 %    RDW-SD 41.1 37.0 - 54.0 fl    MPV 8.6 6.0 - 12.0 fL    Platelets 179 140 - 450 10*3/mm3    Neutrophil % 55.2 42.7 - 76.0 %    Lymphocyte % 31.0 19.6 - 45.3 %    Monocyte % 11.8 5.0 - 12.0 %    Eosinophil % 1.5 0.3 - 6.2 %    Basophil % 0.3 0.0 - 1.5 %    Immature Grans % 0.2 0.0 - 0.5 %    Neutrophils, Absolute 3.36 1.70 - 7.00 10*3/mm3    Lymphocytes, Absolute 1.89 0.70 - 3.10 10*3/mm3    Monocytes, Absolute 0.72 0.10 - 0.90 10*3/mm3    Eosinophils, Absolute 0.09 0.00 - 0.40 10*3/mm3    Basophils, Absolute 0.02 0.00 - 0.20 10*3/mm3    Immature Grans, Absolute 0.01 0.00 - 0.05 10*3/mm3    nRBC 0.0 0.0 - 0.2 /100 WBC   ECG 12 Lead Chest Pain   Result Value Ref Range    QT Interval 430 ms    QTC Interval 534 ms   Gold Top - SST   Result Value Ref Range    Extra Tube Hold for add-ons.      XR Chest 1 View    Result Date: 8/23/2024  Impression: Mild diffuse interstitial thickening and areas of patchy opacity are nonspecific. This may represent mild pulmonary edema versus atypical/multifocal pneumonia. Electronically Signed: Vinny Smyth DO  8/23/2024 8:17 PM EDT  Workstation ID: [id number]               Medical Decision Making  Amount and/or Complexity of Data Reviewed  Labs: ordered.  Radiology: ordered.  ECG/medicine tests: ordered.    Risk  Prescription drug management.      Patient had the above evaluation.  Results were discussed with the patient.  White blood cell count is normal.  BMP is unremarkable.  EKG shows a new left bundle branch block compared to EKG in 2016.  Troponin is elevated at 195.  Patient is having no chest pain at this time.  Chest x-ray shows possible mild pulmonary edema.  Patient is oxygenating well on room air.  He  was given aspirin prior to arrival.  He was started on heparin in the emergency room.  I discussed with the cardiologist on-call who will consult on the patient.  I also discussed with the hospitalist and the patient will be admitted for further evaluation and management.      Final diagnoses:   NSTEMI (non-ST elevated myocardial infarction)       ED Disposition  ED Disposition       ED Disposition   Decision to Admit    Condition   --    Comment   Level of Care: Telemetry [5]   Admitting Physician: LLANES ALVAREZ, CARLOS [[id number]]   Attending Physician: LLANES ALVAREZ, CARLOS [[id number]]   Bed Request Comments: CINTIA                 No follow-up provider specified.       Medication List      No changes were made to your prescriptions during this visit.            Iron Harris MD  08/23/24 5007

## 2024-08-24 NOTE — H&P
[facility] Medicine [facility]    Hospitalist History and Physical     Alexei Merino III : 1950 MRN:[id number] LOS:1 ROOM:      Reason for admission: NSTEMI (non-ST elevated myocardial infarction)     Assessment / Plan     Chest pain  NSTEMI  Hx CAD   - HS troponin 195 and 234  - EKG no ST elevation but new LBBB  - ASA and Nitro provided to pt by EMS per report   - Cardiology notified by ED provider and pt started on heparin drip  - Chest pain resolved  - CXR: Mild diffuse interstitial thickening and areas of patchy opacity are nonspecific. This may represent mild pulmonary edema versus atypical/multifocal pneumonia.   - placed on empiric rocephin    - NPO after midnight for possible cardiac cath in AM  - follow cardiology recommendations    Left foot pain  Suspect 2/2 to possible developing cellulitis, ruptured blister on bottom of foot   - will start rocephin and monitor  - bacitracin to open wound   - analgesics prn  - X-ray: No acute osseous abnormality. Degenerative changes of the first MTP joint most likely represents osteoarthritis. Less likely, gouty arthritis is also a possibility   - will check a uric acid     Anxiety  - atarax prn    HTN  HLD  - not on medication     Emphysema   - not on home O2  - wean oxygen as tolerated     Very [place]       Nutrition:   NPO Diet NPO Type: Strict NPO     VTE Prophylaxis:  Pharmacologic VTE prophylaxis orders are present.         History of Present illness     Alexei Merino III is a 73 y.o. male with PMH of coronary artery disease, hypertension, hyperlipidemia, emphysema, very hard of hearing, anxiety presented to the hospital on 2024, and was admitted with a principal diagnosis of NSTEMI (non-ST elevated myocardial infarction).     Patient history is difficult to obtain as family not at bedside and patient is very hard of hearing.  Patient also in significant pain in his left foot.  Patient had come to the ED via EMS with chest pain that have  been intermittent over the past few days.  Patient reports initially he felt was due to anxiety and had taken his nerve pill and symptoms had improved.  He was given aspirin and possibly nitro by EMS.    On arrival to the ED initial troponin 195.  EKG showed new left bundle branch block.  Cardiology was notified and patient was placed on heparin.  On my exam patient declined chest pain but in significant pain in the left foot.  Noted patient had what appears to be a ruptured blister on the ball of the left foot with surrounding erythema.  X-ray was negative for any fracture but gouty arthritis could be in the differential.  Will check a uric acid and started on empiric Rocephin for possible cellulitis.  Chest x-ray was also showing signs of diffuse thickening of patchy opacity that are nonspecific possibly mild pulmonary edema versus multifocal pneumonia.  Patient denied any shortness of breath.  Was placed on O2 in the ED.  Cardiology has been consulted and he has been admitted for further treatment    Patient was seen and examined on 08/23/24 at 01:12 EDT .    Subjective / Review of systems     Review of Systems   Constitutional:  Positive for activity change.   HENT:  Positive for hearing loss.    Cardiovascular:  Positive for chest pain.   Musculoskeletal:         Left foot pain   All other systems reviewed and are negative.         Past Medical/Surgical/Social/Family History & Allergies     Past Medical History:   Diagnosis Date    Anxiety     Aortic aneurysm     Coronary artery disease     Emphysema lung     [place] (hard of hearing)     complete deaf    Hyperlipidemia     Hypertension     Myocardial infarction       Past Surgical History:   Procedure Laterality Date    ABDOMINAL AORTIC ANEURYSM REPAIR  03/23/2022    CORONARY ARTERY BYPASS GRAFT  03/13/2016    X3  Dr Hong      Social History     Socioeconomic History    Marital status:    Tobacco Use    Smoking status: Every Day     Current packs/day:  0.25     Average packs/day: 0.3 packs/day for 50.0 years (12.5 ttl pk-yrs)     Types: Cigarettes    Smokeless tobacco: Never   Vaping Use    Vaping status: Never Used   Substance and Sexual Activity    Alcohol use: No    Drug use: No    Sexual activity: Defer      Family History   Problem Relation Age of Onset    Hypertension Other     Heart disease Mother     Heart disease Father       Allergies   Allergen Reactions    Morphine Anaphylaxis      Social Determinants of Health     Tobacco Use: High Risk (8/24/2024)    Patient History     Smoking Tobacco Use: Every Day     Smokeless Tobacco Use: Never     Passive Exposure: Not on file   Alcohol Use: Not At Risk (8/24/2024)    AUDIT-C     Frequency of Alcohol Consumption: Never     Average Number of Drinks: Patient does not drink     Frequency of Binge Drinking: Never   Financial Resource Strain: Not on file   Food Insecurity: Not on file   Transportation Needs: Not on file   Physical Activity: Not on file   Stress: Not on file   Social Connections: Unknown (10/11/2023)    Family and Community Support     Help with Day-to-Day Activities: Not on file     Lonely or Isolated: Not on file   Interpersonal Safety: Not At Risk (8/23/2024)    Abuse Screen     Unsafe at Home or Work/School: no     Feels Threatened by Someone?: no     Does Anyone Keep You from Contacting Others or Doint Things Outside the Home?: no     Physical Sign of Abuse Present: no   Depression: Not at risk (3/14/2023)    PHQ-2     PHQ-2 Score: 0   Housing Stability: Unknown (8/24/2024)    Housing Stability     Current Living Arrangements: home     Potentially Unsafe Housing Conditions: Not on file   Utilities: Not on file   Health Literacy: Unknown (10/11/2023)    Education     Help with school or training?: Not on file     Preferred Language: Not on file   Employment: Unknown (10/11/2023)    Employment     Do you want help finding or keeping work or a job?: Not on file   Disabilities: At Risk (8/24/2024)     Disabilities     Concentrating, Remembering, or Making Decisions Difficulty: yes     Doing Errands Independently Difficulty: yes        Home Medications     Prior to Admission medications    Medication Sig Start Date End Date Taking? Authorizing Provider   aspirin 81 MG EC tablet Take 1 tablet by mouth Daily.   Yes Dave Fields MD   ferrous gluconate 324 (37.5 Fe) MG tablet tablet Take 1 tablet by mouth Daily As Needed.   Yes Dave Fields MD   hydrOXYzine (ATARAX) 25 MG tablet TAKE ONE TABLET BY MOUTH EVERY 6 HOURS AS NEEDED FOR ANXIETY 7/16/24  Yes Raheem Mckeon MD   ibuprofen (ADVIL,MOTRIN) 200 MG tablet Take 2 tablets by mouth Every 6 (Six) Hours As Needed for Mild Pain.   Yes ProviderDave MD   artificial tears (REFRESH LACRI-LUBE) ointment ophthalmic ointment Administer  to the right eye Every 1 (One) Hour. 8/25/23 8/23/24  Primo Vazquez DO   Cholecalciferol 125 MCG (5000 UT) tablet Take 1 tablet by mouth Daily. 4/7/23 8/23/24  Raheem Mckeon MD   folic acid (FOLVITE) 1 MG tablet Take 1 tablet by mouth Daily. 1/17/22 8/23/24  Georgiana Jefferson MD   ondansetron (ZOFRAN) 4 MG tablet TAKE 1 TABLET BY MOUTH EVERY 8 HOURS AS NEEDED FOR NAUSEA AND/OR VOMITING 6/18/24 8/23/24  Raheem Mckeon MD   pravastatin (Pravachol) 20 MG tablet Take 1 tablet by mouth Daily. 4/7/23 8/23/24  Raheem Mckeon MD   predniSONE (DELTASONE) 20 MG tablet Take 3 tablets by mouth Daily. 8/25/23 8/23/24  Primo Vazquez DO   valACYclovir (VALTREX) 1000 MG tablet Take 1 tablet by mouth 3 (Three) Times a Day. 8/25/23 8/23/24  Primo Vazquez DO        Objective / Physical Exam     Vital signs:  Temp: 98.7 °F (37.1 °C)  BP: 168/85  Heart Rate: 68  Resp: 18  SpO2: 98 %  Weight: 69 kg (152 lb 1.9 oz)    Admission Weight: Weight: 69 kg (152 lb 1.9 oz)    Physical Exam  Constitutional:       Comments: Very hard of hearing  In significant discomfort due to left  "foot pain   Eyes:      Pupils: Pupils are equal, round, and reactive to light.   Cardiovascular:      Rate and Rhythm: Normal rate and regular rhythm.   Pulmonary:      Breath sounds: Normal breath sounds.   Abdominal:      Palpations: Abdomen is soft.   Skin:     Comments: Ruptured blister bottom of left foot with surrounding erythema    Neurological:      Mental Status: He is alert and oriented to person, place, and time.            Labs     Results from last 7 days   Lab Units 08/23/24 2004   WBC 10*3/mm3 6.09   HEMOGLOBIN g/dL 12.5*   HEMATOCRIT % 36.4*   PLATELETS 10*3/mm3 179            Invalid input(s): \"BILI TOTAL\"   Results from last 7 days   Lab Units 08/23/24 2004   PROTIME Seconds 11.9*   INR  1.10   APTT seconds 30.3*      Results from last 7 days   Lab Units 08/23/24  2302 08/23/24 2004   SODIUM mmol/L 132* 132*   POTASSIUM mmol/L 4.1 3.9   CHLORIDE mmol/L 95* 93*   CO2 mmol/L 26.6 25.7   BUN mg/dL 11 11   CREATININE mg/dL 1.00 1.15   GLUCOSE mg/dL 96 113*        Imaging     XR Foot 2 View Left    Result Date: 8/23/2024  XR FOOT 2 VW LEFT Date of Exam: 8/23/2024 10:41 PM EDT Indication: acute pain Comparison: None available. Findings: There is no evidence of fracture or dislocation. No focal lesions identified. No erosions. Mild degenerative changes are noted throughout the foot, most significantly involving the first MTP joint.. No focal soft tissue abnormalities identified.     Impression: No acute osseous abnormality. Degenerative changes of the first MTP joint most likely represents osteoarthritis. Less likely, gouty arthritis is also a possibility Electronically Signed: Vinny Smyth DO  8/23/2024 10:57 PM EDT  Workstation ID: [id number]    XR Chest 1 View    Result Date: 8/23/2024  XR CHEST 1 VW Date of Exam: 8/23/2024 8:07 PM EDT Indication: chest pain Comparison: 1/29/2022 Findings: Lines: None Lungs: Mild diffuse interstitial thickening with possible superimposed patchy opacities in the " lower lungs. No definite consolidation. Pleura: No pleural effusion or pneumothorax. Cardiomediastinum: Postsurgical changes in the mediastinum. Otherwise unremarkable Soft Tissues: Unremarkable. Bones: No acute osseous abnormality.     Impression: Mild diffuse interstitial thickening and areas of patchy opacity are nonspecific. This may represent mild pulmonary edema versus atypical/multifocal pneumonia. Electronically Signed: Vinny Smyth DO  8/23/2024 8:17 PM EDT  Workstation ID: [id number]      ECG 12 Lead Chest Pain   Preliminary Result   HEART RATE=93  bpm   RR Dsniglju=545  ms   PA Qkmuwehh=194  ms   P Horizontal Axis=-36  deg   P Front Axis=62  deg   QRSD Umzjervl=387  ms   QT Xnkliezw=976  ms   LZoR=842  ms   QRS Axis=-77  deg   T Wave Axis=97  deg   - ABNORMAL ECG -   Sinus rhythm   LAE, consider biatrial enlargement   Left bundle branch block   ST elevation secondary to IVCD   Date and Time of Study:2024-08-23 19:55:51      Telemetry Scan   Final Result      Telemetry Scan   Final Result         Current Medications     Scheduled Meds:  bacitracin, 1 Application, Topical, Q12H  cefTRIAXone, 2,000 mg, Intravenous, Q24H  hydrOXYzine, 25 mg, Oral, 4x Daily  sodium chloride, 10 mL, Intravenous, Q12H         Continuous Infusions:  heparin, 12 Units/kg/hr, Last Rate: 12 Units/kg/hr (08/23/24 2391)           Georgina Cooper [facility]  08/24/24   01:12 EDT

## 2024-08-25 ENCOUNTER — APPOINTMENT (OUTPATIENT)
Dept: CT IMAGING | Facility: HOSPITAL | Age: 74
DRG: 280 | End: 2024-08-25
Payer: MEDICARE

## 2024-08-25 ENCOUNTER — APPOINTMENT (OUTPATIENT)
Dept: CARDIOLOGY | Facility: HOSPITAL | Age: 74
DRG: 280 | End: 2024-08-25
Payer: MEDICARE

## 2024-08-25 LAB
ANION GAP SERPL CALCULATED.3IONS-SCNC: 10 MMOL/L (ref 5–15)
BH CV LOWER ARTERIAL LEFT 2ND DIGIT SYS MAX: 106
BH CV LOWER ARTERIAL LEFT 3RD DIGIT SYS MAX: 71
BH CV LOWER ARTERIAL LEFT 4TH DIGIT SYS MAX: 107
BH CV LOWER ARTERIAL LEFT 5TH DIGIT SYS MAX: 72
BH CV LOWER ARTERIAL LEFT ABI RATIO: 1.12
BH CV LOWER ARTERIAL LEFT CALF RATIO: 1.17
BH CV LOWER ARTERIAL LEFT DORSALIS PEDIS SYS MAX: 155
BH CV LOWER ARTERIAL LEFT GREAT TOE SYS MAX: 137
BH CV LOWER ARTERIAL LEFT LOW THIGH RATIO: 1.55
BH CV LOWER ARTERIAL LEFT LOW THIGH SYS MAX: 215
BH CV LOWER ARTERIAL LEFT POPLITEAL SYS MAX: 163
BH CV LOWER ARTERIAL LEFT POST TIBIAL SYS MAX: 150
BH CV LOWER ARTERIAL LEFT TBI RATIO: 0.99
BH CV LOWER ARTERIAL RIGHT ABI RATIO: 0.6
BH CV LOWER ARTERIAL RIGHT CALF RATIO: 0.68
BH CV LOWER ARTERIAL RIGHT DORSALIS PEDIS SYS MAX: 84
BH CV LOWER ARTERIAL RIGHT GREAT TOE SYS MAX: 59
BH CV LOWER ARTERIAL RIGHT LOW THIGH RATIO: 1.12
BH CV LOWER ARTERIAL RIGHT LOW THIGH SYS MAX: 156
BH CV LOWER ARTERIAL RIGHT POPLITEAL SYS MAX: 94
BH CV LOWER ARTERIAL RIGHT POST TIBIAL SYS MAX: 83
BH CV LOWER ARTERIAL RIGHT TBI RATIO: 0.42
BH CV XLRA MEAS LEFT DIST CCA EDV: 20.4 CM/SEC
BH CV XLRA MEAS LEFT DIST CCA PSV: 92.5 CM/SEC
BH CV XLRA MEAS LEFT DIST ICA EDV: -22.7 CM/SEC
BH CV XLRA MEAS LEFT DIST ICA PSV: -86.2 CM/SEC
BH CV XLRA MEAS LEFT ICA/CCA RATIO: -1.17
BH CV XLRA MEAS LEFT PROX CCA EDV: 19.6 CM/SEC
BH CV XLRA MEAS LEFT PROX CCA PSV: 94.9 CM/SEC
BH CV XLRA MEAS LEFT PROX ECA PSV: -127 CM/SEC
BH CV XLRA MEAS LEFT PROX ICA EDV: -29.8 CM/SEC
BH CV XLRA MEAS LEFT PROX ICA PSV: -111 CM/SEC
BH CV XLRA MEAS LEFT PROX SCLA PSV: 101 CM/SEC
BH CV XLRA MEAS LEFT VERTEBRAL A EDV: 17.2 CM/SEC
BH CV XLRA MEAS LEFT VERTEBRAL A PSV: 63.5 CM/SEC
BH CV XLRA MEAS RIGHT DIST CCA EDV: 16.8 CM/SEC
BH CV XLRA MEAS RIGHT DIST CCA PSV: 89.5 CM/SEC
BH CV XLRA MEAS RIGHT DIST ICA EDV: -28.2 CM/SEC
BH CV XLRA MEAS RIGHT DIST ICA PSV: -100 CM/SEC
BH CV XLRA MEAS RIGHT ICA/CCA RATIO: -1.34
BH CV XLRA MEAS RIGHT PROX CCA EDV: 19.9 CM/SEC
BH CV XLRA MEAS RIGHT PROX CCA PSV: 113 CM/SEC
BH CV XLRA MEAS RIGHT PROX ECA PSV: -138 CM/SEC
BH CV XLRA MEAS RIGHT PROX ICA EDV: -20.4 CM/SEC
BH CV XLRA MEAS RIGHT PROX ICA PSV: -151 CM/SEC
BH CV XLRA MEAS RIGHT PROX SCLA PSV: 106 CM/SEC
BH CV XLRA MEAS RIGHT VERTEBRAL A EDV: -9.9 CM/SEC
BH CV XLRA MEAS RIGHT VERTEBRAL A PSV: -40.1 CM/SEC
BUN SERPL-MCNC: 15 MG/DL (ref 8–23)
BUN/CREAT SERPL: 15.2 (ref 7–25)
CALCIUM SPEC-SCNC: 8.9 MG/DL (ref 8.6–10.5)
CHLORIDE SERPL-SCNC: 94 MMOL/L (ref 98–107)
CHOLEST SERPL-MCNC: 199 MG/DL (ref 0–200)
CO2 SERPL-SCNC: 26 MMOL/L (ref 22–29)
CREAT SERPL-MCNC: 0.99 MG/DL (ref 0.76–1.27)
DEPRECATED RDW RBC AUTO: 41.7 FL (ref 37–54)
EGFRCR SERPLBLD CKD-EPI 2021: 80.4 ML/MIN/1.73
ERYTHROCYTE [DISTWIDTH] IN BLOOD BY AUTOMATED COUNT: 12.3 % (ref 12.3–15.4)
GLUCOSE SERPL-MCNC: 103 MG/DL (ref 65–99)
HCT VFR BLD AUTO: 30.9 % (ref 37.5–51)
HDLC SERPL-MCNC: 44 MG/DL (ref 40–60)
HGB BLD-MCNC: 10.7 G/DL (ref 13–17.7)
LDLC SERPL CALC-MCNC: 133 MG/DL (ref 0–100)
LDLC/HDLC SERPL: 2.96 {RATIO}
MCH RBC QN AUTO: 31.9 PG (ref 26.6–33)
MCHC RBC AUTO-ENTMCNC: 34.6 G/DL (ref 31.5–35.7)
MCV RBC AUTO: 92.2 FL (ref 79–97)
PLATELET # BLD AUTO: 174 10*3/MM3 (ref 140–450)
PMV BLD AUTO: 8.8 FL (ref 6–12)
POTASSIUM SERPL-SCNC: 4.2 MMOL/L (ref 3.5–5.2)
RBC # BLD AUTO: 3.35 10*6/MM3 (ref 4.14–5.8)
SODIUM SERPL-SCNC: 130 MMOL/L (ref 136–145)
TRIGL SERPL-MCNC: 124 MG/DL (ref 0–150)
UPPER ARTERIAL LEFT ARM BRACHIAL SYS MAX: 139
VLDLC SERPL-MCNC: 22 MG/DL (ref 5–40)
WBC NRBC COR # BLD AUTO: 6.34 10*3/MM3 (ref 3.4–10.8)

## 2024-08-25 PROCEDURE — 85027 COMPLETE CBC AUTOMATED: CPT | Performed by: INTERNAL MEDICINE

## 2024-08-25 PROCEDURE — 99233 SBSQ HOSP IP/OBS HIGH 50: CPT | Performed by: STUDENT IN AN ORGANIZED HEALTH CARE EDUCATION/TRAINING PROGRAM

## 2024-08-25 PROCEDURE — 25810000003 SODIUM CHLORIDE 0.9 % SOLUTION: Performed by: INTERNAL MEDICINE

## 2024-08-25 PROCEDURE — 80061 LIPID PANEL: CPT | Performed by: INTERNAL MEDICINE

## 2024-08-25 PROCEDURE — 93880 EXTRACRANIAL BILAT STUDY: CPT | Performed by: STUDENT IN AN ORGANIZED HEALTH CARE EDUCATION/TRAINING PROGRAM

## 2024-08-25 PROCEDURE — 75635 CT ANGIO ABDOMINAL ARTERIES: CPT

## 2024-08-25 PROCEDURE — 80048 BASIC METABOLIC PNL TOTAL CA: CPT | Performed by: INTERNAL MEDICINE

## 2024-08-25 PROCEDURE — 93005 ELECTROCARDIOGRAM TRACING: CPT | Performed by: INTERNAL MEDICINE

## 2024-08-25 PROCEDURE — 93880 EXTRACRANIAL BILAT STUDY: CPT

## 2024-08-25 PROCEDURE — 25010000002 ONDANSETRON PER 1 MG: Performed by: INTERNAL MEDICINE

## 2024-08-25 PROCEDURE — 25510000001 IOPAMIDOL PER 1 ML: Performed by: INTERNAL MEDICINE

## 2024-08-25 PROCEDURE — 25010000002 CEFTRIAXONE PER 250 MG: Performed by: INTERNAL MEDICINE

## 2024-08-25 PROCEDURE — 93923 UPR/LXTR ART STDY 3+ LVLS: CPT

## 2024-08-25 PROCEDURE — 93010 ELECTROCARDIOGRAM REPORT: CPT | Performed by: INTERNAL MEDICINE

## 2024-08-25 PROCEDURE — 97161 PT EVAL LOW COMPLEX 20 MIN: CPT

## 2024-08-25 PROCEDURE — 93923 UPR/LXTR ART STDY 3+ LVLS: CPT | Performed by: STUDENT IN AN ORGANIZED HEALTH CARE EDUCATION/TRAINING PROGRAM

## 2024-08-25 PROCEDURE — 25010000002 HYDROMORPHONE 1 MG/ML SOLUTION: Performed by: STUDENT IN AN ORGANIZED HEALTH CARE EDUCATION/TRAINING PROGRAM

## 2024-08-25 PROCEDURE — 99232 SBSQ HOSP IP/OBS MODERATE 35: CPT | Performed by: INTERNAL MEDICINE

## 2024-08-25 RX ORDER — IOPAMIDOL 755 MG/ML
100 INJECTION, SOLUTION INTRAVASCULAR
Status: COMPLETED | OUTPATIENT
Start: 2024-08-25 | End: 2024-08-25

## 2024-08-25 RX ORDER — SODIUM CHLORIDE 9 MG/ML
75 INJECTION, SOLUTION INTRAVENOUS CONTINUOUS
Status: DISPENSED | OUTPATIENT
Start: 2024-08-25 | End: 2024-08-26

## 2024-08-25 RX ADMIN — Medication 10 ML: at 00:44

## 2024-08-25 RX ADMIN — ATORVASTATIN CALCIUM 40 MG: 40 TABLET, FILM COATED ORAL at 21:10

## 2024-08-25 RX ADMIN — SODIUM CHLORIDE 75 ML/HR: 9 INJECTION, SOLUTION INTRAVENOUS at 12:38

## 2024-08-25 RX ADMIN — CLOPIDOGREL BISULFATE 75 MG: 75 TABLET ORAL at 08:37

## 2024-08-25 RX ADMIN — ISOSORBIDE MONONITRATE 30 MG: 30 TABLET, EXTENDED RELEASE ORAL at 08:37

## 2024-08-25 RX ADMIN — HYDROXYZINE HYDROCHLORIDE 25 MG: 25 TABLET ORAL at 21:10

## 2024-08-25 RX ADMIN — Medication 10 ML: at 00:42

## 2024-08-25 RX ADMIN — BACITRACIN 0.9 G: 500 OINTMENT TOPICAL at 08:44

## 2024-08-25 RX ADMIN — Medication 10 ML: at 21:14

## 2024-08-25 RX ADMIN — HYDRALAZINE HYDROCHLORIDE 25 MG: 25 TABLET ORAL at 21:10

## 2024-08-25 RX ADMIN — Medication 10 ML: at 08:44

## 2024-08-25 RX ADMIN — METOPROLOL TARTRATE 25 MG: 25 TABLET, FILM COATED ORAL at 21:10

## 2024-08-25 RX ADMIN — CEFTRIAXONE 2000 MG: 2 INJECTION, POWDER, FOR SOLUTION INTRAMUSCULAR; INTRAVENOUS at 21:08

## 2024-08-25 RX ADMIN — RANOLAZINE 500 MG: 500 TABLET, EXTENDED RELEASE ORAL at 21:11

## 2024-08-25 RX ADMIN — HYDROXYZINE HYDROCHLORIDE 25 MG: 25 TABLET ORAL at 12:38

## 2024-08-25 RX ADMIN — BACITRACIN 0.9 G: 500 OINTMENT TOPICAL at 21:14

## 2024-08-25 RX ADMIN — RANOLAZINE 500 MG: 500 TABLET, EXTENDED RELEASE ORAL at 08:37

## 2024-08-25 RX ADMIN — HYDROXYZINE HYDROCHLORIDE 25 MG: 25 TABLET ORAL at 07:02

## 2024-08-25 RX ADMIN — HYDRALAZINE HYDROCHLORIDE 25 MG: 25 TABLET ORAL at 08:37

## 2024-08-25 RX ADMIN — ONDANSETRON 4 MG: 2 INJECTION INTRAMUSCULAR; INTRAVENOUS at 10:39

## 2024-08-25 RX ADMIN — HYDROMORPHONE HYDROCHLORIDE 0.5 MG: 1 INJECTION, SOLUTION INTRAMUSCULAR; INTRAVENOUS; SUBCUTANEOUS at 00:41

## 2024-08-25 RX ADMIN — ASPIRIN 81 MG: 81 TABLET, COATED ORAL at 08:37

## 2024-08-25 RX ADMIN — TAMSULOSIN HYDROCHLORIDE 0.4 MG: 0.4 CAPSULE ORAL at 21:10

## 2024-08-25 RX ADMIN — METOPROLOL TARTRATE 25 MG: 25 TABLET, FILM COATED ORAL at 08:37

## 2024-08-25 RX ADMIN — IOPAMIDOL 100 ML: 755 INJECTION, SOLUTION INTRAVENOUS at 10:44

## 2024-08-25 RX ADMIN — HYDROXYZINE HYDROCHLORIDE 25 MG: 25 TABLET ORAL at 17:52

## 2024-08-25 NOTE — PROGRESS NOTES
" [facility]   Vascular Surgery Progress Note    Patient Name: Alexei Merino III  : 1950  MRN: [id number]  Date of admission: 2024  Surgical Procedures Since Admission:  Procedure(s):  LEFT HEART CATH with possible PCI  Surgeon:  Omer Vieyra MD  Status:  1 Day Post-Op  -------------------    Subjective   Subjective     Chief Complaint: follow up PAD, AAA    History of Present Illness   Family at bedside, wife and daughter.  They were able to provide a lot of history and answered a lot of my questions.  They do not know how long he has wounds of the left foot but they noticed them when he was admitted to the hospital.  He has complained of constant pain to the left foot for \"a while,\" at least several weeks.  He does not wash his feet or allow them to wash his feet for him so they had not seen the wounds until he was hospitalized    Review of Systems   All other systems reviewed and are negative.      Objective   Objective     Vitals:   Temp:  [97.9 °F (36.6 °C)-98.7 °F (37.1 °C)] 98 °F (36.7 °C)  Heart Rate:  [54-70] 57  Resp:  [13-18] 18  BP: (101-162)/(46-80) 143/65  Flow (L/min):  [2-4] 2    Physical Exam  Vitals reviewed.   Constitutional:       General: He is not in acute distress.     Appearance: Normal appearance. He is underweight.   HENT:      Head: Normocephalic and atraumatic.      Right Ear: External ear normal.      Left Ear: External ear normal.      Nose: Nose normal.      Mouth/Throat:      Mouth: Mucous membranes are moist.      Pharynx: Oropharynx is clear.   Eyes:      Extraocular Movements: Extraocular movements intact.      Conjunctiva/sclera: Conjunctivae normal.      Pupils: Pupils are equal, round, and reactive to light.   Cardiovascular:      Rate and Rhythm: Normal rate and regular rhythm.      Pulses:           Carotid pulses are 2+ on the right side and 2+ on the left side.       Radial pulses are 2+ on the right side and 2+ on the left side.        Femoral pulses are 1+ " on the right side and 2+ on the left side.       Dorsalis pedis pulses are detected w/ Doppler on the right side and detected w/ Doppler on the left side.        Posterior tibial pulses are detected w/ Doppler on the left side.   Pulmonary:      Comments: Non labored respirations on room air, equal chest rise  Abdominal:      General: Abdomen is flat. There is no distension.      Palpations: Abdomen is soft.   Musculoskeletal:      Cervical back: Normal range of motion. No rigidity.      Right lower leg: No edema.      Left lower leg: No edema.   Skin:     General: Skin is warm and dry.      Capillary Refill: Capillary refill takes less than 2 seconds.   Neurological:      General: No focal deficit present.      Mental Status: He is alert and oriented to person, place, and time.   Psychiatric:         Mood and Affect: Mood normal.         Behavior: Behavior normal.                 Result Review    Result Review:  I have personally reviewed the results from the time of this admission to 8/25/2024 14:24 EDT and agree with these findings:  [x]  Laboratory list / accordion  []  Microbiology  [x]  Radiology  []  EKG/Telemetry   []  Cardiology/Vascular   []  Pathology  []  Old records  []  Other:  Most notable findings include: Hemoglobin 10, hematocrit 31, creatinine 0.99, GFR 80      Assessment & Plan   Assessment / Plan     Brief Patient Summary:  Alexei Merino III is a 73 y.o. male who presented with chest pain and NSTEMI.  He has an infrarenal abdominal aortic aneurysm status post EVAR and peripheral arterial disease with left foot ulcerations and foot pain.    Active Hospital Problems:   Active Hospital Problems    Diagnosis     **NSTEMI (non-ST elevated myocardial infarction)     Hyperlipidemia     Smoking greater than 30 pack years     S/P CABG x 3     Coronary artery disease     Tobacco use      Plan:   -Thankfully his wife and daughter were at bedside today, they were able to provide a lot of supplemental  history that I was not able to obtain yesterday.  He is a long-term, greater than 50-year smoker.  He used to smoke cigarettes but now smokes cigars.  He has had left leg rest pain for a while but the ulcerations are relatively new.  He does take aspirin at home.    -I personally independently reviewed his CT angiogram of the abdomen and pelvis with runoff that was performed today.  He has had an endovascular abdominal aortic aneurysm repair.  The graft is widely patent and the aneurysm sac size has decreased from 5.7 cm in 2022 to 5 cm on today's scan.  The iliac artery aneurysms are stable bilaterally.  There is not significant stenosis of bilateral external iliac arteries.  There is a severe, bulky, calcified plaque of the right common femoral artery which involves the origin of the profunda and the superficial femoral arteries.  There is low flow in the superficial femoral artery distally to this with severe distal disease of the superficial femoral artery and profundofemoral artery.  On the left, there are multiple short areas of severe stenosis or focal occlusion of the superficial femoral artery and popliteal arteries.  There is calcification of the tibial vessels but there appears to be two-vessel runoff to the left foot via the anterior tibial and peroneal arteries.    -I discussed his peripheral arterial disease with his family.  His ABIs and toe pressures are still pending, but I suspect that these will be abnormal and insufficient for wound healing.  If so, I think that an endovascular intervention would be preferred.  With his EVAR graft and severe right common femoral artery disease, this would require a left arm access.  He is asymptomatic in the right leg but may need a right common femoral endarterectomy at some time in the future for his bulky, calcified disease.        Addendum:  Carotid artery duplex reviewed, <50% stenosis bilaterally.  ABIs reviewed.  Left leg perfusion normal at the ankle and  toe pressures are adequate for wound healing.  Right leg with moderate disease related to the right common femoral and superficial femoral plaque, not surprising.  Toe pressures on right 59 mmhg.  I called and discussed these results with his daughter Lindsey.  From a vascular standpoint, he can be discharged and follow up with me in clinic in 6 weeks.  I did place a consult for podiatry for ongoing left foot pain and ingrown toenails.  She states they have tried to see a podiatrist outpatient without success.  Continue aspirin/statin for medical management of PAD.         VTE Prophylaxis:  No VTE prophylaxis order currently exists.        Mitali Saavedra MD

## 2024-08-25 NOTE — THERAPY EVALUATION
Patient Name: Alexei Merino III  : 1950    MRN: [id number]                              Today's Date: 2024       Admit Date: 2024    Visit Dx:     ICD-10-CM ICD-9-CM   1. NSTEMI (non-ST elevated myocardial infarction)  I21.4 410.70   2. Smoking greater than 30 pack years  F17.210 305.1   3. Tobacco use  Z72.0 305.1   4. S/P CABG x 3  Z95.1 V45.81   5. Coronary artery disease involving coronary bypass graft of native heart without angina pectoris  I25.810 414.05   6. Hyperlipidemia, unspecified hyperlipidemia type  E78.5 272.4     Patient Active Problem List   Diagnosis    S/P CABG x 3    Coronary artery disease    Hyperlipidemia    Hypertension    Abdominal aortic aneurysm (AAA)    Actinic keratoses    Anxiety with depression    Chronic cough    Decreased hearing, bilateral    Delayed gastric emptying    Dementia    Dental infection    Exposure to venereal disease    Fatigue    Hay fever    Nausea and vomiting    Chronic sinusitis    Sinusitis, acute    Smoking greater than 30 pack years    Tobacco use    Unsteady gait    Weight loss    Underweight    Chronic obstructive pulmonary disease    B12 deficiency    NSTEMI (non-ST elevated myocardial infarction)     Past Medical History:   Diagnosis Date    Anxiety     Aortic aneurysm     Coronary artery disease     Dementia 2017    per EASTON Lopez, patient was diagnosed 7 years ago    Emphysema lung     Gastroparesis     per Jessica ESTRADA (hard of hearing)     complete deaf    Hyperlipidemia     Hypertension     Myocardial infarction      Past Surgical History:   Procedure Laterality Date    ABDOMINAL AORTIC ANEURYSM REPAIR  2022    CORONARY ARTERY BYPASS GRAFT  03/13/2016    X3  Dr Hong      General Information       Row Name 24 1440          Physical Therapy Time and Intention    Document Type evaluation  -     Mode of Treatment physical therapy  -       Row Name 24 1440          General Information    Patient Profile Reviewed yes   -     Prior Level of Function independent:;all household mobility;gait;transfer;ADL's  no walker or cane use. no falls in the last six months. hx of dementia which pt is unaware of. communication limited by hearing impairment - pt is deaf.  -     Existing Precautions/Restrictions fall  -     Barriers to Rehab cognitive status;hearing deficit;medically complex  -[facility] Name 08/25/24 1440          Living Environment    People in Home significant other  lives with EASTON Lopez. She's his caregiver.  -     Name(s) of People in Home Jessica - S/O and caregiver  -       Row Name 08/25/24 1440          Home Main Entrance    Number of Stairs, Main Entrance two  -     Stair Railings, Main Entrance railings safe and in good condition  -       Row Name 08/25/24 1440          Stairs Within Home, Primary    Number of Stairs, Within Home, Primary none  -[facility] Name 08/25/24 1440          Cognition    Orientation Status (Cognition) oriented to;person;place  dry erase board/written communication necessary. follows written commands and gestures with seemingly good understanding.  -[facility] Name 08/25/24 1440          Safety Issues, Functional Mobility    Safety Issues Affecting Function (Mobility) insight into deficits/self-awareness;impulsivity;awareness of need for assistance  no safety issues noted during evaluation, but family reports recurrent episodes of him trying to get out of bed unassisted.  -     Impairments Affecting Function (Mobility) balance;cognition;endurance/activity tolerance;pain;strength  -     Cognitive Impairments, Mobility Safety/Performance impulsivity;insight into deficits/self-awareness;judgment;awareness, need for assistance  -               User Key  (r) = Recorded By, (t) = Taken By, (c) = Cosigned By      Initials Name Provider Type     Loreta Cuba, PT Physical Therapist                   Mobility       Row Name 08/25/24 1442          Bed Mobility    Bed Mobility  bed mobility (all) activities  -     All Activities, [place] (Bed Mobility) supervision  -     Comment, (Bed Mobility) supine to/from sit and supine scooting.  -[facility] Name 08/25/24 1445          Sit-Stand Transfer    Sit-Stand [place] (Transfers) minimum assist (75% patient effort)  -     Assistive Device (Sit-Stand Transfers) walker, front-wheeled  -     Comment, (Sit-Stand Transfer) LLE heel WB during standing trial per pt comfort due to hypersensitive forefoot/toes. Static standing with walker, primarily WB on R foot  -[facility] Name 08/25/24 1445          Gait/Stairs (Locomotion)    [place] Level (Gait) not tested  -     Patient was able to Ambulate no, other medical factors prevent ambulation  -     Reason Patient was unable to Ambulate Uncontrolled Pain  -               User Key  (r) = Recorded By, (t) = Taken By, (c) = Cosigned By      Initials Name Provider Type     Loreta Cuba, PT Physical Therapist                   Obj/Interventions       [facility] Name 08/25/24 1442          Range of Motion Comprehensive    Comment, General Range of Motion L foot limited due to pain.  -[facility] Name 08/25/24 1444          Strength Comprehensive (MMT)    General Manual Muscle Testing (MMT) Assessment lower extremity strength deficits identified  -     Comment, General Manual Muscle Testing (MMT) Assessment grossly 4/5 BLE  -[facility] Name 08/25/24 1445          Balance    Balance Assessment sitting static balance;sitting dynamic balance;standing static balance;standing dynamic balance  -     Static Sitting Balance standby assist  -     Dynamic Sitting Balance standby assist  -     Position, Sitting Balance sitting edge of bed  -     Static Standing Balance contact guard  -     Dynamic Standing Balance minimal assist  -     Position/Device Used, Standing Balance walker, rolling  -[facility] Name 08/25/24 1443          Sensory Assessment (Somatosensory)    Sensory  Assessment (Somatosensory) --  hypersensitive L forefoot and toes.  -               User Key  (r) = Recorded By, (t) = Taken By, (c) = Cosigned By      Initials Name Provider Type    Loreta Aviles, PT Physical Therapist                   Goals/Plan       Row Name 08/25/24 1457          Transfer Goal 1 (PT)    Activity/Assistive Device (Transfer Goal 1, PT) transfers, all;walker, rolling  -     [place] Level/Cues Needed (Transfer Goal 1, PT) supervision required  -AH     Time Frame (Transfer Goal 1, PT) long term goal (LTG);2 weeks  -AH       Row Name 08/25/24 1458          Gait Training Goal 1 (PT)    Activity/Assistive Device (Gait Training Goal 1, PT) gait (walking locomotion);assistive device use;walker, rolling  -     [place] Level (Gait Training Goal 1, PT) supervision required  -     Distance (Gait Training Goal 1, PT) 50'  -AH     Time Frame (Gait Training Goal 1, PT) long term goal (LTG);2 weeks  -AH       Row Name 08/25/24 1458          Stairs Goal 1 (PT)    Activity/Assistive Device (Stairs Goal 1, PT) stairs, all skills  -     [place] Level/Cues Needed (Stairs Goal 1, PT) contact guard required  -     Number of Stairs (Stairs Goal 1, PT) 2  -AH     Time Frame (Stairs Goal 1, PT) long term goal (LTG);2 weeks  -AH       Row Name 08/25/24 1454          Therapy Assessment/Plan (PT)    Planned Therapy Interventions (PT) balance training;gait training;home exercise program;stair training;strengthening;patient/family education;transfer training  -               User Key  (r) = Recorded By, (t) = Taken By, (c) = Cosigned By      Initials Name Provider Type    Loreta Aviles, PT Physical Therapist                   Clinical Impression       Row Name 08/25/24 8645          Pain    Additional Documentation Pain Scale: FACES Pre/Post-Treatment (Group)  -AH       Row Name 08/25/24 6924          Pain Scale: FACES Pre/Post-Treatment    Pain: FACES Scale, Pretreatment 2-->hurts little  bit  -     Posttreatment Pain Rating 8-->hurts whole lot  -     Pain Location - Side/Orientation Left  -     Pain Location - foot;toe  -       Row Name 08/25/24 8383          Plan of Care Review    Plan of Care Reviewed With patient;daughter;significant other  -     Outcome Evaluation Pt is a 72 y/o male presenting to [facility] on 8/23, admitted with NSTEMI. On arrival to the ED initial troponin 195.  EKG showed new left bundle branch block.  Cardiology was notified and patient was placed on heparin in PM on 8/23.  Pt with rapid response early AM on 8/24 due to severe tachycardia and HTN. Cleared for PT on 8/25/24.     PMH of coronary artery disease, hypertension, hyperlipidemia, emphysema, hearing deficit - deaf, anxiety.     L foot - XR negative for fracture but possible gouty arthritis. Pt also has Peripheral vascular disease, noted to have severe lower extremity disease. Status post left heart cath done yesterday showed small vessels coronary disease not amenable for PCI with recommendation to continue medical management. Per significant other's report, pt is independent at baseline wiht mobility and ADLs in the home, no AD use, no falls in the last 6 months. All pt needs are on the main level of the home. 2 ABIODUN. Pt presents today with stable vitals at rest and with activity, and denies chest pain. Primary barrier to mobility is L foot/toe pain which limits balance and safety in standing and prevents gait assessment today. Pt requires Bijan for sit to stand transfer and standing balance. significant other expresses concern about her ability to care for him in his current state. PT recommends DC to SNF. PT will follow.  -       Row Name 08/25/24 8132          Therapy Assessment/Plan (PT)    Patient/Family Therapy Goals Statement (PT) get back to normal. get L foot pain under control.  -     Rehab Potential (PT) good, to achieve stated therapy goals  -     Criteria for Skilled Interventions Met (PT)  yes;meets criteria  -     Therapy Frequency (PT) 5 times/wk  -     Predicted Duration of Therapy Intervention (PT) until DC  -       Row Name 08/25/24 1450          Vital Signs    Intratreatment Heart Rate (beats/min) 88  -     Intra SpO2 (%) 94  -     O2 Delivery Intra Treatment room air  -     Intra Patient Position Standing  -       Row Name 08/25/24 1450          Positioning and Restraints    Post Treatment Position bed  -     In Bed notified nsg;call light within reach;fowlers;encouraged to call for assist;exit alarm on;with family/caregiver  -               User Key  (r) = Recorded By, (t) = Taken By, (c) = Cosigned By      Initials Name Provider Type    Loreta Aviles PT Physical Therapist                   Outcome Measures       Row Name 08/25/24 1459 08/25/24 0800       How much help from another person do you currently need...    Turning from your back to your side while in flat bed without using bedrails? 4  - 4  -PS    Moving from lying on back to sitting on the side of a flat bed without bedrails? 4  - 4  -PS    Moving to and from a bed to a chair (including a wheelchair)? 3  - 3  -PS    Standing up from a chair using your arms (e.g., wheelchair, bedside chair)? 3  - 3  -PS    Climbing 3-5 steps with a railing? 1  - 1  -PS    To walk in hospital room? 2  - 2  -PS    AM-PAC 6 Clicks Score (PT) 17  - 17  -PS    Highest Level of Mobility Goal 5 --> Static standing  - 5 --> Static standing  -PS      Row Name 08/25/24 1459          Functional Assessment    Outcome Measure Options AM-PAC 6 Clicks Basic Mobility (PT)  -               User Key  (r) = Recorded By, (t) = Taken By, (c) = Cosigned By      Initials Name Provider Type    Vinicio Flores, RN Registered Nurse    Loreta Aviles PT Physical Therapist                                 Physical Therapy Education       Title: PT OT SLP Therapies (Done)       Topic: Physical Therapy (Done)       Point: Mobility  training (Done)       Learning Progress Summary             Patient Acceptance, E, VU by  at 8/25/2024 1500   Family Acceptance, E, VU by  at 8/25/2024 1500                         Point: Home exercise program (Done)       Learning Progress Summary             Patient Acceptance, E, VU by  at 8/25/2024 1500   Family Acceptance, E, VU by  at 8/25/2024 1500                         Point: Body mechanics (Done)       Learning Progress Summary             Patient Acceptance, E, VU by  at 8/25/2024 1500   Family Acceptance, E, VU by  at 8/25/2024 1500                         Point: Precautions (Done)       Learning Progress Summary             Patient Acceptance, E, VU by  at 8/25/2024 1500   Family Acceptance, E, VU by  at 8/25/2024 1500                                         User Key       Initials Effective Dates Name Provider Type Discipline     12/04/23 -  Loreta Cuba, PT Physical Therapist PT                  PT Recommendation and Plan  Planned Therapy Interventions (PT): balance training, gait training, home exercise program, stair training, strengthening, patient/family education, transfer training  Plan of Care Reviewed With: patient, daughter, significant other  Outcome Evaluation: Pt is a 74 y/o male presenting to [facility] on 8/23, admitted with NSTEMI. On arrival to the ED initial troponin 195.  EKG showed new left bundle branch block.  Cardiology was notified and patient was placed on heparin in PM on 8/23.  Pt with rapid response early AM on 8/24 due to severe tachycardia and HTN. Cleared for PT on 8/25/24.     PMH of coronary artery disease, hypertension, hyperlipidemia, emphysema, hearing deficit - deaf, anxiety.     L foot - XR negative for fracture but possible gouty arthritis. Pt also has Peripheral vascular disease, noted to have severe lower extremity disease. Status post left heart cath done yesterday showed small vessels coronary disease not amenable for PCI with recommendation to  continue medical management. Per significant other's report, pt is independent at baseline wiht mobility and ADLs in the home, no AD use, no falls in the last 6 months. All pt needs are on the main level of the home. 2 ABIODUN. Pt presents today with stable vitals at rest and with activity, and denies chest pain. Primary barrier to mobility is L foot/toe pain which limits balance and safety in standing and prevents gait assessment today. Pt requires Bijan for sit to stand transfer and standing balance. significant other expresses concern about her ability to care for him in his current state. PT recommends DC to SNF. PT will follow.     Time Calculation:         PT Charges       Row Name 08/25/24 1500             Time Calculation    Start Time 1343  -      Stop Time 1432  -      Time Calculation (min) 49 min  -      PT Non-Billable Time (min) 0 min  -      PT Received On 08/25/24  -      PT - Next Appointment 08/26/24  -      PT Goal Re-Cert Due Date 09/08/24  -         Time Calculation- PT    Total Timed Code Minutes- PT 0 minute(s)  -                User Key  (r) = Recorded By, (t) = Taken By, (c) = Cosigned By      Initials Name Provider Type     Loreta Cuba, PT Physical Therapist                  Therapy Charges for Today       Code Description Service Date Service Provider Modifiers Qty    [id number]  PT EVAL LOW COMPLEXITY 5 8/25/2024 Loreta Cuba PT  1            PT G-Codes  Outcome Measure Options: AM-PAC 6 Clicks Basic Mobility (PT)  AM-PAC 6 Clicks Score (PT): 17  PT Discharge Summary  Anticipated Discharge Disposition (PT): skilled nursing facility    Loreta Cuba PT  8/25/2024

## 2024-08-25 NOTE — PLAN OF CARE
Goal Outcome Evaluation:      Awaiting results of ultrasounds. Patient complaining of nausea this shift, zofran effective. Patient's daughter requested to speak with case management in regards to home health. Patient worked with physical therapy. Stood at edge of bed with walker with PT, but refused to transfer to the chair. Plan of care ongoing.

## 2024-08-25 NOTE — PLAN OF CARE
Goal Outcome Evaluation:  Plan of Care Reviewed With: patient, daughter, significant other           Outcome Evaluation: Pt is a 72 y/o male presenting to [facility] on 8/23, admitted with NSTEMI. On arrival to the ED initial troponin 195.  EKG showed new left bundle branch block.  Cardiology was notified and patient was placed on heparin in PM on 8/23.  Pt with rapid response early AM on 8/24 due to severe tachycardia and HTN. Cleared for PT on 8/25/24.     PMH of coronary artery disease, hypertension, hyperlipidemia, emphysema, hearing deficit - deaf, anxiety.     L foot - XR negative for fracture but possible gouty arthritis. Pt also has Peripheral vascular disease, noted to have severe lower extremity disease. Status post left heart cath done yesterday showed small vessels coronary disease not amenable for PCI with recommendation to continue medical management. Per significant other's report, pt is independent at baseline wiht mobility and ADLs in the home, no AD use, no falls in the last 6 months. All pt needs are on the main level of the home. 2 ABIODUN. Pt presents today with stable vitals at rest and with activity, and denies chest pain. Primary barrier to mobility is L foot/toe pain which limits balance and safety in standing and prevents gait assessment today. Pt requires Bijan for sit to stand transfer and standing balance. significant other expresses concern about her ability to care for him in his current state. PT recommends DC to SNF. PT will follow.      Anticipated Discharge Disposition (PT): skilled nursing facility

## 2024-08-25 NOTE — CONSULTS
"Nutrition Services    Patient Name: Alexei Merino III  YOB: 1950  MRN: [id number]  Admission date: 8/23/2024    Comment:    RD to add Boost Plus BID (Provides 720 kcals, 28 g protein if consumed)       CLINICAL NUTRITION ASSESSMENT      Reason for Assessment 8/25: BMI < 19     H&P      Past Medical History:   Diagnosis Date    Anxiety     Aortic aneurysm     Coronary artery disease     Dementia 2017    per EASTON Lopez, patient was diagnosed 7 years ago    Emphysema lung     Gastroparesis     per Jessica ESTRADA (hard of hearing)     complete deaf    Hyperlipidemia     Hypertension     Myocardial infarction        Past Surgical History:   Procedure Laterality Date    ABDOMINAL AORTIC ANEURYSM REPAIR  03/23/2022    CORONARY ARTERY BYPASS GRAFT  03/13/2016    X3  Dr Hong        Current Problems   NSTEMI  -S/p cardiac cath 8/24  -Cardiology following  -Hx CAD, CABG    Rapid Response 8/24  -Lopressor given for HR and BP    Left foot pain  -possible cellulitis     Encounter Information        Trending Narrative     8/25: Pt presented to ED on 8/23 with complaints of chest pain intermittent over the past few days. Pt was admitted with principal diagnosis of NSTEMI. Pt is reportedly extremely [place]. Pt is s/p Rapid Response yesterday and cardiac cath placed. Pt's diet advanced to Healthy Heart yesterday. No PO intake documented at this time. RD unable to visit pt in person on this date. Will attempt to perform physical exam at a later time.      Anthropometrics        Current Height, Weight Height: 175.3 cm (69\")  Weight: 54.8 kg (120 lb 13 oz) (extra blankets removed/ bed scale migh need rebalanced) (08/25/24 0500)       Usual Body Weight (UBW) Unable to obtain from patient        Trending Weight Hx     This admission: 8/25: 120#             PTA: 8/25: 21% weight loss in the last year    Wt Readings from Last 30 Encounters:   08/25/24 0500 54.8 kg (120 lb 13 oz)   08/24/24 0944 68.9 kg (152 lb)   08/23/24 2001 69 " kg (152 lb 1.9 oz)   08/25/23 1804 69 kg (152 lb 1.9 oz)   03/14/23 1541 62.1 kg (137 lb)   02/10/22 1410 61.2 kg (135 lb)   01/26/22 1931 59.9 kg (132 lb)   01/18/22 1311 61.3 kg (135 lb 3.2 oz)   01/17/22 1304 63 kg (139 lb)   01/14/22 1156 62.1 kg (137 lb)   12/28/21 1412 60.6 kg (133 lb 9.6 oz)   08/25/21 1311 64.9 kg (143 lb)   05/19/21 1450 59.9 kg (132 lb)   11/04/20 1454 67.1 kg (148 lb)   02/24/20 1342 63.9 kg (140 lb 12.8 oz)   01/06/20 1452 72.1 kg (159 lb)   05/16/19 1559 67.6 kg (149 lb)   05/02/19 1535 61.7 kg (136 lb)   04/08/19 1610 56.9 kg (125 lb 6.4 oz)   03/07/19 1621 62.1 kg (136 lb 12.8 oz)   11/13/18 1539 66.2 kg (146 lb)   08/30/18 1436 67.8 kg (149 lb 6.4 oz)   07/23/18 1341 64.9 kg (143 lb)   05/15/18 1455 64.4 kg (142 lb)   04/24/18 1056 65.3 kg (144 lb)   03/20/18 1022 66.2 kg (146 lb)   02/21/18 1010 68 kg (150 lb)   01/18/18 1340 69.4 kg (153 lb)   12/20/17 1143 72.6 kg (160 lb)   11/27/17 1500 73.5 kg (162 lb)   09/21/17 1055 68.9 kg (152 lb)   08/17/17 1406 69.9 kg (154 lb)      BMI kg/m2 Body mass index is 17.84 kg/m².       Labs        Pertinent Labs Hyponatremia - management per attending.    Results from last 7 days   Lab Units 08/25/24  0052 08/24/24  0606 08/23/24  2302   SODIUM mmol/L 130* 131* 132*   POTASSIUM mmol/L 4.2 4.7 4.1   CHLORIDE mmol/L 94* 96* 95*   CO2 mmol/L 26.0 24.5 26.6   BUN mg/dL 15 12 11   CREATININE mg/dL 0.99 1.03 1.00   CALCIUM mg/dL 8.9 8.6 8.7   GLUCOSE mg/dL 103* 98 96     Results from last 7 days   Lab Units 08/25/24  0052   HEMOGLOBIN g/dL 10.7*   HEMATOCRIT % 30.9*   TRIGLYCERIDES mg/dL 124     Lab Results   Component Value Date    HGBA1C 5.92 (H) 08/23/2024        Medications    Scheduled Medications aspirin, 81 mg, Oral, Daily  atorvastatin, 40 mg, Oral, Nightly  bacitracin, 1 Application, Topical, Q12H  cefTRIAXone, 2,000 mg, Intravenous, Q24H  clopidogrel, 75 mg, Oral, Daily  hydrALAZINE, 25 mg, Oral, Q12H  hydrOXYzine, 25 mg, Oral, 4x  Daily  isosorbide mononitrate, 30 mg, Oral, Q24H  metoprolol tartrate, 25 mg, Oral, Q12H  ranolazine, 500 mg, Oral, Q12H  sodium chloride, 10 mL, Intravenous, Q12H  tamsulosin, 0.4 mg, Oral, Nightly        Infusions      PRN Medications   acetaminophen    aluminum-magnesium hydroxide-simethicone    atropine    senna-docusate sodium **AND** polyethylene glycol **AND** bisacodyl **AND** bisacodyl    diphenhydrAMINE    hydrALAZINE    nitroglycerin    ondansetron ODT **OR** ondansetron    [COMPLETED] Insert Peripheral IV **AND** sodium chloride    sodium chloride    sodium chloride    sodium chloride     Physical Findings        Trending Physical   Appearance, NFPE 8/25: UBALDO   --  Edema  No edema documented      Bowel Function Last documented BM 8/23     Tubes No feeding tube in place     Chewing/Swallowing No issues reported      Skin L posterior great toe wound - pending WOCN eval     --  Current Nutrition Orders & Evaluation of Intake       Oral Nutrition     Food Allergies NKFA   Current PO Diet Diet: Cardiac; Healthy Heart (2-3 Na+); Fluid Consistency: Thin (IDDSI 0)   Supplement none   PO Evaluation     Trending % PO Intake 8/25: No meals documented    --  Nutritional Risk Screening        NRS-2002 Score          Nutrition Diagnosis         Nutrition Dx Problem 1 Unintentional weight loss related to suspected hypermetabolism in the setting of chronic cardiac issues as evidenced by > 20% weight loss in the last year.       Nutrition Dx Problem 2        Intervention Goal         Intervention Goal(s) Tolerate po diet  PO intake > 75%  Tolerate ONS     Nutrition Intervention        RD Action Add Boost Plus BID (Provides 720 kcals, 28 g protein if consumed)        Nutrition Prescription          Diet Prescription Healthy Heart   Supplement Prescription Boost Plus BID (Provides 720 kcals, 28 g protein if consumed)      --  Monitor/Evaluation        Monitor Per protocol, I&O, PO intake, Supplement intake, Pertinent labs,  Weight, Skin status, GI status, Symptoms         Electronically signed by:  Anjelica Garcia RD  08/25/24 09:15 EDT

## 2024-08-25 NOTE — CASE MANAGEMENT/SOCIAL WORK
Discharge Planning Assessment   [place]     Patient Name: Alexei Merino III  MRN: [id number]  Today's Date: 8/25/2024    Admit Date: 8/23/2024    Plan: PT recommending SNF; pending family choices. No precert required. Anticipate WC van vs EMS at dc.   Discharge Needs Assessment       Row Name 08/25/24 2208       Living Environment    People in Home significant other    Name(s) of People in Home Jessica- significant other    Current Living Arrangements home    Potentially Unsafe Housing Conditions other (see comments)  potential fall risk, with pt current condition. S/O looking into a gate    In the past 12 months has the electric, gas, oil, or water company threatened to shut off services in your home? No    Primary Care Provided by spouse/significant other    Provides Primary Care For no one, unable/limited ability to care for self    Family Caregiver if Needed significant other       Resource/Environmental Concerns    Resource/Environmental Concerns home accessibility    Home Accessibility Concerns stairs to access bedroom or bathroom       Transportation Needs    In the past 12 months, has lack of transportation kept you from medical appointments or from getting medications? no    In the past 12 months, has lack of transportation kept you from meetings, work, or from getting things needed for daily living? No       Food Insecurity    Within the past 12 months, you worried that your food would run out before you got the money to buy more. Never true    Within the past 12 months, the food you bought just didn't last and you didn't have money to get more. Never true       Transition Planning    Patient/Family Anticipates Transition to other (see comments)  SNF    Patient/Family Anticipated Services at Transition skilled nursing    Transportation Anticipated family or friend will provide       Discharge Needs Assessment    Readmission Within the Last 30 Days no previous admission in last 30 days    Equipment Currently  Used at Home shower chair    Concerns to be Addressed no discharge needs identified    Anticipated Changes Related to Illness none    Equipment Needed After Discharge walker, rolling;cane, straight    Discharge Facility/Level of Care Needs nursing facility, skilled    Provided Post Acute Provider List? Yes    Post Acute Provider List Home Health;Nursing Home                   Discharge Plan       Row Name 08/25/24 1657       Plan    Plan PT recommending SNF; pending family choices. No precert required. Anticipate WC van vs EMS at dc.    Plan Comments Me with pt, daughter Lindsey, and SO Jessica at bedside. 40+ minutes spent at bedside. Pt is severely [place] with dementia. Daughter and SO answered assessment questions. Pt lives at home with s/o. Pt has not driven for 3+ years. Daughters and s/o provide transport. PCP and pharmacy verified. Denied issues affording food, medication, or paying bills. Current DME in home is a shower chair. Anticipate the need for RW. PT recommending SNF. Family agreeable. SNF list and HH lists provided. Encouraged to use medicare.gov. IMM reviewed. CM phone number provided.      Row Name 08/25/24 1610       Plan    Plan PT recommending SNF; pending family choices. No precert required.                  Continued Care and Services - Admitted Since 8/23/2024    No active coordination exists for this encounter.       Selected Continued Care - Episodes Includes continued care and service providers with selected services from the active episodes listed below      High Risk Care Management Episode start date: 8/28/2023   There are no active outsourced providers for this episode.                 Expected Discharge Date and Time       Expected Discharge Date Expected Discharge Time    Aug 27, 2024            Demographic Summary       Row Name 08/25/24 1650       General Information    Admission Type inpatient    Arrived From emergency department    Required Notices Provided Important Message from  Medicare    Reason for Consult discharge planning    Preferred Language English                   Functional Status       Row Name 08/25/24 1652       Functional Status    Usual Activity Tolerance poor    Current Activity Tolerance poor    Functional Status Comments Usually in bed and may sleep for 16 hrs or so.       Physical Activity    On average, how many days per week do you engage in moderate to strenuous exercise (like a brisk walk)? 0 days    On average, how many minutes do you engage in exercise at this level? 0 min    Number of minutes of exercise per week 0       Assessment of Health Literacy    How often do you have someone help you read hospital materials? Sometimes    How often do you have problems learning about your medical condition because of difficulty understanding written information? Often    How often do you have a problem understanding what is told to you about your medical condition? Often    How confident are you filling out medical forms by yourself? Not at all    Health Literacy Low       Functional Status, IADL    Medications completely dependent    Meal Preparation completely dependent    Housekeeping completely dependent    Laundry completely dependent    Shopping completely dependent       Mental Status    General Appearance WDL X  Pt with dementia       Mental Status Summary    Recent Changes in Mental Status/Cognitive Functioning decision-making/judgment;hearing;memory (remote)       Employment/    Employment Status retired             Ngozi Garcia RN BSN  Weekend   [facility]  Phone: [phone]  Fax: [phone]

## 2024-08-25 NOTE — PROGRESS NOTES
LOS: 2 days   Admitting Physician- Baljit Higuera MD    Reason For Followup:    Chest pain  Non-ST elevation myocardial infarction  CAD  CABG  Hypertension    Subjective     No chest pain    Objective     Hemodynamics are stable    Review of Systems:   Review of Systems   Constitutional: Negative for chills and fever.   HENT:  Negative for ear discharge and nosebleeds.    Eyes:  Negative for discharge and redness.   Cardiovascular:  Negative for chest pain, orthopnea, palpitations, paroxysmal nocturnal dyspnea and syncope.   Respiratory:  Negative for cough, shortness of breath and wheezing.    Endocrine: Negative for heat intolerance.   Skin:  Negative for rash.   Musculoskeletal:  Negative for arthritis and myalgias.   Gastrointestinal:  Negative for abdominal pain, melena, nausea and vomiting.   Genitourinary:  Negative for dysuria and hematuria.   Neurological:  Negative for dizziness, light-headedness, numbness and tremors.   Psychiatric/Behavioral:  Negative for depression. The patient is not nervous/anxious.          Vital Signs  Vitals:    08/25/24 0540 08/25/24 0759 08/25/24 0837 08/25/24 1204   BP: 102/46 143/65 143/65 143/65   BP Location: Left arm Left arm  Left arm   Patient Position: Lying Lying  Lying   Pulse: 70 54 62 57   Resp: 16 13  18   Temp: 98.7 °F (37.1 °C) 97.9 °F (36.6 °C)  98 °F (36.7 °C)   TempSrc: Oral Oral  Oral   SpO2: 93% 95%  97%   Weight:       Height:         Wt Readings from Last 1 Encounters:   08/25/24 54.8 kg (120 lb 13 oz)       Intake/Output Summary (Last 24 hours) at 8/25/2024 1440  Last data filed at 8/25/2024 1425  Gross per 24 hour   Intake 240 ml   Output 550 ml   Net -310 ml     Physical Exam:  Constitutional:       Appearance: Well-developed.   Eyes:      General: No scleral icterus.        Right eye: No discharge.   HENT:      Head: Normocephalic and atraumatic.   Neck:      Thyroid: No thyromegaly.      Lymphadenopathy: No cervical adenopathy.   Pulmonary:       Effort: Pulmonary effort is normal. No respiratory distress.      Breath sounds: Normal breath sounds. No wheezing. No rales.   Cardiovascular:      Normal rate. Regular rhythm.      No gallop.    Edema:     Peripheral edema absent.   Abdominal:      Tenderness: There is no abdominal tenderness.   Skin:     Findings: No erythema or rash.   Neurological:      Mental Status: Alert and oriented to person, place, and time.         Results Review:   Lab Results (last 24 hours)       Procedure Component Value Units Date/Time    CBC (No Diff) [[id number]]  (Abnormal) Collected: 08/25/24 0052    Specimen: Blood Updated: 08/25/24 0133     WBC 6.34 10*3/mm3      RBC 3.35 10*6/mm3      Hemoglobin 10.7 g/dL      Hematocrit 30.9 %      MCV 92.2 fL      MCH 31.9 pg      MCHC 34.6 g/dL      RDW 12.3 %      RDW-SD 41.7 fl      MPV 8.8 fL      Platelets 174 10*3/mm3      Comment: Result checked         Basic Metabolic Panel [[id number]]  (Abnormal) Collected: 08/25/24 0052    Specimen: Blood Updated: 08/25/24 0130     Glucose 103 mg/dL      BUN 15 mg/dL      Creatinine 0.99 mg/dL      Sodium 130 mmol/L      Potassium 4.2 mmol/L      Comment: Specimen hemolyzed.  Result may be falsely elevated.        Chloride 94 mmol/L      CO2 26.0 mmol/L      Calcium 8.9 mg/dL      BUN/Creatinine Ratio 15.2     Anion Gap 10.0 mmol/L      eGFR 80.4 mL/min/1.73     Narrative:      GFR Normal >60  Chronic Kidney Disease <60  Kidney Failure <15    The GFR formula is only valid for adults with stable renal function between ages 18 and 70.    Lipid Panel [[id number]]  (Abnormal) Collected: 08/25/24 0052    Specimen: Blood Updated: 08/25/24 0126     Total Cholesterol 199 mg/dL      Triglycerides 124 mg/dL      HDL Cholesterol 44 mg/dL      LDL Cholesterol  133 mg/dL      VLDL Cholesterol 22 mg/dL      LDL/HDL Ratio 2.96    Narrative:      Cholesterol Reference Ranges  (U.[place]. Department of Health and Human Services ATP III Classifications)    Desirable           <200 mg/dL  Borderline High    200-239 mg/dL  High Risk          >240 mg/dL      Triglyceride Reference Ranges  (U.S. Department of Health and Human Services ATP III Classifications)    Normal           <150 mg/dL  Borderline High  150-199 mg/dL  High             200-499 mg/dL  Very High        >500 mg/dL    HDL Reference Ranges  (U.S. Department of Health and Human Services ATP III Classifications)    Low     <40 mg/dl (major risk factor for CHD)  High    >60 mg/dl ('negative' risk factor for CHD)        LDL Reference Ranges  (U.S. Department of Health and Human Services ATP III Classifications)    Optimal          <100 mg/dL  Near Optimal     100-129 mg/dL  Borderline High  130-159 mg/dL  High             160-189 mg/dL  Very High        >189 mg/dL    POC Activated Clotting Time [[id number]]  (Abnormal) Collected: 08/24/24 1308    Specimen: Arterial Blood Updated: 08/24/24 2049     Activated Clotting Time  158 Seconds      Comment: Serial Number: [id number]:  [id number]       Protime-INR [[id number]]  (Abnormal) Collected: 08/24/24 1019    Specimen: Blood from Arm, Right Updated: 08/24/24 1552     Protime 12.4 Seconds      INR 1.15          Imaging Results (Last 72 Hours)       Procedure Component Value Units Date/Time    CT Angio Abdominal Aorta Bilateral Iliofem Runoff [[id number]] Collected: 08/25/24 1205     Updated: 08/25/24 1237    Narrative:      CT ANGIO ABDOMINAL AORTA BILAT ILIOFEM RUNOFF    Date of Exam: 8/25/2024 10:30 AM EDT    Indication: AAA, PAD.    Comparison: CTA abdomen/pelvis 1/24/2022    Technique: CTA of the abdomen, pelvis and both lower extremities was performed before and after the uneventful intravenous administration of iodinated contrast. Reconstructed coronal and sagittal images were also obtained. In addition, a 3-D volume   rendered image was created for interpretation. Automated exposure control and iterative reconstruction methods were used.      Findings:    AORTA: Aortobiiliac  stent graft appears patent. Mildly decreased size of infrarenal abdominal abdominal aortic aneurysm measuring up to 5 cm, previously up to 5.5 cm. Stable appearing bilateral common iliac artery aneurysms measuring 1.7 cm on the right   and 2.2 cm on the left.  MESENTERIC/RENAL VESSELS: Mild narrowing at the origins of the celiac artery, SMA, and bilateral renal arteries.  PELVIC VESSELS: Stable appearing bilateral common iliac artery aneurysms measuring 1.7 cm on the right and 2.2 cm on the left. Atherosclerosis of the iliac vessels, which appear patent.  IVC:  Normal in caliber.    RIGHT LOWER EXTREMITY: Patent common femoral artery which demonstrates mild multifocal narrowing. The superficial femoral artery demonstrates severe stenosis/focal occlusion at its origin and appears patent distally with diminutive appearing flow and   with multifocal severe stenoses. The profunda femoral artery demonstrates severe stenosis/focal occlusion near its origin and appears patent distally. The popliteal artery appears patent and demonstrates multifocal moderate stenosis. Calcified   atherosclerosis of the runoff vessels limits evaluation for stenosis. Within technical limitation: Multifocal severe stenoses/focal occlusions of the anterior tibial artery which appears occluded proximal to the ankle.. Multifocal severe stenoses/focal   occlusions of the posterior tibial artery which appears occluded proximal to the ankle. The peroneal artery demonstrates multifocal narrowing proximally, and appears patent to the ankle.    LEFT LOWER EXTREMITY: Common femoral artery demonstrates mild multifocal stenosis and appears patent. Superficial femoral artery and profundofemoral artery demonstrates mild to moderate multifocal stenosis and appear patent. Popliteal artery demonstrates   moderate to severe multifocal narrowing. There is calcified atherosclerosis of the runoff vessels, which limits evaluation for stenosis. Within technical  limitation: Anterior tibial artery demonstrates multifocal stenoses/occlusions and appears occluded   proximal to the ankle. The posterior tibial artery demonstrates multifocal severe stenoses/occlusions and appears occluded proximal to the ankle. The peroneal artery demonstrates multifocal severe stenosis proximally and appears patent to the foot.     LOWER THORAX: Small bilateral pleural effusions with adjacent atelectasis. Coronary artery calcifications.    LIVER: Unremarkable parenchyma without focal lesion.  BILIARY/GALLBLADDER: Cholelithiasis.  SPLEEN:  Unremarkable  PANCREAS:  Unremarkable  ADRENAL:  Unremarkable  KIDNEYS:  Unremarkable parenchyma with no solid mass identified. No hydronephrosis.No calculus identified.  GASTROINTESTINAL/MESENTERY:   No evidence of obstruction.Normal-appearing appendix.  RETROPERITONEUM/LYMPH NODES:  No pathologic appearing lymph nodes by imaging criteria.  REPRODUCTIVE: Enlarged prostate gland.  BLADDER:  Unremarkable  MUSCULOSKELETAL:No acute or aggressive appearing bone or soft tissue process.        Impression:      Impression:  Aortobiiliac stent graft appears patent. Mildly decreased size of infrarenal abdominal aortic aneurysm measuring up to 5 cm. Stable appearing bilateral common iliac artery aneurysms measuring up to 1.7 cm on the right and 2.2 cm on the left.    Severe multifocal atherosclerotic disease of the lower extremity vessels, with likely one-vessel runoff bilaterally, though appears diminutive on the right.      Electronically Signed: Lux Daniel    8/25/2024 12:35 PM EDT    Workstation ID: [id number]    XR Foot 2 View Left [[id number]] Collected: 08/23/24 2254     Updated: 08/23/24 2259    Narrative:      XR FOOT 2 VW LEFT    Date of Exam: 8/23/2024 10:41 PM EDT    Indication: acute pain    Comparison: None available.    Findings:  There is no evidence of fracture or dislocation.  No focal lesions identified. No erosions.    Mild degenerative changes are  noted throughout the foot, most significantly involving the first MTP joint..    No focal soft tissue abnormalities identified.      Impression:      Impression:  No acute osseous abnormality. Degenerative changes of the first MTP joint most likely represents osteoarthritis. Less likely, gouty arthritis is also a possibility      Electronically Signed: Vinny Smyth DO    8/23/2024 10:57 PM EDT    Workstation ID: [id number]    XR Chest 1 View [[id number]] Collected: 08/23/24 2016     Updated: 08/23/24 2019    Narrative:      XR CHEST 1 VW    Date of Exam: 8/23/2024 8:07 PM EDT    Indication: chest pain    Comparison: 1/29/2022    Findings:  Lines: None    Lungs: Mild diffuse interstitial thickening with possible superimposed patchy opacities in the lower lungs. No definite consolidation.  Pleura: No pleural effusion or pneumothorax.    Cardiomediastinum: Postsurgical changes in the mediastinum. Otherwise unremarkable    Soft Tissues: Unremarkable.    Bones: No acute osseous abnormality.      Impression:      Impression:  Mild diffuse interstitial thickening and areas of patchy opacity are nonspecific. This may represent mild pulmonary edema versus atypical/multifocal pneumonia.      Electronically Signed: Vinny Smyth DO    8/23/2024 8:17 PM EDT    Workstation ID: [id number]          ECG/EMG Results (most recent)       Procedure Component Value Units Date/Time    Telemetry Scan [[id number]] Resulted: 08/23/24     Updated: 08/23/24 2027    Telemetry Scan [[id number]] Resulted: 08/23/24     Updated: 08/24/24 0104    ECG 12 Lead Rhythm Change [[id number]] Collected: 08/24/24 0335     Updated: 08/24/24 0337     QT Interval 405 ms      QTC Interval 530 ms     Narrative:      HEART YIIG=711  bpm  RR Qeyvdzxl=366  ms  ND Ejdfhqsv=008  ms  P Horizontal Axis=  deg  P Front Axis=85  deg  QRSD Omcqgmhm=311  ms  QT Tfegxhdv=480  ms  MSpT=598  ms  QRS Axis=-65  deg  T Wave Axis=97  deg  - ABNORMAL ECG -  Sinus  tachycardia  Left atrial enlargement  Left bundle branch block  ST elevation secondary to IVCD  When compared with ECG of 23-Aug-2024 19:55:51,  Nonspecific significant change  Date and Time of Study:2024-08-24 03:35:47    ECG 12 Lead Chest Pain [[id number]] Collected: 08/23/24 1955     Updated: 08/24/24 0912     QT Interval 430 ms      QTC Interval 534 ms     Narrative:      HEART RATE=93  bpm  RR Zdfennex=491  ms  OH Lkbvrxei=491  ms  P Horizontal Axis=-36  deg  P Front Axis=62  deg  QRSD Fzmmtqgz=167  ms  QT Metazcuw=211  ms  XXtJ=216  ms  QRS Axis=-77  deg  T Wave Axis=97  deg  - ABNORMAL ECG -  Sinus rhythm  LAE, consider biatrial enlargement  Left bundle branch block  ST elevation secondary to IVCD  Electronically Signed By: Iron Harris (Rishabh) 2024-08-24 09:12:06  Date and Time of Study:2024-08-23 19:55:51    ECG 12 Lead Chest Pain [[id number]] Collected: 08/24/24 1559     Updated: 08/24/24 1601     QT Interval 518 ms      QTC Interval 509 ms     Narrative:      HEART RATE=58  bpm  RR Crusiauy=0812  ms  OH Gfdcoqwa=870  ms  P Horizontal Axis=-29  deg  P Front Axis=15  deg  QRSD Bsjpeaga=759  ms  QT Onslrrdg=784  ms  TTcC=504  ms  QRS Axis=-63  deg  T Wave Axis=131  deg  - ABNORMAL ECG -  Sinus bradycardia  Probable left atrial enlargement  Left bundle branch block  ST elevation secondary to IVCD  Date and Time of Study:2024-08-24 15:59:35    Telemetry Scan [[id number]] Resulted: 08/23/24     Updated: 08/24/24 1648    Telemetry Scan [[id number]] Resulted: 08/23/24     Updated: 08/24/24 1707    Adult Transthoracic Echo Complete w/ Color, Spectral and Contrast if Necessary Per Protocol [[id number]] Resulted: 08/24/24 1707     Updated: 08/24/24 1710     LV GLOBAL STRAIN  -5.7 %      LVIDd 5.7 cm      LVIDs 5.2 cm      IVSd 1.10 cm      LVPWd 1.10 cm      FS 8.8 %      IVS/LVPW 1.00 cm      ESV(cubed) 140.6 ml      LV Sys Vol (BSA corrected) 49.9 cm2      EDV(cubed) 185.2 ml      LV Hearn Vol (BSA corrected) 62.0 cm2       LV mass(C)d 256.7 grams      LVOT area 3.5 cm2      LVOT diam 2.10 cm      EDV(MOD-sp4) 114.0 ml      ESV(MOD-sp4) 91.7 ml      SV(MOD-sp4) 22.3 ml      SVi(MOD-SP4) 12.1 ml/m2      SVi (LVOT) 25.1 ml/m2      EF(MOD-sp4) 19.6 %      MV E max everett 55.1 cm/sec      MV A max everett 72.6 cm/sec      MV dec time 0.27 sec      MV E/A 0.76     Med Peak E' [place] 3.7 cm/sec      Lat Peak E' [place] 4.0 cm/sec      TR max everett 208.0 cm/sec      Avg E/e' ratio 14.31     SV(LVOT) 46.1 ml      RVIDd 2.00 cm      TAPSE (>1.6) 1.48 cm      RV S' 10.1 cm/sec      LA dimension (2D)  5.2 cm      LV V1 max 63.0 cm/sec      LV V1 max PG 1.59 mmHg      LV V1 mean PG 1.00 mmHg      LV V1 VTI 13.3 cm      Ao pk everett 127.0 cm/sec      Ao max PG 6.5 mmHg      Ao mean PG 3.0 mmHg      Ao V2 VTI 29.0 cm      HEIDY(I,D) 1.59 cm2      AI P1/2t 553.4 msec      MV max PG 2.7 mmHg      MV mean PG 1.00 mmHg      MV V2 VTI 22.6 cm      MV P1/2t 111.3 msec      MVA(P1/2t) 1.98 cm2      MVA(VTI) 2.04 cm2      MV dec slope 150.0 cm/sec2      MR max everett 399.0 cm/sec      MR max PG 63.7 mmHg      TR max PG 17.3 mmHg      RVSP(TR) 20.3 mmHg      RAP systole 3.0 mmHg      RV V1 max PG 2.7 mmHg      RV V1 max 81.4 cm/sec      RV V1 VTI 13.0 cm      PA V2 max 79.6 cm/sec      PA acc time 0.11 sec      ACS 1.40 cm      Sinus 3.4 cm      EF(MOD-bp) 20.0 %      Dimensionless Index 0.47 (DI)     Narrative:        Left ventricular systolic function is severely decreased. Left   ventricular ejection fraction appears to be 21 - 25%.  Akinetic mid to   distal anterior wall and apex noted    The left ventricular cavity is mildly dilated.    Left ventricular wall thickness is consistent with mild concentric   hypertrophy.    Left ventricular diastolic function was normal.    Estimated right ventricular systolic pressure from tricuspid   regurgitation is normal (<35 mmHg).      Telemetry Scan [[id number]] Resulted: 08/23/24     Updated: 08/24/24 1731    Telemetry Scan [[id number]]  Resulted: 08/23/24     Updated: 08/24/24 1821    Telemetry Scan [[id number]] Resulted: 08/23/24     Updated: 08/24/24 1854    Telemetry Scan [[id number]] Resulted: 08/23/24     Updated: 08/24/24 2005    ECG 12 Lead Pre-Op / Pre-Procedure [[id number]] Collected: 08/25/24 0547     Updated: 08/25/24 0550     QT Interval 476 ms      QTC Interval 490 ms     Narrative:      HEART RATE=64  bpm  RR Lhjrbmjv=373  ms  UT Cdfacqwt=276  ms  P Horizontal Axis=-37  deg  P Front Axis=54  deg  QRSD Pyqioeig=615  ms  QT Idvvnqaw=490  ms  IUlE=327  ms  QRS Axis=-69  deg  T Wave Axis=133  deg  - ABNORMAL ECG -  Sinus rhythm  Probable left atrial enlargement  Left bundle branch block  Date and Time of Study:2024-08-25 05:47:11          CBC    Results from last 7 days   Lab Units 08/25/24  0052 08/24/24 0315 08/23/24 2004   WBC 10*3/mm3 6.34 7.02 6.09   HEMOGLOBIN g/dL 10.7* 11.4* 12.5*   PLATELETS 10*3/mm3 174 153 179     BMP   Results from last 7 days   Lab Units 08/25/24  0052 08/24/24  0606 08/23/24 2302 08/23/24 2004   SODIUM mmol/L 130* 131* 132* 132*   POTASSIUM mmol/L 4.2 4.7 4.1 3.9   CHLORIDE mmol/L 94* 96* 95* 93*   CO2 mmol/L 26.0 24.5 26.6 25.7   BUN mg/dL 15 12 11 11   CREATININE mg/dL 0.99 1.03 1.00 1.15   GLUCOSE mg/dL 103* 98 96 113*     CMP   Results from last 7 days   Lab Units 08/25/24  0052 08/24/24  0606 08/23/24 2302 08/23/24 2004   SODIUM mmol/L 130* 131* 132* 132*   POTASSIUM mmol/L 4.2 4.7 4.1 3.9   CHLORIDE mmol/L 94* 96* 95* 93*   CO2 mmol/L 26.0 24.5 26.6 25.7   BUN mg/dL 15 12 11 11   CREATININE mg/dL 0.99 1.03 1.00 1.15   GLUCOSE mg/dL 103* 98 96 113*     Cardiac Studies:  Echo- Results for orders placed during the hospital encounter of 08/23/24    Adult Transthoracic Echo Complete w/ Color, Spectral and Contrast if Necessary Per Protocol    Interpretation Summary    Left ventricular systolic function is severely decreased. Left ventricular ejection fraction appears to be 21 - 25%.  Akinetic mid to  distal anterior wall and apex noted    The left ventricular cavity is mildly dilated.    Left ventricular wall thickness is consistent with mild concentric hypertrophy.    Left ventricular diastolic function was normal.    Estimated right ventricular systolic pressure from tricuspid regurgitation is normal (<35 mmHg).    Stress Myoview-  Cath-      Medication Review:   Scheduled Meds:aspirin, 81 mg, Oral, Daily  atorvastatin, 40 mg, Oral, Nightly  bacitracin, 1 Application, Topical, Q12H  cefTRIAXone, 2,000 mg, Intravenous, Q24H  clopidogrel, 75 mg, Oral, Daily  hydrALAZINE, 25 mg, Oral, Q12H  hydrOXYzine, 25 mg, Oral, 4x Daily  isosorbide mononitrate, 30 mg, Oral, Q24H  metoprolol tartrate, 25 mg, Oral, Q12H  ranolazine, 500 mg, Oral, Q12H  sodium chloride, 10 mL, Intravenous, Q12H  tamsulosin, 0.4 mg, Oral, Nightly      Continuous Infusions:sodium chloride, 75 mL/hr, Last Rate: 75 mL/hr (08/25/24 1238)      PRN Meds:.  acetaminophen    aluminum-magnesium hydroxide-simethicone    atropine    senna-docusate sodium **AND** polyethylene glycol **AND** bisacodyl **AND** bisacodyl    diphenhydrAMINE    hydrALAZINE    nitroglycerin    ondansetron ODT **OR** ondansetron    [COMPLETED] Insert Peripheral IV **AND** sodium chloride    sodium chloride    sodium chloride    sodium chloride      Assessment & Plan     NSTEMI (non-ST elevated myocardial infarction)    S/P CABG x 3    Coronary artery disease    Hyperlipidemia    Smoking greater than 30 pack years    Tobacco use    MDM:    1.  CAD/CABG:    Patient underwent cardiac catheterization and his bypasses were patent but patient has severe native coronary artery disease.  Nongrafted right had intermediate lesion.  Medical treatment was recommended.    2.  Chest pain:    Patient is chest pain-free.  Patient has been started on Imdur and Ranexa    3.  Peripheral vascular disease:    Vascular surgery has seen the patient.  Patient is noted to have severe lower extremity  disease.  Further recommendation as per them    4.  Hypertension:    Blood pressure is controlled    Omer Vieyra MD  08/25/24  14:40 EDT           none

## 2024-08-25 NOTE — PLAN OF CARE
Goal Outcome Evaluation:  Plan of Care Reviewed With: patient        Progress: improving    Patient came in here with a diagnosis of NSTEMI. Patient had a cardiac cath yesterday. No stent was place.     Vascular Surgeon was consulted- CT angiogram with run-off/PIO were ordered .    Plan of care is ongoing.

## 2024-08-25 NOTE — PROGRESS NOTES
[facility] MEDICINE SERVICE  DAILY PROGRESS NOTE    NAME: Alexei Merino III  : 1950  MRN: [id number]      LOS: 2 days     PROVIDER OF SERVICE: Baljit Higuera MD    Chief Complaint: NSTEMI (non-ST elevated myocardial infarction)    Subjective:     Interval History:  History taken from: patient    Chest pain-free, discussed left heart cath that was done yesterday with the patient and his daughter who was at the bedside again      Objective:     Vital Signs  Temp:  [97.9 °F (36.6 °C)-98.7 °F (37.1 °C)] 97.9 °F (36.6 °C)  Heart Rate:  [54-70] 62  Resp:  [13-21] 13  BP: (101-174)/(46-84) 143/65  Flow (L/min):  [2-4] 2   Body mass index is 17.84 kg/m².    Physical Exam  General Appearance:  Alert, cooperative, no distress, appears stated age  Head:   Normocephalic, without obvious abnormality, atraumatic  Eyes:   PERRL, conjunctiva/corneas clear, EOM's intact, fundi benign, both eyes  Ears:    Normal TM's and external ear canals, both ears  Nose:   Nares normal, septum midline, mucosa normal, no drainage or sinus tenderness  Throat: Lips, mucosa, and tongue normal; teeth and gums normal  Neck:   Supple, symmetrical, trachea midline, no adenopathy, thyroid: not enlarged, symmetric, no tenderness/mass/nodules, no carotid bruit or JVD  Lungs:             Clear to auscultation bilaterally, respirations unlabored  Heart:  Regular rate and rhythm, S1, S2 normal, no murmur, rub or gallop  Abdomen:  Soft, non-tender, bowel sounds active all four quadrants,  no masses, no organomegaly  Extremities:     Extremities normal, atraumatic, no cyanosis or edema  Pulses:            2+ and symmetric  Skin:    Skin color, texture, turgor normal, no rashes or lesions  Neurologic: Normal    Scheduled Meds   aspirin, 81 mg, Oral, Daily  atorvastatin, 40 mg, Oral, Nightly  bacitracin, 1 Application, Topical, Q12H  cefTRIAXone, 2,000 mg, Intravenous, Q24H  clopidogrel, 75 mg, Oral, Daily  hydrALAZINE, 25 mg, Oral,  Q12H  hydrOXYzine, 25 mg, Oral, 4x Daily  isosorbide mononitrate, 30 mg, Oral, Q24H  metoprolol tartrate, 25 mg, Oral, Q12H  ranolazine, 500 mg, Oral, Q12H  sodium chloride, 10 mL, Intravenous, Q12H  tamsulosin, 0.4 mg, Oral, Nightly       PRN Meds     acetaminophen    aluminum-magnesium hydroxide-simethicone    atropine    senna-docusate sodium **AND** polyethylene glycol **AND** bisacodyl **AND** bisacodyl    diphenhydrAMINE    hydrALAZINE    nitroglycerin    ondansetron ODT **OR** ondansetron    [COMPLETED] Insert Peripheral IV **AND** sodium chloride    sodium chloride    sodium chloride    sodium chloride   Infusions           Diagnostic Data    Results from last 7 days   Lab Units 08/25/24  0052   WBC 10*3/mm3 6.34   HEMOGLOBIN g/dL 10.7*   HEMATOCRIT % 30.9*   PLATELETS 10*3/mm3 174   GLUCOSE mg/dL 103*   CREATININE mg/dL 0.99   BUN mg/dL 15   SODIUM mmol/L 130*   POTASSIUM mmol/L 4.2   ANION GAP mmol/L 10.0       XR Foot 2 View Left    Result Date: 8/23/2024  Impression: No acute osseous abnormality. Degenerative changes of the first MTP joint most likely represents osteoarthritis. Less likely, gouty arthritis is also a possibility Electronically Signed: Vinny Smyth DO  8/23/2024 10:57 PM EDT  Workstation ID: [id number]    XR Chest 1 View    Result Date: 8/23/2024  Impression: Mild diffuse interstitial thickening and areas of patchy opacity are nonspecific. This may represent mild pulmonary edema versus atypical/multifocal pneumonia. Electronically Signed: Vinny Smyth DO  8/23/2024 8:17 PM EDT  Workstation ID: [id number]       I reviewed the patient's new clinical results.    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       Chest pain  NSTEMI  Hx CAD   - HS troponin 195 and 234  - EKG no ST elevation but new LBBB  -Off heparin drip   -Status post left heart cath done yesterday showed small vessels coronary disease not amenable for PCI with recommendation to continue medical management  -Continue with aspirin Plavix statin and beta-blocker      suspect 2/2 to possible developing cellulitis, ruptured blister on bottom of foot   - cont rocephin for now , should be able to change to Keflex by tomorrow  - bacitracin to open wound   - analgesics prn  - X-ray: No acute osseous abnormality. Degenerative changes of the first MTP joint most likely represents osteoarthritis. Less likely, gouty arthritis is also a possibility     PVD /H oF AAA  -Evaluated by vascular surgery and plan for CT abdomen with runoff today, depends on results next event plan will be determined  -Start patient on IV fluid to reduce the chance of contrast-induced nephropathy    Anxiety  - atarax prn     HTN  HLD  - not on medication      Emphysema   - not on home O2  - wean oxygen as tolerated     VTE Prophylaxis:  No VTE prophylaxis order currently exists.         Code status is   Code Status and Medical Interventions: CPR (Attempt to Resuscitate); Full Support   Ordered at: 08/23/24 4708     Code Status (Patient has no pulse and is not breathing):    CPR (Attempt to Resuscitate)     Medical Interventions (Patient has pulse or is breathing):    Full Support       Plan for disposition:1-2 days    Time: 30 minutes    Signature: Electronically signed by Baljit Higuera MD, 08/25/24, 10:40 EDT.  [facility] Hospitalist Team

## 2024-08-26 LAB
ALBUMIN SERPL-MCNC: 3.7 G/DL (ref 3.5–5.2)
ALBUMIN/GLOB SERPL: 2.1 G/DL
ALP SERPL-CCNC: 57 U/L (ref 39–117)
ALT SERPL W P-5'-P-CCNC: 6 U/L (ref 1–41)
ANION GAP SERPL CALCULATED.3IONS-SCNC: 9.6 MMOL/L (ref 5–15)
AST SERPL-CCNC: 22 U/L (ref 1–40)
BILIRUB SERPL-MCNC: 1.1 MG/DL (ref 0–1.2)
BUN SERPL-MCNC: 15 MG/DL (ref 8–23)
BUN/CREAT SERPL: 15.3 (ref 7–25)
CALCIUM SPEC-SCNC: 8.6 MG/DL (ref 8.6–10.5)
CHLORIDE SERPL-SCNC: 99 MMOL/L (ref 98–107)
CO2 SERPL-SCNC: 22.4 MMOL/L (ref 22–29)
CREAT SERPL-MCNC: 0.98 MG/DL (ref 0.76–1.27)
DEPRECATED RDW RBC AUTO: 41.2 FL (ref 37–54)
EGFRCR SERPLBLD CKD-EPI 2021: 81.4 ML/MIN/1.73
ERYTHROCYTE [DISTWIDTH] IN BLOOD BY AUTOMATED COUNT: 12.3 % (ref 12.3–15.4)
GLOBULIN UR ELPH-MCNC: 1.8 GM/DL
GLUCOSE SERPL-MCNC: 103 MG/DL (ref 65–99)
HCT VFR BLD AUTO: 27.6 % (ref 37.5–51)
HGB BLD-MCNC: 9.5 G/DL (ref 13–17.7)
MAGNESIUM SERPL-MCNC: 1.9 MG/DL (ref 1.6–2.4)
MCH RBC QN AUTO: 31.7 PG (ref 26.6–33)
MCHC RBC AUTO-ENTMCNC: 34.4 G/DL (ref 31.5–35.7)
MCV RBC AUTO: 92 FL (ref 79–97)
PHOSPHATE SERPL-MCNC: 2.6 MG/DL (ref 2.5–4.5)
PLATELET # BLD AUTO: 150 10*3/MM3 (ref 140–450)
PMV BLD AUTO: 9.5 FL (ref 6–12)
POTASSIUM SERPL-SCNC: 3.9 MMOL/L (ref 3.5–5.2)
PROT SERPL-MCNC: 5.5 G/DL (ref 6–8.5)
QT INTERVAL: 405 MS
QT INTERVAL: 476 MS
QT INTERVAL: 518 MS
QTC INTERVAL: 490 MS
QTC INTERVAL: 509 MS
QTC INTERVAL: 530 MS
RBC # BLD AUTO: 3 10*6/MM3 (ref 4.14–5.8)
SODIUM SERPL-SCNC: 131 MMOL/L (ref 136–145)
WBC NRBC COR # BLD AUTO: 6.3 10*3/MM3 (ref 3.4–10.8)

## 2024-08-26 PROCEDURE — 25010000002 ONDANSETRON PER 1 MG: Performed by: INTERNAL MEDICINE

## 2024-08-26 PROCEDURE — 97112 NEUROMUSCULAR REEDUCATION: CPT

## 2024-08-26 PROCEDURE — 99221 1ST HOSP IP/OBS SF/LOW 40: CPT | Performed by: PODIATRIST

## 2024-08-26 PROCEDURE — 83735 ASSAY OF MAGNESIUM: CPT | Performed by: STUDENT IN AN ORGANIZED HEALTH CARE EDUCATION/TRAINING PROGRAM

## 2024-08-26 PROCEDURE — 97530 THERAPEUTIC ACTIVITIES: CPT

## 2024-08-26 PROCEDURE — 97110 THERAPEUTIC EXERCISES: CPT

## 2024-08-26 PROCEDURE — 85027 COMPLETE CBC AUTOMATED: CPT | Performed by: STUDENT IN AN ORGANIZED HEALTH CARE EDUCATION/TRAINING PROGRAM

## 2024-08-26 PROCEDURE — 80053 COMPREHEN METABOLIC PANEL: CPT | Performed by: STUDENT IN AN ORGANIZED HEALTH CARE EDUCATION/TRAINING PROGRAM

## 2024-08-26 PROCEDURE — 99232 SBSQ HOSP IP/OBS MODERATE 35: CPT | Performed by: INTERNAL MEDICINE

## 2024-08-26 PROCEDURE — 84100 ASSAY OF PHOSPHORUS: CPT | Performed by: STUDENT IN AN ORGANIZED HEALTH CARE EDUCATION/TRAINING PROGRAM

## 2024-08-26 PROCEDURE — 25010000002 CEFTRIAXONE PER 250 MG: Performed by: INTERNAL MEDICINE

## 2024-08-26 RX ORDER — ALPRAZOLAM 0.25 MG
0.25 TABLET ORAL ONCE
Status: COMPLETED | OUTPATIENT
Start: 2024-08-26 | End: 2024-08-26

## 2024-08-26 RX ORDER — HYDROXYZINE HYDROCHLORIDE 25 MG/1
50 TABLET, FILM COATED ORAL 4 TIMES DAILY
Status: DISCONTINUED | OUTPATIENT
Start: 2024-08-26 | End: 2024-08-27 | Stop reason: HOSPADM

## 2024-08-26 RX ADMIN — METOPROLOL TARTRATE 25 MG: 25 TABLET, FILM COATED ORAL at 21:31

## 2024-08-26 RX ADMIN — ALPRAZOLAM 0.25 MG: 0.25 TABLET ORAL at 02:59

## 2024-08-26 RX ADMIN — Medication 10 ML: at 21:27

## 2024-08-26 RX ADMIN — HYDROXYZINE HYDROCHLORIDE 25 MG: 25 TABLET ORAL at 08:30

## 2024-08-26 RX ADMIN — CEFTRIAXONE 2000 MG: 2 INJECTION, POWDER, FOR SOLUTION INTRAMUSCULAR; INTRAVENOUS at 21:29

## 2024-08-26 RX ADMIN — ONDANSETRON 4 MG: 2 INJECTION INTRAMUSCULAR; INTRAVENOUS at 10:16

## 2024-08-26 RX ADMIN — HYDROXYZINE HYDROCHLORIDE 25 MG: 25 TABLET ORAL at 11:57

## 2024-08-26 RX ADMIN — ATORVASTATIN CALCIUM 40 MG: 40 TABLET, FILM COATED ORAL at 21:26

## 2024-08-26 RX ADMIN — TAMSULOSIN HYDROCHLORIDE 0.4 MG: 0.4 CAPSULE ORAL at 21:33

## 2024-08-26 RX ADMIN — ISOSORBIDE MONONITRATE 30 MG: 30 TABLET, EXTENDED RELEASE ORAL at 08:30

## 2024-08-26 RX ADMIN — METOPROLOL TARTRATE 25 MG: 25 TABLET, FILM COATED ORAL at 08:30

## 2024-08-26 RX ADMIN — HYDRALAZINE HYDROCHLORIDE 25 MG: 25 TABLET ORAL at 08:30

## 2024-08-26 RX ADMIN — HYDRALAZINE HYDROCHLORIDE 25 MG: 25 TABLET ORAL at 21:34

## 2024-08-26 RX ADMIN — RANOLAZINE 500 MG: 500 TABLET, EXTENDED RELEASE ORAL at 21:28

## 2024-08-26 RX ADMIN — HYDROXYZINE HYDROCHLORIDE 50 MG: 25 TABLET, FILM COATED ORAL at 17:30

## 2024-08-26 RX ADMIN — HYDROXYZINE HYDROCHLORIDE 50 MG: 25 TABLET, FILM COATED ORAL at 21:27

## 2024-08-26 RX ADMIN — RANOLAZINE 500 MG: 500 TABLET, EXTENDED RELEASE ORAL at 08:30

## 2024-08-26 RX ADMIN — Medication 10 ML: at 08:31

## 2024-08-26 RX ADMIN — BACITRACIN 0.9 G: 500 OINTMENT TOPICAL at 21:28

## 2024-08-26 RX ADMIN — ASPIRIN 81 MG: 81 TABLET, COATED ORAL at 08:30

## 2024-08-26 RX ADMIN — CLOPIDOGREL BISULFATE 75 MG: 75 TABLET ORAL at 08:30

## 2024-08-26 RX ADMIN — BACITRACIN 0.9 G: 500 OINTMENT TOPICAL at 08:30

## 2024-08-26 NOTE — PLAN OF CARE
Goal Outcome Evaluation:  Plan of Care Reviewed With: patient        Progress: no change  Outcome Evaluation: Patient received medications. Patient requested more frequent anxiety medications, MD notified- dose increased. Patient is room air-2L nc. Follow up with podiatry Katharine Stein outpatient within 1 month, per Dr Carpio.

## 2024-08-26 NOTE — PROGRESS NOTES
LOS: 3 days   Admitting Physician- Guillermo Bear MD    Reason For Followup:    Chest pain  Non-ST elevation myocardial infarction  CAD  CABG  Hypertension    Subjective     No chest pain    Objective     Hemodynamics are stable    Review of Systems:   Review of Systems   Constitutional: Negative for chills and fever.   HENT:  Negative for ear discharge and nosebleeds.    Eyes:  Negative for discharge and redness.   Cardiovascular:  Negative for chest pain, orthopnea, palpitations, paroxysmal nocturnal dyspnea and syncope.   Respiratory:  Negative for cough, shortness of breath and wheezing.    Endocrine: Negative for heat intolerance.   Skin:  Negative for rash.   Musculoskeletal:  Negative for arthritis and myalgias.   Gastrointestinal:  Negative for abdominal pain, melena, nausea and vomiting.   Genitourinary:  Negative for dysuria and hematuria.   Neurological:  Negative for dizziness, light-headedness, numbness and tremors.   Psychiatric/Behavioral:  Negative for depression. The patient is not nervous/anxious.          Vital Signs  Vitals:    08/26/24 0515 08/26/24 0830 08/26/24 1300 08/26/24 1701   BP: 158/80 161/84 161/93 162/83   BP Location: Left arm   Left arm   Patient Position: Lying   Lying   Pulse: 85 64 52 72   Resp: 24 13 26 19   Temp: 97.9 °F (36.6 °C) 98.2 °F (36.8 °C) 99.9 °F (37.7 °C) 98.5 °F (36.9 °C)   TempSrc: Axillary Axillary Axillary Oral   SpO2: 96% 99% 97% 96%   Weight:       Height:         Wt Readings from Last 1 Encounters:   08/25/24 54.8 kg (120 lb 13 oz)       Intake/Output Summary (Last 24 hours) at 8/26/2024 1810  Last data filed at 8/26/2024 1701  Gross per 24 hour   Intake 240 ml   Output 275 ml   Net -35 ml     Physical Exam:  Constitutional:       Appearance: Well-developed.   Eyes:      General: No scleral icterus.        Right eye: No discharge.   HENT:      Head: Normocephalic and atraumatic.   Neck:      Thyroid: No thyromegaly.      Lymphadenopathy: No cervical  adenopathy.   Pulmonary:      Effort: Pulmonary effort is normal. No respiratory distress.      Breath sounds: Normal breath sounds. No wheezing. No rales.   Cardiovascular:      Normal rate. Regular rhythm.      No gallop.    Edema:     Peripheral edema absent.   Abdominal:      Tenderness: There is no abdominal tenderness.   Skin:     Findings: No erythema or rash.   Neurological:      Mental Status: Alert and oriented to person, place, and time.         Results Review:   Lab Results (last 24 hours)       Procedure Component Value Units Date/Time    CBC (No Diff) [[id number]]  (Abnormal) Collected: 08/26/24 0224    Specimen: Blood Updated: 08/26/24 0319     WBC 6.30 10*3/mm3      RBC 3.00 10*6/mm3      Hemoglobin 9.5 g/dL      Hematocrit 27.6 %      MCV 92.0 fL      MCH 31.7 pg      MCHC 34.4 g/dL      RDW 12.3 %      RDW-SD 41.2 fl      MPV 9.5 fL      Platelets 150 10*3/mm3      Comment: Result checked         Comprehensive Metabolic Panel [[id number]]  (Abnormal) Collected: 08/26/24 0224    Specimen: Blood Updated: 08/26/24 0311     Glucose 103 mg/dL      BUN 15 mg/dL      Creatinine 0.98 mg/dL      Sodium 131 mmol/L      Potassium 3.9 mmol/L      Comment: Slight hemolysis detected by analyzer. Result may be falsely elevated.        Chloride 99 mmol/L      CO2 22.4 mmol/L      Calcium 8.6 mg/dL      Total Protein 5.5 g/dL      Albumin 3.7 g/dL      ALT (SGPT) 6 U/L      AST (SGOT) 22 U/L      Comment: Slight hemolysis detected by analyzer. Result may be falsely elevated.        Alkaline Phosphatase 57 U/L      Total Bilirubin 1.1 mg/dL      Globulin 1.8 gm/dL      A/G Ratio 2.1 g/dL      BUN/Creatinine Ratio 15.3     Anion Gap 9.6 mmol/L      eGFR 81.4 mL/min/1.73     Narrative:      GFR Normal >60  Chronic Kidney Disease <60  Kidney Failure <15    The GFR formula is only valid for adults with stable renal function between ages 18 and 70.    Magnesium [[id number]]  (Normal) Collected: 08/26/24 0224    Specimen:  Blood Updated: 08/26/24 0311     Magnesium 1.9 mg/dL     Phosphorus [[id number]]  (Normal) Collected: 08/26/24 0224    Specimen: Blood Updated: 08/26/24 0308     Phosphorus 2.6 mg/dL           Imaging Results (Last 72 Hours)       Procedure Component Value Units Date/Time    CT Angio Abdominal Aorta Bilateral Iliofem Runoff [[id number]] Collected: 08/25/24 1205     Updated: 08/25/24 1237    Narrative:      CT ANGIO ABDOMINAL AORTA BILAT ILIOFEM RUNOFF    Date of Exam: 8/25/2024 10:30 AM EDT    Indication: AAA, PAD.    Comparison: CTA abdomen/pelvis 1/24/2022    Technique: CTA of the abdomen, pelvis and both lower extremities was performed before and after the uneventful intravenous administration of iodinated contrast. Reconstructed coronal and sagittal images were also obtained. In addition, a 3-D volume   rendered image was created for interpretation. Automated exposure control and iterative reconstruction methods were used.      Findings:    AORTA: Aortobiiliac stent graft appears patent. Mildly decreased size of infrarenal abdominal abdominal aortic aneurysm measuring up to 5 cm, previously up to 5.5 cm. Stable appearing bilateral common iliac artery aneurysms measuring 1.7 cm on the right   and 2.2 cm on the left.  MESENTERIC/RENAL VESSELS: Mild narrowing at the origins of the celiac artery, SMA, and bilateral renal arteries.  PELVIC VESSELS: Stable appearing bilateral common iliac artery aneurysms measuring 1.7 cm on the right and 2.2 cm on the left. Atherosclerosis of the iliac vessels, which appear patent.  IVC:  Normal in caliber.    RIGHT LOWER EXTREMITY: Patent common femoral artery which demonstrates mild multifocal narrowing. The superficial femoral artery demonstrates severe stenosis/focal occlusion at its origin and appears patent distally with diminutive appearing flow and   with multifocal severe stenoses. The profunda femoral artery demonstrates severe stenosis/focal occlusion near its origin and  appears patent distally. The popliteal artery appears patent and demonstrates multifocal moderate stenosis. Calcified   atherosclerosis of the runoff vessels limits evaluation for stenosis. Within technical limitation: Multifocal severe stenoses/focal occlusions of the anterior tibial artery which appears occluded proximal to the ankle.. Multifocal severe stenoses/focal   occlusions of the posterior tibial artery which appears occluded proximal to the ankle. The peroneal artery demonstrates multifocal narrowing proximally, and appears patent to the ankle.    LEFT LOWER EXTREMITY: Common femoral artery demonstrates mild multifocal stenosis and appears patent. Superficial femoral artery and profundofemoral artery demonstrates mild to moderate multifocal stenosis and appear patent. Popliteal artery demonstrates   moderate to severe multifocal narrowing. There is calcified atherosclerosis of the runoff vessels, which limits evaluation for stenosis. Within technical limitation: Anterior tibial artery demonstrates multifocal stenoses/occlusions and appears occluded   proximal to the ankle. The posterior tibial artery demonstrates multifocal severe stenoses/occlusions and appears occluded proximal to the ankle. The peroneal artery demonstrates multifocal severe stenosis proximally and appears patent to the foot.     LOWER THORAX: Small bilateral pleural effusions with adjacent atelectasis. Coronary artery calcifications.    LIVER: Unremarkable parenchyma without focal lesion.  BILIARY/GALLBLADDER: Cholelithiasis.  SPLEEN:  Unremarkable  PANCREAS:  Unremarkable  ADRENAL:  Unremarkable  KIDNEYS:  Unremarkable parenchyma with no solid mass identified. No hydronephrosis.No calculus identified.  GASTROINTESTINAL/MESENTERY:   No evidence of obstruction.Normal-appearing appendix.  RETROPERITONEUM/LYMPH NODES:  No pathologic appearing lymph nodes by imaging criteria.  REPRODUCTIVE: Enlarged prostate gland.  BLADDER:   Unremarkable  MUSCULOSKELETAL:No acute or aggressive appearing bone or soft tissue process.        Impression:      Impression:  Aortobiiliac stent graft appears patent. Mildly decreased size of infrarenal abdominal aortic aneurysm measuring up to 5 cm. Stable appearing bilateral common iliac artery aneurysms measuring up to 1.7 cm on the right and 2.2 cm on the left.    Severe multifocal atherosclerotic disease of the lower extremity vessels, with likely one-vessel runoff bilaterally, though appears diminutive on the right.      Electronically Signed: Lux Daniel    8/25/2024 12:35 PM EDT    Workstation ID: [id number]    XR Foot 2 View Left [[id number]] Collected: 08/23/24 2254     Updated: 08/23/24 2259    Narrative:      XR FOOT 2 VW LEFT    Date of Exam: 8/23/2024 10:41 PM EDT    Indication: acute pain    Comparison: None available.    Findings:  There is no evidence of fracture or dislocation.  No focal lesions identified. No erosions.    Mild degenerative changes are noted throughout the foot, most significantly involving the first MTP joint..    No focal soft tissue abnormalities identified.      Impression:      Impression:  No acute osseous abnormality. Degenerative changes of the first MTP joint most likely represents osteoarthritis. Less likely, gouty arthritis is also a possibility      Electronically Signed: Vinny Smyth DO    8/23/2024 10:57 PM EDT    Workstation ID: [id number]    XR Chest 1 View [[id number]] Collected: 08/23/24 2016     Updated: 08/23/24 2019    Narrative:      XR CHEST 1 VW    Date of Exam: 8/23/2024 8:07 PM EDT    Indication: chest pain    Comparison: 1/29/2022    Findings:  Lines: None    Lungs: Mild diffuse interstitial thickening with possible superimposed patchy opacities in the lower lungs. No definite consolidation.  Pleura: No pleural effusion or pneumothorax.    Cardiomediastinum: Postsurgical changes in the mediastinum. Otherwise unremarkable    Soft Tissues:  Unremarkable.    Bones: No acute osseous abnormality.      Impression:      Impression:  Mild diffuse interstitial thickening and areas of patchy opacity are nonspecific. This may represent mild pulmonary edema versus atypical/multifocal pneumonia.      Electronically Signed: Vinny Smyth DO    8/23/2024 8:17 PM EDT    Workstation ID: [id number]          ECG/EMG Results (most recent)       Procedure Component Value Units Date/Time    Telemetry Scan [[id number]] Resulted: 08/23/24     Updated: 08/23/24 2027    Telemetry Scan [[id number]] Resulted: 08/23/24     Updated: 08/24/24 0104    ECG 12 Lead Chest Pain [[id number]] Collected: 08/23/24 1955     Updated: 08/24/24 0912     QT Interval 430 ms      QTC Interval 534 ms     Narrative:      HEART RATE=93  bpm  RR Ebolhzkp=987  ms  FL Neagebup=427  ms  P Horizontal Axis=-36  deg  P Front Axis=62  deg  QRSD Nogbzdbt=425  ms  QT Hjjvvbaq=996  ms  NXyR=353  ms  QRS Axis=-77  deg  T Wave Axis=97  deg  - ABNORMAL ECG -  Sinus rhythm  LAE, consider biatrial enlargement  Left bundle branch block  ST elevation secondary to IVCD  Electronically Signed By: Iron Harris ([facility]) 2024-08-24 09:12:06  Date and Time of Study:2024-08-23 19:55:51    Telemetry Scan [[id number]] Resulted: 08/23/24     Updated: 08/24/24 1648    Telemetry Scan [[id number]] Resulted: 08/23/24     Updated: 08/24/24 1707    Adult Transthoracic Echo Complete w/ Color, Spectral and Contrast if Necessary Per Protocol [[id number]] Resulted: 08/24/24 1707     Updated: 08/24/24 1710     LV GLOBAL STRAIN  -5.7 %      LVIDd 5.7 cm      LVIDs 5.2 cm      IVSd 1.10 cm      LVPWd 1.10 cm      FS 8.8 %      IVS/LVPW 1.00 cm      ESV(cubed) 140.6 ml      LV Sys Vol (BSA corrected) 49.9 cm2      EDV(cubed) 185.2 ml      LV Hearn Vol (BSA corrected) 62.0 cm2      LV mass(C)d 256.7 grams      LVOT area 3.5 cm2      LVOT diam 2.10 cm      EDV(MOD-sp4) 114.0 ml      ESV(MOD-sp4) 91.7 ml      SV(MOD-sp4) 22.3 ml      SVi(MOD-SP4)  12.1 ml/m2      SVi (LVOT) 25.1 ml/m2      EF(MOD-sp4) 19.6 %      MV E max everett 55.1 cm/sec      MV A max everett 72.6 cm/sec      MV dec time 0.27 sec      MV E/A 0.76     Med Peak E' [place] 3.7 cm/sec      Lat Peak E' [place] 4.0 cm/sec      TR max everett 208.0 cm/sec      Avg E/e' ratio 14.31     SV(LVOT) 46.1 ml      RVIDd 2.00 cm      TAPSE (>1.6) 1.48 cm      RV S' 10.1 cm/sec      LA dimension (2D)  5.2 cm      LV V1 max 63.0 cm/sec      LV V1 max PG 1.59 mmHg      LV V1 mean PG 1.00 mmHg      LV V1 VTI 13.3 cm      Ao pk everett 127.0 cm/sec      Ao max PG 6.5 mmHg      Ao mean PG 3.0 mmHg      Ao V2 VTI 29.0 cm      HEIDY(I,D) 1.59 cm2      AI P1/2t 553.4 msec      MV max PG 2.7 mmHg      MV mean PG 1.00 mmHg      MV V2 VTI 22.6 cm      MV P1/2t 111.3 msec      MVA(P1/2t) 1.98 cm2      MVA(VTI) 2.04 cm2      MV dec slope 150.0 cm/sec2      MR max everett 399.0 cm/sec      MR max PG 63.7 mmHg      TR max PG 17.3 mmHg      RVSP(TR) 20.3 mmHg      RAP systole 3.0 mmHg      RV V1 max PG 2.7 mmHg      RV V1 max 81.4 cm/sec      RV V1 VTI 13.0 cm      PA V2 max 79.6 cm/sec      PA acc time 0.11 sec      ACS 1.40 cm      Sinus 3.4 cm      EF(MOD-bp) 20.0 %      Dimensionless Index 0.47 (DI)     Narrative:        Left ventricular systolic function is severely decreased. Left   ventricular ejection fraction appears to be 21 - 25%.  Akinetic mid to   distal anterior wall and apex noted    The left ventricular cavity is mildly dilated.    Left ventricular wall thickness is consistent with mild concentric   hypertrophy.    Left ventricular diastolic function was normal.    Estimated right ventricular systolic pressure from tricuspid   regurgitation is normal (<35 mmHg).      Telemetry Scan [[id number]] Resulted: 08/23/24     Updated: 08/24/24 1731    Telemetry Scan [[id number]] Resulted: 08/23/24     Updated: 08/24/24 1821    Telemetry Scan [[id number]] Resulted: 08/23/24     Updated: 08/24/24 1854    Telemetry Scan [[id number]] Resulted:  08/23/24     Updated: 08/24/24 2005    Telemetry Scan [[id number]] Resulted: 08/23/24     Updated: 08/25/24 2035    Telemetry Scan [[id number]] Resulted: 08/23/24     Updated: 08/25/24 2035    Telemetry Scan [[id number]] Resulted: 08/23/24     Updated: 08/25/24 2056    Telemetry Scan [[id number]] Resulted: 08/23/24     Updated: 08/25/24 2115    Telemetry Scan [[id number]] Resulted: 08/23/24     Updated: 08/26/24 0028    Telemetry Scan [[id number]] Resulted: 08/23/24     Updated: 08/26/24 0101    Telemetry Scan [[id number]] Resulted: 08/23/24     Updated: 08/26/24 0211    Telemetry Scan [[id number]] Resulted: 08/23/24     Updated: 08/26/24 0357    Telemetry Scan [[id number]] Resulted: 08/23/24     Updated: 08/26/24 0809    Telemetry Scan [[id number]] Resulted: 08/23/24     Updated: 08/26/24 1233    ECG 12 Lead Pre-Op / Pre-Procedure [[id number]] Collected: 08/25/24 0547     Updated: 08/26/24 1408     QT Interval 476 ms      QTC Interval 490 ms     Narrative:      HEART RATE=64  bpm  RR Tiwhlvxn=372  ms  MS Zvrxfnmw=043  ms  P Horizontal Axis=-37  deg  P Front Axis=54  deg  QRSD Qkzbddeh=457  ms  QT Tyallwno=390  ms  NPiI=518  ms  QRS Axis=-69  deg  T Wave Axis=133  deg  - ABNORMAL ECG -  Sinus rhythm  Probable  left atrial enlargement  Left bundle branch block  When compared with ECG of 24-Aug-2024 15:59:35,  Significant repolarization change  Electronically Signed By: Omer Vieyra ([facility]) 2024-08-26 14:06:00  Date and Time of Study:2024-08-25 05:47:11    ECG 12 Lead Chest Pain [[id number]] Collected: 08/24/24 1559     Updated: 08/26/24 1409     QT Interval 518 ms      QTC Interval 509 ms     Narrative:      HEART RATE=58  bpm  RR Abczlxgy=5382  ms  MS Btnfawvx=628  ms  P Horizontal Axis=-29  deg  P Front Axis=15  deg  QRSD Afweeluy=757  ms  QT Kqfldeny=103  ms  LDrU=114  ms  QRS Axis=-63  deg  T Wave Axis=131  deg  - ABNORMAL ECG -  Sinus bradycardia  Probable  left atrial enlargement  Left bundle branch block  ST elevation  secondary to IVCD  When compared with ECG of 24-Aug-2024 03:35:47,  Significant rate decrease  Electronically Signed By: Omer Vieyra ([facility]) 2024-08-26 14:06:06  Date and Time of Study:2024-08-24 15:59:35    ECG 12 Lead Rhythm Change [[id number]] Collected: 08/24/24 0335     Updated: 08/26/24 1409     QT Interval 405 ms      QTC Interval 530 ms     Narrative:      HEART QSIF=222  bpm  RR Aydexqgj=102  ms  DC Mgkrzszd=904  ms  P Horizontal Axis=  deg  P Front Axis=85  deg  QRSD Ivhulroz=600  ms  QT Frfsdscf=008  ms  MNwD=930  ms  QRS Axis=-65  deg  T Wave Axis=97  deg  - ABNORMAL ECG -  Sinus tachycardia  Left atrial enlargement  Left bundle branch block  ST elevation secondary to IVCD  When compared with ECG of 23-Aug-2024 19:55:51,  Nonspecific significant change  Electronically Signed By: Omer Vieyra ([facility]) 2024-08-26 14:06:14  Date and Time of Study:2024-08-24 03:35:47          CBC    Results from last 7 days   Lab Units 08/26/24  0224 08/25/24  0052 08/24/24  0315 08/23/24 2004   WBC 10*3/mm3 6.30 6.34 7.02 6.09   HEMOGLOBIN g/dL 9.5* 10.7* 11.4* 12.5*   PLATELETS 10*3/mm3 150 174 153 179     BMP   Results from last 7 days   Lab Units 08/26/24  0224 08/25/24  0052 08/24/24  0606 08/23/24  2302 08/23/24 2004   SODIUM mmol/L 131* 130* 131* 132* 132*   POTASSIUM mmol/L 3.9 4.2 4.7 4.1 3.9   CHLORIDE mmol/L 99 94* 96* 95* 93*   CO2 mmol/L 22.4 26.0 24.5 26.6 25.7   BUN mg/dL 15 15 12 11 11   CREATININE mg/dL 0.98 0.99 1.03 1.00 1.15   GLUCOSE mg/dL 103* 103* 98 96 113*   MAGNESIUM mg/dL 1.9  --   --   --   --    PHOSPHORUS mg/dL 2.6  --   --   --   --      CMP   Results from last 7 days   Lab Units 08/26/24  0224 08/25/24  0052 08/24/24  0606 08/23/24  2302 08/23/24 2004   SODIUM mmol/L 131* 130* 131* 132* 132*   POTASSIUM mmol/L 3.9 4.2 4.7 4.1 3.9   CHLORIDE mmol/L 99 94* 96* 95* 93*   CO2 mmol/L 22.4 26.0 24.5 26.6 25.7   BUN mg/dL 15 15 12 11 11   CREATININE mg/dL 0.98 0.99 1.03 1.00 1.15   GLUCOSE mg/dL 103* 103*  98 96 113*   ALBUMIN g/dL 3.7  --   --   --   --    BILIRUBIN mg/dL 1.1  --   --   --   --    ALK PHOS U/L 57  --   --   --   --    AST (SGOT) U/L 22  --   --   --   --    ALT (SGPT) U/L 6  --   --   --   --      Cardiac Studies:  Echo- Results for orders placed during the hospital encounter of 08/23/24    Adult Transthoracic Echo Complete w/ Color, Spectral and Contrast if Necessary Per Protocol    Interpretation Summary    Left ventricular systolic function is severely decreased. Left ventricular ejection fraction appears to be 21 - 25%.  Akinetic mid to distal anterior wall and apex noted    The left ventricular cavity is mildly dilated.    Left ventricular wall thickness is consistent with mild concentric hypertrophy.    Left ventricular diastolic function was normal.    Estimated right ventricular systolic pressure from tricuspid regurgitation is normal (<35 mmHg).    Stress Myoview-  Cath-      Medication Review:   Scheduled Meds:aspirin, 81 mg, Oral, Daily  atorvastatin, 40 mg, Oral, Nightly  bacitracin, 1 Application, Topical, Q12H  cefTRIAXone, 2,000 mg, Intravenous, Q24H  clopidogrel, 75 mg, Oral, Daily  hydrALAZINE, 25 mg, Oral, Q12H  hydrOXYzine, 50 mg, Oral, 4x Daily  isosorbide mononitrate, 30 mg, Oral, Q24H  metoprolol tartrate, 25 mg, Oral, Q12H  ranolazine, 500 mg, Oral, Q12H  sodium chloride, 10 mL, Intravenous, Q12H  tamsulosin, 0.4 mg, Oral, Nightly      Continuous Infusions:     PRN Meds:.  acetaminophen    aluminum-magnesium hydroxide-simethicone    atropine    senna-docusate sodium **AND** polyethylene glycol **AND** bisacodyl **AND** bisacodyl    diphenhydrAMINE    hydrALAZINE    nitroglycerin    ondansetron ODT **OR** ondansetron    [COMPLETED] Insert Peripheral IV **AND** sodium chloride    sodium chloride    sodium chloride    sodium chloride      Assessment & Plan     NSTEMI (non-ST elevated myocardial infarction)    S/P CABG x 3    Coronary artery disease    Hyperlipidemia    Smoking  greater than 30 pack years    Tobacco use    MDM:    1.  CAD/CABG:    Patient had history of coronary artery disease status post coronary bypass 3 x 3 vessels and had a cardiac catheterization because of non-STEMI  Patient had all 3 grafts patent but has severe diffuse native vessel disease and hence is on medical therapy.    2.  Chest pain:    Patient is chest pain-free.  Patient has been started on Imdur and Ranexa    3.  Peripheral vascular disease:    Vascular surgery has seen the patient.  Patient is noted to have severe lower extremity disease.  Further recommendation as per them    4.  Hypertension:  Patient's blood pressure is still slightly high and hence I will increase his metoprolol.    5.Hyperlipidemia  Patient's lipid levels are stable and is on statins.    Blood pressure is controlled    Avila Do MD  08/26/24  18:10 EDT

## 2024-08-26 NOTE — PROGRESS NOTES
Nutrition Services    Patient Name:  Alexei Merino III  YOB: 1950  MRN: [id number]  Admit Date:  8/23/2024    RD visited pt and pt's significant other at bedside. Pt's significant other reports pt eats  4-5 small meals plus snacks daily when he is feeling well. Pt's significant other reported she feeds pt whatever he wants to make sure he is eating enough, even if the foods are high carbohydrate containing. For example, pt reportedly eats ~15 snack size allan bars daily. Pt used to drink ONS but he grew tired of these. Pt's significant other denied food allergies and reported pt refuses to wear his dentures. NFPE completed, consistent with nutrition diagnosis of malnutrition using AND/ASPEN criteria. See MSA below.     PES: Severe chronic disease related malnutrition related to multiple chronic diseases (gastroparesis, emphysema, dementia) as evidenced by PO intake meeting less than 75% of estimated energy requirement for greater than or equal to 1 month, > 20% wt loss x 1 year, and mainly severe muscle wasting + moderate fat loss per NFPE.     Malnutrition Severity Assessment      Patient meets criteria for : Severe Malnutrition  Malnutrition Type (Last 8 Hours)       Malnutrition Severity Assessment       Row Name 08/26/24 1544       Malnutrition Severity Assessment    Malnutrition Type Chronic Disease - Related Malnutrition      Row Name 08/26/24 1544       Insufficient Energy Intake     Insufficient Energy Intake Findings Severe    Insufficient Energy Intake  <75% of est. energy requirement for > or equal to 1 month      Row Name 08/26/24 1544       Unintentional Weight Loss     Unintentional Weight Loss Findings Severe    Unintentional Weight Loss  Weight loss greater than 20% in one year      Row Name 08/26/24 1544       Muscle Loss    Loss of Muscle Mass Findings Severe    [place] Region Severe - deep hollowing/scooping, lack of muscle to touch, facial bones well defined    Clavicle Bone  Region Severe - protruding prominent bone    Acromion Bone Region Severe - squared shoulders, bones, and acromion process protrusion prominent    Dorsal Hand Region Moderate - slight depression    Patellar Region Severe - prominent bone, square looking, very little muscle definition    Anterior Thigh Region Severe - line/depression along thigh, obviously thin    Posterior Calf Region Severe - thin with very little definition/firmness      Row Name 08/26/24 1544       Fat Loss    Subcutaneous Fat Loss Findings Moderate    Orbital Region  Severe - pronounced hollowness/depression, dark circles, loose saggy skin    Upper Arm Region Moderate - some fat tissue, not ample      Row Name 08/26/24 1544       Criteria Met (Must meet criteria for severity in at least 2 of these categories: M Wasting, Fat Loss, Fluid, Secondary Signs, Wt. Status, Intake)    Patient meets criteria for  Severe Malnutrition                           Electronically signed by:  Rosangela Peterson RD  08/26/24 15:37 EDT

## 2024-08-26 NOTE — THERAPY TREATMENT NOTE
"Subjective: Pt agreeable to therapeutic plan of care.    Objective:     Precautions - deaf; whiteboard and gestures for communication    Bed mobility - Modified-Independent  Transfers - Min-A and with rolling walker for STS and stand-pivot to bedside recliner.  Ambulation - 2 feet Min-A and with rolling walker    Therapeutic Exercise - 15 Reps B LE AROM lying supine and unsupported sitting / EOB: ankle pumps, SLR, knee to chest, seated LAQ    Vitals: WNL    Pain: 5 VAS   Location: L foot when weight-bearing  Intervention for pain: Repositioned, Increased Activity, and Therapeutic Presence    Education: Provided education on the importance of mobility in the acute care setting, Verbal/Tactile Cues, Transfer Training, Gait Training, Energy conservation strategies, and HEP    Assessment: Alexei Merino III presents with functional mobility impairments which indicate the need for skilled intervention. Pt's significant other present during session. Pt pleasant and cooperative, demo good BLE strength, and mild deficit with standing balance. Pt tolerated exercises well, required Bijan for standing and transferring to recliner. Pt able to scoot IND. Minimal L foot pain reported, just when transferring. Chair alarm in recliner and edu pt and significant other regarding sitting up for at least one hour. Tolerating session today without incident. Will continue to follow and progress as tolerated.     Plan/Recommendations:   If medically appropriate, Moderate Intensity Therapy recommended post-acute care. This is recommended as therapy feels the patient would require 3-4 days per week and wouldn't tolerate \"3 hour daily\" rehab intensity. SNF would be the preferred choice. If the patient does not agree to SNF, arrange HH or OP depending on home bound status. If patient is medically complex, consider LTACH. Pt requires no DME at discharge.     Pt desires Skilled Rehab placement at discharge. Pt cooperative; agreeable to therapeutic " recommendations and plan of care.         Basic Mobility 6-click:  Rollin = Total, A lot = 2, A little = 3; 4 = None  Supine>Sit:   1 = Total, A lot = 2, A little = 3; 4 = None   Sit>Stand with arms:  1 = Total, A lot = 2, A little = 3; 4 = None  Bed>Chair:   1 = Total, A lot = 2, A little = 3; 4 = None  Ambulate in room:  1 = Total, A lot = 2, A little = 3; 4 = None  3-5 Steps with railin = Total, A lot = 2, A little = 3; 4 = None  Score: 18    Modified [place]: N/A = No pre-op stroke/TIA    Post-Tx Position: Up in Chair, Alarms activated, and Call light and personal items within reach  PPE: gloves

## 2024-08-26 NOTE — DISCHARGE PLACEMENT REQUEST
"Bella Orellana III \"Chip\" (73 y.o. Male)       Date of Birth   1950    Social Security Number       Address   [address] [place] [address] [place] [facility]    Home Phone   [phone]    MRN   [id number]       Jainism   None    Marital Status                               Admission Date   8/23/24    Admission Type   Emergency    Admitting Provider   Llanes Alvarez, Carlos, MD    Attending Provider   Guillermo Bear MD    Department, Room/Bed   [facility], 2114/1       Discharge Date       Discharge Disposition       Discharge Destination                                 Attending Provider: Guillermo Bear MD    Allergies: Morphine    Isolation: None   Infection: None   Code Status: CPR    Ht: 175.3 cm (69\")   Wt: 54.8 kg (120 lb 13 oz)    Admission Cmt: None   Principal Problem: NSTEMI (non-ST elevated myocardial infarction) [I21.4]                   Active Insurance as of 8/23/2024       Primary Coverage       Payor Plan Insurance Group Employer/Plan Group    MEDICARE MEDICARE A & B        Payor Plan Address Payor Plan Phone Number Payor Plan Fax Number Effective Dates    [address] [phone]  10/1/2015 - None Entered    [facility] [id number]         Subscriber Name Subscriber Birth Date Member ID       BELLA ORELLANA III 1950 [id number]               Secondary Coverage       Payor Plan Insurance Group Employer/Plan Group    [place] [employer] [facility]        Payor Plan Address Payor Plan Phone Number Payor Plan Fax Number Effective Dates    [address] [place] [facility] [phone]  10/1/2015 - None Entered    [facility] [address]         Subscriber Name Subscriber Birth Date Member ID       BELLA ORELLANA III 1950 [id number]                     Emergency Contacts        (Rel.) Home Phone Work Phone Mobile Phone    Jessica Arias (Care Giver) [phone]    Lindsey Varela (Daughter) -- -- [phone]    Radha Orellana (Daughter) " [phone]

## 2024-08-26 NOTE — CASE MANAGEMENT/SOCIAL WORK
Continued Stay Note  BANDAR Gregg     Patient Name: Alexei Merino III  MRN: [id number]  Today's Date: 8/26/2024    Admit Date: 8/23/2024    Plan: [facility], pending acceptance. PASRR approved, no precert required. 2nd choice [address] [facility].   Discharge Plan       Row Name 08/26/24 1626       Plan    Plan [facility], pending acceptance. PASRR approved, no precert required. 2nd choice [address] [facility].    Patient/Family in Agreement with Plan yes    Plan Comments CM met [facility] pt and SO Jessica at bedside. Family would like [facility], CM sent referral and messaged liaison. 2nd choice [address] [facility]. Pt is very [place] and does not have assistive devices at bedside.             Expected Discharge Date and Time       Expected Discharge Date Expected Discharge Time    Aug 27, 2024         Zayra Mendoza RN    phone [phone]  fax [phone]

## 2024-08-26 NOTE — PLAN OF CARE
Assessment: Alexei Merino III presents with functional mobility impairments which indicate the need for skilled intervention. Pt's significant other present during session. Pt pleasant and cooperative, demo good BLE strength, and mild deficit with standing balance. Pt tolerated exercises well, required Bijan for standing and transferring to recliner. Pt able to scoot IND. Minimal L foot pain reported, just when transferring. Chair alarm in recliner and edu pt and significant other regarding sitting up for at least one hour. Tolerating session today without incident. Will continue to follow and progress as tolerated.

## 2024-08-26 NOTE — PROGRESS NOTES
[facility] MEDICINE SERVICE  DAILY PROGRESS NOTE    NAME: Alexei Merino III  : 1950  MRN: [id number]      LOS: 3 days     PROVIDER OF SERVICE: Guillermo Bear MD    Chief Complaint: NSTEMI (non-ST elevated myocardial infarction)    Subjective:     Interval History:  History taken from: patient    Patient doing well today although still complains of some left foot pain.  He also complains of increased anxiety.      Objective:     Vital Signs  Temp:  [97.9 °F (36.6 °C)-99 °F (37.2 °C)] 98.2 °F (36.8 °C)  Heart Rate:  [59-85] 64  Resp:  [13-24] 13  BP: (140-161)/(71-86) 161/84  Flow (L/min):  [2] 2   Body mass index is 17.84 kg/m².    Physical Exam  General Appearance:  Alert, cooperative, no distress, appears stated age  Head:   Normocephalic, without obvious abnormality, atraumatic  Eyes:   PERRL, conjunctiva/corneas clear, EOM's intact, fundi benign, both eyes  Ears:    Normal TM's and external ear canals, both ears  Nose:   Nares normal, septum midline, mucosa normal, no drainage or sinus tenderness  Throat: Lips, mucosa, and tongue normal; teeth and gums normal  Neck:   Supple, symmetrical, trachea midline, no adenopathy, thyroid: not enlarged, symmetric, no tenderness/mass/nodules, no carotid bruit or JVD  Lungs:             Clear to auscultation bilaterally, respirations unlabored  Heart:  Regular rate and rhythm, S1, S2 normal, no murmur, rub or gallop  Abdomen:  Soft, non-tender, bowel sounds active all four quadrants,  no masses, no organomegaly  Extremities:     Left foot plantar wound  Pulses:            2+ and symmetric  Skin:    Skin color, texture, turgor normal, no rashes or lesions  Neurologic: Normal    Scheduled Meds   aspirin, 81 mg, Oral, Daily  atorvastatin, 40 mg, Oral, Nightly  bacitracin, 1 Application, Topical, Q12H  cefTRIAXone, 2,000 mg, Intravenous, Q24H  clopidogrel, 75 mg, Oral, Daily  hydrALAZINE, 25 mg, Oral, Q12H  hydrOXYzine, 25 mg, Oral, 4x Daily  isosorbide  mononitrate, 30 mg, Oral, Q24H  metoprolol tartrate, 25 mg, Oral, Q12H  ranolazine, 500 mg, Oral, Q12H  sodium chloride, 10 mL, Intravenous, Q12H  tamsulosin, 0.4 mg, Oral, Nightly       PRN Meds     acetaminophen    aluminum-magnesium hydroxide-simethicone    atropine    senna-docusate sodium **AND** polyethylene glycol **AND** bisacodyl **AND** bisacodyl    diphenhydrAMINE    hydrALAZINE    nitroglycerin    ondansetron ODT **OR** ondansetron    [COMPLETED] Insert Peripheral IV **AND** sodium chloride    sodium chloride    sodium chloride    sodium chloride   Infusions           Diagnostic Data    Results from last 7 days   Lab Units 08/26/24  0224   WBC 10*3/mm3 6.30   HEMOGLOBIN g/dL 9.5*   HEMATOCRIT % 27.6*   PLATELETS 10*3/mm3 150   GLUCOSE mg/dL 103*   CREATININE mg/dL 0.98   BUN mg/dL 15   SODIUM mmol/L 131*   POTASSIUM mmol/L 3.9   AST (SGOT) U/L 22   ALT (SGPT) U/L 6   ALK PHOS U/L 57   BILIRUBIN mg/dL 1.1   ANION GAP mmol/L 9.6       CT Angio Abdominal Aorta Bilateral Iliofem Runoff    Result Date: 8/25/2024  Impression: Aortobiiliac stent graft appears patent. Mildly decreased size of infrarenal abdominal aortic aneurysm measuring up to 5 cm. Stable appearing bilateral common iliac artery aneurysms measuring up to 1.7 cm on the right and 2.2 cm on the left. Severe multifocal atherosclerotic disease of the lower extremity vessels, with likely one-vessel runoff bilaterally, though appears diminutive on the right. Electronically Signed: Lux Daniel  8/25/2024 12:35 PM EDT  Workstation ID: [id number]       I reviewed the patient's new clinical results.    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       Chest pain  NSTEMI  Hx CAD   - HS troponin 195 and 234  - EKG no ST elevation but new LBBB  -Was initially on heparin drip but this has been discontinued after LHC  -Patient underwent LHC on 8/24 which showed small vessel coronary artery disease not amenable to PCI; patient had previous CABG with fairly patent grafts  -Continue with DAPT, statin, beta-blocker      Sepsis 2/2 to possible developing cellulitis, ruptured blister on bottom of foot   - cont rocephin for now, switch to Keflex tomorrow  - bacitracin to open wound   - analgesics prn  - X-ray: No acute osseous abnormality. Degenerative changes of the first MTP joint most likely represents osteoarthritis. Less likely, gouty arthritis is also a possibility   -Podiatry consult pending    PVD /H o with history of AAA  -Patient underwent for CT abdomen [facility] today which shows bilateral moderate atherosclerotic disease but no evidence of severe stenosis  -Bilateral PIO performed with decreased pressure on the right but normal flow on the left  -Evaluated by vascular surgery; no indication for intervention at this time; patient will need follow-up with vascular surgery in 6 weeks  -Initiated on IV fluids to reduce the chance of contrast-induced nephropathy, IV fluids can be discontinued    Anxiety  -Increase Atarax dose     HTN  HLD  - not on medication      Emphysema   - not on home O2  - wean oxygen as tolerated     VTE Prophylaxis:  No VTE prophylaxis order currently exists.         Code status is   Code Status and Medical Interventions: CPR (Attempt to Resuscitate); Full Support   Ordered at: 08/23/24 5292     Code Status (Patient has no pulse and is not breathing):    CPR (Attempt to Resuscitate)     Medical Interventions (Patient has pulse or is breathing):    Full Support       Plan for disposition: DC to SNF on 8/27    Time: 30 minutes    Signature: Electronically signed by Guillermo  MD Chema, 08/26/24, 12:06 EDT.  [facility] Hospitalist Team

## 2024-08-26 NOTE — CONSULTS
08/26/24   Foot and Ankle Surgery - Consult  Provider: Dr. José Miguel Carpio DPM  Location: [facility]    Subjective:  Alexei Merino III is a 73 y.o. male.     Chief Complaint   Patient presents with    Chest Pain       Consulting Physician: Primary service    Reason for Consult: Elongated nails    HPI: Patient is a 73-year-old deaf male that was admitted for chest pains.  Patient was noted to have a ruptured blister on the bottom of the left foot on admission.  I have been asked to evaluate the patient for foot wounds and toenails.  Patient's caregiver provides majority of the history.  Patient denies any significant pain or limitation at this time.  He currently has no open wounds or signs of infection involving the left foot.  Patient has been evaluated by vascular service including perform CTA showing multilevel occlusive disease but two-vessel runoff into the foot.  Patient's caregiver is most concerned about nail length.  No other issues or concerns today.    Allergies   Allergen Reactions    Morphine Anaphylaxis       Past Medical History:   Diagnosis Date    Anxiety     Aortic aneurysm     Coronary artery disease     Dementia 2017    per EASTON Lopez, patient was diagnosed 7 years ago    Emphysema lung     Gastroparesis     per Jessica MAEH (hard of hearing)     complete deaf    Hyperlipidemia     Hypertension     Myocardial infarction        Past Surgical History:   Procedure Laterality Date    ABDOMINAL AORTIC ANEURYSM REPAIR  03/23/2022    CARDIAC CATHETERIZATION Right 8/24/2024    Procedure: LEFT HEART CATH with possible PCI;  Surgeon: Omer Vieyra MD;  Location: [facility] INVASIVE LOCATION;  Service: Cardiovascular;  Laterality: Right;  Local and IV sedation    CORONARY ARTERY BYPASS GRAFT  03/13/2016    X3  Dr Hong       Family History   Problem Relation Age of Onset    Hypertension Other     Heart disease Mother     Heart disease Father        Social History     Socioeconomic History     Marital status:    Tobacco Use    Smoking status: Every Day     Current packs/day: 0.25     Average packs/day: 0.3 packs/day for 50.0 years (12.5 ttl pk-yrs)     Types: Cigarettes    Smokeless tobacco: Never   Vaping Use    Vaping status: Never Used   Substance and Sexual Activity    Alcohol use: No    Drug use: No    Sexual activity: Defer          Current Facility-Administered Medications:     acetaminophen (TYLENOL) tablet 650 mg, 650 mg, Oral, Q4H PRN, Omer Vieyra MD    aluminum-magnesium hydroxide-simethicone (MAALOX MAX) 400-400-40 MG/5ML suspension 15 mL, 15 mL, Oral, Q6H PRN, Omer Vieyra MD    [COMPLETED] aspirin chewable tablet 324 mg, 324 mg, Oral, Once, 324 mg at 08/24/24 1555 **AND** aspirin EC tablet 81 mg, 81 mg, Oral, Daily, Dayana Sheppard, APRN, 81 mg at 08/26/24 0830    atorvastatin (LIPITOR) tablet 40 mg, 40 mg, Oral, Nightly, Omer Vieyra MD, 40 mg at 08/25/24 2110    atropine sulfate injection 0.5 mg, 0.5 mg, Intravenous, Q5 Min PRN, Omer Vieyra MD    bacitracin ointment 0.9 g, 1 Application, Topical, Q12H, Omer Vieyra MD, 0.9 g at 08/26/24 0830    sennosides-docusate (PERICOLACE) 8.6-50 MG per tablet 2 tablet, 2 tablet, Oral, BID PRN **AND** polyethylene glycol (MIRALAX) packet 17 g, 17 g, Oral, Daily PRN **AND** bisacodyl (DULCOLAX) EC tablet 5 mg, 5 mg, Oral, Daily PRN **AND** bisacodyl (DULCOLAX) suppository 10 mg, 10 mg, Rectal, Daily PRN, Omer Vieyra MD    cefTRIAXone (ROCEPHIN) 2,000 mg in sodium chloride 0.9 % 100 mL MBP, 2,000 mg, Intravenous, Q24H, Omer Vieyra MD, Last Rate: 200 mL/hr at 08/25/24 2108, 2,000 mg at 08/25/24 2108    clopidogrel (PLAVIX) tablet 75 mg, 75 mg, Oral, Daily, Omer Vieyra MD, 75 mg at 08/26/24 0830    diphenhydrAMINE (BENADRYL) capsule 25 mg, 25 mg, Oral, Q6H PRN, Omer Vieyra MD    hydrALAZINE (APRESOLINE) injection 10 mg, 10 mg, Intravenous, Q6H PRN, Omer Vieyra MD, 10 mg at 08/24/24 1758    hydrALAZINE (APRESOLINE) tablet 25 mg, 25  mg, Oral, Q12H, Omer Vieyra MD, 25 mg at 08/26/24 0830    hydrOXYzine (ATARAX) tablet 25 mg, 25 mg, Oral, 4x Daily, Omer Vieyra MD, 25 mg at 08/26/24 0830    isosorbide mononitrate (IMDUR) 24 hr tablet 30 mg, 30 mg, Oral, Q24H, Omer Vieyra MD, 30 mg at 08/26/24 0830    metoprolol tartrate (LOPRESSOR) tablet 25 mg, 25 mg, Oral, Q12H, Omer Vieyra MD, 25 mg at 08/26/24 0830    nitroglycerin (NITROSTAT) SL tablet 0.4 mg, 0.4 mg, Sublingual, Q5 Min PRN, Omer Vieyra MD    ondansetron ODT (ZOFRAN-ODT) disintegrating tablet 4 mg, 4 mg, Oral, Q6H PRN **OR** ondansetron (ZOFRAN) injection 4 mg, 4 mg, Intravenous, Q6H PRN, Omer Vieyra MD, 4 mg at 08/26/24 1016    ranolazine (RANEXA) 12 hr tablet 500 mg, 500 mg, Oral, Q12H, Omer Vieyra MD, 500 mg at 08/26/24 0830    [COMPLETED] Insert Peripheral IV, , , Once **AND** sodium chloride 0.9 % flush 10 mL, 10 mL, Intravenous, PRN, Omer Vieyra MD    sodium chloride 0.9 % flush 10 mL, 10 mL, Intravenous, Q12H, Omer Vieyra MD, 10 mL at 08/26/24 0831    sodium chloride 0.9 % flush 10 mL, 10 mL, Intravenous, PRN, Omer Vieyra MD, 10 mL at 08/25/24 0044    sodium chloride 0.9 % infusion 250 mL, 250 mL, Intravenous, Once PRN, Omer Vieyra MD    sodium chloride 0.9 % infusion 40 mL, 40 mL, Intravenous, PRN, Omer Vieyra MD    tamsulosin (FLOMAX) 24 hr capsule 0.4 mg, 0.4 mg, Oral, Nightly, Tammo, Baljit, MD, 0.4 mg at 08/25/24 2110    Review of Systems:  General: Denies fever, chills, fatigue, and weakness.  Eyes: Denies vision loss, blurry vision, and excessive redness.  ENT: Denies hearing issues and difficulty swallowing.  Cardiovascular: Denies palpitations, chest pain, or syncopal episodes.  Respiratory: Denies shortness of breath, wheezing, and coughing.  GI: Denies abdominal pain, nausea, and vomiting.   : Denies frequency, hematuria, and urgency.  Musculoskeletal: Denies muscle cramps, joint pains, and stiffness.  Derm: + Elongated nails  Neuro: Denies headaches,  "numbness, loss of coordination, and tremors.  Psych: Denies anxiety and depression.  Endocrine: Denies temperature intolerance and changes in appetite.  Heme: Denies bleeding disorders or abnormal bruising.     Objective   /84   Pulse 64   Temp 98.2 °F (36.8 °C) (Axillary)   Resp 13   Ht 175.3 cm (69\")   Wt 54.8 kg (120 lb 13 oz) Comment: extra blankets removed/ bed scale migh need rebalanced  SpO2 99%   BMI 17.84 kg/m²     Foot/Ankle Exam    GENERAL  Appearance:  appears ill and frail  Orientation:  AAOx3  Affect:  appropriate    VASCULAR     Right Foot Vascularity   Dorsalis pedis:  1+  Posterior tibial:  1+  Skin temperature:  cool  Edema grading:  None  CFT:  4  Pedal hair growth:  Absent     Left Foot Vascularity   Dorsalis pedis:  1+  Posterior tibial:  1+  Skin temperature:  cool  Edema grading:  None  CFT:  4  Pedal hair growth:  Absent     NEUROLOGIC     Right Foot Neurologic   Light touch sensation: normal  Hot/Cold sensation: normal  Achilles reflex:  2+     Left Foot Neurologic   Light touch sensation: normal  Hot/Cold sensation:  normal  Achilles reflex:  2+    MUSCULOSKELETAL     Right Foot Musculoskeletal   Ecchymosis:  none  Tenderness:  none    Hammertoe:  Second toe, third toe, fourth toe and fifth toe     Left Foot Musculoskeletal   Ecchymosis:  none  Tenderness:  none  Hammertoe:  Second toe, third toe, fourth toe and fifth toe    MUSCLE STRENGTH     Right Foot Muscle Strength   Normal strength    Foot dorsiflexion:  5  Foot plantar flexion:  5  Foot inversion:  5  Foot eversion:  5     Left Foot Muscle Strength   Normal strength    Foot dorsiflexion:  5  Foot plantar flexion:  5  Foot inversion:  5  Foot eversion:  5    DERMATOLOGIC      Right Foot Dermatologic   Skin  Right foot skin is intact.      Left Foot Dermatologic   Skin  Left foot skin is intact.      Right foot additional comments: Elongated and mildly dystrophic nails x 10.  No significant discoloration.  Mild thickness. "  No open wounds or gross signs of infection involving the feet            Results from last 7 days   Lab Units 08/26/24  0224   WBC 10*3/mm3 6.30   HEMOGLOBIN g/dL 9.5*   HEMATOCRIT % 27.6*   PLATELETS 10*3/mm3 150       Assessment & Plan     NSTEMI (non-ST elevated myocardial infarction)    S/P CABG x 3    Coronary artery disease    Hyperlipidemia    Smoking greater than 30 pack years    Tobacco use  Onychodystrophy  Peripheral arterial disease    Patient was evaluated at bedside.  Patient's caregiver is present.  He denies any significant pain or limitation involving the feet.  The caregiver was concerned about the elongated nails.  He currently does not have any open wounds or signs of infection.  I do feel that the feet are stable for outpatient follow-up with my nurse practitioner in 1 to 2 weeks after discharge.  Vascular assessment and workup was reviewed.  No further plans from my standpoint this admission.  Follow-up in office.  Will sign off at this time.    Thank you for the consultation and allowing me to participate in this patient's care. Please call with any additional questions or concerns.     Note is dictated utilizing voice recognition software. Unfortunately this leads to occasional typographical errors. I apologize in advance if the situation occurs. If questions occur please do not hesitate to call our office.

## 2024-08-27 ENCOUNTER — TELEPHONE (OUTPATIENT)
Dept: CARDIOLOGY | Facility: CLINIC | Age: 74
End: 2024-08-27
Payer: COMMERCIAL

## 2024-08-27 VITALS
SYSTOLIC BLOOD PRESSURE: 121 MMHG | WEIGHT: 123.9 LBS | TEMPERATURE: 97.7 F | OXYGEN SATURATION: 96 % | DIASTOLIC BLOOD PRESSURE: 58 MMHG | HEIGHT: 69 IN | BODY MASS INDEX: 18.35 KG/M2 | RESPIRATION RATE: 20 BRPM | HEART RATE: 55 BPM

## 2024-08-27 PROBLEM — E43 SEVERE MALNUTRITION: Status: ACTIVE | Noted: 2024-08-27

## 2024-08-27 PROCEDURE — 99232 SBSQ HOSP IP/OBS MODERATE 35: CPT | Performed by: INTERNAL MEDICINE

## 2024-08-27 RX ORDER — LOSARTAN POTASSIUM 25 MG/1
25 TABLET ORAL DAILY
Start: 2024-08-27

## 2024-08-27 RX ORDER — ISOSORBIDE MONONITRATE 30 MG/1
30 TABLET, EXTENDED RELEASE ORAL
Start: 2024-08-28

## 2024-08-27 RX ORDER — CLOPIDOGREL BISULFATE 75 MG/1
75 TABLET ORAL DAILY
Start: 2024-08-28

## 2024-08-27 RX ORDER — ATORVASTATIN CALCIUM 40 MG/1
40 TABLET, FILM COATED ORAL NIGHTLY
Start: 2024-08-27

## 2024-08-27 RX ORDER — HYDRALAZINE HYDROCHLORIDE 25 MG/1
25 TABLET, FILM COATED ORAL EVERY 12 HOURS SCHEDULED
Start: 2024-08-27

## 2024-08-27 RX ORDER — HYDROXYZINE HYDROCHLORIDE 50 MG/1
50 TABLET, FILM COATED ORAL 4 TIMES DAILY
Start: 2024-08-27

## 2024-08-27 RX ORDER — METOPROLOL TARTRATE 25 MG/1
25 TABLET, FILM COATED ORAL 2 TIMES DAILY
Start: 2024-08-27

## 2024-08-27 RX ORDER — RANOLAZINE 500 MG/1
500 TABLET, EXTENDED RELEASE ORAL EVERY 12 HOURS SCHEDULED
Start: 2024-08-27

## 2024-08-27 RX ADMIN — ASPIRIN 81 MG: 81 TABLET, COATED ORAL at 08:28

## 2024-08-27 RX ADMIN — ISOSORBIDE MONONITRATE 30 MG: 30 TABLET, EXTENDED RELEASE ORAL at 08:28

## 2024-08-27 RX ADMIN — RANOLAZINE 500 MG: 500 TABLET, EXTENDED RELEASE ORAL at 08:28

## 2024-08-27 RX ADMIN — HYDROXYZINE HYDROCHLORIDE 50 MG: 25 TABLET, FILM COATED ORAL at 08:28

## 2024-08-27 RX ADMIN — Medication 10 ML: at 08:28

## 2024-08-27 RX ADMIN — HYDRALAZINE HYDROCHLORIDE 25 MG: 25 TABLET ORAL at 08:28

## 2024-08-27 RX ADMIN — Medication 10 MG: at 02:05

## 2024-08-27 RX ADMIN — HYDROXYZINE HYDROCHLORIDE 50 MG: 25 TABLET, FILM COATED ORAL at 12:49

## 2024-08-27 RX ADMIN — BACITRACIN 0.9 G: 500 OINTMENT TOPICAL at 08:28

## 2024-08-27 RX ADMIN — METOPROLOL TARTRATE 25 MG: 25 TABLET, FILM COATED ORAL at 08:28

## 2024-08-27 RX ADMIN — HYDROXYZINE HYDROCHLORIDE 50 MG: 25 TABLET, FILM COATED ORAL at 18:32

## 2024-08-27 RX ADMIN — CLOPIDOGREL BISULFATE 75 MG: 75 TABLET ORAL at 08:28

## 2024-08-27 NOTE — CASE MANAGEMENT/SOCIAL WORK
Continued Stay Note  BANADR Gregg     Patient Name: Alexei Merino III  MRN: [id number]  Today's Date: 8/27/2024    Admit Date: 8/23/2024    Plan: [facility], accepted. PASRR approved, no precert needed.   Discharge Plan       Row Name 08/27/24 1215       Plan    Plan [facility]. PASRR approved, no precert needed.    Patient/Family in Agreement with Plan yes    Plan Comments [facility] accepted, bed is ready. PASRR approved, no precert needed. CM notified MD and nurse that bed is ready. Pt should d/c today.                 Expected Discharge Date and Time       Expected Discharge Date Expected Discharge Time    Aug 27, 2024        Zayra Mendoza RN    phone [phone]  fax [phone]

## 2024-08-27 NOTE — TELEPHONE ENCOUNTER
Caller: [facility] WITH [facility] DISCHAR[facility]    Relationship to patient: NURSE IN HOSPITAL     Best call back number: PT @ [phone]    Chief complaint: NA    Type of visit: HFU    Requested date: 2 WEEKS     If rescheduling, when is the original appointment: NA     Additional notes: [facility] DISCHAR[facility] CALLING TO SCHEDULE TWO WEEK HFU - NO AVAILABILITY FOR MD OR APRN, SHE IS NOTING TO PT THAT WE WILL CONTACT HIM TO SCHEDULE HIS FU

## 2024-08-27 NOTE — CASE MANAGEMENT/SOCIAL WORK
Continued Stay Note  [facility]     Patient Name: Alexei Merino III  MRN: [id number]  Today's Date: 8/27/2024    Admit Date: 8/23/2024    Plan: [facility], accepted. PASRR approved, no precert needed.   Discharge Plan       Row Name 08/27/24 1436       Plan    Plan Comments CM placed request in Epic for [facility] van to transport to [facility] ( [facility] ) and informed nursing to call # [phone] when ready for him to be picked up and gave nursing phone number for report      Row Name 08/27/24 1217       Plan    Plan [facility], accepted. PASRR approved, no precert needed.    Patient/Family in Agreement with Plan yes    Plan Comments [facility] accepted, bed is ready. PASRR approved, no precert needed. CM notified MD and nurse that bed is ready. Pt should d/c today.                             Alessandra AGARWAL,RN Case Manager  [facility]  Phone: Desk- [phone] cell- [phone]

## 2024-08-27 NOTE — PROGRESS NOTES
LOS: 4 days   Admitting Physician- Guillermo Bear MD    Reason For Followup:    Chest pain  Non-ST elevation myocardial infarction  CAD  CABG  Hypertension    Subjective     No chest pain or shortness of breath.    Objective     Hemodynamics are stable.  Patient is completely deaf    Review of Systems:   Review of Systems   Constitutional: Negative for chills and fever.   HENT:  Negative for ear discharge and nosebleeds.    Eyes:  Negative for discharge and redness.   Cardiovascular:  Negative for chest pain, orthopnea, palpitations, paroxysmal nocturnal dyspnea and syncope.   Respiratory:  Negative for cough, shortness of breath and wheezing.    Endocrine: Negative for heat intolerance.   Skin:  Negative for rash.   Musculoskeletal:  Negative for arthritis and myalgias.   Gastrointestinal:  Negative for abdominal pain, melena, nausea and vomiting.   Genitourinary:  Negative for dysuria and hematuria.   Neurological:  Negative for dizziness, light-headedness, numbness and tremors.   Psychiatric/Behavioral:  Negative for depression. The patient is not nervous/anxious.          Vital Signs  Vitals:    08/27/24 0115 08/27/24 0500 08/27/24 0615 08/27/24 0910   BP: 137/75  145/75 134/68   BP Location: Left arm  Left arm Left arm   Patient Position: Lying  Lying Lying   Pulse: 70  69 61   Resp: 13  15 18   Temp: 97.9 °F (36.6 °C)  98.7 °F (37.1 °C) 97.9 °F (36.6 °C)   TempSrc: Oral  Oral Oral   SpO2: 94%  96% 94%   Weight:  56.2 kg (123 lb 14.4 oz)     Height:         Wt Readings from Last 1 Encounters:   08/27/24 56.2 kg (123 lb 14.4 oz)       Intake/Output Summary (Last 24 hours) at 8/27/2024 1042  Last data filed at 8/27/2024 0800  Gross per 24 hour   Intake 360 ml   Output 525 ml   Net -165 ml     Physical Exam:  Constitutional:       Appearance: Well-developed.   Eyes:      General: No scleral icterus.        Right eye: No discharge.   HENT:      Head: Normocephalic and atraumatic.   Neck:      Thyroid: No  thyromegaly.      Lymphadenopathy: No cervical adenopathy.   Pulmonary:      Effort: Pulmonary effort is normal. No respiratory distress.      Breath sounds: Normal breath sounds. No wheezing. No rales.   Cardiovascular:      Normal rate. Regular rhythm.      No gallop.    Edema:     Peripheral edema absent.   Abdominal:      Tenderness: There is no abdominal tenderness.   Skin:     Findings: No erythema or rash.   Neurological:      Mental Status: Alert and oriented to person, place, and time.         Results Review:   Lab Results (last 24 hours)       ** No results found for the last 24 hours. **          Imaging Results (Last 72 Hours)       Procedure Component Value Units Date/Time    CT Angio Abdominal Aorta Bilateral Iliofem Runoff [[id number]] Collected: 08/25/24 1205     Updated: 08/25/24 1237    Narrative:      CT ANGIO ABDOMINAL AORTA BILAT ILIOFEM RUNOFF    Date of Exam: 8/25/2024 10:30 AM EDT    Indication: AAA, PAD.    Comparison: CTA abdomen/pelvis 1/24/2022    Technique: CTA of the abdomen, pelvis and both lower extremities was performed before and after the uneventful intravenous administration of iodinated contrast. Reconstructed coronal and sagittal images were also obtained. In addition, a 3-D volume   rendered image was created for interpretation. Automated exposure control and iterative reconstruction methods were used.      Findings:    AORTA: Aortobiiliac stent graft appears patent. Mildly decreased size of infrarenal abdominal abdominal aortic aneurysm measuring up to 5 cm, previously up to 5.5 cm. Stable appearing bilateral common iliac artery aneurysms measuring 1.7 cm on the right   and 2.2 cm on the left.  MESENTERIC/RENAL VESSELS: Mild narrowing at the origins of the celiac artery, SMA, and bilateral renal arteries.  PELVIC VESSELS: Stable appearing bilateral common iliac artery aneurysms measuring 1.7 cm on the right and 2.2 cm on the left. Atherosclerosis of the iliac vessels, which  appear patent.  IVC:  Normal in caliber.    RIGHT LOWER EXTREMITY: Patent common femoral artery which demonstrates mild multifocal narrowing. The superficial femoral artery demonstrates severe stenosis/focal occlusion at its origin and appears patent distally with diminutive appearing flow and   with multifocal severe stenoses. The profunda femoral artery demonstrates severe stenosis/focal occlusion near its origin and appears patent distally. The popliteal artery appears patent and demonstrates multifocal moderate stenosis. Calcified   atherosclerosis of the runoff vessels limits evaluation for stenosis. Within technical limitation: Multifocal severe stenoses/focal occlusions of the anterior tibial artery which appears occluded proximal to the ankle.. Multifocal severe stenoses/focal   occlusions of the posterior tibial artery which appears occluded proximal to the ankle. The peroneal artery demonstrates multifocal narrowing proximally, and appears patent to the ankle.    LEFT LOWER EXTREMITY: Common femoral artery demonstrates mild multifocal stenosis and appears patent. Superficial femoral artery and profundofemoral artery demonstrates mild to moderate multifocal stenosis and appear patent. Popliteal artery demonstrates   moderate to severe multifocal narrowing. There is calcified atherosclerosis of the runoff vessels, which limits evaluation for stenosis. Within technical limitation: Anterior tibial artery demonstrates multifocal stenoses/occlusions and appears occluded   proximal to the ankle. The posterior tibial artery demonstrates multifocal severe stenoses/occlusions and appears occluded proximal to the ankle. The peroneal artery demonstrates multifocal severe stenosis proximally and appears patent to the foot.     LOWER THORAX: Small bilateral pleural effusions with adjacent atelectasis. Coronary artery calcifications.    LIVER: Unremarkable parenchyma without focal lesion.  BILIARY/GALLBLADDER:  Cholelithiasis.  SPLEEN:  Unremarkable  PANCREAS:  Unremarkable  ADRENAL:  Unremarkable  KIDNEYS:  Unremarkable parenchyma with no solid mass identified. No hydronephrosis.No calculus identified.  GASTROINTESTINAL/MESENTERY:   No evidence of obstruction.Normal-appearing appendix.  RETROPERITONEUM/LYMPH NODES:  No pathologic appearing lymph nodes by imaging criteria.  REPRODUCTIVE: Enlarged prostate gland.  BLADDER:  Unremarkable  MUSCULOSKELETAL:No acute or aggressive appearing bone or soft tissue process.        Impression:      Impression:  Aortobiiliac stent graft appears patent. Mildly decreased size of infrarenal abdominal aortic aneurysm measuring up to 5 cm. Stable appearing bilateral common iliac artery aneurysms measuring up to 1.7 cm on the right and 2.2 cm on the left.    Severe multifocal atherosclerotic disease of the lower extremity vessels, with likely one-vessel runoff bilaterally, though appears diminutive on the right.      Electronically Signed: Lux Daniel    8/25/2024 12:35 PM EDT    Workstation ID: [id number]          ECG/EMG Results (most recent)       Procedure Component Value Units Date/Time    Telemetry Scan [[id number]] Resulted: 08/23/24     Updated: 08/23/24 2027    Telemetry Scan [[id number]] Resulted: 08/23/24     Updated: 08/24/24 0104    ECG 12 Lead Chest Pain [[id number]] Collected: 08/23/24 1955     Updated: 08/24/24 0912     QT Interval 430 ms      QTC Interval 534 ms     Narrative:      HEART RATE=93  bpm  RR Yvpycfms=431  ms  OK Ixofncsr=373  ms  P Horizontal Axis=-36  deg  P Front Axis=62  deg  QRSD Enmaecrd=052  ms  QT Jrotpcgy=803  ms  PAoO=635  ms  QRS Axis=-77  deg  T Wave Axis=97  deg  - ABNORMAL ECG -  Sinus rhythm  LAE, consider biatrial enlargement  Left bundle branch block  ST elevation secondary to IVCD  Electronically Signed By: Iron Harris (Rishabh) 2024-08-24 09:12:06  Date and Time of Study:2024-08-23 19:55:51    Telemetry Scan [[id number]] Resulted: 08/23/24      Updated: 08/24/24 1648    Telemetry Scan [[id number]] Resulted: 08/23/24     Updated: 08/24/24 1707    Adult Transthoracic Echo Complete w/ Color, Spectral and Contrast if Necessary Per Protocol [[id number]] Resulted: 08/24/24 1707     Updated: 08/24/24 1710     LV GLOBAL STRAIN  -5.7 %      LVIDd 5.7 cm      LVIDs 5.2 cm      IVSd 1.10 cm      LVPWd 1.10 cm      FS 8.8 %      IVS/LVPW 1.00 cm      ESV(cubed) 140.6 ml      LV Sys Vol (BSA corrected) 49.9 cm2      EDV(cubed) 185.2 ml      LV Hearn Vol (BSA corrected) 62.0 cm2      LV mass(C)d 256.7 grams      LVOT area 3.5 cm2      LVOT diam 2.10 cm      EDV(MOD-sp4) 114.0 ml      ESV(MOD-sp4) 91.7 ml      SV(MOD-sp4) 22.3 ml      SVi(MOD-SP4) 12.1 ml/m2      SVi (LVOT) 25.1 ml/m2      EF(MOD-sp4) 19.6 %      MV E max everett 55.1 cm/sec      MV A max everett 72.6 cm/sec      MV dec time 0.27 sec      MV E/A 0.76     Med Peak E' [place] 3.7 cm/sec      Lat Peak E' [place] 4.0 cm/sec      TR max everett 208.0 cm/sec      Avg E/e' ratio 14.31     SV(LVOT) 46.1 ml      RVIDd 2.00 cm      TAPSE (>1.6) 1.48 cm      RV S' 10.1 cm/sec      LA dimension (2D)  5.2 cm      LV V1 max 63.0 cm/sec      LV V1 max PG 1.59 mmHg      LV V1 mean PG 1.00 mmHg      LV V1 VTI 13.3 cm      Ao pk everett 127.0 cm/sec      Ao max PG 6.5 mmHg      Ao mean PG 3.0 mmHg      Ao V2 VTI 29.0 cm      HEIDY(I,D) 1.59 cm2      AI P1/2t 553.4 msec      MV max PG 2.7 mmHg      MV mean PG 1.00 mmHg      MV V2 VTI 22.6 cm      MV P1/2t 111.3 msec      MVA(P1/2t) 1.98 cm2      MVA(VTI) 2.04 cm2      MV dec slope 150.0 cm/sec2      MR max eveertt 399.0 cm/sec      MR max PG 63.7 mmHg      TR max PG 17.3 mmHg      RVSP(TR) 20.3 mmHg      RAP systole 3.0 mmHg      RV V1 max PG 2.7 mmHg      RV V1 max 81.4 cm/sec      RV V1 VTI 13.0 cm      PA V2 max 79.6 cm/sec      PA acc time 0.11 sec      ACS 1.40 cm      Sinus 3.4 cm      EF(MOD-bp) 20.0 %      Dimensionless Index 0.47 (DI)     Narrative:        Left ventricular systolic function is  severely decreased. Left   ventricular ejection fraction appears to be 21 - 25%.  Akinetic mid to   distal anterior wall and apex noted    The left ventricular cavity is mildly dilated.    Left ventricular wall thickness is consistent with mild concentric   hypertrophy.    Left ventricular diastolic function was normal.    Estimated right ventricular systolic pressure from tricuspid   regurgitation is normal (<35 mmHg).      Telemetry Scan [[id number]] Resulted: 08/23/24     Updated: 08/24/24 1731    Telemetry Scan [[id number]] Resulted: 08/23/24     Updated: 08/24/24 1821    Telemetry Scan [[id number]] Resulted: 08/23/24     Updated: 08/24/24 1854    Telemetry Scan [[id number]] Resulted: 08/23/24     Updated: 08/24/24 2005    Telemetry Scan [[id number]] Resulted: 08/23/24     Updated: 08/25/24 2035    Telemetry Scan [[id number]] Resulted: 08/23/24     Updated: 08/25/24 2035    Telemetry Scan [[id number]] Resulted: 08/23/24     Updated: 08/25/24 2056    Telemetry Scan [[id number]] Resulted: 08/23/24     Updated: 08/25/24 2115    Telemetry Scan [[id number]] Resulted: 08/23/24     Updated: 08/26/24 0028    Telemetry Scan [[id number]] Resulted: 08/23/24     Updated: 08/26/24 0101    Telemetry Scan [[id number]] Resulted: 08/23/24     Updated: 08/26/24 0211    Telemetry Scan [[id number]] Resulted: 08/23/24     Updated: 08/26/24 0357    Telemetry Scan [[id number]] Resulted: 08/23/24     Updated: 08/26/24 0809    Telemetry Scan [[id number]] Resulted: 08/23/24     Updated: 08/26/24 1233    ECG 12 Lead Pre-Op / Pre-Procedure [[id number]] Collected: 08/25/24 0547     Updated: 08/26/24 1408     QT Interval 476 ms      QTC Interval 490 ms     Narrative:      HEART RATE=64  bpm  RR Ngfibort=609  ms  UT Sszvsyip=175  ms  P Horizontal Axis=-37  deg  P Front Axis=54  deg  QRSD Joamvjpu=965  ms  QT Vniopnev=242  ms  HOsB=452  ms  QRS Axis=-69  deg  T Wave Axis=133  deg  - ABNORMAL ECG -  Sinus rhythm  Probable  left atrial enlargement  Left bundle  branch block  When compared with ECG of 24-Aug-2024 15:59:35,  Significant repolarization change  Electronically Signed By: Omer Vieyra (PARAMJIT) 2024-08-26 14:06:00  Date and Time of Study:2024-08-25 05:47:11    ECG 12 Lead Chest Pain [[id number]] Collected: 08/24/24 1559     Updated: 08/26/24 1409     QT Interval 518 ms      QTC Interval 509 ms     Narrative:      HEART RATE=58  bpm  RR Zjnnqwlz=0487  ms  MA Umxjmjaj=358  ms  P Horizontal Axis=-29  deg  P Front Axis=15  deg  QRSD Xbcbfkpu=642  ms  QT Zmuoprlh=526  ms  JEjN=135  ms  QRS Axis=-63  deg  T Wave Axis=131  deg  - ABNORMAL ECG -  Sinus bradycardia  Probable  left atrial enlargement  Left bundle branch block  ST elevation secondary to IVCD  When compared with ECG of 24-Aug-2024 03:35:47,  Significant rate decrease  Electronically Signed By: Omer Vieyra (APRAMJIT) 2024-08-26 14:06:06  Date and Time of Study:2024-08-24 15:59:35    ECG 12 Lead Rhythm Change [[id number]] Collected: 08/24/24 0335     Updated: 08/26/24 1409     QT Interval 405 ms      QTC Interval 530 ms     Narrative:      HEART UVDK=710  bpm  RR Nbptykdq=299  ms  MA Nlirmyjs=310  ms  P Horizontal Axis=  deg  P Front Axis=85  deg  QRSD Ufnqkzbv=925  ms  QT Jdqfsnic=326  ms  ACbO=516  ms  QRS Axis=-65  deg  T Wave Axis=97  deg  - ABNORMAL ECG -  Sinus tachycardia  Left atrial enlargement  Left bundle branch block  ST elevation secondary to IVCD  When compared with ECG of 23-Aug-2024 19:55:51,  Nonspecific significant change  Electronically Signed By: Omer Vieyra (PARAMJIT) 2024-08-26 14:06:14  Date and Time of Study:2024-08-24 03:35:47    Telemetry Scan [[id number]] Resulted: 08/23/24     Updated: 08/26/24 1828    Telemetry Scan [[id number]] Resulted: 08/23/24     Updated: 08/26/24 2312    Telemetry Scan [[id number]] Resulted: 08/23/24     Updated: 08/26/24 2347    Telemetry Scan [[id number]] Resulted: 08/23/24     Updated: 08/27/24 0415    Telemetry Scan [[id number]] Resulted: 08/23/24     Updated: 08/27/24 0822           CBC    Results from last 7 days   Lab Units 08/26/24 0224 08/25/24 0052 08/24/24 0315 08/23/24 2004   WBC 10*3/mm3 6.30 6.34 7.02 6.09   HEMOGLOBIN g/dL 9.5* 10.7* 11.4* 12.5*   PLATELETS 10*3/mm3 150 174 153 179     BMP   Results from last 7 days   Lab Units 08/26/24 0224 08/25/24 0052 08/24/24 0606 08/23/24 2302 08/23/24 2004   SODIUM mmol/L 131* 130* 131* 132* 132*   POTASSIUM mmol/L 3.9 4.2 4.7 4.1 3.9   CHLORIDE mmol/L 99 94* 96* 95* 93*   CO2 mmol/L 22.4 26.0 24.5 26.6 25.7   BUN mg/dL 15 15 12 11 11   CREATININE mg/dL 0.98 0.99 1.03 1.00 1.15   GLUCOSE mg/dL 103* 103* 98 96 113*   MAGNESIUM mg/dL 1.9  --   --   --   --    PHOSPHORUS mg/dL 2.6  --   --   --   --      CMP   Results from last 7 days   Lab Units 08/26/24 0224 08/25/24 0052 08/24/24 0606 08/23/24 2302 08/23/24 2004   SODIUM mmol/L 131* 130* 131* 132* 132*   POTASSIUM mmol/L 3.9 4.2 4.7 4.1 3.9   CHLORIDE mmol/L 99 94* 96* 95* 93*   CO2 mmol/L 22.4 26.0 24.5 26.6 25.7   BUN mg/dL 15 15 12 11 11   CREATININE mg/dL 0.98 0.99 1.03 1.00 1.15   GLUCOSE mg/dL 103* 103* 98 96 113*   ALBUMIN g/dL 3.7  --   --   --   --    BILIRUBIN mg/dL 1.1  --   --   --   --    ALK PHOS U/L 57  --   --   --   --    AST (SGOT) U/L 22  --   --   --   --    ALT (SGPT) U/L 6  --   --   --   --      Cardiac Studies:  Echo- Results for orders placed during the hospital encounter of 08/23/24    Adult Transthoracic Echo Complete w/ Color, Spectral and Contrast if Necessary Per Protocol    Interpretation Summary    Left ventricular systolic function is severely decreased. Left ventricular ejection fraction appears to be 21 - 25%.  Akinetic mid to distal anterior wall and apex noted    The left ventricular cavity is mildly dilated.    Left ventricular wall thickness is consistent with mild concentric hypertrophy.    Left ventricular diastolic function was normal.    Estimated right ventricular systolic pressure from tricuspid regurgitation is normal (<35  mmHg).    Stress Myoview-  Cath-      Medication Review:   Scheduled Meds:aspirin, 81 mg, Oral, Daily  atorvastatin, 40 mg, Oral, Nightly  bacitracin, 1 Application, Topical, Q12H  cefTRIAXone, 2,000 mg, Intravenous, Q24H  clopidogrel, 75 mg, Oral, Daily  hydrALAZINE, 25 mg, Oral, Q12H  hydrOXYzine, 50 mg, Oral, 4x Daily  isosorbide mononitrate, 30 mg, Oral, Q24H  metoprolol tartrate, 25 mg, Oral, Q12H  ranolazine, 500 mg, Oral, Q12H  sodium chloride, 10 mL, Intravenous, Q12H  tamsulosin, 0.4 mg, Oral, Nightly      Continuous Infusions:     PRN Meds:.  acetaminophen    aluminum-magnesium hydroxide-simethicone    atropine    senna-docusate sodium **AND** polyethylene glycol **AND** bisacodyl **AND** bisacodyl    diphenhydrAMINE    hydrALAZINE    melatonin    nitroglycerin    ondansetron ODT **OR** ondansetron    [COMPLETED] Insert Peripheral IV **AND** sodium chloride    sodium chloride    sodium chloride    sodium chloride      Assessment & Plan         1.  CAD/CABG:    Patient had history of coronary artery disease status post coronary bypass 3 x 3 vessels and had a cardiac catheterization because of non-STEMI  Patient had all 3 grafts patent but has severe diffuse native vessel disease and hence is on medical therapy.  Patient is tolerating medications without any symptoms    2.  Chest pain:    Patient is chest pain-free.  Patient has been started on Imdur and Ranexa    3.  Peripheral vascular disease:    Vascular surgery has seen the patient.  Patient is noted to have severe lower extremity disease.  Further recommendation as per them    4.  Hypertension:  Patient's blood pressure is still slightly high and hence I will increase his metoprolol.    5.Hyperlipidemia  Patient's lipid levels are stable and is on statins.    Will follow in the office    Blood pressure is controlled    Avila Do MD  08/27/24  10:42 EDT

## 2024-08-27 NOTE — DISCHARGE SUMMARY
[facility] Medicine Services  Discharge Summary    Date of Service: 2024  Patient Name: Alexei Merino III  : 1950  MRN: [id number]    Date of Admission: 2024  Discharge Diagnosis:   NSTEMI  Cellulitis of the left foot without sepsis  PVD  History of AAA  CAD  Anxiety disorder  Hypertension  Dyslipidemia  Emphysema    Date of Discharge: 2024  Primary Care Physician: Raheem Mckeon MD      Presenting Problem:   NSTEMI (non-ST elevated myocardial infarction) [I21.4]    Active and Resolved Hospital Problems:  Active Hospital Problems    Diagnosis POA    **NSTEMI (non-ST elevated myocardial infarction) [I21.4] Yes    Severe malnutrition [E43] Yes    Hyperlipidemia [E78.5] Yes    Smoking greater than 30 pack years [F17.210] Yes    S/P CABG x 3 [Z95.1] Not Applicable    Coronary artery disease [I25.10] Yes    Tobacco use [Z72.0] Yes      Resolved Hospital Problems   No resolved problems to display.         Hospital Course     HPI:  Patient is a 73-year-old male who presented to the hospital with complaints of chest pain.  Please see H&P for details.    Hospital Course:  The patient was admitted to the hospital and was found to have non-ST elevation MI.  He was started on a heparin drip and was evaluated by cardiology.  He underwent LHC on  which showed fairly patent CABG grafts with small vessel coronary artery disease of his native coronary arteries, none of which were amenable to PCI.  He was started on DAPT as well as high-dose statin therapy, ARB and beta-blocker therapy, which he will continue on discharge.  The patient also presented with lower extremity wounds with concern for PVD given his previous history.  He underwent CT angiogram with runoff which did show right upper leg thrombosis and stenosis as well as left lower leg atherosclerotic disease with stenosis.  He was evaluated by vascular surgery who did not feel that any surgical intervention was necessary  at this time.  He will need to follow-up with them in 6 weeks for further outpatient treatment.  He did have a small wound on his left foot that may have been related to poor peripheral arterial flow, however podiatry did not feel that this was actively infected.  He did receive IV antibiotics for 5-day course of treatment for possible infectious process.  PT/OT is recommending skilled rehab at discharge.  He is stable for discharge.        DISCHARGE Follow Up Recommendations for labs and diagnostics: Follow-up with podiatry in 2 weeks.  Follow-up with cardiology in 2 weeks.  Follow-up with vascular surgery in 6 weeks.      Reasons For Change In Medications and Indications for New Medications:      Day of Discharge     Vital Signs:  Temp:  [97.9 °F (36.6 °C)-99.9 °F (37.7 °C)] 97.9 °F (36.6 °C)  Heart Rate:  [52-72] 61  Resp:  [13-26] 18  BP: (134-162)/(68-93) 134/68    Physical Exam:  Physical Exam   General Appearance:  Alert, cooperative, no distress, appears stated age  Head:  Normocephalic, without obvious abnormality, atraumatic  Eyes:  PERRL, conjunctiva/corneas clear, EOM's intact, fundi benign, both eyes  Ears:  Normal TM's and external ear canals, both ears  Nose: Nares normal, septum midline, mucosa normal, no drainage or sinus tenderness  Throat: Lips, mucosa, and tongue normal; teeth and gums normal  Neck: Supple, symmetrical, trachea midline, no adenopathy, thyroid: not enlarged, symmetric, no tenderness/mass/nodules, no carotid bruit or JVD  Lungs:   Clear to auscultation bilaterally, respirations unlabored  Heart:  Regular rate and rhythm, S1, S2 normal, no murmur, rub or gallop  Abdomen:  Soft, non-tender, bowel sounds active all four quadrants,  no masses, no organomegaly  Extremities: Extremities normal, atraumatic, no cyanosis or edema  Pulses: 2+ and symmetric  Skin: Skin color, texture, turgor normal, no rashes or lesions  Neurologic: Normal        Pertinent  and/or Most Recent Results     LAB  RESULTS:      Lab 08/26/24 0224 08/25/24 0052 08/24/24  1019 08/24/24  0315 08/23/24 2004   WBC 6.30 6.34  --  7.02 6.09   HEMOGLOBIN 9.5* 10.7*  --  11.4* 12.5*   HEMATOCRIT 27.6* 30.9*  --  32.9* 36.4*   PLATELETS 150 174  --  153 179   NEUTROS ABS  --   --   --  4.45 3.36   IMMATURE GRANS (ABS)  --   --   --  0.02 0.01   LYMPHS ABS  --   --   --  1.68 1.89   MONOS ABS  --   --   --  0.80 0.72   EOS ABS  --   --   --  0.03 0.09   MCV 92.0 92.2  --  91.9 92.4   PROTIME  --   --  12.4*  --  11.9*   APTT  --   --  47.0* 67.1 30.3*         Lab 08/26/24 0224 08/25/24 0052 08/24/24 0606 08/23/24 2302 08/23/24 2004   SODIUM 131* 130* 131* 132* 132*   POTASSIUM 3.9 4.2 4.7 4.1 3.9   CHLORIDE 99 94* 96* 95* 93*   CO2 22.4 26.0 24.5 26.6 25.7   ANION GAP 9.6 10.0 10.5 10.4 13.3   BUN 15 15 12 11 11   CREATININE 0.98 0.99 1.03 1.00 1.15   EGFR 81.4 80.4 76.7 79.5 67.2   GLUCOSE 103* 103* 98 96 113*   CALCIUM 8.6 8.9 8.6 8.7 9.4   MAGNESIUM 1.9  --   --   --   --    PHOSPHORUS 2.6  --   --   --   --    HEMOGLOBIN A1C  --   --   --   --  5.92*         Lab 08/26/24 0224   TOTAL PROTEIN 5.5*   ALBUMIN 3.7   GLOBULIN 1.8   ALT (SGPT) 6   AST (SGOT) 22   BILIRUBIN 1.1   ALK PHOS 57         Lab 08/24/24  1019 08/24/24  0606 08/23/24 2302 08/23/24 2004   HSTROP T  --  278* 234* 195*   PROTIME 12.4*  --   --  11.9*   INR 1.15*  --   --  1.10         Lab 08/25/24  0052 08/23/24 2004   CHOLESTEROL 199 235*   LDL CHOL 133* 162*   HDL CHOL 44 46   TRIGLYCERIDES 124 147             Brief Urine Lab Results       None          Microbiology Results (last 10 days)       ** No results found for the last 240 hours. **            CT Angio Abdominal Aorta Bilateral Iliofem Runoff    Result Date: 8/25/2024  Impression: Impression: Aortobiiliac stent graft appears patent. Mildly decreased size of infrarenal abdominal aortic aneurysm measuring up to 5 cm. Stable appearing bilateral common iliac artery aneurysms measuring up to 1.7 cm on  the right and 2.2 cm on the left. Severe multifocal atherosclerotic disease of the lower extremity vessels, with likely one-vessel runoff bilaterally, though appears diminutive on the right. Electronically Signed: Lux OMID Daniel  8/25/2024 12:35 PM EDT  Workstation ID: [id number]    XR Foot 2 View Left    Result Date: 8/23/2024  Impression: Impression: No acute osseous abnormality. Degenerative changes of the first MTP joint most likely represents osteoarthritis. Less likely, gouty arthritis is also a possibility Electronically Signed: Vinny Smyth,   8/23/2024 10:57 PM EDT  Workstation ID: [id number]    XR Chest 1 View    Result Date: 8/23/2024  Impression: Impression: Mild diffuse interstitial thickening and areas of patchy opacity are nonspecific. This may represent mild pulmonary edema versus atypical/multifocal pneumonia. Electronically Signed: Vinny Smyth DO  8/23/2024 8:17 PM EDT  Workstation ID: [id number]     Results for orders placed during the hospital encounter of 08/23/24    Doppler Arterial Multi Level Lower Extremity - Bilateral CAR    Interpretation Summary    Right Conclusion: The right PIO is moderately reduced. Mild digital insufficiency.    Left Conclusion: The left PIO is normal. Normal digital pressures.      Results for orders placed during the hospital encounter of 08/23/24    Doppler Arterial Multi Level Lower Extremity - Bilateral CAR    Interpretation Summary    Right Conclusion: The right PIO is moderately reduced. Mild digital insufficiency.    Left Conclusion: The left PIO is normal. Normal digital pressures.      Results for orders placed during the hospital encounter of 08/23/24    Adult Transthoracic Echo Complete w/ Color, Spectral and Contrast if Necessary Per Protocol    Interpretation Summary    Left ventricular systolic function is severely decreased. Left ventricular ejection fraction appears to be 21 - 25%.  Akinetic mid to distal anterior wall and apex noted    The  left ventricular cavity is mildly dilated.    Left ventricular wall thickness is consistent with mild concentric hypertrophy.    Left ventricular diastolic function was normal.    Estimated right ventricular systolic pressure from tricuspid regurgitation is normal (<35 mmHg).      Labs Pending at Discharge:      Procedures Performed  Procedure(s):  LEFT HEART CATH with possible PCI         Consults:   Consults       Date and Time Order Name Status Description    8/25/2024  7:01 PM Inpatient Podiatry Consult Completed     8/24/2024  1:58 PM Inpatient Vascular Surgery Consult Completed     8/23/2024  8:55 PM Cardiology (on-call MD unless specified) Completed     8/23/2024  8:55 PM Hospitalist (on-call MD unless specified)                Discharge Details        Discharge Medications        New Medications        Instructions Start Date   atorvastatin 40 MG tablet  Commonly known as: LIPITOR   40 mg, Oral, Nightly      clopidogrel 75 MG tablet  Commonly known as: PLAVIX   75 mg, Oral, Daily   Start Date: August 28, 2024     hydrALAZINE 25 MG tablet  Commonly known as: APRESOLINE   25 mg, Oral, Every 12 Hours Scheduled      isosorbide mononitrate 30 MG 24 hr tablet  Commonly known as: IMDUR   30 mg, Oral, Every 24 Hours Scheduled   Start Date: August 28, 2024     losartan 25 MG tablet  Commonly known as: Cozaar   25 mg, Oral, Daily      metoprolol tartrate 25 MG tablet  Commonly known as: LOPRESSOR   25 mg, Oral, 2 Times Daily      ranolazine 500 MG 12 hr tablet  Commonly known as: RANEXA   500 mg, Oral, Every 12 Hours Scheduled             Changes to Medications        Instructions Start Date   hydrOXYzine 50 MG tablet  Commonly known as: ATARAX  What changed:   medication strength  how much to take  when to take this  reasons to take this  additional instructions   50 mg, Oral, 4 Times Daily             Continue These Medications        Instructions Start Date   aspirin 81 MG EC tablet   81 mg, Oral, Daily       ferrous gluconate 324 (37.5 Fe) MG tablet tablet   324 mg, Oral, Daily PRN      ibuprofen 200 MG tablet  Commonly known as: ADVIL,MOTRIN   400 mg, Oral, Every 6 Hours PRN               Allergies   Allergen Reactions    Morphine Anaphylaxis         Discharge Disposition:     Skilled Nursing Facility (DC - External)    Diet:  Hospital:  Diet Order   Procedures    Diet: Cardiac; Healthy Heart (2-3 Na+); Fluid Consistency: Thin (IDDSI 0)         Discharge Activity:         CODE STATUS:  Code Status and Medical Interventions: CPR (Attempt to Resuscitate); Full Support   Ordered at: 08/23/24 7944     Code Status (Patient has no pulse and is not breathing):    CPR (Attempt to Resuscitate)     Medical Interventions (Patient has pulse or is breathing):    Full Support         Future Appointments   Date Time Provider Department Center   10/10/2024  1:30 PM Mitali Saavedra MD [facility]       Additional Instructions for the Follow-ups that You Need to Schedule       Ambulatory Referral to Cardiac Rehab   As directed      Discharge Follow-up with Specified Provider: Follow-up with cardiology in 2 weeks.; 2 Weeks   As directed      To: Follow-up with cardiology in 2 weeks.   Follow Up: 2 Weeks        Discharge Follow-up with Specified Provider: Follow-up with podiatry as scheduled on 9/10; 2 Weeks   As directed      To: Follow-up with podiatry as scheduled on 9/10   Follow Up: 2 Weeks        Discharge Follow-up with Specified Provider: Follow-up with vascular surgery in 6 weeks.; 6 Weeks   As directed      To: Follow-up with vascular surgery in 6 weeks.   Follow Up: 6 Weeks                Time spent on Discharge including face to face service:  >30 minutes    Signature: Electronically signed by Guillermo Bear MD, 08/27/24, 12:48 EDT.  [facility] Hospitalist Team

## 2024-08-28 NOTE — TELEPHONE ENCOUNTER
CALLED [phone], WOMAN ANSWERED BUT CALL WAS DROPPED. TRIED TO CALL BACK AND HAD TO LEAVE MESSAGE.  OK TO FOR HUB TO CONFIRM APT.

## 2024-08-28 NOTE — CASE MANAGEMENT/SOCIAL WORK
Case Management Discharge Note      Final Note: D/C to [facility] (SNF)    Provided Post Acute Provider List?: Yes  Post Acute Provider List: Home Health, Nursing Home    Selected Continued Care - Discharged on 8/27/2024 Admission date: 8/23/2024 - Discharge disposition: Skilled Nursing Facility (DC - External)      Destination Coordination complete.      Service Provider Selected Services Address Phone Fax Patient Preferred    [facility] [address] [place] [address] [phone] --             Transportation Services  W/C Van: Efraín Hernandez    Final Discharge Disposition Code: 03 - skilled nursing facility (SNF)

## 2024-09-10 NOTE — PROGRESS NOTES
Subjective:     Encounter Date:09/11/2024      Patient ID: Alexei Merino III is a 73 y.o. male.    Chief Complaint:  History of Present Illness    Alexei Merino III is a 73 y.o. male who presents to the office today for hospital follow-up for which he was evaluated by cardiology for unstable angina and NSTEMI.  Patient underwent cardiac catheterization that showed 3 out of 3 bypass grafts patent.  RCA with diffuse disease with 60 to 70% mid and PLB branch with 80 to 90%, small diameter.  Echocardiogram showed reduced LVEF of 21 to 25% with akinetic mid to distal anterior wall and apex.  Patient was started on appropriate medical therapy and discharged home.   Patient seen and examined, labs and chart reviewed. Patient states he has been experiencing shortness of breath, fatigue, nausea.  He reports shortness of breath is his baseline through underlying COPD.  His daughter and wife note that he has not been eating well and has been too tired to undergo PT/OT at facility following recent hospitalization.  Patient's vital signs are stable today.  He takes all medications as prescribed.  He currently denies chest pain, palpitations, lightheadedness/dizziness, numbness/tingling, syncope.  Of note patient is very concerned over anxiety, advised to talk to PCP for further management.      The following portions of the patient's history were reviewed and updated as appropriate: allergies, current medications, past family history, past medical history, past social history, past surgical history, and problem list.    Past Medical History:   Diagnosis Date    Anxiety     Aortic aneurysm     Coronary artery disease     Dementia 2017    per EASTON Lopez, patient was diagnosed 7 years ago    Emphysema lung     Gastroparesis     per Jessica    [place] (hard of hearing)     complete deaf    Hyperlipidemia     Hypertension     Myocardial infarction     STEMI (ST elevation myocardial infarction) 08/23/2024     Past Surgical History:  "  Procedure Laterality Date    ABDOMINAL AORTIC ANEURYSM REPAIR  03/23/2022    CARDIAC CATHETERIZATION Right 8/24/2024    Procedure: LEFT HEART CATH with possible PCI;  Surgeon: Omer Vieyra MD;  Location: [facility] CATH INVASIVE LOCATION;  Service: Cardiovascular;  Laterality: Right;  Local and IV sedation    CORONARY ARTERY BYPASS GRAFT  03/13/2016    X3  Dr Hong     /61 (BP Location: Left arm, Patient Position: Sitting, Cuff Size: Adult)   Pulse 61   Ht 175.3 cm (69\")   Wt 58.4 kg (128 lb 12.8 oz)   SpO2 97%   BMI 19.02 kg/m²   Family History   Problem Relation Age of Onset    Hypertension Other     Heart disease Mother     Heart disease Father        Current Outpatient Medications:     aspirin 81 MG EC tablet, Take 1 tablet by mouth Daily., Disp: , Rfl:     atorvastatin (LIPITOR) 40 MG tablet, Take 1 tablet by mouth Every Night., Disp: , Rfl:     cetirizine (zyrTEC) 5 MG tablet, Take 1 tablet by mouth Daily., Disp: , Rfl:     clopidogrel (PLAVIX) 75 MG tablet, Take 1 tablet by mouth Daily., Disp: , Rfl:     ferrous gluconate 324 (37.5 Fe) MG tablet tablet, Take 1 tablet by mouth Daily As Needed., Disp: , Rfl:     hydrALAZINE (APRESOLINE) 25 MG tablet, Take 1 tablet by mouth Every 12 (Twelve) Hours., Disp: , Rfl:     hydrOXYzine (ATARAX) 50 MG tablet, Take 1 tablet by mouth 4 (Four) Times a Day., Disp: , Rfl:     ibuprofen (ADVIL,MOTRIN) 200 MG tablet, Take 2 tablets by mouth Every 6 (Six) Hours As Needed for Mild Pain., Disp: , Rfl:     isosorbide mononitrate (IMDUR) 30 MG 24 hr tablet, Take 1 tablet by mouth Daily., Disp: , Rfl:     losartan (Cozaar) 25 MG tablet, Take 1 tablet by mouth Daily., Disp: , Rfl:     ondansetron (ZOFRAN) 4 MG tablet, Take 1 tablet by mouth Every 6 (Six) Hours As Needed for Nausea or Vomiting., Disp: , Rfl:     ranolazine (RANEXA) 500 MG 12 hr tablet, Take 1 tablet by mouth Every 12 (Twelve) Hours., Disp: , Rfl:     metoprolol succinate XL (TOPROL-XL) 25 MG 24 hr tablet, " Take 1 tablet by mouth Daily., Disp: 90 tablet, Rfl: 3    Allergies   Allergen Reactions    Morphine Anaphylaxis     Social History     Socioeconomic History    Marital status:    Tobacco Use    Smoking status: Former     Average packs/day: 0.3 packs/day for 50.0 years (12.5 ttl pk-yrs)     Types: Cigarettes     Start date:      Quit date:      Years since quittin.7     Passive exposure: Past    Smokeless tobacco: Never   Vaping Use    Vaping status: Never Used   Substance and Sexual Activity    Alcohol use: No    Drug use: No    Sexual activity: Defer     Review of Systems   Constitutional: Positive for malaise/fatigue.   Cardiovascular:  Negative for chest pain, dyspnea on exertion, leg swelling and palpitations.   Respiratory:  Positive for shortness of breath. Negative for cough.    Gastrointestinal:  Positive for nausea. Negative for abdominal pain and vomiting.   Neurological:  Negative for dizziness, focal weakness, headaches, light-headedness and numbness.   All other systems reviewed and are negative.         Objective:     Vitals reviewed.   Constitutional:       Appearance: Not in distress. Frail.   HENT:      Right Ear: Decreased hearing noted.      Left Ear: Decreased hearing noted.   Pulmonary:      Effort: Pulmonary effort is normal.      Breath sounds: Normal breath sounds.   Cardiovascular:      Normal rate. Regular rhythm.   Pulses:     Intact distal pulses.   Edema:     Peripheral edema absent.   Abdominal:      Palpations: Abdomen is soft.   Musculoskeletal: Normal range of motion.      Cervical back: Normal range of motion and neck supple. Skin:     General: Skin is warm and dry.   Neurological:      General: No focal deficit present.      Mental Status: Alert.       Procedures    Lab Review:    Diagnosis Plan   1. Hospital discharge follow-up        2. Chronic HFrEF (heart failure with reduced ejection fraction)        3. Coronary artery disease involving coronary bypass  graft of native heart without angina pectoris  metoprolol succinate XL (TOPROL-XL) 25 MG 24 hr tablet      4. S/P CABG x 3        5. Dyslipidemia        6. Abdominal aortic aneurysm (AAA) without rupture, unspecified part        7. Primary hypertension        8. Chronic obstructive pulmonary disease, unspecified COPD type        9. Other fatigue        LAB RESULTS (LAST 7 DAYS)    Echocardiogram   Results for orders placed during the hospital encounter of 08/23/24    Adult Transthoracic Echo Complete w/ Color, Spectral and Contrast if Necessary Per Protocol    Interpretation Summary    Left ventricular systolic function is severely decreased. Left ventricular ejection fraction appears to be 21 - 25%.  Akinetic mid to distal anterior wall and apex noted    The left ventricular cavity is mildly dilated.    Left ventricular wall thickness is consistent with mild concentric hypertrophy.    Left ventricular diastolic function was normal.    Estimated right ventricular systolic pressure from tricuspid regurgitation is normal (<35 mmHg).      CBC  Results from last 7 days   Lab Units 09/09/24  1525   WBC 10*3/mm3 4.44   RBC 10*6/mm3 3.45*   HEMOGLOBIN g/dL 10.8*   HEMATOCRIT % 31.1*   MCV fL 90.1   PLATELETS 10*3/mm3 191       BMP  Results from last 7 days   Lab Units 09/09/24  1525   SODIUM mmol/L 126*   POTASSIUM mmol/L 4.8   CHLORIDE mmol/L 91*   CO2 mmol/L 25.4   BUN mg/dL 13   CREATININE mg/dL 1.04   GLUCOSE mg/dL 108*       CMP   Results from last 7 days   Lab Units 09/09/24  1525   SODIUM mmol/L 126*   POTASSIUM mmol/L 4.8   CHLORIDE mmol/L 91*   CO2 mmol/L 25.4   BUN mg/dL 13   CREATININE mg/dL 1.04   GLUCOSE mg/dL 108*         BNP        TROPONIN        CoAg        Creatinine Clearance  Estimated Creatinine Clearance: 52.3 mL/min (by C-G formula based on SCr of 1.04 mg/dL).    ABG        Radiology  No radiology results for the last day        MDM    Plan/recommendations    Hospital discharge follow-up  Unstable  angina, NSTEMI  History of CAD  S/p CABG x 3 (2016)   Recent hospitalization for unstable angina  Cardiac catheterization showed 3 out of 3 bypass grafts patent  Diffuse disease to RCA  No intervention completed, medical therapy  Continue aspirin, high intensity statin, Plavix, Imdur, Ranexa, Lopressor    HFrEF  Ischemic cardiomyopathy  Echocardiogram with reduced LVEF 21 to 25%  Reports not new onset HF   GDMT to include hydralazine, losartan, beta-blocker  Change Lopressor to metoprolol succinate for better GDMT  Consider SGTL2 inhibitor   Appears euvolemic   CHF instructions discussed to include   - Daily weight:  same time every day, same clothing   - Low Salt (sodium) diet   - Watch fluid intake     Hypertension  Blood pressure 121/61   Continue hydralazine 25 mg, Imdur 30 mg, losartan 25 mg  Change Lopressor to metoprolol succinate    Dyslipidemia  High intensity statin therapy  Recent lipid panel total cholesterol 109, , HDL 44  Goal DL less than 70  Repeat lipid panel prior to next visit     Peripheral artery disease  Follows with vascular surgery    Abdominal aortic aneurysm  S/p EVAR (2022)   Recent CTA with patent graft  Follows with vascular surgery    Tobacco abuse  Counseled complete cessation      Patient's previous medical records, labs, and EKG were reviewed and discussed with the patient at today's visit.     Patient to be seen as needed or in 6 months with Dr. Do

## 2024-09-11 ENCOUNTER — OFFICE VISIT (OUTPATIENT)
Dept: CARDIOLOGY | Facility: CLINIC | Age: 74
End: 2024-09-11
Payer: MEDICARE

## 2024-09-11 VITALS
OXYGEN SATURATION: 97 % | HEART RATE: 61 BPM | WEIGHT: 128.8 LBS | DIASTOLIC BLOOD PRESSURE: 61 MMHG | SYSTOLIC BLOOD PRESSURE: 121 MMHG | HEIGHT: 69 IN | BODY MASS INDEX: 19.08 KG/M2

## 2024-09-11 DIAGNOSIS — J44.9 CHRONIC OBSTRUCTIVE PULMONARY DISEASE, UNSPECIFIED COPD TYPE: ICD-10-CM

## 2024-09-11 DIAGNOSIS — R53.83 OTHER FATIGUE: ICD-10-CM

## 2024-09-11 DIAGNOSIS — Z95.1 S/P CABG X 3: ICD-10-CM

## 2024-09-11 DIAGNOSIS — I71.40 ABDOMINAL AORTIC ANEURYSM (AAA) WITHOUT RUPTURE, UNSPECIFIED PART: ICD-10-CM

## 2024-09-11 DIAGNOSIS — Z09 HOSPITAL DISCHARGE FOLLOW-UP: Primary | ICD-10-CM

## 2024-09-11 DIAGNOSIS — I10 PRIMARY HYPERTENSION: ICD-10-CM

## 2024-09-11 DIAGNOSIS — E78.5 DYSLIPIDEMIA: ICD-10-CM

## 2024-09-11 DIAGNOSIS — I25.810 CORONARY ARTERY DISEASE INVOLVING CORONARY BYPASS GRAFT OF NATIVE HEART WITHOUT ANGINA PECTORIS: ICD-10-CM

## 2024-09-11 DIAGNOSIS — I50.22 CHRONIC HFREF (HEART FAILURE WITH REDUCED EJECTION FRACTION): ICD-10-CM

## 2024-09-11 RX ORDER — METOPROLOL SUCCINATE 25 MG/1
25 TABLET, EXTENDED RELEASE ORAL DAILY
Qty: 90 TABLET | Refills: 3 | Status: SHIPPED | OUTPATIENT
Start: 2024-09-11

## 2024-09-11 RX ORDER — CETIRIZINE HYDROCHLORIDE 5 MG/1
5 TABLET ORAL DAILY
COMMUNITY

## 2024-09-11 RX ORDER — ONDANSETRON 4 MG/1
4 TABLET, FILM COATED ORAL EVERY 6 HOURS PRN
COMMUNITY

## 2024-10-21 RX ORDER — ONDANSETRON 4 MG/1
4 TABLET, FILM COATED ORAL EVERY 8 HOURS PRN
Qty: 30 TABLET | Refills: 1 | Status: SHIPPED | OUTPATIENT
Start: 2024-10-21 | End: 2024-10-22

## 2024-10-22 PROBLEM — S72.001A FRACTURE OF FEMORAL NECK, RIGHT: Status: ACTIVE | Noted: 2024-10-22

## 2024-10-22 NOTE — NURSING NOTE
Nursing report ED to floor  Alexei Merino III  74 y.o.  male    HPI:   Chief Complaint   Patient presents with    Fall       Admitting doctor:   Amira Traore MD    Admitting diagnosis:   The encounter diagnosis was Closed fracture of right hip, initial encounter.    Code status:   Current Code Status       Date Active Code Status Order ID Comments User Context       10/22/2024 1704 CPR (Attempt to Resuscitate) [id number]  Margo Watkins APRN ED        Question Answer    Code Status (Patient has no pulse and is not breathing) CPR (Attempt to Resuscitate)    Medical Interventions (Patient has pulse or is breathing) Full Support    Level Of Support Discussed With Patient                    Allergies:   Morphine    Isolation:  No active isolations     Fall Risk:  Fall Risk Assessment was completed, and patient is at high risk for falls.   Predictive Model Details         13 (Low) Factor Value    Calculated 10/22/2024 17:21 Age 74    Risk of Fall Model Active Peripheral IV Present     Imaging order in this encounter Present     Respiratory Rate 18     Magnesium not on file     Total Bilirubin 2.3 mg/dL     Lucio Scale not on file     Number of Distinct Medication Classes administered 2     Chloride 99 mmol/L     Calcium 9 mg/dL     Diastolic BP 82     Clinically Relevant Sex Not Female     Albumin 3.9 g/dL     Number of administrations of Analgesic Narcotics 1     Creatinine 1.15 mg/dL     Days after Admission 0.141     Tobacco Use Quit     ALT 15 U/L     Potassium 3.8 mmol/L         Weight:       10/22/24  1419   Weight: 53.1 kg (117 lb)       Intake and Output  No intake or output data in the 24 hours ending 10/22/24 1733    Diet:   Dietary Orders (From admission, onward)       Start     Ordered    10/23/24 0001  NPO Diet NPO Type: Strict NPO  Diet Effective Midnight        Question:  NPO Type  Answer:  Strict NPO    10/22/24 1704    10/22/24 1637  Diet: Regular/House; Fluid Consistency: Thin (IDDSI 0)  Diet Effective  Now        References:    Diet Order [place]   Question Answer Comment   Diets: Regular/House    Fluid Consistency: Thin (IDDSI 0)        10/22/24 1636                     Most recent vitals:   Vitals:    10/22/24 1646 10/22/24 1648 10/22/24 1716 10/22/24 1731   BP: 148/77  153/82 144/82   BP Location:       Patient Position:       Pulse: 68 67 70 68   Resp:       Temp:       TempSrc:       SpO2: 96% 96% 93% 94%   Weight:       Height:           Active LDAs/IV Access:   Lines, Drains & Airways       Active LDAs       Name Placement date Placement time Site Days    Peripheral IV 10/22/24 1508 Right Forearm 10/22/24  1508  Forearm  less than 1                    Skin Condition:   Skin Assessments (last day)       None             Labs (abnormal labs have a star):   Labs Reviewed   COMPREHENSIVE METABOLIC PANEL - Abnormal; Notable for the following components:       Result Value    Glucose 115 (*)     CO2 30.2 (*)     Total Protein 5.6 (*)     Total Bilirubin 2.3 (*)     All other components within normal limits    Narrative:     GFR Normal >60  Chronic Kidney Disease <60  Kidney Failure <15    The GFR formula is only valid for adults with stable renal function between ages 18 and 70.   PROTIME-INR - Abnormal; Notable for the following components:    Protime 12.9 (*)     INR 1.20 (*)     All other components within normal limits   CBC WITH AUTO DIFFERENTIAL - Abnormal; Notable for the following components:    RBC 2.82 (*)     Hemoglobin 8.7 (*)     Hematocrit 26.3 (*)     Neutrophil % 80.7 (*)     Lymphocyte % 9.1 (*)     Eosinophil % 0.2 (*)     All other components within normal limits   BASIC METABOLIC PANEL - Abnormal; Notable for the following components:    Glucose 112 (*)     All other components within normal limits    Narrative:     GFR Normal >60  Chronic Kidney Disease <60  Kidney Failure <15    The GFR formula is only valid for adults with stable renal function between ages 18 and 70.   APTT - Normal    RAINBOW DRAW    Narrative:     The following orders were created for panel order [place] Draw.  Procedure                               Abnormality         Status                     ---------                               -----------         ------                     Green Top (Gel)[[id number]]                                  Final result               Lavender Top[[id number]]                                     Final result               Gold Top - SST[[id number]]                                   Final result               Light Blue Top[[id number]]                                   Final result                 Please view results for these tests on the individual orders.   TYPE AND SCREEN   GREEN TOP   LAVENDER TOP   GOLD TOP - SST   LIGHT BLUE TOP   CBC AND DIFFERENTIAL    Narrative:     The following orders were created for panel order CBC & Differential.  Procedure                               Abnormality         Status                     ---------                               -----------         ------                     CBC Auto Differential[[id number]]        Abnormal            Final result                 Please view results for these tests on the individual orders.       LOC: Person, Place, Time, and Situation    Telemetry:  Med/Surg    Cardiac Monitoring Ordered: yes    EKG:   ECG 12 Lead Pre-Op / Pre-Procedure   Preliminary Result   HEART RATE=72  bpm   RR Bupplipa=368  ms   NV Esdyodiq=440  ms   P Horizontal Axis=-39  deg   P Front Axis=75  deg   QRSD Reaphbzy=508  ms   QT Zuvkuazx=770  ms   TQlF=197  ms   QRS Axis=-70  deg   T Wave Axis=111  deg   - ABNORMAL ECG -   Sinus rhythm   Probable left atrial enlargement   Left bundle branch block   ST elevation secondary to IVCD   Date and Time of Study:2024-10-22 15:23:33          Medications Given in the ED:   Medications   sodium chloride 0.9 % flush 10 mL (has no administration in time range)   Potassium Replacement - Follow Nurse / BPA Driven  Protocol (has no administration in time range)   Magnesium Standard Dose Replacement - Follow Nurse / BPA Driven Protocol (has no administration in time range)   Phosphorus Replacement - Follow Nurse / BPA Driven Protocol (has no administration in time range)   Calcium Replacement - Follow Nurse / BPA Driven Protocol (has no administration in time range)   ondansetron ODT (ZOFRAN-ODT) disintegrating tablet 4 mg (has no administration in time range)     Or   ondansetron (ZOFRAN) injection 4 mg (has no administration in time range)   HYDROmorphone (DILAUDID) injection 1 mg (1 mg Intravenous Given 10/22/24 1719)   ondansetron (ZOFRAN) injection 4 mg (4 mg Intravenous Given 10/22/24 1619)       Imaging results:  XR Femur 2 View Right    Result Date: 10/22/2024  Impression: Subcapital left hip fracture. No additional femoral fractures are identified. Electronically Signed: Emilie Nesbitt MD  10/22/2024 4:07 PM EDT  Workstation ID: [id number]    XR Hip With or Without Pelvis 2 - 3 View Right    Result Date: 10/22/2024  Impression: Acute right femoral neck fracture not clearly involving the lesser or greater trochanter. Maintained right hip joint alignment. Electronically Signed: José Miguel Dumas MD  10/22/2024 2:57 PM EDT  Workstation ID: [id number]     Social issues:   Social History     Socioeconomic History    Marital status:    Tobacco Use    Smoking status: Former     Average packs/day: 0.3 packs/day for 50.0 years (12.5 ttl pk-yrs)     Types: Cigarettes     Start date:      Quit date: 1964     Years since quittin.8     Passive exposure: Past    Smokeless tobacco: Never   Vaping Use    Vaping status: Never Used   Substance and Sexual Activity    Alcohol use: No    Drug use: No    Sexual activity: Defer       NIH Stroke Scale:  Interval: (not recorded)  1a. Level of Consciousness: (not recorded)  1b. LOC Questions: (not recorded)  1c. LOC Commands: (not recorded)  2. Best Gaze: (not recorded)  3. Visual:  (not recorded)  4. Facial Palsy: (not recorded)  5a. Motor Arm, Left: (not recorded)  5b. Motor Arm, Right: (not recorded)  6a. Motor Leg, Left: (not recorded)  6b. Motor Leg, Right: (not recorded)  7. Limb Ataxia: (not recorded)  8. Sensory: (not recorded)  9. Best Language: (not recorded)  10. Dysarthria: (not recorded)  11. Extinction and Inattention (formerly Neglect): (not recorded)    Total (NIH Stroke Scale): (not recorded)     Additional notable assessment information:     Nursing report ED to floor:  LAUREL Lazcano RN   10/22/24 17:33 EDT

## 2024-10-22 NOTE — Clinical Note
Level of Care: Med/Surg [1]  Diagnosis: Fracture of femoral neck, right [160956]  Admitting Physician: DESHAWN PARK [[id number]  Certification: I Certify That Inpatient Hospital Services Are Medically Necessary For Greater Than 2 Midnights

## 2024-10-22 NOTE — H&P
[facility] Medicine Services  History & Physical    Patient Name: Alexei Merino III  : 1950  MRN: [id number]  Primary Care Physician:  Raheem Mckeon MD  Date of admission: 10/22/2024  Date and Time of Service: 10/22/2024 at 1600    Subjective      Chief Complaint: hip pain     History of Present Illness: Alexei Merino III is a 74 y.o. male with a CMH of anxiety, aortic aneurysm, CAD, dementia, HTN, HLD, and history of STEMI who presented to [facility] on 10/22/2024 with  right hip pain s/p a fall. Patient lives at a long term care facility. He was trying to go the bathroom by himself when he fell. Xray of right femur shows subcapital hip fracture. Xr of the right hip shows acute right femoral neck fracture with maintained right hip joint alignment. Orthopedic surgery has been consulted. Hospitalist team to admit for further medical management.       Review of Systems   Constitutional:  Positive for appetite change and fatigue.   Musculoskeletal:  Positive for arthralgias and gait problem.   Neurological:  Positive for weakness.       Personal History     Past Medical History:   Diagnosis Date    Anxiety     Aortic aneurysm     Coronary artery disease     Dementia 2017    per EASTON Lopez, patient was diagnosed 7 years ago    Emphysema lung     Gastroparesis     per Jessica    [place] (hard of hearing)     complete deaf    Hyperlipidemia     Hypertension     Myocardial infarction     STEMI (ST elevation myocardial infarction) 2024       Past Surgical History:   Procedure Laterality Date    ABDOMINAL AORTIC ANEURYSM REPAIR  2022    CARDIAC CATHETERIZATION Right 2024    Procedure: LEFT HEART CATH with possible PCI;  Surgeon: Omer Vieyra MD;  Location:  INVASIVE LOCATION;  Service: Cardiovascular;  Laterality: Right;  Local and IV sedation    CORONARY ARTERY BYPASS GRAFT  03/13/2016    X3  Dr Hong       Family History: family history includes Heart disease in  his father and mother; Hypertension in an other family member. Otherwise pertinent FHx was reviewed and not pertinent to current issue.    Social History:  reports that he quit smoking about 60 years ago. His smoking use included cigarettes. He started smoking about 9 months ago. He has a 12.5 pack-year smoking history. He has been exposed to tobacco smoke. He has never used smokeless tobacco. He reports that he does not drink alcohol and does not use drugs.    Home Medications:  Prior to Admission Medications       Prescriptions Last Dose Informant Patient Reported? Taking?    aspirin 81 MG EC tablet 10/22/2024  Yes Yes    Take 1 tablet by mouth Daily.    atorvastatin (LIPITOR) 40 MG tablet 10/21/2024  No Yes    Take 1 tablet by mouth Every Night.    cetirizine (zyrTEC) 5 MG tablet 10/22/2024  Yes Yes    Take 1 tablet by mouth Daily.    clopidogrel (PLAVIX) 75 MG tablet 10/22/2024  No Yes    Take 1 tablet by mouth Daily.    ferrous gluconate 324 (37.5 Fe) MG tablet tablet 10/21/2024  Yes Yes    Take 1 tablet by mouth Daily As Needed.    hydrALAZINE (APRESOLINE) 25 MG tablet 10/21/2024  Yes Yes    Take 1 tablet by mouth 2 (Two) Times a Day. Give if SBP greater than 160 or DBP greater than 90.    hydrOXYzine (ATARAX) 25 MG tablet 10/22/2024  Yes Yes    Take 1 tablet by mouth 2 (Two) Times a Day.    hydrOXYzine (ATARAX) 25 MG tablet 10/21/2024  Yes Yes    Take 1 tablet by mouth Daily As Needed for Anxiety.    ibuprofen (ADVIL,MOTRIN) 200 MG tablet 10/21/2024  Yes Yes    Take 2 tablets by mouth Every 6 (Six) Hours As Needed for Mild Pain.    isosorbide mononitrate (IMDUR) 30 MG 24 hr tablet 10/22/2024  Yes Yes    Take 1 tablet by mouth Daily. Hold for BP <100/60    losartan (COZAAR) 100 MG tablet 10/22/2024  Yes Yes    Take 1 tablet by mouth Daily.    magnesium hydroxide (MILK OF MAGNESIA) 2400 MG/10ML suspension suspension 10/21/2024  Yes Yes    Take 30 mL by mouth Daily As Needed.    ranolazine (RANEXA) 500 MG 12 hr  tablet 10/22/2024  No Yes    Take 1 tablet by mouth Every 12 (Twelve) Hours.    sodium bicarbonate 650 MG tablet 10/22/2024  Yes Yes    Take 1 tablet by mouth 3 (Three) Times a Day.    ondansetron (ZOFRAN) 4 MG tablet   Yes No    Take 1 tablet by mouth Every 6 (Six) Hours As Needed for Nausea or Vomiting.    polyethylene glycol (MIRALAX) 17 g packet   Yes No    Take 17 g by mouth Daily.              Allergies:  Allergies   Allergen Reactions    Morphine Anaphylaxis       Objective      Vitals:   Temp:  [97.7 °F (36.5 °C)] 97.7 °F (36.5 °C)  Heart Rate:  [67-74] 67  Resp:  [18] 18  BP: (143-157)/(73-90) 148/77  Body mass index is 17.28 kg/m².  Physical Exam  Vitals and nursing note reviewed.   Constitutional:       Appearance: He is underweight. He is ill-appearing.   HENT:      Head: Normocephalic and atraumatic.      Nose: Nose normal.      Mouth/Throat:      Mouth: Mucous membranes are moist.   Eyes:      Extraocular Movements: Extraocular movements intact.      Pupils: Pupils are equal, round, and reactive to light.   Cardiovascular:      Rate and Rhythm: Normal rate and regular rhythm.      Pulses: Normal pulses.   Pulmonary:      Effort: Pulmonary effort is normal.      Breath sounds: Normal breath sounds.   Abdominal:      General: Bowel sounds are normal.      Palpations: Abdomen is soft.   Musculoskeletal:         General: Swelling (right hip) and tenderness (right hip) present.      Cervical back: Normal range of motion and neck supple.      Comments: ROM limited of right leg secondary to pain      Skin:     General: Skin is warm.      Capillary Refill: Capillary refill takes less than 2 seconds.      Coloration: Skin is pale.   Neurological:      Mental Status: He is oriented to person, place, and time.         Diagnostic Data:  Lab Results (last 24 hours)       Procedure Component Value Units Date/Time    Basic Metabolic Panel [[id number]]  (Abnormal) Collected: 10/22/24 1608    Specimen: Blood Updated:  10/22/24 1636     Glucose 112 mg/dL      BUN 20 mg/dL      Creatinine 1.15 mg/dL      Sodium 137 mmol/L      Potassium 3.8 mmol/L      Chloride 99 mmol/L      CO2 28.3 mmol/L      Calcium 9.0 mg/dL      BUN/Creatinine Ratio 17.4     Anion Gap 9.7 mmol/L      eGFR 66.8 mL/min/1.73     Narrative:      GFR Normal >60  Chronic Kidney Disease <60  Kidney Failure <15    The GFR formula is only valid for adults with stable renal function between ages 18 and 70.    aPTT [[id number]]  (Normal) Collected: 10/22/24 1508    Specimen: Blood Updated: 10/22/24 1558     PTT 29.6 seconds     Comprehensive Metabolic Panel [[id number]]  (Abnormal) Collected: 10/22/24 1508    Specimen: Blood Updated: 10/22/24 1550     Glucose 115 mg/dL      BUN 20 mg/dL      Creatinine 1.14 mg/dL      Sodium 140 mmol/L      Potassium 4.0 mmol/L      Chloride 100 mmol/L      CO2 30.2 mmol/L      Calcium 9.1 mg/dL      Total Protein 5.6 g/dL      Albumin 3.9 g/dL      ALT (SGPT) 15 U/L      AST (SGOT) 23 U/L      Alkaline Phosphatase 92 U/L      Total Bilirubin 2.3 mg/dL      Globulin 1.7 gm/dL      A/G Ratio 2.3 g/dL      BUN/Creatinine Ratio 17.5     Anion Gap 9.8 mmol/L      eGFR 67.5 mL/min/1.73     Narrative:      GFR Normal >60  Chronic Kidney Disease <60  Kidney Failure <15    The GFR formula is only valid for adults with stable renal function between ages 18 and 70.    Protime-INR [[id number]]  (Abnormal) Collected: 10/22/24 1508    Specimen: Blood Updated: 10/22/24 1538     Protime 12.9 Seconds      INR 1.20    [place] Draw [[id number]] Collected: 10/22/24 1508    Specimen: Blood Updated: 10/22/24 1532    Narrative:      The following orders were created for panel order [place] Draw.  Procedure                               Abnormality         Status                     ---------                               -----------         ------                     Green Top (Gel)[[id number]]                                  Final result               Lavender  Top[[id number]]                                     Final result               Gold Top - SST[[id number]]                                   Final result               Light Blue Top[[id number]]                                   Final result                 Please view results for these tests on the individual orders.    Green Top (Gel) [[id number]] Collected: 10/22/24 1508    Specimen: Blood Updated: 10/22/24 1532     Extra Tube Hold for add-ons.     Comment: Auto resulted.       Lavender Top [[id number]] Collected: 10/22/24 1508    Specimen: Blood Updated: 10/22/24 1532     Extra Tube hold for add-on     Comment: Auto resulted       Gold Top - SST [[id number]] Collected: 10/22/24 1508    Specimen: Blood Updated: 10/22/24 1532     Extra Tube Hold for add-ons.     Comment: Auto resulted.       Light Blue Top [[id number]] Collected: 10/22/24 1508    Specimen: Blood Updated: 10/22/24 1532     Extra Tube Hold for add-ons.     Comment: Auto resulted       CBC & Differential [[id number]]  (Abnormal) Collected: 10/22/24 1508    Specimen: Blood Updated: 10/22/24 1525    Narrative:      The following orders were created for panel order CBC & Differential.  Procedure                               Abnormality         Status                     ---------                               -----------         ------                     CBC Auto Differential[[id number]]        Abnormal            Final result                 Please view results for these tests on the individual orders.    CBC Auto Differential [[id number]]  (Abnormal) Collected: 10/22/24 1508    Specimen: Blood Updated: 10/22/24 1525     WBC 8.55 10*3/mm3      RBC 2.82 10*6/mm3      Hemoglobin 8.7 g/dL      Hematocrit 26.3 %      MCV 93.3 fL      MCH 30.9 pg      MCHC 33.1 g/dL      RDW 13.0 %      RDW-SD 44.0 fl      MPV 9.6 fL      Platelets 151 10*3/mm3      Neutrophil % 80.7 %      Lymphocyte % 9.1 %      Monocyte % 9.5 %      Eosinophil % 0.2 %      Basophil % 0.1 %       Immature Grans % 0.4 %      Neutrophils, Absolute 6.90 10*3/mm3      Lymphocytes, Absolute 0.78 10*3/mm3      Monocytes, Absolute 0.81 10*3/mm3      Eosinophils, Absolute 0.02 10*3/mm3      Basophils, Absolute 0.01 10*3/mm3      Immature Grans, Absolute 0.03 10*3/mm3      nRBC 0.0 /100 WBC              Imaging Results (Last 24 Hours)       Procedure Component Value Units Date/Time    XR Femur 2 View Right [[id number]] Collected: 10/22/24 1606     Updated: 10/22/24 1609    Narrative:      XR FEMUR 2 VW RIGHT    Date of Exam: 10/22/2024 3:56 PM EDT    Indication: hip fx, eval femur    Comparison: None available.    Findings:  4 films. A subcapital right hip fracture is again identified. There is no evidence of a fracture of the femoral shaft. There is atherosclerotic vascular calcification. The femoral head remains seated within the acetabulum.      Impression:      Impression:  Subcapital left hip fracture. No additional femoral fractures are identified.      Electronically Signed: Emilie Nesbitt MD    10/22/2024 4:07 PM EDT    Workstation ID: [id number]    XR Hip With or Without Pelvis 2 - 3 View Right [[id number]] Collected: 10/22/24 1455     Updated: 10/22/24 1459    Narrative:      XR HIP W OR WO PELVIS 2-3 VIEW RIGHT    Date of Exam: 10/22/2024 2:53 PM EDT    Indication: trauma    Comparison: None available.    Findings:  Osseous demineralization. There is an acute mildly displaced fracture involving right femoral neck, likely transcervical versus basicervical in location. No clear involvement of lesser or greater trochanter. Mild fracture foreshortening and varus   angulation. Alignment of the right hip joint appears maintained. Degenerative osteoarthritis of the SI joints and hip joints. No other convincing fracture within limitations of demineralization. Vascular stents noted. Peripheral vascular disease. Soft   tissues radiographically unremarkable.      Impression:      Impression:  Acute right femoral neck  fracture not clearly involving the lesser or greater trochanter. Maintained right hip joint alignment.      Electronically Signed: José Miguel Dumas MD    10/22/2024 2:57 PM EDT    Workstation ID: [id number]              Assessment & Plan        This is a 74 y.o. male with:    Active and Resolved Problems  Active Hospital Problems    Diagnosis  POA    **Fracture of femoral neck, right [S72.001A]  Yes      Resolved Hospital Problems   No resolved problems to display.       Right hip fracture  - XR hip: right femoral neck fracture  - XR femur: subcapital fracture   - prn pain medication- dilaudid ordered at recommendation of pharmacist, has morphine allergy but has tolerated dilaudid in the past.   - surgery consulted  - NPO at midnight     Hyperlipidemia   - continue home statin     Anxiety   - continue home statin     Hypertension   - BP stable at 148/77  - continue home BP meds once reconciled      VTE Prophylaxis:  No VTE prophylaxis order currently exists.        The patient desires to be as follows:    CODE STATUS:    Level Of Support Discussed With: Patient  Code Status (Patient has no pulse and is not breathing): CPR (Attempt to Resuscitate)  Medical Interventions (Patient has pulse or is breathing): Full Support        Lindsey Varela, who can be contacted at [phone], is the designated person to make medical decisions on the patient's behalf if He is incapable of doing so. This was clarified with patient and/or next of kin on 10/22/2024 during the course of this H&P.    Admission Status:  I believe this patient meets inpatient status.    Expected Length of Stay: 2-3 days     PDMP and Medication Dispenses via Sidebar reviewed and consistent with patient reported medications.    I discussed the patient's findings and my recommendations with patient.      Signature:     This document has been electronically signed by RAMESH Jones on October 22, 2024 17:06 EDT   [facility] Hospitalist Team

## 2024-10-22 NOTE — ED PROVIDER NOTES
Subjective   History of Present Illness  74-year-old male presents from the extended care facility where he reportedly fell when trying to get up and go to the bathroom without assistance.  According to daughter patient ambulates with assistance only.  He had complained of some right hip pain and reportedly had an x-ray at their facility showing a fracture of his hip.  He denies hitting his head or any other injury.  Review of Systems    Past Medical History:   Diagnosis Date    Anxiety     Aortic aneurysm     Coronary artery disease     Dementia     per EASTON Lopez, patient was diagnosed 7 years ago    Emphysema lung     Gastroparesis     per Jessica    [place] (hard of hearing)     complete deaf    Hyperlipidemia     Hypertension     Myocardial infarction     STEMI (ST elevation myocardial infarction) 2024       Allergies   Allergen Reactions    Morphine Anaphylaxis       Past Surgical History:   Procedure Laterality Date    ABDOMINAL AORTIC ANEURYSM REPAIR  2022    CARDIAC CATHETERIZATION Right 2024    Procedure: LEFT HEART CATH with possible PCI;  Surgeon: Omer Vieyra MD;  Location: [facility] CATH INVASIVE LOCATION;  Service: Cardiovascular;  Laterality: Right;  Local and IV sedation    CORONARY ARTERY BYPASS GRAFT  03/13/2016    X3  Dr Hong       Family History   Problem Relation Age of Onset    Hypertension Other     Heart disease Mother     Heart disease Father        Social History     Socioeconomic History    Marital status:    Tobacco Use    Smoking status: Former     Average packs/day: 0.3 packs/day for 50.0 years (12.5 ttl pk-yrs)     Types: Cigarettes     Start date:      Quit date:      Years since quittin.8     Passive exposure: Past    Smokeless tobacco: Never   Vaping Use    Vaping status: Never Used   Substance and Sexual Activity    Alcohol use: No    Drug use: No    Sexual activity: Defer       Prior to Admission medications    Medication Sig Start Date End Date  "Taking? Authorizing Provider   aspirin 81 MG EC tablet Take 1 tablet by mouth Daily.    Dave Fields MD   atorvastatin (LIPITOR) 40 MG tablet Take 1 tablet by mouth Every Night. 8/27/24   Guillermo Bear MD   cetirizine (zyrTEC) 5 MG tablet Take 1 tablet by mouth Daily.    Dave Fields MD   clopidogrel (PLAVIX) 75 MG tablet Take 1 tablet by mouth Daily. 8/28/24   Guillermo Bear MD   ferrous gluconate 324 (37.5 Fe) MG tablet tablet Take 1 tablet by mouth Daily As Needed.    Dave Fields MD   hydrALAZINE (APRESOLINE) 25 MG tablet Take 1 tablet by mouth Every 12 (Twelve) Hours. 8/27/24   Guillermo Bear MD   hydrOXYzine (ATARAX) 50 MG tablet Take 1 tablet by mouth 4 (Four) Times a Day. 8/27/24   Guillermo Bear MD   ibuprofen (ADVIL,MOTRIN) 200 MG tablet Take 2 tablets by mouth Every 6 (Six) Hours As Needed for Mild Pain.    Dave Fields MD   isosorbide mononitrate (IMDUR) 30 MG 24 hr tablet Take 1 tablet by mouth Daily. 8/28/24   Guillermo Bear MD   losartan (Cozaar) 25 MG tablet Take 1 tablet by mouth Daily. 8/27/24   Guillermo Bear MD   metoprolol succinate XL (TOPROL-XL) 25 MG 24 hr tablet Take 1 tablet by mouth Daily. 9/11/24   Haley Shipman APRN   ondansetron (ZOFRAN) 4 MG tablet Take 1 tablet by mouth Every 8 (Eight) Hours As Needed for Nausea or Vomiting. Last refill w/o appt 10/21/24   Raheem Mckeon MD   ranolazine (RANEXA) 500 MG 12 hr tablet Take 1 tablet by mouth Every 12 (Twelve) Hours. 8/27/24   Guillermo Bear MD     /73 (BP Location: Left arm, Patient Position: Lying)   Pulse 72   Temp 97.7 °F (36.5 °C) (Oral)   Resp 18   Ht 175.3 cm (69\")   Wt 53.1 kg (117 lb)   SpO2 95%   BMI 17.28 kg/m²       Objective   Physical Exam  General: Thin elderly male awake and alert, no acute distress  Eyes: Pupils round and reactive, sclera nonicteric  HEENT: Mucous membranes moist, no mucosal swelling, normocephalic, atraumatic  Neck: C-spine nontender  Back: " Thoracic and lumbar spine nontender  Respirations: Respirations nonlabored, equal breath sounds bilaterally, clear lungs  Heart regular rate and rhythm,   Abdomen soft nontender nondistended,   Extremities some tenderness palpation in the region of the right hip, he does resist range of motion secondary to pain, normal pulses and sensorimotor function distally, pelvis stable and nontender  Neuro cranial nerves grossly intact, no focal limb deficits, GCS 15, hard of hearing  Psych oriented, pleasant affect  Skin no rash, brisk cap refill  Procedures           ED Course                                               Medical Decision Making  Patient presents with right hip pain following a mechanical fall.  He has no other signs of injury.  He does have normal neurovascular function of the extremity.  X-ray confirming hip fracture.  Preop labs and EKG ordered and pending at the time of admission.  Hospitalist service and orthopedics paged for admission and consultation.  Patient and family advised the findings and agreeable plan of admission.    Amount and/or Complexity of Data Reviewed  Labs: ordered.  Radiology: ordered and independent interpretation performed.     Details: My independent interpretation of x-ray image of the right hip femoral neck fracture  ECG/medicine tests: ordered.    Risk  Prescription drug management.    Case discussed with Olivia with the hospitalist service as well as Dr. Shields and with the orthopedic service who are agreeable plan of admission    Final diagnoses:   Closed fracture of right hip, initial encounter       ED Disposition  ED Disposition       ED Disposition   Decision to Admit    Condition   --    Comment   Level of Care: Med/Surg [1]   Admitting Physician: DESHAWN PARK [[id number]]   Attending Physician: DESHAWN PARK [[id number]]                 No follow-up provider specified.       Medication List      No changes were made to your prescriptions during this visit.            Steven  Lv MATHEWS MD  10/22/24 [id number]

## 2024-10-23 NOTE — DISCHARGE INSTRUCTIONS
Hip Replacement Discharge Instructions:    I. ACTIVITIES:  1. Exercises:  Complete exercise program as taught by the hospital physical therapist 2 times per day  Exercise program will be advanced by the physical therapist  During the day be up ambulating every 2 hours (while awake) for short distances  Complete the ankle pump exercises at least 10 times per hour (while awake)  Elevate legs when in bed and for at least 30 minutes during the day.Use cold packs 20-30 minutes approximately 5 times per day. This should be done before and after completing your exercises and at any time you are experiencing pain/ stiffness in your operative extremity.      2. Activities of Daily Living:  No tub baths, hot tubs, or swimming pools for 4 weeks  May shower and let water run over the incision on post-operative day #5 if no drainage. Do not scrub or rub the incision. Simply let the water run over the incision and pat dry.    II. Restrictions  Continue hip precautions as taught at the hospital  Your surgeon will discuss with you when you will be able to drive again.  Weight bearing is as tolerated  First week stay inside on even terrain. May go up and down stairs one stair at a time utilizing the hand rail once cleared by physical therapy to do so.  After one week, you may venture outside (if cleared to do so by physical therapist).    III. Precautions:  Everyone that comes near you should wash their hands  No elective dental, genital-urinary, or colon procedures or surgical procedures for 12 weeks after surgery unless absolutely necessary.   If dental work or surgical procedure is deemed absolutely necessary, you will need to contact your surgeon as you will need to take antibiotics 1 hour prior to any dental work (including teeth cleanings).  Please discuss with your surgeon prophylactic antibiotics as the length of time this intervention will be necessary for you varies with each patient’s health history and situation.  Avoid  sick people. If you must be around someone who is ill, they should wear a mask.  Avoid visits to the Emergency Room or Urgent Care unless you are having a life threatening event.   If ordered stockings are to be placed on in the morning and removed at night. Monitor the stockings to ensure that any swelling is not causing the stockings to become too tight. In this case, remove stockings immediately.    IV. INCISION CARE:  The dressing placed during surgery is waterproof and should remain in place for 5 to 7 days.  May shower with waterproof dressing on and allowed water to run over the dressing  Remove the dressing around day 5-7  There is Steri-Strips underneath your dressing which may stay on and will eventually fall off on their own  After dressing removal on day 7 May shower and allow water to run over the incision  No soaking or submersion of the incision in tubs pools hot tubs etc.  No creams or ointments to the incision  Do not touch or pick at the incision  Check incision every day and notify surgeon immediately if any of the following signs or symptoms are noted:  Increase in redness  Increase in swelling around the incision and of the entire extremity  Increase in pain  Drainage oozing from the incision  Pulling apart of the edges of the incision  Increase in overall body temperature (greater than 100.5 degrees)  Your surgeon will instruct you regarding suture or staple removal    V. Medications:   1. Anticoagulants: You will be discharged on an anticoagulant. This is a prophylactic medication that helps prevent blood clots during your post-operative period. The type and length of dosage varies based on your individual needs, procedure performed, and surgeon’s preference.  While taking the anticoagulant, you should avoid taking any additional aspirin, ibuprofen (Advil or Motrin), Aleve (Naprosyn) or other non-steroidal anti-inflammatory medications.   Notify surgeon immediately if any bee bleeding is  noted in the urine, stool, emesis, or from the nose or the incision. Blood in the stool will often appear as black rather than red. Blood in urine may appear as pink. Blood in emesis may appear as brown/black like coffee grounds.  You will need to apply pressure for longer periods of time to any cuts or abrasions to stop bleeding  Avoid alcohol while taking anticoagulants    2. Stool Softeners: You will be at greater risk of constipation after surgery due to being less mobile and the pain medications.   Take stool softeners as instructed by your surgeon while on pain medications. Over the counter Colace 100 mg 1-2 capsules twice daily.   If stools become too loose or too frequent, please decreases the dosage or stop the stool softener.  If constipation occurs despite use of stool softeners, you are to continue the stool softeners and add a laxative (Milk of Magnesia 1 ounce daily as needed)  Drink plenty of fluids, and eat fruits and vegetables during your recovery time    3. Pain Medications utilized after surgery are narcotics and the law requires that the following information be given to all patients that are prescribed narcotics:  CLASSIFICATION: Pain medications are called Opioids and are narcotics  LEGALITIES: It is illegal to share narcotics with others and to drive within 24 hours of taking narcotics  POTENTIAL SIDE EFFECTS: Potential side effects of opioids include: nausea, vomiting, itching, dizziness, drowsiness, dry mouth, constipation, and difficulty urinating.  POTENTIAL ADVERSE EFFECTS:   Opioid tolerance can develop with use of pain medications and this simply means that it requires more and more of the medication to control pain; however, this is seen more in patients that use opioids for longer periods of time.  Opioid dependence can develop with use of Opioids and this simply means that to stop the medication can cause withdrawal symptoms; however, this is seen with patients that use Opioids for  longer periods of time.  Opioid addiction can develop with use of Opioids and the incidence of this is very unlikely in patients who take the medications as ordered and stop the medications as instructed.  Opioid overdose can be dangerous, but is unlikely when the medication is taken as ordered and stopped when ordered. It is important not to mix opioids with alcohol or with and type of sedative such as Benadryl as this can lead to over sedation and respiratory difficulty.  DOSAGE:   Pain medications will need to be taken consistently for the first week to decrease pain and promote adequate pain relief and participation in physical therapy.  After the initial surgical pain begins to resolve, you may begin to decrease the pain medication. By the end of 6 weeks, you should be off of pain medications.  Refills will not be given by the office during evening hours, on weekends, or after 6 weeks post-op.  To seek refills on pain medications during the initial 6 week post-operative period, you must call the office 48 hours in advance to request the refill. The office will then notify you when to  the prescription. DO NOT wait until you are out of the medication to request a refill.    V. FOLLOW-UP VISITS:  You will need to follow up in the office with your surgeon in  2 weeks. Please call this number [phone]  to schedule this appointment.  If you have any concerns or suspected complications prior to your follow up visit, please call your surgeons office. Do not wait until your appointment time if you suspect complications. These will need to be addressed in the office promptly.

## 2024-10-23 NOTE — PROGRESS NOTES
[facility] MEDICINE SERVICE  DAILY PROGRESS NOTE    NAME: Alexei Merino III  : 1950  MRN: [id number]      LOS: 1 day     PROVIDER OF SERVICE: Norbert Medrano MD    Chief Complaint: Fracture of femoral neck, right    Subjective:     Interval History:  History taken from: patient chart      History of Present Illness: Alexei Merino III is a 74 y.o. male with a CMH of anxiety, aortic aneurysm, CAD, dementia, HTN, HLD, and history of STEMI who presented to [facility] on 10/22/2024 with  right hip pain s/p a fall. Patient lives at a long term care facility. He was trying to go the bathroom by himself when he fell. Xray of right femur shows subcapital hip fracture. Xr of the right hip shows acute right femoral neck fracture with maintained right hip joint alignment. Orthopedic surgery has been consulted. Hospitalist team to admit for further medical management.       10/23/24 patient seen and examined in bed no acute distress, vital signs stable, discussed with RN, for surgery in a.m.,    Review of Systems   Constitutional:  Positive for appetite change and fatigue.   Musculoskeletal:  Positive for arthralgias and gait problem.   Neurological:  Positive for weakness.     Objective:     Vital Signs  Temp:  [95.6 °F (35.3 °C)-97.9 °F (36.6 °C)] 97.9 °F (36.6 °C)  Heart Rate:  [67-85] 82  Resp:  [10-23] 17  BP: (140-157)/(72-96) 140/80  Flow (L/min) (Oxygen Therapy):  [2-5] 4   Body mass index is 17.28 kg/m².    Physical Exam   Vitals and nursing note reviewed.   Constitutional:       Appearance: He is underweight. He is ill-appearing.   HENT:      Head: Normocephalic and atraumatic.      Nose: Nose normal.      Mouth/Throat:      Mouth: Mucous membranes are moist.   Eyes:      Extraocular Movements: Extraocular movements intact.      Pupils: Pupils are equal, round, and reactive to light.   Cardiovascular:      Rate and Rhythm: Normal rate and regular rhythm.      Pulses: Normal pulses.    Pulmonary:      Effort: Pulmonary effort is normal.      Breath sounds: Normal breath sounds.   Abdominal:      General: Bowel sounds are normal.      Palpations: Abdomen is soft.   Musculoskeletal:         General: Swelling (right hip) and tenderness (right hip) present.      Cervical back: Normal range of motion and neck supple.      Comments: ROM limited of right leg secondary to pain      Skin:     General: Skin is warm.      Capillary Refill: Capillary refill takes less than 2 seconds.      Coloration: Skin is pale.   Neurological:      Mental Status: He is oriented to person, place, and time.        Diagnostic Data    Results from last 7 days   Lab Units 10/23/24  0453 10/22/24  1608 10/22/24  1508   WBC 10*3/mm3 8.84  --  8.55   HEMOGLOBIN g/dL 8.7*  --  8.7*   HEMATOCRIT % 26.1*  --  26.3*   PLATELETS 10*3/mm3 146  --  151   GLUCOSE mg/dL 139*   < > 115*   CREATININE mg/dL 1.17   < > 1.14   BUN mg/dL 23   < > 20   SODIUM mmol/L 139   < > 140   POTASSIUM mmol/L 4.4   < > 4.0   AST (SGOT) U/L  --   --  23   ALT (SGPT) U/L  --   --  15   ALK PHOS U/L  --   --  92   BILIRUBIN mg/dL  --   --  2.3*   ANION GAP mmol/L 11.0   < > 9.8    < > = values in this interval not displayed.       XR Chest 1 View    Result Date: 10/22/2024  Impression: Stable cardiomegaly with evidence of previous CABG. No active cardiopulmonary disease. Electronically Signed: Leoncio Tan DO  10/22/2024 10:48 PM EDT  Workstation ID: [id number]    XR Femur 2 View Right    Result Date: 10/22/2024  Impression: Subcapital left hip fracture. No additional femoral fractures are identified. Electronically Signed: Emilie Nesbitt MD  10/22/2024 4:07 PM EDT  Workstation ID: [id number]    XR Hip With or Without Pelvis 2 - 3 View Right    Result Date: 10/22/2024  Impression: Acute right femoral neck fracture not clearly involving the lesser or greater trochanter. Maintained right hip joint alignment. Electronically Signed: José Miguel Dumas MD  10/22/2024  2:57 PM EDT  Workstation ID: [id number]       I reviewed the patient's new clinical results.    Assessment/Plan:     Active and Resolved Problems  Active Hospital Problems    Diagnosis  POA    **Fracture of femoral neck, right [S72.001A]  Yes      Resolved Hospital Problems   No resolved problems to display.     Right hip fracture  - XR hip: right femoral neck fracture  - XR femur: subcapital fracture   - prn pain medication- dilaudid ordered at recommendation of pharmacist, has morphine allergy but has tolerated dilaudid in the past.   - surgery consulted  - NPO at midnight     Coronary artery disease-consulted cardiology  Patient had coronary bypass surgery with a LIMA to the LAD and diagonal branch and also SVG to the circumflex artery which are patent but his RCA and PDA have diffuse disease and is not grafted and is on medical therapy including beta-blockers     CHF-HFrEF  Patient has severe LV dysfunction and is on GDMT with beta-blockers hydralazine losartan.   -continue the same medicines for now.     Hyperlipidemia   - continue home statin      Anxiety   - continue home statin      Hypertension   - BP stable at 148/77  - continue home BP meds once reconciled    VTE Prophylaxis:  Mechanical VTE prophylaxis orders are present.    Disposition Planning:     Barriers to Discharge:hip fx  Anticipated Date of Discharge: 10/26  Place of Discharge: [facility]      Time: 30 minutes     Code Status and Medical Interventions: CPR (Attempt to Resuscitate); Full Support   Ordered at: 10/22/24 7237     Level Of Support Discussed With:    Patient     Code Status (Patient has no pulse and is not breathing):    CPR (Attempt to Resuscitate)     Medical Interventions (Patient has pulse or is breathing):    Full Support       Signature: Electronically signed by Norbert Medrano MD, 10/23/24, 11:40 EDT.  [facility] Hospitalist Team

## 2024-10-23 NOTE — SIGNIFICANT NOTE
10/23/24 0750   OTHER   Discipline physical therapist   Rehab Time/Intention   Session Not Performed unable to evaluate, medical status change;other (see comments)  (sx pending 10/24; will eval post op)   Recommendation   PT - Next Appointment 10/24/24

## 2024-10-23 NOTE — CONSULTS
Patient ID: Alexei Merino III is a 74 y.o. male.    Chief Complaint:    Chief Complaint   Patient presents with    Fall       HPI:  This is a 74-year-old gentleman seen with his family member relates that he fell yesterday complaining of acute right hip pain.  He has a history of coronary artery bypass is on Plavix it appears he likely took it yesterday no other acute complaints  Past Medical History:   Diagnosis Date    Anxiety     Aortic aneurysm     Coronary artery disease     Dementia 2017    per EASTON Lopez, patient was diagnosed 7 years ago    Emphysema lung     Gastroparesis     per Jessica    [place] (hard of hearing)     complete deaf    Hyperlipidemia     Hypertension     Myocardial infarction     STEMI (ST elevation myocardial infarction) 2024       Past Surgical History:   Procedure Laterality Date    ABDOMINAL AORTIC ANEURYSM REPAIR  2022    CARDIAC CATHETERIZATION Right 2024    Procedure: LEFT HEART CATH with possible PCI;  Surgeon: Omer Vieyra MD;  Location: [facility] CATH INVASIVE LOCATION;  Service: Cardiovascular;  Laterality: Right;  Local and IV sedation    CORONARY ARTERY BYPASS GRAFT  03/13/2016    X3  Dr Hong       Family History   Problem Relation Age of Onset    Hypertension Other     Heart disease Mother     Heart disease Father           Social History     Occupational History    Not on file   Tobacco Use    Smoking status: Former     Average packs/day: 0.3 packs/day for 50.0 years (12.5 ttl pk-yrs)     Types: Cigarettes     Start date:      Quit date: 1964     Years since quittin.8     Passive exposure: Past    Smokeless tobacco: Never   Vaping Use    Vaping status: Never Used   Substance and Sexual Activity    Alcohol use: No    Drug use: Yes     Types: Marijuana     Comment: occasional marijuana usage    Sexual activity: Defer      Review of Systems   Cardiovascular:  Negative for chest pain.   Musculoskeletal:  Positive for arthralgias.       Objective:    BP  "144/84 (BP Location: Left arm, Patient Position: Lying)   Pulse 67   Temp 96.8 °F (36 °C) (Axillary)   Resp 23   Ht 175.3 cm (69\")   Wt 53.1 kg (117 lb)   SpO2 98%   BMI 17.28 kg/m²     Physical Examination:  Right hip is short  Rotated the skin is intact compartments are soft of the right hip calf is soft foot is warm perfused gross sensation and motor intact    Imaging:  Displaced right femoral neck fracture    Assessment:  Displaced right femoral neck fracture  Plan:  Options discussed with the family recommend proceeding with right hip arthroplasty surgery to be performed likely tomorrow provisionally discussed the risks including infection fracture dislocation neurovascular injury anesthesia complications with family mechanical DVT prophylaxis hold his Plavix cardiology consult pending    Disclaimer: Part of this note may be an electronic transcription/translation of spoken language to printed text using the Dragon Dictation System   "

## 2024-10-23 NOTE — CASE MANAGEMENT/SOCIAL WORK
Discharge Planning Assessment   Crist[place]     Patient Name: Alexei Merino III  MRN: [id number]  Today's Date: 10/23/2024    Admit Date: 10/22/2024    Plan: From [facility] since 8/28/24.  Plans to return to [facility]. Pending surgery 10/24/24   Discharge Needs Assessment       Row Name 10/23/24 1253       Living Environment    People in Home significant other  came from [facility]    Name(s) of People in Home Jessica    Current Living Arrangements other (see comments)  came from [facility]    Potentially Unsafe Housing Conditions none    In the past 12 months has the electric, gas, oil, or water company threatened to shut off services in your home? No    Primary Care Provided by other (see comments)  came from [facility]    Provides Primary Care For no one    Family Caregiver if Needed significant other    Family Caregiver Names significant other Jessica, daughters Radha and Moustapha    Quality of Family Relationships supportive;helpful;involved    Able to Return to Prior Arrangements yes       Resource/Environmental Concerns    Resource/Environmental Concerns none    Transportation Concerns none       Transportation Needs    In the past 12 months, has lack of transportation kept you from medical appointments or from getting medications? no    In the past 12 months, has lack of transportation kept you from meetings, work, or from getting things needed for daily living? No       Food Insecurity    Within the past 12 months, you worried that your food would run out before you got the money to buy more. Never true    Within the past 12 months, the food you bought just didn't last and you didn't have money to get more. Never true       Transition Planning    Patient/Family Anticipates Transition to inpatient rehabilitation facility    Patient/Family Anticipated Services at Transition rehabilitation services;skilled nursing    Transportation Anticipated health  plan transportation       Discharge Needs Assessment    Readmission Within the Last 30 Days no previous admission in last 30 days    Current Outpatient/Agency/Support Group skilled nursing facility  came from [facility] sn[facility]    Equipment Currently Used at Home none;other (see comments)  has been using rw and w/c at [facility]    Concerns to be Addressed care coordination/care conferences;discharge planning    Do you want help finding or keeping work or a job? I do not need or want help    Do you want help with school or training? For example, starting or completing job training or getting a high school diploma, GED or equivalent No    Anticipated Changes Related to Illness none    Equipment Needed After Discharge none    Outpatient/Agency/Support Group Needs skilled nursing facility;inpatient rehabilitation facility    Discharge Facility/Level of Care Needs nursing facility, skilled    Provided Post Acute Provider List? N/A    N/A Provider List Comment came from [facility] agreeable to return or if PT recommends IPR okay to go to [facility] or [facility]                   Discharge Plan       Row Name 10/23/24 1303       Plan    Plan From [facility] sn[facility] since 8/28/24.  Plans to return to [facility]. Pending surgery 10/24/24    Patient/Family in Agreement with Plan yes    Plan Comments Patient lives at home with significant other Jessica, however has been in skilled rehab at [facility] since 8/28/24. Patient does not drive. May need assistance with  transport at discharge. PCP and pharmacy confirmed. Denies financial assistance needs for medication and/or food. Spoke with patient and tere Garcia and they are good with patient returning to [facility]. Spoke with liasion at [facility] and was told patient okay to return when ready.  Daughter also asked about acute rehab.  I let her know patient would have surgery, then PT OT would eval and make  recommendations.  If they recommend IPR then we could send referral to [facility] rehab or [facility] rehab.   Surgery scheduled for 10/24/24                  Continued Care and Services - Admitted Since 10/22/2024       Destination       Service Provider Request Status Services Address Phone Fax Patient Preferred    [facility] Pending - Request Sent -- [address] [place] [address] [phone] --    [facility] Pending - Request Sent -- [address] [place] [id number] [phone] --                  Selected Continued Care - Prior Encounters Includes continued care and service providers with selected services from prior encounters from 7/24/2024 to 10/23/2024      Discharged on 8/27/2024 Admission date: 8/23/2024 - Discharge disposition: Skilled Nursing Facility (DC - External)      Destination       Service Provider Services Address Phone Fax Patient Preferred    [facility] [address] [place] [address] [phone] --                          Expected Discharge Date and Time       Expected Discharge Date Expected Discharge Time    Oct 25, 2024            Demographic Summary       Row Name 10/23/24 1252       General Information    Admission Type inpatient    Arrived From emergency department    Required Notices Provided Important Message from Medicare    Referral Source admission list    Reason for Consult discharge planning    Preferred Language English       Contact Information    Permission Granted to Share Info With                    Functional Status       Row Name 10/23/24 1252       Functional Status    Usual Activity Tolerance fair    Current Activity Tolerance fair       Functional Status, IADL    Medications assistive equipment and person    Meal Preparation assistive equipment and person    Housekeeping assistive equipment and person    Laundry assistive equipment and  person    Shopping assistive equipment and person    If for any reason you need help with day-to-day activities such as bathing, preparing meals, shopping, managing finances, etc., do you get the help you need? I get all the help I need       Mental Status    General Appearance WDL WDL       Mental Status Summary    Recent Changes in Mental Status/Cognitive Functioning no changes                   Patient Forms       Row Name 10/23/24 1045       Patient Forms    Important Message from Medicare ([facility]) Delivered  IMM del 10/22/24 EC reg                  Shefali Phelan RN    SIPS 1  [email or web address]  Office [phone]  Cell [phone]

## 2024-10-23 NOTE — DISCHARGE PLACEMENT REQUEST
"Bella Orellana III \"Chip\" (74 y.o. Male)       Date of Birth   1950    Social Security Number       Address   [address] [place] [address] [place] [facility]    Home Phone   [phone]    MRN   [id number]       Oriental orthodox   None    Marital Status                               Admission Date   10/22/24    Admission Type   Emergency    Admitting Provider   Amira Traore MD    Attending Provider   Norbert Medrano MD    Department, Room/Bed   [facility] SURGICAL INPATIENT, 4121/1       Discharge Date       Discharge Disposition       Discharge Destination                                 Attending Provider: Norbert Medrano MD    Allergies: Morphine    Isolation: Contact   Infection: Candida Auris (rule out) (10/22/24)   Code Status: CPR    Ht: 175.3 cm (69\")   Wt: 53.1 kg (117 lb)    Admission Cmt: None   Principal Problem: Fracture of femoral neck, right [S72.001A]                   Active Insurance as of 10/22/2024       Primary Coverage       Payor Plan Insurance Group Employer/Plan Group    MEDICARE MEDICARE A & B        Payor Plan Address Payor Plan Phone Number Payor Plan Fax Number Effective Dates    [address] [phone]  10/1/2015 - None Entered    [facility] [id number]         Subscriber Name Subscriber Birth Date Member ID       BELLA ORELLANA III 1950 [id number]               Secondary Coverage       Payor Plan Insurance Group Employer/Plan Group    [employer] [place] [employer] [place]        Payor Plan Address Payor Plan Phone Number Payor Plan Fax Number Effective Dates    [address] [place] [address] [phone]  10/1/2015 - None Entered    [place] [address]         Subscriber Name Subscriber Birth Date Member ID       BELLA ORELLANA III 1950 [id number]                     Emergency Contacts        (Rel.) Home Phone Work Phone Mobile Phone    Jessica Arias (Care Giver) [phone]    Lindsey Varela (Daughter) -- -- [phone]    " Radha Merino (Daughter) [phone]

## 2024-10-23 NOTE — PLAN OF CARE
Goal Outcome Evaluation:           Progress: improving          Pt VSS, slightly confused at times but can make needs known, daughter at bedside and attentive to patient, pain controlled per MAR, awaiting surgery tomorrow. Plan of care ongoing.

## 2024-10-23 NOTE — CONSULTS
CARDIOLOGY CONSULT:    Alexei Merino III  1950  male  [id number]      Referring Provider: Hospitalist  Reason for Consultation: Status post fall and hip fracture    Patient Care Team:  Raheem Mckeon MD as PCP - General (Internal Medicine)  Haley Shipman APRN as Nurse Practitioner (Cardiology)    Chief complaint status post fall    Subjective .     History of present illness:  Alexei Merino III is a 74 y.o. male with history of coronary disease status post carotid bypass surgery history of HFrEF history of hypertension hyperlipidemia and other medical problems presented to the hospital after a fall and hip fracture.  Patient needs preop evaluation for hip surgery.  Patient does not have any symptoms of chest pain or shortness of breath.  No grandmas of any PND orthopnea.  No palpitations dizziness or syncope.  He is at a rehab and is getting all his medicines regularly.    Review of Systems   Constitutional: Negative for fever and malaise/fatigue.   HENT:  Negative for ear pain and nosebleeds.    Eyes:  Negative for blurred vision and double vision.   Cardiovascular:  Negative for chest pain, dyspnea on exertion and palpitations.   Respiratory:  Negative for cough and shortness of breath.    Skin:  Negative for rash.   Musculoskeletal:  Positive for falls. Negative for joint pain.   Gastrointestinal:  Negative for abdominal pain, nausea and vomiting.   Neurological:  Negative for focal weakness and headaches.   Psychiatric/Behavioral:  Negative for depression. The patient is not nervous/anxious.    All other systems reviewed and are negative.      History  Past Medical History:   Diagnosis Date    Anxiety     Aortic aneurysm     Coronary artery disease     Dementia 2017    per EASTON Lopez, patient was diagnosed 7 years ago    Emphysema lung     Gastroparesis     per Jessica    [place] (hard of hearing)     complete deaf    Hyperlipidemia     Hypertension     Myocardial infarction     STEMI (ST elevation  myocardial infarction) 2024       Past Surgical History:   Procedure Laterality Date    ABDOMINAL AORTIC ANEURYSM REPAIR  2022    CARDIAC CATHETERIZATION Right 2024    Procedure: LEFT HEART CATH with possible PCI;  Surgeon: Omer Vieyra MD;  Location: [facility] CATH INVASIVE LOC[facility];  Service: Cardiovascular;  Laterality: Right;  Local and IV sedation    CORONARY ARTERY BYPASS GRAFT  03/13/2016    X3  Dr Hong       Family History   Problem Relation Age of Onset    Hypertension Other     Heart disease Mother     Heart disease Father        Social History     Tobacco Use    Smoking status: Former     Average packs/day: 0.3 packs/day for 50.0 years (12.5 ttl pk-yrs)     Types: Cigarettes     Start date:      Quit date:      Years since quittin.8     Passive exposure: Past    Smokeless tobacco: Never   Vaping Use    Vaping status: Never Used   Substance Use Topics    Alcohol use: No    Drug use: Yes     Types: Marijuana     Comment: occasional marijuana usage        Medications Prior to Admission   Medication Sig Dispense Refill Last Dose/Taking    aspirin 81 MG EC tablet Take 1 tablet by mouth Daily.   10/22/2024    atorvastatin (LIPITOR) 40 MG tablet Take 1 tablet by mouth Every Night.   10/21/2024    cetirizine (zyrTEC) 5 MG tablet Take 1 tablet by mouth Daily.   10/22/2024    clopidogrel (PLAVIX) 75 MG tablet Take 1 tablet by mouth Daily.   10/22/2024    ferrous gluconate 324 (37.5 Fe) MG tablet tablet Take 1 tablet by mouth Daily As Needed.   10/21/2024    hydrALAZINE (APRESOLINE) 25 MG tablet Take 1 tablet by mouth 2 (Two) Times a Day. Give if SBP greater than 160 or DBP greater than 90.   10/21/2024    hydrOXYzine (ATARAX) 25 MG tablet Take 1 tablet by mouth 2 (Two) Times a Day.   10/22/2024    hydrOXYzine (ATARAX) 25 MG tablet Take 1 tablet by mouth Daily As Needed for Anxiety.   10/21/2024    ibuprofen (ADVIL,MOTRIN) 200 MG tablet Take 2 tablets by mouth Every 6 (Six) Hours As  "Needed for Mild Pain.   10/21/2024    isosorbide mononitrate (IMDUR) 30 MG 24 hr tablet Take 1 tablet by mouth Daily. Hold for BP <100/60   10/22/2024    losartan (COZAAR) 100 MG tablet Take 1 tablet by mouth Daily.   10/22/2024    magnesium hydroxide (MILK OF MAGNESIA) 2400 MG/10ML suspension suspension Take 30 mL by mouth Daily As Needed.   10/21/2024    ranolazine (RANEXA) 500 MG 12 hr tablet Take 1 tablet by mouth Every 12 (Twelve) Hours.   10/22/2024    sodium bicarbonate 650 MG tablet Take 1 tablet by mouth 3 (Three) Times a Day.   10/22/2024    ondansetron (ZOFRAN) 4 MG tablet Take 1 tablet by mouth Every 6 (Six) Hours As Needed for Nausea or Vomiting.       polyethylene glycol (MIRALAX) 17 g packet Take 17 g by mouth Daily.          Allergies: Morphine    Scheduled Meds:   Continuous Infusions:   PRN Meds:.  Calcium Replacement - Follow Nurse / BPA Driven Protocol    cyclobenzaprine    HYDROmorphone    Magnesium Standard Dose Replacement - Follow Nurse / BPA Driven Protocol    ondansetron ODT **OR** ondansetron    Phosphorus Replacement - Follow Nurse / BPA Driven Protocol    Potassium Replacement - Follow Nurse / BPA Driven Protocol    [COMPLETED] Insert Peripheral IV **AND** sodium chloride    Objective     VITAL SIGNS  Vitals:    10/22/24 2300 10/23/24 0055 10/23/24 0456 10/23/24 0805   BP:  145/96 144/84 140/72   BP Location:  Left arm Left arm Left arm   Patient Position:  Lying Lying Lying   Pulse:  75 67 85   Resp:  10 23 17   Temp: 96.9 °F (36.1 °C) 96.4 °F (35.8 °C) 96.8 °F (36 °C) 97.9 °F (36.6 °C)   TempSrc: Rectal Axillary Axillary Oral   SpO2:  99% 98% 97%   Weight:       Height:           Flowsheet Rows      Flowsheet Row First Filed Value   Admission Height 175.3 cm (69\") Documented at 10/22/2024 1419   Admission Weight 53.1 kg (117 lb) Documented at 10/22/2024 1419             TELEMETRY: Sinus rhythm    Physical Exam:  Constitutional:       Appearance: Well-developed.   Eyes:      General: No " scleral icterus.     Conjunctiva/sclera: Conjunctivae normal.      Pupils: Pupils are equal, round, and reactive to light.   HENT:      Head: Normocephalic and atraumatic.   Neck:      Vascular: No carotid bruit or JVD.   Pulmonary:      Effort: Pulmonary effort is normal.      Breath sounds: Normal breath sounds. No wheezing. No rales.   Cardiovascular:      Normal rate. Regular rhythm.   Pulses:     Intact distal pulses.   Abdominal:      General: Bowel sounds are normal.      Palpations: Abdomen is soft.   Musculoskeletal: Normal range of motion.      Cervical back: Normal range of motion and neck supple. Skin:     General: Skin is warm and dry.      Findings: No rash.   Neurological:      Mental Status: Alert.      Comments: No focal deficits          Results Review:   I reviewed the patient's new clinical results.  Lab Results (last 24 hours)       Procedure Component Value Units Date/Time    Basic Metabolic Panel [[id number]]  (Abnormal) Collected: 10/23/24 0453    Specimen: Blood Updated: 10/23/24 0536     Glucose 139 mg/dL      BUN 23 mg/dL      Creatinine 1.17 mg/dL      Sodium 139 mmol/L      Potassium 4.4 mmol/L      Chloride 99 mmol/L      CO2 29.0 mmol/L      Calcium 9.2 mg/dL      BUN/Creatinine Ratio 19.7     Anion Gap 11.0 mmol/L      eGFR 65.4 mL/min/1.73     Narrative:      GFR Normal >60  Chronic Kidney Disease <60  Kidney Failure <15    The GFR formula is only valid for adults with stable renal function between ages 18 and 70.    CBC & Differential [[id number]]  (Abnormal) Collected: 10/23/24 0453    Specimen: Blood Updated: 10/23/24 0500    Narrative:      The following orders were created for panel order CBC & Differential.  Procedure                               Abnormality         Status                     ---------                               -----------         ------                     CBC Auto Differential[[id number]]        Abnormal            Final result                 Please view  results for these tests on the individual orders.    CBC Auto Differential [[id number]]  (Abnormal) Collected: 10/23/24 0453    Specimen: Blood Updated: 10/23/24 0500     WBC 8.84 10*3/mm3      RBC 2.81 10*6/mm3      Hemoglobin 8.7 g/dL      Hematocrit 26.1 %      MCV 92.9 fL      MCH 31.0 pg      MCHC 33.3 g/dL      RDW 13.0 %      RDW-SD 43.8 fl      MPV 9.5 fL      Platelets 146 10*3/mm3      Neutrophil % 74.3 %      Lymphocyte % 10.2 %      Monocyte % 13.3 %      Eosinophil % 1.5 %      Basophil % 0.2 %      Immature Grans % 0.5 %      Neutrophils, Absolute 6.57 10*3/mm3      Lymphocytes, Absolute 0.90 10*3/mm3      Monocytes, Absolute 1.18 10*3/mm3      Eosinophils, Absolute 0.13 10*3/mm3      Basophils, Absolute 0.02 10*3/mm3      Immature Grans, Absolute 0.04 10*3/mm3      nRBC 0.0 /100 WBC     [facility] PCR - Swab, Axilla Right, Axilla Left and Groin [[id number]] Collected: 10/22/24 2056    Specimen: Swab from Axilla Right, Axilla Left and Groin Updated: 10/22/24 2100    Basic Metabolic Panel [[id number]]  (Abnormal) Collected: 10/22/24 1608    Specimen: Blood Updated: 10/22/24 1636     Glucose 112 mg/dL      BUN 20 mg/dL      Creatinine 1.15 mg/dL      Sodium 137 mmol/L      Potassium 3.8 mmol/L      Chloride 99 mmol/L      CO2 28.3 mmol/L      Calcium 9.0 mg/dL      BUN/Creatinine Ratio 17.4     Anion Gap 9.7 mmol/L      eGFR 66.8 mL/min/1.73     Narrative:      GFR Normal >60  Chronic Kidney Disease <60  Kidney Failure <15    The GFR formula is only valid for adults with stable renal function between ages 18 and 70.    aPTT [[id number]]  (Normal) Collected: 10/22/24 1508    Specimen: Blood Updated: 10/22/24 1558     PTT 29.6 seconds     Comprehensive Metabolic Panel [[id number]]  (Abnormal) Collected: 10/22/24 1508    Specimen: Blood Updated: 10/22/24 1550     Glucose 115 mg/dL      BUN 20 mg/dL      Creatinine 1.14 mg/dL      Sodium 140 mmol/L      Potassium 4.0 mmol/L      Chloride 100 mmol/L      CO2  30.2 mmol/L      Calcium 9.1 mg/dL      Total Protein 5.6 g/dL      Albumin 3.9 g/dL      ALT (SGPT) 15 U/L      AST (SGOT) 23 U/L      Alkaline Phosphatase 92 U/L      Total Bilirubin 2.3 mg/dL      Globulin 1.7 gm/dL      A/G Ratio 2.3 g/dL      BUN/Creatinine Ratio 17.5     Anion Gap 9.8 mmol/L      eGFR 67.5 mL/min/1.73     Narrative:      GFR Normal >60  Chronic Kidney Disease <60  Kidney Failure <15    The GFR formula is only valid for adults with stable renal function between ages 18 and 70.    Protime-INR [[id number]]  (Abnormal) Collected: 10/22/24 1508    Specimen: Blood Updated: 10/22/24 1538     Protime 12.9 Seconds      INR 1.20    [place] Draw [[id number]] Collected: 10/22/24 1508    Specimen: Blood Updated: 10/22/24 1532    Narrative:      The following orders were created for panel order [place] Draw.  Procedure                               Abnormality         Status                     ---------                               -----------         ------                     Green Top (Gel)[[id number]]                                  Final result               Lavender Top[[id number]]                                     Final result               Gold Top - SST[[id number]]                                   Final result               Light Blue Top[[id number]]                                   Final result                 Please view results for these tests on the individual orders.    Green Top (Gel) [[id number]] Collected: 10/22/24 1508    Specimen: Blood Updated: 10/22/24 1532     Extra Tube Hold for add-ons.     Comment: Auto resulted.       Lavender Top [[id number]] Collected: 10/22/24 1508    Specimen: Blood Updated: 10/22/24 1532     Extra Tube hold for add-on     Comment: Auto resulted       Gold Top - SST [[id number]] Collected: 10/22/24 1508    Specimen: Blood Updated: 10/22/24 1532     Extra Tube Hold for add-ons.     Comment: Auto resulted.       Light Blue Top [[id number]] Collected: 10/22/24 1508     Specimen: Blood Updated: 10/22/24 1532     Extra Tube Hold for add-ons.     Comment: Auto resulted       CBC & Differential [[id number]]  (Abnormal) Collected: 10/22/24 1508    Specimen: Blood Updated: 10/22/24 1525    Narrative:      The following orders were created for panel order CBC & Differential.  Procedure                               Abnormality         Status                     ---------                               -----------         ------                     CBC Auto Differential[[id number]]        Abnormal            Final result                 Please view results for these tests on the individual orders.    CBC Auto Differential [[id number]]  (Abnormal) Collected: 10/22/24 1508    Specimen: Blood Updated: 10/22/24 1525     WBC 8.55 10*3/mm3      RBC 2.82 10*6/mm3      Hemoglobin 8.7 g/dL      Hematocrit 26.3 %      MCV 93.3 fL      MCH 30.9 pg      MCHC 33.1 g/dL      RDW 13.0 %      RDW-SD 44.0 fl      MPV 9.6 fL      Platelets 151 10*3/mm3      Neutrophil % 80.7 %      Lymphocyte % 9.1 %      Monocyte % 9.5 %      Eosinophil % 0.2 %      Basophil % 0.1 %      Immature Grans % 0.4 %      Neutrophils, Absolute 6.90 10*3/mm3      Lymphocytes, Absolute 0.78 10*3/mm3      Monocytes, Absolute 0.81 10*3/mm3      Eosinophils, Absolute 0.02 10*3/mm3      Basophils, Absolute 0.01 10*3/mm3      Immature Grans, Absolute 0.03 10*3/mm3      nRBC 0.0 /100 WBC             Imaging Results (Last 24 Hours)       Procedure Component Value Units Date/Time    XR Chest 1 View [[id number]] Collected: 10/22/24 2245     Updated: 10/22/24 2250    Narrative:      XR CHEST 1 VW    Date of Exam: 10/22/2024 9:25 PM EDT    Indication: pre op hip fracture    Comparison: 8/23/2024, 1/26/2022    Findings:  The lungs are grossly clear. Cardiac, hilar, and mediastinal silhouettes are radiographically stable with cardiomegaly and evidence of previous CABG. There are senescent changes in the thoracic aorta as well. No pneumothorax or  pleural effusions. The   trachea is midline. Pulmonary vascularity is normal. Visualized bony structures are intact.        Impression:      Impression:  Stable cardiomegaly with evidence of previous CABG. No active cardiopulmonary disease.      Electronically Signed: Leoncio Tan DO    10/22/2024 10:48 PM EDT    Workstation ID: [id number]    XR Femur 2 View Right [[id number]] Collected: 10/22/24 1606     Updated: 10/22/24 1609    Narrative:      XR FEMUR 2 VW RIGHT    Date of Exam: 10/22/2024 3:56 PM EDT    Indication: hip fx, eval femur    Comparison: None available.    Findings:  4 films. A subcapital right hip fracture is again identified. There is no evidence of a fracture of the femoral shaft. There is atherosclerotic vascular calcification. The femoral head remains seated within the acetabulum.      Impression:      Impression:  Subcapital left hip fracture. No additional femoral fractures are identified.      Electronically Signed: Emilie Nesbitt MD    10/22/2024 4:07 PM EDT    Workstation ID: [id number]    XR Hip With or Without Pelvis 2 - 3 View Right [[id number]] Collected: 10/22/24 1455     Updated: 10/22/24 1459    Narrative:      XR HIP W OR WO PELVIS 2-3 VIEW RIGHT    Date of Exam: 10/22/2024 2:53 PM EDT    Indication: trauma    Comparison: None available.    Findings:  Osseous demineralization. There is an acute mildly displaced fracture involving right femoral neck, likely transcervical versus basicervical in location. No clear involvement of lesser or greater trochanter. Mild fracture foreshortening and varus   angulation. Alignment of the right hip joint appears maintained. Degenerative osteoarthritis of the SI joints and hip joints. No other convincing fracture within limitations of demineralization. Vascular stents noted. Peripheral vascular disease. Soft   tissues radiographically unremarkable.      Impression:      Impression:  Acute right femoral neck fracture not clearly involving the lesser or  greater trochanter. Maintained right hip joint alignment.      Electronically Signed: José Miguel Dumas MD    10/22/2024 2:57 PM EDT    Workstation ID: [id number]            EKG      I personally viewed and interpreted the patient's EKG/Telemetry data:    ECHOCARDIOGRAM:  Results for orders placed during the hospital encounter of 08/23/24    Adult Transthoracic Echo Complete w/ Color, Spectral and Contrast if Necessary Per Protocol    Interpretation Summary    Left ventricular systolic function is severely decreased. Left ventricular ejection fraction appears to be 21 - 25%.  Akinetic mid to distal anterior wall and apex noted    The left ventricular cavity is mildly dilated.    Left ventricular wall thickness is consistent with mild concentric hypertrophy.    Left ventricular diastolic function was normal.    Estimated right ventricular systolic pressure from tricuspid regurgitation is normal (<35 mmHg).         STRESS MYOVIEW:       CARDIAC CATHETERIZATION:    Results for orders placed during the hospital encounter of 08/23/24    Cardiac Catheterization/Vascular Study    Conclusion  CARDIAC CATHETERIZATION REPORT      DATE OF PROCEDURE:  8/24/2024    INDICATION FOR PROCEDURE:    Non-ST elevation myocardial infarction  Angina pectoris  CAD  CABG  Hypertension    PROCEDURE PERFORMED:    Ultrasound-guided access of femoral artery  Left heart catheterization  coronary angiography  Angiography of SVG graft and left HANNA.  left ventriculography    PROCEDURE COMMENTS:    After informed consent was obtained, the patient was prepped and draped in the usual sterile manner.  Mild to moderate sedation was administered.  Right femoral artery was accessed without difficulty under fluoroscopy and ultrasound guidance and 6 [place]ic arterial sheath was inserted.  Sheath was flushed with heparinized saline.    Using 6 [place]ic Tita catheters, first left coronary artery and the right coronary was electively engaged and appropriate views  were taken.  A 6 Sami JR4 catheter was used to cross aortic valve and left heart catheterization was performed.  Left ventriculography was done in a WEBER view    Patient tolerated the procedure well.    FINDINGS:    1. HEMODYNAMICS:    Aortic pressure: 145/70 mmHg    LVEDP: 5 to 10 mmHg    Gradient across aortic valve on pullback: No gradient across aortic valve      2. LEFT VENTRICULOGRAPHY: 40%      3. CORONARY ANGIOGRAPHY:    A: Left main coronary artery: 25 to 30% stenosis in the ostium of left main.  At the bifurcation there is a 99% heavily calcified plaque/stenosis noted.    B: Left anterior descending artery: 100% occlusion of LAD in the proximal segment after the second .  LIMA to LAD is patent but distal to anastomosis there is diffuse disease of LAD followed by subtotal occlusion in the distal segment at the apex.  LIMA to LAD is attached to diagonal branch and LAD.    C: Left circumflex coronary artery: 100% occlusion of LCx at the ostium.  SVG graft to LCx is patent.  It retrograde fills second marginal.    D: Right coronary artery: RCA is diffusely diseased.  It is dominant.  Patient has 60 to 70% stenosis in the midsegment.  PLB branch which is a small caliber vessel less than 2 mm is diffusely diseased up to 80 to 90%.  It is not amenable to percutaneous intervention.  PDA has mild disease but no high-grade stenosis.    E: LIMA to LAD and diagonal is patent.  SVG graft to OM is patent    SUMMARY:    Three-vessel coronary artery disease  3/3 bypasses are patent  Severe disease of native coronary arteries  RCA is on grafted and has attenuated lesion in the midsegment.  Moderate left ventricular dysfunction    RECOMMENDATIONS:    Medical treatment       OTHER:         MDM      Status post fall/hip fracture/preop evaluation  Patient fell down and had right hip pain with hip fracture and needs surgery  Patient had severe coronary artery disease but had coronary bypass surgery and all 3 bypasses  are patent but patient also has diffuse disease in the RCA and has significant LV dysfunction  Patient is at least moderate risk for surgery but will continue medical therapy and let him have his hip surgery done.  Discussed with patient and family.    Coronary disease  Patient had coronary bypass surgery with a LIMA to the LAD and diagonal branch and also SVG to the circumflex artery which are patent but his RCA and PDA have diffuse disease and is not grafted and is on medical therapy including beta-blockers    HFrEF  Patient has severe LV dysfunction and is on GDMT with beta-blockers hydralazine losartan.  I will continue the same medicines for now.    Hypertension  Patient blood pressure currently stable on multiple medications    Hyperlipidemia  Patient is on statins and the lipid levels are well within normal limits    History of dementia  Patient has history of dementia and is at a rehab place where he fell down.    I discussed the patients findings and my recommendations with patient and family and nurse    Avila Do MD  10/23/24  10:56 EDT

## 2024-10-23 NOTE — CONSULTS
Nutrition Services    Patient Name: Alexei Merino III  YOB: 1950  MRN: [id number]  Admission date: 10/22/2024    Comment:  -- Diet advancement to Regular to increase available options, especially Sprite per patient preference     -- Add chocolate Boost Original BID (Provides 480 kcals, 20 g protein if consumed)     -- Severe chronic disease related malnutrition related to multiple chronic diseases including dementia, CAD, HLD, HTN as evidenced by PO intakes less than 75% for greater than 1 month, weight loss greater than 20% x 1 year and fat/muscle loss per physical exam.  See MSA below.      CLINICAL NUTRITION ASSESSMENT      Reason for Assessment 10/23: BMI less than 19, MST of 2, history of malnutrition      H&P      Past Medical History:   Diagnosis Date    Anxiety     Aortic aneurysm     Coronary artery disease     Dementia 2017    per EASTON Lopez, patient was diagnosed 7 years ago    Emphysema lung     Gastroparesis     per Jessica    [place] (hard of hearing)     complete deaf    Hyperlipidemia     Hypertension     Myocardial infarction     STEMI (ST elevation myocardial infarction) 08/23/2024       Past Surgical History:   Procedure Laterality Date    ABDOMINAL AORTIC ANEURYSM REPAIR  03/23/2022    CARDIAC CATHETERIZATION Right 8/24/2024    Procedure: LEFT HEART CATH with possible PCI;  Surgeon: Omer Vieyra MD;  Location: [facility] CATH INVASIVE LOCATION;  Service: Cardiovascular;  Laterality: Right;  Local and IV sedation    CORONARY ARTERY BYPASS GRAFT  03/13/2016    X3  Dr Hong        Current Problems   Right hip fracture  - ortho following  - right hip replacement scheduled for 10/23    Hyperlipidemia      Anxiety      Hypertension        Encounter Information        Trending Narrative     10/23: Admitted for hip pain following a fall.  Ortho following.  Plans for hip replacement today 10/23.  RD visited patient at bedside.  Spoke to daughters, patient resting with headphones on.  Daughters  "report patient refuses to eat most things.  Drinks regular Sprite.  Daughters requested patient diet restrictions be removed (Heart Healthy, Consistent CHO) and RD changed.  NFPE completed, consistent with nutrition diagnosis of malnutrition using AND/ASPEN criteria. See MSA below.         Anthropometrics        Current Height, Weight Height: 175.3 cm (69\")  Weight: 53.1 kg (117 lb) (10/22/24 1419)       Usual Body Weight (UBW) Unable to obtain from patient        Trending Weight Hx     This admission: 10/23: 117#             PTA: 23% weight loss x 14 months     Wt Readings from Last 30 Encounters:   10/22/24 1419 53.1 kg (117 lb)   09/11/24 1449 58.4 kg (128 lb 12.8 oz)   08/27/24 0500 56.2 kg (123 lb 14.4 oz)   08/25/24 0500 54.8 kg (120 lb 13 oz)   08/24/24 0944 68.9 kg (152 lb)   08/23/24 2001 69 kg (152 lb 1.9 oz)   08/25/23 1804 69 kg (152 lb 1.9 oz)   03/14/23 1541 62.1 kg (137 lb)   02/10/22 1410 61.2 kg (135 lb)   01/26/22 1931 59.9 kg (132 lb)   01/18/22 1311 61.3 kg (135 lb 3.2 oz)   01/17/22 1304 63 kg (139 lb)   01/14/22 1156 62.1 kg (137 lb)   12/28/21 1412 60.6 kg (133 lb 9.6 oz)   08/25/21 1311 64.9 kg (143 lb)   05/19/21 1450 59.9 kg (132 lb)   11/04/20 1454 67.1 kg (148 lb)   02/24/20 1342 63.9 kg (140 lb 12.8 oz)   01/06/20 1452 72.1 kg (159 lb)   05/16/19 1559 67.6 kg (149 lb)   05/02/19 1535 61.7 kg (136 lb)   04/08/19 1610 56.9 kg (125 lb 6.4 oz)   03/07/19 1621 62.1 kg (136 lb 12.8 oz)   11/13/18 1539 66.2 kg (146 lb)   08/30/18 1436 67.8 kg (149 lb 6.4 oz)   07/23/18 1341 64.9 kg (143 lb)   05/15/18 1455 64.4 kg (142 lb)   04/24/18 1056 65.3 kg (144 lb)   03/20/18 1022 66.2 kg (146 lb)   02/21/18 1010 68 kg (150 lb)   01/18/18 1340 69.4 kg (153 lb)   12/20/17 1143 72.6 kg (160 lb)   11/27/17 1500 73.5 kg (162 lb)      BMI kg/m2 Body mass index is 17.28 kg/m².       Labs        Pertinent Labs    Results from last 7 days   Lab Units 10/23/24  0453 10/22/24  1608 10/22/24  1508   SODIUM mmol/L " 139 137 140   POTASSIUM mmol/L 4.4 3.8 4.0   CHLORIDE mmol/L 99 99 100   CO2 mmol/L 29.0 28.3 30.2*   BUN mg/dL 23 20 20   CREATININE mg/dL 1.17 1.15 1.14   CALCIUM mg/dL 9.2 9.0 9.1   BILIRUBIN mg/dL  --   --  2.3*   ALK PHOS U/L  --   --  92   ALT (SGPT) U/L  --   --  15   AST (SGOT) U/L  --   --  23   GLUCOSE mg/dL 139* 112* 115*     Results from last 7 days   Lab Units 10/23/24  0453   HEMOGLOBIN g/dL 8.7*   HEMATOCRIT % 26.1*     Lab Results   Component Value Date    HGBA1C 5.92 (H) 08/23/2024        Medications    Scheduled Medications      Infusions      PRN Medications   Calcium Replacement - Follow Nurse / BPA Driven Protocol    cyclobenzaprine    HYDROmorphone    Magnesium Standard Dose Replacement - Follow Nurse / BPA Driven Protocol    ondansetron ODT **OR** ondansetron    Phosphorus Replacement - Follow Nurse / BPA Driven Protocol    Potassium Replacement - Follow Nurse / BPA Driven Protocol    [COMPLETED] Insert Peripheral IV **AND** sodium chloride     Physical Findings        Trending Physical   Appearance, NFPE 10/23: NFPE completed, consistent with nutrition diagnosis of malnutrition using AND/ASPEN criteria. See MSA below.      --  Edema  No edema documented      Bowel Function No BM since admission (yesterday)     Tubes No feeding tube      Chewing/Swallowing No known issues      Skin No WOCN note at this time  - coccyx area  - left coccyx area  - right lower back area     --  Current Nutrition Orders & Evaluation of Intake       Oral Nutrition     Food Allergies NKFA   Current PO Diet NPO Diet NPO Type: Strict NPO  Diet: Regular/House; Fluid Consistency: Thin (IDDSI 0)   Supplement None ordered    PO Evaluation     Trending % PO Intake 10/23: None documented    --  Nutritional Risk Screening        NRS-2002 Score          Nutrition Diagnosis         Nutrition Dx Problem 1 Severe chronic disease related malnutrition related to multiple chronic diseases including dementia, CAD, HLD, HTN as  evidenced by PO intakes less than 75% for greater than 1 month, weight loss greater than 20% x 1 year and fat/muscle loss per physical exam.       Nutrition Dx Problem 2        Intervention Goal         Intervention Goal(s) Accept ONS  No weight loss  PO intakes at least 50%     Nutrition Intervention        RD Action Add ONS  Liberalized diet  Completed NFPE     Nutrition Prescription          Diet Prescription Regular    Supplement Prescription Boost Original BID    --  Monitor/Evaluation        Monitor Per protocol, I&O, PO intake, Supplement intake, Pertinent labs, Weight     Malnutrition Severity Assessment      Patient meets criteria for : Severe Malnutrition  Malnutrition Type (Last 8 Hours)       Malnutrition Severity Assessment       Row Name 10/23/24 1239       Malnutrition Severity Assessment    Malnutrition Type Chronic Disease - Related Malnutrition      Row Name 10/23/24 1239       Insufficient Energy Intake     Insufficient Energy Intake Findings Severe    Insufficient Energy Intake  <75% of est. energy requirement for > or equal to 1 month      Row Name 10/23/24 1239       Unintentional Weight Loss     Unintentional Weight Loss Findings Severe    Unintentional Weight Loss  Weight loss greater than 20% in one year      Row Name 10/23/24 1239       Muscle Loss    Loss of Muscle Mass Findings Severe    Moravian Region Severe - deep hollowing/scooping, lack of muscle to touch, facial bones well defined    Clavicle Bone Region Severe - protruding prominent bone    Acromion Bone Region Severe - squared shoulders, bones, and acromion process protrusion prominent    Scapular Bone Region Severe - prominent bones, depressions easily visible between ribs, scapula, spine, shoulders    Dorsal Hand Region Severe - prominent depression    Patellar Region Severe - prominent bone, square looking, very little muscle definition    Anterior Thigh Region Severe - line/depression along thigh, obviously thin    Posterior  Calf Region Severe - thin with very little definition/firmness      Row Name 10/23/24 1239       Fat Loss    Subcutaneous Fat Loss Findings Severe    Orbital Region  Severe - pronounced hollowness/depression, dark circles, loose saggy skin    Upper Arm Region Severe - mostly skin, very little space between folds, fingers touch    Thoracic & Lumbar Region Severe - ribs visible with prominent depressions, iliac crest very prominent      Row Name 10/23/24 1239       Criteria Met (Must meet criteria for severity in at least 2 of these categories: M Wasting, Fat Loss, Fluid, Secondary Signs, Wt. Status, Intake)    Patient meets criteria for  Severe Malnutrition                     Electronically signed by:  Lidia Gunn RD  10/23/24 08:45 EDT

## 2024-10-23 NOTE — PLAN OF CARE
Goal Outcome Evaluation:  Plan of Care Reviewed With: patient           Outcome Evaluation: Patient admitted to [facility] this evening, He is scheduled for surgery 10/24. Patient resting with no complaints at this time. Family at bedside, Call light in reach.

## 2024-10-24 NOTE — NURSING NOTE
WOCN note:    74 yr old male admitted 10/22/24 with right femoral neck fracture. WOCN consult received for a pressure injury noted upon admission. Patient scheduled for surgery today. Chart and photos reviewed.     Patient noted to have a small stage 2 partial thickness pressure injury to his low left sacrum. There is also a linear abrasion to his right posterior thorax that is also partial thickness. Recommend to cover both areas with silicone foam dressings and initiate pressure injury prevention measures per protocol. We will continue to follow as needed.

## 2024-10-24 NOTE — PLAN OF CARE
Goal Outcome Evaluation:      Patient has been painful tonight, treated per MAR. Remains confused at baseline. Native, patient using device to help. CHG bath done tonight. Care continuing.

## 2024-10-24 NOTE — PROGRESS NOTES
[facility] MEDICINE SERVICE  DAILY PROGRESS NOTE    NAME: Alexei Merino III  : 1950  MRN: [id number]      LOS: 2 days     PROVIDER OF SERVICE: Norbert Medrano MD    Chief Complaint: Fracture of femoral neck, right    Subjective:     Interval History:  History taken from: patient chart      History of Present Illness: Alexei Merino III is a 74 y.o. male with a CMH of anxiety, aortic aneurysm, CAD, dementia, HTN, HLD, and history of STEMI who presented to [facility] on 10/22/2024 with  right hip pain s/p a fall. Patient lives at a long term care facility. He was trying to go the bathroom by himself when he fell. Xray of right femur shows subcapital hip fracture. Xr of the right hip shows acute right femoral neck fracture with maintained right hip joint alignment. Orthopedic surgery has been consulted. Hospitalist team to admit for further medical management.       10/23/24 patient seen and examined in bed no acute distress, vital signs stable, discussed with RN, for surgery in a.m.,  10/24/24 patient examined in bed no acute distress, vital signs stable, discussed with RN, for surgery today.    Review of Systems   Constitutional:  Positive for appetite change and fatigue.   Musculoskeletal:  Positive for arthralgias and gait problem.   Neurological:  Positive for weakness.     Objective:     Vital Signs  Temp:  [97.7 °F (36.5 °C)-98.2 °F (36.8 °C)] 98.2 °F (36.8 °C)  Heart Rate:  [] 78  Resp:  [11-19] 16  BP: (121-140)/(64-80) 121/64  Flow (L/min) (Oxygen Therapy):  [4] 4   Body mass index is 17.28 kg/m².    Physical Exam   Vitals and nursing note reviewed.   Constitutional:       Appearance: He is underweight. He is ill-appearing.   HENT:      Head: Normocephalic and atraumatic.      Nose: Nose normal.      Mouth/Throat:      Mouth: Mucous membranes are moist.   Eyes:      Extraocular Movements: Extraocular movements intact.      Pupils: Pupils are equal, round, and reactive to  light.   Cardiovascular:      Rate and Rhythm: Normal rate and regular rhythm.      Pulses: Normal pulses.   Pulmonary:      Effort: Pulmonary effort is normal.      Breath sounds: Normal breath sounds.   Abdominal:      General: Bowel sounds are normal.      Palpations: Abdomen is soft.   Musculoskeletal:         General: Swelling (right hip) and tenderness (right hip) present.      Cervical back: Normal range of motion and neck supple.      Comments: ROM limited of right leg secondary to pain      Skin:     General: Skin is warm.      Capillary Refill: Capillary refill takes less than 2 seconds.      Coloration: Skin is pale.   Neurological:      Mental Status: He is oriented to person, place, and time.        Diagnostic Data    Results from last 7 days   Lab Units 10/23/24  2324 10/22/24  1608 10/22/24  1508   WBC 10*3/mm3 8.40   < > 8.55   HEMOGLOBIN g/dL 7.5*   < > 8.7*   HEMATOCRIT % 22.5*   < > 26.3*   PLATELETS 10*3/mm3 148   < > 151   GLUCOSE mg/dL 149*   < > 115*   CREATININE mg/dL 0.96   < > 1.14   BUN mg/dL 21   < > 20   SODIUM mmol/L 138   < > 140   POTASSIUM mmol/L 3.7   < > 4.0   AST (SGOT) U/L  --   --  23   ALT (SGPT) U/L  --   --  15   ALK PHOS U/L  --   --  92   BILIRUBIN mg/dL  --   --  2.3*   ANION GAP mmol/L 7.8   < > 9.8    < > = values in this interval not displayed.       XR Chest 1 View    Result Date: 10/22/2024  Impression: Stable cardiomegaly with evidence of previous CABG. No active cardiopulmonary disease. Electronically Signed: Leoncio Tan DO  10/22/2024 10:48 PM EDT  Workstation ID: [id number]    XR Femur 2 View Right    Result Date: 10/22/2024  Impression: Subcapital left hip fracture. No additional femoral fractures are identified. Electronically Signed: Emilie Nesbitt MD  10/22/2024 4:07 PM EDT  Workstation ID: [id number]    XR Hip With or Without Pelvis 2 - 3 View Right    Result Date: 10/22/2024  Impression: Acute right femoral neck fracture not clearly involving the lesser or  greater trochanter. Maintained right hip joint alignment. Electronically Signed: José Miguel Dumas MD  10/22/2024 2:57 PM EDT  Workstation ID: [id number]       I reviewed the patient's new clinical results.    Assessment/Plan:     Active and Resolved Problems  Active Hospital Problems    Diagnosis  POA    **Fracture of femoral neck, right [S72.001A]  Yes      Resolved Hospital Problems   No resolved problems to display.     Right hip fracture  - XR hip: right femoral neck fracture  - XR femur: subcapital fracture   - prn pain medication- dilaudid ordered at recommendation of pharmacist, has morphine allergy but has tolerated dilaudid in the past.   - surgery consulted  - NPO     Coronary artery disease-consulted cardiology  Patient had coronary bypass surgery with a LIMA to the LAD and diagonal branch and also SVG to the circumflex artery which are patent but his RCA and PDA have diffuse disease and is not grafted and is on medical therapy including beta-blockers     CHF-HFrEF  Patient has severe LV dysfunction and is on GDMT with beta-blockers hydralazine losartan.   -continue the same medicines for now.     Hyperlipidemia   - continue home statin      Anxiety   - continue home statin      Hypertension   - BP stable at 148/77  - continue home BP meds once reconciled    VTE Prophylaxis:  Mechanical VTE prophylaxis orders are present.    Disposition Planning:     Barriers to Discharge:hip fx  Anticipated Date of Discharge: 10/26  Place of Discharge: [facility]      Time: 30 minutes     Code Status and Medical Interventions: CPR (Attempt to Resuscitate); Full Support   Ordered at: 10/22/24 1704     Level Of Support Discussed With:    Patient     Code Status (Patient has no pulse and is not breathing):    CPR (Attempt to Resuscitate)     Medical Interventions (Patient has pulse or is breathing):    Full Support       Signature: Electronically signed by Norbert Medrano MD, 10/24/24, 10:58 EDT.  [facility] Hospitalist Team

## 2024-10-24 NOTE — PROGRESS NOTES
Orthopedic Consult      Patient: Alexei Merino III    Date of Admission: 10/22/2024  1:58 PM    YOB: 1950    Medical Record Number: [id number]    Attending Physician: Norbert Medrano MD  Consulting Physician:       Chief Complaints: Fracture of femoral neck, right [S72.001A]  Closed fracture of right hip, initial encounter [S72.001A]      History of Present Illness: 74 y.o. male admitted to [facility] to services of Norbert Medrano MD with Fracture of femoral neck, right [S72.001A]  Closed fracture of right hip, initial encounter [S72.001A].   Patient is a 74-year-old male who sustained a ground-level fall on 10/22/2024.  Patient states he got up to go to the restroom by himself and fell onto his left side.  He had immediate pain and inability to bear weight.  He was brought to the hospital and diagnosed with a left femoral neck fracture.  He is admitted to hospitalist service for preoperative restratification and medical optimization.  Our team was consulted for further evaluation of his hip fracture.    Allergies   Allergen Reactions    Morphine Anaphylaxis       Medications:   Home Medications:  Medications Prior to Admission   Medication Sig Dispense Refill Last Dose/Taking    aspirin 81 MG EC tablet Take 1 tablet by mouth Daily.   10/22/2024    atorvastatin (LIPITOR) 40 MG tablet Take 1 tablet by mouth Every Night.   10/21/2024    cetirizine (zyrTEC) 5 MG tablet Take 1 tablet by mouth Daily.   10/22/2024    clopidogrel (PLAVIX) 75 MG tablet Take 1 tablet by mouth Daily.   10/22/2024    ferrous gluconate 324 (37.5 Fe) MG tablet tablet Take 1 tablet by mouth Daily As Needed.   10/21/2024    hydrALAZINE (APRESOLINE) 25 MG tablet Take 1 tablet by mouth 2 (Two) Times a Day. Give if SBP greater than 160 or DBP greater than 90.   10/21/2024    hydrOXYzine (ATARAX) 25 MG tablet Take 1 tablet by mouth 2 (Two) Times a Day.   10/22/2024    hydrOXYzine (ATARAX) 25 MG tablet Take 1 tablet by  mouth Daily As Needed for Anxiety.   10/21/2024    ibuprofen (ADVIL,MOTRIN) 200 MG tablet Take 2 tablets by mouth Every 6 (Six) Hours As Needed for Mild Pain.   10/21/2024    isosorbide mononitrate (IMDUR) 30 MG 24 hr tablet Take 1 tablet by mouth Daily. Hold for BP <100/60   10/22/2024    losartan (COZAAR) 100 MG tablet Take 1 tablet by mouth Daily.   10/22/2024    magnesium hydroxide (MILK OF MAGNESIA) 2400 MG/10ML suspension suspension Take 30 mL by mouth Daily As Needed.   10/21/2024    ranolazine (RANEXA) 500 MG 12 hr tablet Take 1 tablet by mouth Every 12 (Twelve) Hours.   10/22/2024    sodium bicarbonate 650 MG tablet Take 1 tablet by mouth 3 (Three) Times a Day.   10/22/2024    ondansetron (ZOFRAN) 4 MG tablet Take 1 tablet by mouth Every 6 (Six) Hours As Needed for Nausea or Vomiting.       polyethylene glycol (MIRALAX) 17 g packet Take 17 g by mouth Daily.          Current Medications:  Scheduled Meds:atorvastatin, 40 mg, Oral, Nightly  isosorbide mononitrate, 30 mg, Oral, Q24H  losartan, 100 mg, Oral, Q24H  metoprolol succinate XL, 25 mg, Oral, Q24H      Continuous Infusions:dextrose 5 % and sodium chloride 0.9 %, 100 mL/hr, Last Rate: 100 mL/hr (10/23/24 2051)      PRN Meds:.  Calcium Replacement - Follow Nurse / BPA Driven Protocol    cyclobenzaprine    HYDROmorphone    Magnesium Standard Dose Replacement - Follow Nurse / BPA Driven Protocol    ondansetron ODT **OR** ondansetron    oxyCODONE    Phosphorus Replacement - Follow Nurse / BPA Driven Protocol    Potassium Replacement - Follow Nurse / BPA Driven Protocol    [COMPLETED] Insert Peripheral IV **AND** sodium chloride    traMADol       Past Medical History:   Diagnosis Date    Anxiety     Aortic aneurysm     Coronary artery disease     Dementia 2017    per EASTON Lopez, patient was diagnosed 7 years ago    Emphysema lung     Gastroparesis     per Jessica    [place] (hard of hearing)     complete deaf    Hyperlipidemia     Hypertension     Myocardial  infarction     STEMI (ST elevation myocardial infarction) 2024        Past Surgical History:   Procedure Laterality Date    ABDOMINAL AORTIC ANEURYSM REPAIR  2022    CARDIAC CATHETERIZATION Right 2024    Procedure: LEFT HEART CATH with possible PCI;  Surgeon: Omer Vieyra MD;  Location: [facility] CATH INVASIVE LOCATION;  Service: Cardiovascular;  Laterality: Right;  Local and IV sedation    CORONARY ARTERY BYPASS GRAFT  03/13/2016    X3  Dr Hong        Social History     Occupational History    Not on file   Tobacco Use    Smoking status: Former     Average packs/day: 0.3 packs/day for 50.0 years (12.5 ttl pk-yrs)     Types: Cigarettes     Start date:      Quit date:      Years since quittin.8     Passive exposure: Past    Smokeless tobacco: Never   Vaping Use    Vaping status: Never Used   Substance and Sexual Activity    Alcohol use: No    Drug use: Yes     Types: Marijuana     Comment: occasional marijuana usage    Sexual activity: Defer      Social History     Social History Narrative    Not on file        Family History   Problem Relation Age of Onset    Hypertension Other     Heart disease Mother     Heart disease Father          Review of Systems:   HEENT: Patient denies any headaches, vision changes, change in hearing, or tinnitus, Patient denies any rhinorrhea, epistaxis, sinus pain, mouth or dental problems, sore throat or hoarseness, or dysphagia  Pulmonary: Patient denies any cough, congestion, SOA, or wheezing  Cardiovascular: Patient denies any chest pain, dyspnea, palpitations, weakness, intolerance of exercise, varicosities, swelling of extremities, known murmur  Gastrointestinal:  Patient denies nausea, vomiting, diarrhea, constipation, loss of appetite, change in appetite, dysphagia, gas, heartburn, melena, change in bowel habits, use of laxatives or other drugs to alter the function of the gastrointestinal tract.  Genital/Urinary: Patient denies dysuria, change in  color of urine, change in frequency of urination, pain with urgency, incontinence, retention, or nocturia.  Musculoskeletal: Patient denies increased warmth; redness; or swelling of joints; limitation of function; deformity; crepitation: notes pain in left hip as documented above in HPI.  Denies pain in low back or neck.    Neurological: Patient denies dizziness, tremor, ataxia, difficulty in speaking, change in speech, paresthesia, loss of sensation, seizures, syncope, changes in memory.  Endocrine system: Patient denies tremors, palpitations, intolerance of heat or cold, polyuria, polydipsia, polyphagia, diaphoresis, exophthalmos, or goiter.  Psychological: Patient denies thoughts/plans of harming self or other; depression, insomnia, night terrors, vitaliy, memory loss, disorientation.  Skin: Patient denies any bruising, rashes, discoloration, pruritus, wounds, ulcers, decubiti, changes in the hair or nails  Hematopoietic: Patient denies history of spontaneous or excessive bleeding, epistaxis, hematuria, melena, fatigue, enlarged or tender lymph nodes, pallor, history of anemia.    Physical Exam: 74 y.o. male  Vitals:    10/23/24 2029 10/24/24 0314 10/24/24 0923 10/24/24 1214   BP: 137/78 129/75 121/64 120/60   BP Location: Right arm Right arm  Right arm   Patient Position: Lying Lying  Lying   Pulse: 71 105 78 72   Resp: 11 16  18   Temp: 98.2 °F (36.8 °C)   97.7 °F (36.5 °C)   TempSrc: Oral Oral  Oral   SpO2: 97% 98%  98%   Weight:       Height:         General: No acute distress  Pulmonary: Unlabored breathing  Cardiovascular: regular rate and rhythm  LLE  Sensation normal  2+ dorsalis pedis and posterior tibial pulse  5 out of 5 ankle plantarflexion dorsiflexion inversion and eversion  No signs of DVT or compartment syndrome      Diagnostic Tests:  Admission on 10/22/2024   Component Date Value Ref Range Status    Extra Tube 10/22/2024 Hold for add-ons.   Final    Auto resulted.    Extra Tube 10/22/2024 hold for  add-on   Final    Auto resulted    Extra Tube 10/22/2024 Hold for add-ons.   Final    Auto resulted.    Extra Tube 10/22/2024 Hold for add-ons.   Final    Auto resulted    QT Interval 10/22/2024 491  ms Final    QTC Interval 10/22/2024 540  ms Final    Glucose 10/22/2024 115 (H)  65 - 99 mg/dL Final    BUN 10/22/2024 20  8 - 23 mg/dL Final    Creatinine 10/22/2024 1.14  0.76 - 1.27 mg/dL Final    Sodium 10/22/2024 140  136 - 145 mmol/L Final    Potassium 10/22/2024 4.0  3.5 - 5.2 mmol/L Final    Chloride 10/22/2024 100  98 - 107 mmol/L Final    CO2 10/22/2024 30.2 (H)  22.0 - 29.0 mmol/L Final    Calcium 10/22/2024 9.1  8.6 - 10.5 mg/dL Final    Total Protein 10/22/2024 5.6 (L)  6.0 - 8.5 g/dL Final    Albumin 10/22/2024 3.9  3.5 - 5.2 g/dL Final    ALT (SGPT) 10/22/2024 15  1 - 41 U/L Final    AST (SGOT) 10/22/2024 23  1 - 40 U/L Final    Alkaline Phosphatase 10/22/2024 92  39 - 117 U/L Final    Total Bilirubin 10/22/2024 2.3 (H)  0.0 - 1.2 mg/dL Final    Globulin 10/22/2024 1.7  gm/dL Final    A/G Ratio 10/22/2024 2.3  g/dL Final    BUN/Creatinine Ratio 10/22/2024 17.5  7.0 - 25.0 Final    Anion Gap 10/22/2024 9.8  5.0 - 15.0 mmol/L Final    eGFR 10/22/2024 67.5  >60.0 mL/min/1.73 Final    Protime 10/22/2024 12.9 (H)  9.6 - 11.7 Seconds Final    INR 10/22/2024 1.20 (H)  0.93 - 1.10 Final    ABO Type 10/22/2024 A   Final    RH type 10/22/2024 Positive   Final    Antibody Screen 10/22/2024 Negative   Final    T&S Expiration Date 10/22/2024 10/25/2024 11:59:59 PM   Final    WBC 10/22/2024 8.55  3.40 - 10.80 10*3/mm3 Final    RBC 10/22/2024 2.82 (L)  4.14 - 5.80 10*6/mm3 Final    Hemoglobin 10/22/2024 8.7 (L)  13.0 - 17.7 g/dL Final    Hematocrit 10/22/2024 26.3 (L)  37.5 - 51.0 % Final    MCV 10/22/2024 93.3  79.0 - 97.0 fL Final    MCH 10/22/2024 30.9  26.6 - 33.0 pg Final    MCHC 10/22/2024 33.1  31.5 - 35.7 g/dL Final    RDW 10/22/2024 13.0  12.3 - 15.4 % Final    RDW-SD 10/22/2024 44.0  37.0 - 54.0 fl Final     MPV 10/22/2024 9.6  6.0 - 12.0 fL Final    Platelets 10/22/2024 151  140 - 450 10*3/mm3 Final    Neutrophil % 10/22/2024 80.7 (H)  42.7 - 76.0 % Final    Lymphocyte % 10/22/2024 9.1 (L)  19.6 - 45.3 % Final    Monocyte % 10/22/2024 9.5  5.0 - 12.0 % Final    Eosinophil % 10/22/2024 0.2 (L)  0.3 - 6.2 % Final    Basophil % 10/22/2024 0.1  0.0 - 1.5 % Final    Immature Grans % 10/22/2024 0.4  0.0 - 0.5 % Final    Neutrophils, Absolute 10/22/2024 6.90  1.70 - 7.00 10*3/mm3 Final    Lymphocytes, Absolute 10/22/2024 0.78  0.70 - 3.10 10*3/mm3 Final    Monocytes, Absolute 10/22/2024 0.81  0.10 - 0.90 10*3/mm3 Final    Eosinophils, Absolute 10/22/2024 0.02  0.00 - 0.40 10*3/mm3 Final    Basophils, Absolute 10/22/2024 0.01  0.00 - 0.20 10*3/mm3 Final    Immature Grans, Absolute 10/22/2024 0.03  0.00 - 0.05 10*3/mm3 Final    nRBC 10/22/2024 0.0  0.0 - 0.2 /100 WBC Final    Glucose 10/22/2024 112 (H)  65 - 99 mg/dL Final    BUN 10/22/2024 20  8 - 23 mg/dL Final    Creatinine 10/22/2024 1.15  0.76 - 1.27 mg/dL Final    Sodium 10/22/2024 137  136 - 145 mmol/L Final    Potassium 10/22/2024 3.8  3.5 - 5.2 mmol/L Final    Chloride 10/22/2024 99  98 - 107 mmol/L Final    CO2 10/22/2024 28.3  22.0 - 29.0 mmol/L Final    Calcium 10/22/2024 9.0  8.6 - 10.5 mg/dL Final    BUN/Creatinine Ratio 10/22/2024 17.4  7.0 - 25.0 Final    Anion Gap 10/22/2024 9.7  5.0 - 15.0 mmol/L Final    eGFR 10/22/2024 66.8  >60.0 mL/min/1.73 Final    PTT 10/22/2024 29.6  24.0 - 31.0 seconds Final    RIGO AURIS PCR 10/22/2024 Not Detected  Not Detected Final    Glucose 10/23/2024 139 (H)  65 - 99 mg/dL Final    BUN 10/23/2024 23  8 - 23 mg/dL Final    Creatinine 10/23/2024 1.17  0.76 - 1.27 mg/dL Final    Sodium 10/23/2024 139  136 - 145 mmol/L Final    Potassium 10/23/2024 4.4  3.5 - 5.2 mmol/L Final    Chloride 10/23/2024 99  98 - 107 mmol/L Final    CO2 10/23/2024 29.0  22.0 - 29.0 mmol/L Final    Calcium 10/23/2024 9.2  8.6 - 10.5 mg/dL Final     BUN/Creatinine Ratio 10/23/2024 19.7  7.0 - 25.0 Final    Anion Gap 10/23/2024 11.0  5.0 - 15.0 mmol/L Final    eGFR 10/23/2024 65.4  >60.0 mL/min/1.73 Final    WBC 10/23/2024 8.84  3.40 - 10.80 10*3/mm3 Final    RBC 10/23/2024 2.81 (L)  4.14 - 5.80 10*6/mm3 Final    Hemoglobin 10/23/2024 8.7 (L)  13.0 - 17.7 g/dL Final    Hematocrit 10/23/2024 26.1 (L)  37.5 - 51.0 % Final    MCV 10/23/2024 92.9  79.0 - 97.0 fL Final    MCH 10/23/2024 31.0  26.6 - 33.0 pg Final    MCHC 10/23/2024 33.3  31.5 - 35.7 g/dL Final    RDW 10/23/2024 13.0  12.3 - 15.4 % Final    RDW-SD 10/23/2024 43.8  37.0 - 54.0 fl Final    MPV 10/23/2024 9.5  6.0 - 12.0 fL Final    Platelets 10/23/2024 146  140 - 450 10*3/mm3 Final    Neutrophil % 10/23/2024 74.3  42.7 - 76.0 % Final    Lymphocyte % 10/23/2024 10.2 (L)  19.6 - 45.3 % Final    Monocyte % 10/23/2024 13.3 (H)  5.0 - 12.0 % Final    Eosinophil % 10/23/2024 1.5  0.3 - 6.2 % Final    Basophil % 10/23/2024 0.2  0.0 - 1.5 % Final    Immature Grans % 10/23/2024 0.5  0.0 - 0.5 % Final    Neutrophils, Absolute 10/23/2024 6.57  1.70 - 7.00 10*3/mm3 Final    Lymphocytes, Absolute 10/23/2024 0.90  0.70 - 3.10 10*3/mm3 Final    Monocytes, Absolute 10/23/2024 1.18 (H)  0.10 - 0.90 10*3/mm3 Final    Eosinophils, Absolute 10/23/2024 0.13  0.00 - 0.40 10*3/mm3 Final    Basophils, Absolute 10/23/2024 0.02  0.00 - 0.20 10*3/mm3 Final    Immature Grans, Absolute 10/23/2024 0.04  0.00 - 0.05 10*3/mm3 Final    nRBC 10/23/2024 0.0  0.0 - 0.2 /100 WBC Final    Glucose 10/23/2024 149 (H)  65 - 99 mg/dL Final    BUN 10/23/2024 21  8 - 23 mg/dL Final    Creatinine 10/23/2024 0.96  0.76 - 1.27 mg/dL Final    Sodium 10/23/2024 138  136 - 145 mmol/L Final    Potassium 10/23/2024 3.7  3.5 - 5.2 mmol/L Final    Chloride 10/23/2024 99  98 - 107 mmol/L Final    CO2 10/23/2024 31.2 (H)  22.0 - 29.0 mmol/L Final    Calcium 10/23/2024 8.8  8.6 - 10.5 mg/dL Final    BUN/Creatinine Ratio 10/23/2024 21.9  7.0 - 25.0 Final     Anion Gap 10/23/2024 7.8  5.0 - 15.0 mmol/L Final    eGFR 10/23/2024 82.9  >60.0 mL/min/1.73 Final    WBC 10/23/2024 8.40  3.40 - 10.80 10*3/mm3 Final    RBC 10/23/2024 2.45 (L)  4.14 - 5.80 10*6/mm3 Final    Hemoglobin 10/23/2024 7.5 (L)  13.0 - 17.7 g/dL Final    Hematocrit 10/23/2024 22.5 (L)  37.5 - 51.0 % Final    MCV 10/23/2024 91.8  79.0 - 97.0 fL Final    MCH 10/23/2024 30.6  26.6 - 33.0 pg Final    MCHC 10/23/2024 33.3  31.5 - 35.7 g/dL Final    RDW 10/23/2024 12.9  12.3 - 15.4 % Final    RDW-SD 10/23/2024 43.4  37.0 - 54.0 fl Final    MPV 10/23/2024 9.7  6.0 - 12.0 fL Final    Platelets 10/23/2024 148  140 - 450 10*3/mm3 Final    Neutrophil % 10/23/2024 73.7  42.7 - 76.0 % Final    Lymphocyte % 10/23/2024 10.2 (L)  19.6 - 45.3 % Final    Monocyte % 10/23/2024 11.8  5.0 - 12.0 % Final    Eosinophil % 10/23/2024 3.7  0.3 - 6.2 % Final    Basophil % 10/23/2024 0.4  0.0 - 1.5 % Final    Immature Grans % 10/23/2024 0.2  0.0 - 0.5 % Final    Neutrophils, Absolute 10/23/2024 6.19  1.70 - 7.00 10*3/mm3 Final    Lymphocytes, Absolute 10/23/2024 0.86  0.70 - 3.10 10*3/mm3 Final    Monocytes, Absolute 10/23/2024 0.99 (H)  0.10 - 0.90 10*3/mm3 Final    Eosinophils, Absolute 10/23/2024 0.31  0.00 - 0.40 10*3/mm3 Final    Basophils, Absolute 10/23/2024 0.03  0.00 - 0.20 10*3/mm3 Final    Immature Grans, Absolute 10/23/2024 0.02  0.00 - 0.05 10*3/mm3 Final    nRBC 10/23/2024 0.0  0.0 - 0.2 /100 WBC Final    Acanthocytes 10/23/2024 Slight/1+  None Seen Final    WBC Morphology 10/23/2024 Normal  Normal Final    Platelet Morphology 10/23/2024 Normal  Normal Final    PTT 10/24/2024 33.0 (H)  24.0 - 31.0 seconds Final    Protime 10/24/2024 12.6 (H)  9.6 - 11.7 Seconds Final    INR 10/24/2024 1.17 (H)  0.93 - 1.10 Final    Product Code 10/24/2024 [id number]   Final    Unit Number 10/24/2024 [id number]   Final    UNIT  ABO 10/24/2024 A   Final    UNIT  RH 10/24/2024 POS   Final    Crossmatch Interpretation 10/24/2024  Compatible   Final    Dispense Status 10/24/2024 XM   Final    Blood Expiration Date 10/24/2024 [id number]   Final    Blood Type Barcode 10/24/2024 6200   Final     XR Chest 1 View    Result Date: 10/22/2024  Impression: Impression: Stable cardiomegaly with evidence of previous CABG. No active cardiopulmonary disease. Electronically Signed: Leonciojulio cesar Tan DO  10/22/2024 10:48 PM EDT  Workstation ID: [id number]    XR Femur 2 View Right    Result Date: 10/22/2024  Impression: Impression: Subcapital left hip fracture. No additional femoral fractures are identified. Electronically Signed: Emilie Nesbitt MD  10/22/2024 4:07 PM EDT  Workstation ID: [id number]    XR Hip With or Without Pelvis 2 - 3 View Right    Result Date: 10/22/2024  Impression: Impression: Acute right femoral neck fracture not clearly involving the lesser or greater trochanter. Maintained right hip joint alignment. Electronically Signed: José Miguel Dumas MD  10/22/2024 2:57 PM EDT  Workstation ID: [id number]     Assessment:    Fracture of femoral neck, right           Plan:    He has a displaced left femoral neck fracture and is interested in proceeding with hip replacement surgery.    The spectrum of treatment options were discussed with the patient in detail including both the nonoperative and operative treatment modalities and their respective risks and benefits.  After thorough discussion, the patient has elected to undergo surgical treatment.  The details of the surgical procedure were explained including the location of probable incisions and a description of the likely implants to be used.  Models and diagrams were used as educational resources. The patient understands the likely convalescence after surgery, as well as the rehabilitation required.  We thoroughly discussed the risks, benefits, and alternatives to surgery.  The risks include but are not limited to the risk of infection, joint stiffness, blood clots (including DVT and/or pulmonary  embolus along with the risk of death), neurologic and/or vascular injury, fracture, dislocation, nonunion, malunion, need for further surgery including hardware failure requiring revision, and continued pain.  It was explained that if tissue has been repaired or reconstructed, there is also a chance of failure which may require further management.  Following the completion of the discussion, the patient expressed understanding of this planned course of care, all their questions were answered and consent will be obtained preoperatively.    Plan for left total hip arthroplasty, anterior approach.  Unfortunately the patients canceled due to untreated hgb 7.4 yesterday with no updated labs and no rbc transfused.  Will discuss with partners and determine who can proceed with surgery for the patient once medically optimized.       Tunde Gaspar MD      Dictated utilizing Dragon dictation

## 2024-10-24 NOTE — PROGRESS NOTES
CARDIOLOGY PROGRESS NOTE:    Alexei Merino III  74 y.o.  male  1950  [id number]      Referring Provider: Norbert Medrano MD     Reason for follow-up: s/p fall and hip fracture, preop risk stratification      Patient Care Team:  Raheem Mckeon MD as PCP - General (Internal Medicine)  Haley Shipman APRN as Nurse Practitioner (Cardiology)    Subjective .  Patient seen and examined.  Labs and chart reviewed.  Patient is doing well from cardiology standpoint and denies chest pain/pressure, shortness of breath, edema  Plan for hip arthroplasty today    Objective  Patient lying in bed resting comfortably with family at bedside     Review of Systems   Constitutional: Positive for decreased appetite and malaise/fatigue. Negative for chills and fever.   Cardiovascular:  Negative for chest pain, near-syncope, palpitations and syncope.   Respiratory:  Negative for cough and shortness of breath.    Gastrointestinal:  Negative for abdominal pain, nausea and vomiting.   Neurological:  Positive for weakness. Negative for dizziness and headaches.       Allergies: Morphine    Scheduled Meds:atorvastatin, 40 mg, Oral, Nightly  isosorbide mononitrate, 30 mg, Oral, Q24H  losartan, 100 mg, Oral, Q24H  metoprolol succinate XL, 25 mg, Oral, Q24H      Continuous Infusions:dextrose 5 % and sodium chloride 0.9 %, 100 mL/hr, Last Rate: 100 mL/hr (10/23/24 2051)      PRN Meds:.  Calcium Replacement - Follow Nurse / BPA Driven Protocol    cyclobenzaprine    HYDROmorphone    Magnesium Standard Dose Replacement - Follow Nurse / BPA Driven Protocol    ondansetron ODT **OR** ondansetron    Phosphorus Replacement - Follow Nurse / BPA Driven Protocol    Potassium Replacement - Follow Nurse / BPA Driven Protocol    [COMPLETED] Insert Peripheral IV **AND** sodium chloride        VITAL SIGNS  Vitals:    10/23/24 1554 10/23/24 2029 10/24/24 0314 10/24/24 0923   BP: 128/69 137/78 129/75 121/64   BP Location:  Right arm Right arm    Patient  "Position:  Lying Lying    Pulse: 69 71 105 78   Resp: 12 11 16    Temp: 97.7 °F (36.5 °C) 98.2 °F (36.8 °C)     TempSrc: Oral Oral Oral    SpO2: 100% 97% 98%    Weight:       Height:           Flowsheet Rows      Flowsheet Row First Filed Value   Admission Height 175.3 cm (69\") Documented at 10/22/2024 1419   Admission Weight 53.1 kg (117 lb) Documented at 10/22/2024 1419             TELEMETRY: sinus rhythm    Physical Exam:  Vitals reviewed.   Constitutional:       Appearance: Underweight. Frail.   Eyes:      Pupils: Pupils are equal, round, and reactive to light.   Pulmonary:      Effort: Pulmonary effort is normal.      Breath sounds: Normal breath sounds.   Cardiovascular:      Normal rate. Regular rhythm.   Pulses:     Intact distal pulses.   Edema:     Peripheral edema absent.   Abdominal:      Palpations: Abdomen is soft.   Musculoskeletal:      Cervical back: Normal range of motion and neck supple. Skin:     General: Skin is warm and dry.   Neurological:      General: No focal deficit present.      Mental Status: Alert.          Results Review:   I reviewed the patient's new clinical results.  Lab Results (last 24 hours)       Procedure Component Value Units Date/Time    aPTT [[id number]]  (Abnormal) Collected: 10/24/24 0932    Specimen: Blood from Arm, Left Updated: 10/24/24 0950     PTT 33.0 seconds     Protime-INR [[id number]]  (Abnormal) Collected: 10/24/24 0932    Specimen: Blood from Arm, Left Updated: 10/24/24 0950     Protime 12.6 Seconds      INR 1.17    CBC & Differential [[id number]]  (Abnormal) Collected: 10/23/24 2324    Specimen: Blood Updated: 10/24/24 0009    Narrative:      The following orders were created for panel order CBC & Differential.  Procedure                               Abnormality         Status                     ---------                               -----------         ------                     CBC Auto Differential[[id number]]        Abnormal            Final result          "      Scan Slide[[id number]]                                       Final result                 Please view results for these tests on the individual orders.    CBC Auto Differential [[id number]]  (Abnormal) Collected: 10/23/24 2324    Specimen: Blood Updated: 10/24/24 0009     WBC 8.40 10*3/mm3      RBC 2.45 10*6/mm3      Hemoglobin 7.5 g/dL      Hematocrit 22.5 %      MCV 91.8 fL      MCH 30.6 pg      MCHC 33.3 g/dL      RDW 12.9 %      RDW-SD 43.4 fl      MPV 9.7 fL      Platelets 148 10*3/mm3      Neutrophil % 73.7 %      Lymphocyte % 10.2 %      Monocyte % 11.8 %      Eosinophil % 3.7 %      Basophil % 0.4 %      Immature Grans % 0.2 %      Neutrophils, Absolute 6.19 10*3/mm3      Lymphocytes, Absolute 0.86 10*3/mm3      Monocytes, Absolute 0.99 10*3/mm3      Eosinophils, Absolute 0.31 10*3/mm3      Basophils, Absolute 0.03 10*3/mm3      Immature Grans, Absolute 0.02 10*3/mm3      nRBC 0.0 /100 WBC     Scan Slide [[id number]] Collected: 10/23/24 2324    Specimen: Blood Updated: 10/24/24 0009     Acanthocytes Slight/1+     WBC Morphology Normal     Platelet Morphology Normal    Basic Metabolic Panel [[id number]]  (Abnormal) Collected: 10/23/24 2324    Specimen: Blood Updated: 10/23/24 2353     Glucose 149 mg/dL      BUN 21 mg/dL      Creatinine 0.96 mg/dL      Sodium 138 mmol/L      Potassium 3.7 mmol/L      Chloride 99 mmol/L      CO2 31.2 mmol/L      Calcium 8.8 mg/dL      BUN/Creatinine Ratio 21.9     Anion Gap 7.8 mmol/L      eGFR 82.9 mL/min/1.73     Narrative:      GFR Normal >60  Chronic Kidney Disease <60  Kidney Failure <15    The GFR formula is only valid for adults with stable renal function between ages 18 and 70.            Imaging Results (Last 24 Hours)       ** No results found for the last 24 hours. **            EKG        I personally viewed and interpreted the patient's EKG/Telemetry data:    ECHOCARDIOGRAM:  Results for orders placed during the hospital encounter of 08/23/24    Adult  Transthoracic Echo Complete w/ Color, Spectral and Contrast if Necessary Per Protocol    Interpretation Summary    Left ventricular systolic function is severely decreased. Left ventricular ejection fraction appears to be 21 - 25%.  Akinetic mid to distal anterior wall and apex noted    The left ventricular cavity is mildly dilated.    Left ventricular wall thickness is consistent with mild concentric hypertrophy.    Left ventricular diastolic function was normal.    Estimated right ventricular systolic pressure from tricuspid regurgitation is normal (<35 mmHg).       STRESS MYOVIEW:       CARDIAC CATHETERIZATION:  Results for orders placed during the hospital encounter of 08/23/24    Cardiac Catheterization/Vascular Study    Conclusion  CARDIAC CATHETERIZATION REPORT      DATE OF PROCEDURE:  8/24/2024    INDICATION FOR PROCEDURE:    Non-ST elevation myocardial infarction  Angina pectoris  CAD  CABG  Hypertension    PROCEDURE PERFORMED:    Ultrasound-guided access of femoral artery  Left heart catheterization  coronary angiography  Angiography of SVG graft and left HANNA.  left ventriculography    PROCEDURE COMMENTS:    After informed consent was obtained, the patient was prepped and draped in the usual sterile manner.  Mild to moderate sedation was administered.  Right femoral artery was accessed without difficulty under fluoroscopy and ultrasound guidance and 6 Guamanian arterial sheath was inserted.  Sheath was flushed with heparinized saline.    Using 6 Guamanian Tita catheters, first left coronary artery and the right coronary was electively engaged and appropriate views were taken.  A 6 Guamanian JR4 catheter was used to cross aortic valve and left heart catheterization was performed.  Left ventriculography was done in a WEBER view    Patient tolerated the procedure well.    FINDINGS:    1. HEMODYNAMICS:    Aortic pressure: 145/70 mmHg    LVEDP: 5 to 10 mmHg    Gradient across aortic valve on pullback: No gradient  across aortic valve      2. LEFT VENTRICULOGRAPHY: 40%      3. CORONARY ANGIOGRAPHY:    A: Left main coronary artery: 25 to 30% stenosis in the ostium of left main.  At the bifurcation there is a 99% heavily calcified plaque/stenosis noted.    B: Left anterior descending artery: 100% occlusion of LAD in the proximal segment after the second .  LIMA to LAD is patent but distal to anastomosis there is diffuse disease of LAD followed by subtotal occlusion in the distal segment at the apex.  LIMA to LAD is attached to diagonal branch and LAD.    C: Left circumflex coronary artery: 100% occlusion of LCx at the ostium.  SVG graft to LCx is patent.  It retrograde fills second marginal.    D: Right coronary artery: RCA is diffusely diseased.  It is dominant.  Patient has 60 to 70% stenosis in the midsegment.  PLB branch which is a small caliber vessel less than 2 mm is diffusely diseased up to 80 to 90%.  It is not amenable to percutaneous intervention.  PDA has mild disease but no high-grade stenosis.    E: LIMA to LAD and diagonal is patent.  SVG graft to OM is patent    SUMMARY:    Three-vessel coronary artery disease  3/3 bypasses are patent  Severe disease of native coronary arteries  RCA is on grafted and has attenuated lesion in the midsegment.  Moderate left ventricular dysfunction    RECOMMENDATIONS:    Medical treatment       OTHER:         Assessment & Plan     Principal Problem:    Fracture of femoral neck, right       Fracture of right femoral neck  S/p fall  Orthopedic surgery following  Plan for right hip arthroplasty today  Cardiac history to include severe CAD with 3/3 patent bypasses, severe disease of native coronaries and diffuse disease in RCA  Severe LVEF dysfunction  Patient is moderate risk for surgery  Dr. Do discussed with patient and family  PT/OT    CAD  S/p CABG x 3 (2016)   Cardiac catheterization with 3 out of 3 bypass grafts patent, diffuse disease to RCA  Continue statin,  Imdur, Ranexa, beta-blocker  DAPT on hold for surgery  Restart when okay with Ortho    HFrEF  Ischemic cardiomyopathy  Echocardiogram with significantly reduced LVEF of 21 to 25%  GDMT to include metoprolol succinate, losartan  Home hydralazine on hold  Consider SGLT2 inhibitor    Hypertension  Blood pressure well-controlled  Continue Imdur, losartan, metoprolol succinate    Dyslipidemia  High intensity statin therapy  Total cholesterol 109, , HDL 44  Goal LDL less than 70    Malnutrition  Decreased appetite  Nutrition consult    I discussed the patients findings and my recommendations with patient and nurse    RAMESH Gutierrez  10/24/24  10:43 EDT

## 2024-10-24 NOTE — PLAN OF CARE
Goal Outcome Evaluation:  Plan of Care Reviewed With: patient        Progress: no change          Pt VSS, family at bedside, pain controlled per MAR, currently receiving 1 unit of blood, surgery cancelled for low hgb. Plan of care ongoing.

## 2024-10-25 NOTE — PLAN OF CARE
Goal Outcome Evaluation:      Patient is doing well. Has remained painful tonight, treated per MAR. Has remained confused tonight, can reorient but gets confused again easily. Care continuing.

## 2024-10-25 NOTE — PROGRESS NOTES
Nutrition Services    Patient Name: Alexei Merino III  YOB: 1950  MRN: [id number]  Admission date: 10/22/2024    PROGRESS NOTE      Encounter Information: Check on for PO intakes.  WOCN saw 10/24 and noted stage 2 to left sacrum and abrasion to right posterior thorax.  Hip replacement cancelled due to hgb 7.4, rescheduled for 10/26.         PO Diet: Diet: Regular/House; Fluid Consistency: Thin (IDDSI 0)  NPO Diet NPO Type: Strict NPO   PO Supplements: Chocolate Boost Original BID   PO Intake:  0-25%       Current nutrition support:    Nutrition support review:        Labs (reviewed below): Reviewed, management per attending       GI Function:  No BM since admission (x 3 days ago)       Nutrition Intervention Updates: Continue current diet, supplement and encourage good PO intakes.       Results from last 7 days   Lab Units 10/24/24  2218 10/24/24  1313 10/23/24  2324 10/22/24  1608 10/22/24  1508   SODIUM mmol/L 138 139 138   < > 140   POTASSIUM mmol/L 3.9 3.8 3.7   < > 4.0   CHLORIDE mmol/L 100 101 99   < > 100   CO2 mmol/L 28.1 30.1* 31.2*   < > 30.2*   BUN mg/dL 22 22 21   < > 20   CREATININE mg/dL 0.96 0.97 0.96   < > 1.14   CALCIUM mg/dL 9.2 8.9 8.8   < > 9.1   BILIRUBIN mg/dL  --   --   --   --  2.3*   ALK PHOS U/L  --   --   --   --  92   ALT (SGPT) U/L  --   --   --   --  15   AST (SGOT) U/L  --   --   --   --  23   GLUCOSE mg/dL 160* 119* 149*   < > 115*    < > = values in this interval not displayed.     Results from last 7 days   Lab Units 10/24/24  2218   HEMOGLOBIN g/dL 9.1*   HEMATOCRIT % 27.5*     COVID19   Date Value Ref Range Status   01/26/2022 Not Detected Not Detected - Ref. Range Final     Lab Results   Component Value Date    HGBA1C 5.92 (H) 08/23/2024       Wt Readings from Last 10 Encounters:   10/22/24 1419 53.1 kg (117 lb)   09/11/24 1449 58.4 kg (128 lb 12.8 oz)   08/27/24 0500 56.2 kg (123 lb 14.4 oz)   08/25/24 0500 54.8 kg (120 lb 13 oz)   08/24/24 0944 68.9 kg (152 lb)    08/23/24 2001 69 kg (152 lb 1.9 oz)   08/25/23 1804 69 kg (152 lb 1.9 oz)   03/14/23 1541 62.1 kg (137 lb)   02/10/22 1410 61.2 kg (135 lb)   01/26/22 1931 59.9 kg (132 lb)   01/18/22 1311 61.3 kg (135 lb 3.2 oz)   01/17/22 1304 63 kg (139 lb)   01/14/22 1156 62.1 kg (137 lb)       RD to follow up per protocol.    Electronically signed by:  Lidia Gunn RD  10/25/24 07:59 EDT

## 2024-10-25 NOTE — CONSULTS
ORTHOPEDIC SURGERY CONSULT      Patient: Alexei Merino III  Date of Admission: 10/22/2024  1:58 PM  YOB: 1950  Medical Record Number: [id number]  Attending Physician: Norbert Medrano MD  Consulting Physician: Cade Velasquez MD    CHIEF COMPLAINT: Right femoral neck fracture    HISTORY OF PRESENT ILLNESS: Patient is a 74 y.o. year old male presents to [facility] with above complaints.  I was consulted for further evaluation and treatment.  He was originally scheduled for right total hip arthroplasty yesterday by Dr. Gaspar however he was not medically optimized with low hemoglobin.  I was asked by his service to take over care of the patient.    ALLERGIES:   Allergies   Allergen Reactions    Morphine Anaphylaxis       HOME MEDICATIONS:  Medications Prior to Admission   Medication Sig Dispense Refill Last Dose/Taking    aspirin 81 MG EC tablet Take 1 tablet by mouth Daily.   10/22/2024    atorvastatin (LIPITOR) 40 MG tablet Take 1 tablet by mouth Every Night.   10/21/2024    cetirizine (zyrTEC) 5 MG tablet Take 1 tablet by mouth Daily.   10/22/2024    clopidogrel (PLAVIX) 75 MG tablet Take 1 tablet by mouth Daily.   10/22/2024    ferrous gluconate 324 (37.5 Fe) MG tablet tablet Take 1 tablet by mouth Daily As Needed.   10/21/2024    hydrALAZINE (APRESOLINE) 25 MG tablet Take 1 tablet by mouth 2 (Two) Times a Day. Give if SBP greater than 160 or DBP greater than 90.   10/21/2024    hydrOXYzine (ATARAX) 25 MG tablet Take 1 tablet by mouth 2 (Two) Times a Day.   10/22/2024    hydrOXYzine (ATARAX) 25 MG tablet Take 1 tablet by mouth Daily As Needed for Anxiety.   10/21/2024    ibuprofen (ADVIL,MOTRIN) 200 MG tablet Take 2 tablets by mouth Every 6 (Six) Hours As Needed for Mild Pain.   10/21/2024    isosorbide mononitrate (IMDUR) 30 MG 24 hr tablet Take 1 tablet by mouth Daily. Hold for BP <100/60   10/22/2024    losartan (COZAAR) 100 MG tablet Take 1 tablet by mouth Daily.    10/22/2024    magnesium hydroxide (MILK OF MAGNESIA) 2400 MG/10ML suspension suspension Take 30 mL by mouth Daily As Needed.   10/21/2024    ranolazine (RANEXA) 500 MG 12 hr tablet Take 1 tablet by mouth Every 12 (Twelve) Hours.   10/22/2024    sodium bicarbonate 650 MG tablet Take 1 tablet by mouth 3 (Three) Times a Day.   10/22/2024    ondansetron (ZOFRAN) 4 MG tablet Take 1 tablet by mouth Every 6 (Six) Hours As Needed for Nausea or Vomiting.       polyethylene glycol (MIRALAX) 17 g packet Take 17 g by mouth Daily.          CURRENT MEDICATIONS:  Scheduled Meds:atorvastatin, 40 mg, Oral, Nightly  isosorbide mononitrate, 30 mg, Oral, Q24H  losartan, 100 mg, Oral, Q24H  metoprolol succinate XL, 25 mg, Oral, Q24H      Continuous Infusions:dextrose 5 % and sodium chloride 0.9 %, 100 mL/hr, Last Rate: 100 mL/hr (10/24/24 1530)      PRN Meds:.  Calcium Replacement - Follow Nurse / BPA Driven Protocol    cyclobenzaprine    HYDROmorphone    Magnesium Standard Dose Replacement - Follow Nurse / BPA Driven Protocol    ondansetron ODT **OR** ondansetron    oxyCODONE    Phosphorus Replacement - Follow Nurse / BPA Driven Protocol    Potassium Replacement - Follow Nurse / BPA Driven Protocol    [COMPLETED] Insert Peripheral IV **AND** sodium chloride    traMADol    Past Medical History:   Diagnosis Date    Anxiety     Aortic aneurysm     Coronary artery disease     Dementia 2017    per EASTON Lopez, patient was diagnosed 7 years ago    Emphysema lung     Gastroparesis     per Jessica    [place] (hard [place] hearing[place]     complete deaf    Hyperlipidemia     Hypertension     Myocardial infarction     STEMI (ST elevation myocardial infarction) 08/23/2024     Past Surgical History:   Procedure Laterality Date    ABDOMINAL AORTIC ANEURYSM REPAIR  03/23/2022    CARDIAC CATHETERIZATION Right 8/24/2024    Procedure: LEFT HEART CATH with possible PCI;  Surgeon: Omer Vieyra MD;  Location: [facility] CATH INVASIVE LOCATION;  Service: Cardiovascular;   Laterality: Right;  Local and IV sedation    CORONARY ARTERY BYPASS GRAFT  03/13/2016    X3  Dr Hong     Social History     Occupational History    Not on file   Tobacco Use    Smoking status: Former     Average packs/day: 0.3 packs/day for 50.0 years (12.5 ttl pk-yrs)     Types: Cigarettes     Start date:      Quit date:      Years since quittin.8     Passive exposure: Past    Smokeless tobacco: Never   Vaping Use    Vaping status: Never Used   Substance and Sexual Activity    Alcohol use: No    Drug use: Yes     Types: Marijuana     Comment: occasional marijuana usage    Sexual activity: Defer      Social History     Social History Narrative    Not on file     Family History   Problem Relation Age of Onset    Hypertension Other     Heart disease Mother     Heart disease Father        REVIEW OF SYSTEMS:    Unable to obtain.    PHYSICAL EXAM:   Vitals:  Vitals:    10/24/24 1637 10/24/24 2126 10/25/24 0429 10/25/24 0552   BP: 136/70 140/73 128/78    BP Location:  Right arm Right arm    Patient Position:  Lying Lying    Pulse: 78 66 83    Resp: 18  16    Temp: 97.8 °F (36.6 °C) 98.2 °F (36.8 °C) 98.2 °F (36.8 °C)    TempSrc: Axillary Oral Oral    SpO2: 100% 96%  93%   Weight:       Height:           General:  74 y.o. male who appears about stated age.    Alert, cooperative, in no acute distress does not answer questions.       Head:    Normocephalic, without obvious abnormality, atraumatic   Eyes:            Lids and lashes normal, conjunctivae and sclerae normal, no         icterus, no pallor, corneas clear, PERRLA   Ears:    Ears appear intact with no abnormalities noted   Throat:   No oral lesions, no thrush, oral mucosa moist   Neck:   No adenopathy, supple, trachea midline, no JVD   Back:     Limited exam shows no severe kyphosis present,no visible           erythema, no excessive  tenderness to palpation.    Lungs:     Respirations regular, even and unlabored.     Heart:    Normal rate, Pulses  palpable   Chest Wall:    No abnormalities observed.   Abdomen:     Normal bowel sounds, no masses, no organomegaly, soft              non-tender, non-distended, no guarding, no rebound                      tenderness   Rectal:     Deferred   Pulses:   Pulses palpable and equal bilaterally   Skin:   No bleeding, bruising or rash   Lymph nodes:   No palpable adenopathy   Extremities:     Right hip: Skin is intact.  Positive logroll.  Neurovascular intact distally.    DIAGNOSTIC TEST:  Narrative & Impression   XR HIP W OR WO PELVIS 2-3 VIEW RIGHT     Date of Exam: 10/22/2024 2:53 PM EDT     Indication: trauma     Comparison: None available.     Findings:  Osseous demineralization. There is an acute mildly displaced fracture involving right femoral neck, likely transcervical versus basicervical in location. No clear involvement of lesser or greater trochanter. Mild fracture foreshortening and varus   angulation. Alignment of the right hip joint appears maintained. Degenerative osteoarthritis of the SI joints and hip joints. No other convincing fracture within limitations of demineralization. Vascular stents noted. Peripheral vascular disease. Soft   tissues radiographically unremarkable.     IMPRESSION:  Impression:  Acute right femoral neck fracture not clearly involving the lesser or greater trochanter. Maintained right hip joint alignment.          ASSESSMENT:    Fracture of femoral neck, right      PLAN:    After the patient is medically stabilized we will plan on a right total hip arthroplasty.  Surgery will be scheduled for tomorrow morning.      The above diagnosis and treatment plan was discussed with the patient.  They were educated in treatment options for their condition.   They were given the opportunity to ask questions and were answered to their satisfaction.  They agreed to proceed with the above treatment plan.        Cade Velasquez MD  Date: 10/25/2024

## 2024-10-25 NOTE — CASE MANAGEMENT/SOCIAL WORK
Continued Stay Note   Cristino     Patient Name: Alexei Merino III  MRN: [id number]  Today's Date: 10/25/2024    Admit Date: 10/22/2024    Plan: From [facility] since 8/28/24. Plans to return to [facility]. Pending surgery 10/26/24, OT PT eval,  No precert needed. No new PASRR  needed since came from [facility]   Discharge Plan       Row Name 10/25/24 1251       Plan    Plan From [facility] snf since 8/28/24. Plans to return to [facility]. Pending surgery 10/26/24, OT PT eval,  No precert needed. No new PASRR  needed since came from [facility]    Plan Comments DC barriers: right hip surgery scheduled for 10/26/24 with Dr. Velasquez, monitoring hgb,  Will need PT OT eval.  Will likely return to [facility] ( was there for rehab,   No precert needed.  No new PASRR  needed                  Shefali Phelan RN    SIPS 1  [email or web address]  Office [phone]  Cell [phone]

## 2024-10-25 NOTE — THERAPY EVALUATION
Acute Care - Speech Language Pathology   Swallow Initial Evaluation [facility]     Patient Name: Alexei Merino III  : 1950  MRN: [id number]  Today's Date: 10/25/2024               Admit Date: 10/22/2024    Visit Dx:     ICD-10-CM ICD-9-CM   1. Closed fracture of right hip, initial encounter  S72.001A 820.8     Patient Active Problem List   Diagnosis    S/P CABG x 3    Coronary artery disease    Hyperlipidemia    Hypertension    Abdominal aortic aneurysm (AAA)    Actinic keratoses    Anxiety with depression    Chronic cough    Decreased hearing, bilateral    Delayed gastric emptying    Dementia    Dental infection    Exposure to venereal disease    Fatigue    Hay fever    Nausea and vomiting    Chronic sinusitis    Sinusitis, acute    Smoking greater than 30 pack years    Tobacco use    Unsteady gait    Weight loss    Underweight    Chronic obstructive pulmonary disease    B12 deficiency    NSTEMI (non-ST elevated myocardial infarction)    Severe malnutrition    Fracture of femoral neck, right     Past Medical History:   Diagnosis Date    Anxiety     Aortic aneurysm     Coronary artery disease     Dementia 2017    per EASTON Lopez, patient was diagnosed 7 years ago    Emphysema lung     Gastroparesis     per Jessica    [place] (hard of hearing)     complete deaf    Hyperlipidemia     Hypertension     Myocardial infarction     STEMI (ST elevation myocardial infarction) 2024     Past Surgical History:   Procedure Laterality Date    ABDOMINAL AORTIC ANEURYSM REPAIR  2022    CARDIAC CATHETERIZATION Right 2024    Procedure: LEFT HEART CATH with possible PCI;  Surgeon: Omer Vieyra MD;  Location: [facility] INVASIVE LOCATION;  Service: Cardiovascular;  Laterality: Right;  Local and IV sedation    CORONARY ARTERY BYPASS GRAFT  03/13/2016    X3  Dr Hong       SLP Recommendation and Plan  SLP Swallowing Diagnosis: moderate, oral dysphagia, R/O pharyngeal dysphagia (10/25/24 1500)  SLP Diet Recommendation:  puree, nectar thick liquids (10/25/24 1500)  Recommended Precautions and Strategies: upright posture during/after eating, small bites of food and sips of liquid, alternate between small bites of food and sips of liquid, general aspiration precautions, reflux precautions, assist with feeding (10/25/24 1500)  SLP Rec. for Method of Medication Administration: meds crushed, with puree (10/25/24 1500)     Monitor for Signs of Aspiration: yes, notify SLP if any concerns, cough, gurgly voice, throat clearing (10/25/24 1500)  Recommended Diagnostics: reassess via VFSS ([facility]) (10/25/24 1500)  Swallow Criteria for Skilled Therapeutic Interventions Met: demonstrates skilled criteria (10/25/24 1500)     Rehab Potential/Prognosis, Swallowing: good, to achieve stated therapy goals (10/25/24 1500)  Therapy Frequency (Swallow): PRN (10/25/24 1500)  Predicted Duration Therapy Intervention (Days): until discharge (10/25/24 1500)  Oral Care Recommendations: Oral Care BID/PRN (10/25/24 1500)                                               SWALLOW EVALUATION (Last 72 Hours)       SLP Adult Swallow Evaluation       Row Name 10/25/24 1500       Rehab Evaluation    Document Type evaluation  -CP    Subjective Information complains of;weakness;fatigue  -CP    Patient Observations alert;cooperative  -CP    Patient Effort good  -CP       General Information    Patient Profile Reviewed yes  -CP    Pertinent History Of Current Problem Alexei Merino III is a 74 y.o. male with a CMH of anxiety, aortic aneurysm, CAD, dementia, HTN, HLD, and history of STEMI who presented to [facility] on 10/22/2024 with  right hip pain s/p a fall. Patient lives at a long term care facility. He was trying to go the bathroom by himself when he fell. Xray of right femur shows subcapital hip fracture. Xr of the right hip shows acute right femoral neck fracture with maintained right hip joint alignment. Orthopedic surgery has been consulted. Hospitalist team to  admit for further medical management.  Pt has been coughing on liquids and a swallow evaluation was ordered.  -CP    Current Method of Nutrition regular textures;thin liquids  -CP    Prior Level of Function-Swallowing regular textures;thin liquids;concerns regarding malnutrition  -CP    Plans/Goals Discussed with patient;family  -CP    Barriers to Rehab medically complex  -CP       Pain    Additional Documentation Pain Scale: FACES Pre/Post-Treatment (Group)  -CP       Pain Scale: FACES Pre/Post-Treatment    Pain: FACES Scale, Pretreatment 0-->no hurt  -CP    Posttreatment Pain Rating 0-->no hurt  -CP       Oral Motor Structure and Function    Dentition Assessment upper dentures/partial in place;lower dentures/partial in place;dentures are ill fitting  -CP    Secretion Management WNL/WFL  -CP    Mucosal Quality moist, healthy  -CP       Oral Musculature and Cranial Nerve Assessment    Oral Motor General Assessment generalized oral motor weakness;oral labial or buccal impairment  -CP    Oral Labial or Buccal Impairment, Detail, Cranial Nerve VII (Facial): right labial droop  -CP       General Eating/Swallowing Observations    Respiratory Support Currently in Use nasal cannula  -CP    O2 Liters 3L  -CP    Eating/Swallowing Skills fed by SLP  -CP    Positioning During Eating upright in bed;semi-reclined;unable to be positioned upright for examination;other (see comments)  Pt unable to come to 90 degrees due to broken hip  -CP    Utensils Used spoon;straw  -CP    Consistencies Trialed thin liquids;pureed;regular textures  -CP       Clinical Swallow Eval    Clinical Swallow Evaluation Summary Pt seen for clinical swallow eval. Pt was brought upright in bed, but unable to be placed fully at 90 degrees due to hip fractuire. Pt was responsive and able to follow simple commands, however very [place] and used an auditory  to hear. Pt having some cinfusion from baseline per family. His speech was mildly dysartric with some  dec intelligibility. Pt was seen during breakfast meal, being fed by CNA. Pt was seen consuming no, pancake, oatmeal, soda by straw, milk by straw and NT juice by straw. Mastication was very extended with pt unable to fully masticate the first bite of no. Pt expectorated this. Mastication of soft pancke with syrup was also extended with oral residual after each trial. No further solids given. Oral transit for puree was slow and disorganized. Digital palpation suggests delayed swallow initiation, with multiple swallows on each trial of thin liquid. There was immediate coughing following 6/7 trials of thin liquids. No other overt s/s of aspiration occurred on any consistency. Further eval of swallow with VFSS is rec for this pt, however pt is to have hip surgery tomorrow and it is felt that VFSS should be completed after the surgery. It is rec that pt initiate a puree diet with NTL until VFSS can be completed, due to no overt s/s of aspiration on those consistencies. Pt should take meds crushed in puree. Monitor closely for s/s of aspiration and make NPO if pt shows these s/s. Pt should be upright 90 degrees for all PO. Pt should be a full feed and be fed at a slow rate. ST will follow to complete VFSS after surgery and make further recs as indicated.  -CP       SLP Evaluation Clinical Impression    SLP Swallowing Diagnosis moderate;oral dysphagia;R/O pharyngeal dysphagia  -CP    Functional Impact risk of aspiration/pneumonia;risk of malnutrition  -CP    Rehab Potential/Prognosis, Swallowing good, to achieve stated therapy goals  -CP    Swallow Criteria for Skilled Therapeutic Interventions Met demonstrates skilled criteria  -CP       SLP Treatment Clinical Impressions    Care Plan Review evaluation/treatment results reviewed;care plan/treatment goals reviewed;risks/benefits reviewed;patient/other agree to care plan  -CP    Care Plan Review, Other Participant(s) daughter  -CP       Recommendations    Therapy  Frequency (Swallow) PRN  -CP    Predicted Duration Therapy Intervention (Days) until discharge  -CP    SLP Diet Recommendation puree;nectar thick liquids  -CP    Recommended Diagnostics reassess via VFSS ([facility])  -CP    Recommended Precautions and Strategies upright posture during/after eating;small bites of food and sips of liquid;alternate between small bites of food and sips of liquid;general aspiration precautions;reflux precautions;assist with feeding  -CP    Oral Care Recommendations Oral Care BID/PRN  -CP    SLP Rec. for Method of Medication Administration meds crushed;with puree  -CP    Monitor for Signs of Aspiration yes;notify SLP if any concerns;cough;gurgly voice;throat clearing  -CP       Swallow Goals (SLP)    Swallow LTGs Swallow Long Term Goal (free text)  -CP    Swallow STGs diet tolerance goal selection (SLP)  -CP    Diet Tolerance Goal Selection (SLP) Swallow Short Term Goal 1;Swallow Short Term Goal 2  -CP       (LTG) Swallow    (LTG) Swallow Pt will maximize swallow function for least restrictive PO diet, exhibiting no complication associated with dysphagia, adequate PO intake, and demonstrating independent use of swallow compensations  -CP    Time Frame (Swallow Long Term Goal) by discharge  -CP       (STG) Swallow 1    (STG) Swallow 1 The patient will participate in a meal/follow-up assessment to determine safety and adequacy of recommended diet, independent use of safe swallow compensations, pt/family education and additional goals/recommendations to follow.  -CP    Time Frame (Swallow Short Term Goal 1) 1 week  -CP       (STG) Swallow 2    (STG) Swallow 2 Pt will participate in ongoing swallow assessment to include VFSS if indicated, and caregiver teaching.  -CP    Time Frame (Swallow Short Term Goal 2) 1 week  -CP              User Key  (r) = Recorded By, (t) = Taken By, (c) = Cosigned By      Initials Name Effective Dates    CP Elissa Carlisle SLP 06/16/21 -                      EDUCATION  The patient has been educated in the following areas:   Dysphagia (Swallowing Impairment) Oral Care/Hydration Modified Diet Instruction.        SLP GOALS       Row Name 10/25/24 1500             (LTG) Swallow    (LTG) Swallow Pt will maximize swallow function for least restrictive PO diet, exhibiting no complication associated with dysphagia, adequate PO intake, and demonstrating independent use of swallow compensations  -CP      Time Frame (Swallow Long Term Goal) by discharge  -CP         (STG) Swallow 1    (STG) Swallow 1 The patient will participate in a meal/follow-up assessment to determine safety and adequacy of recommended diet, independent use of safe swallow compensations, pt/family education and additional goals/recommendations to follow.  -CP      Time Frame (Swallow Short Term Goal 1) 1 week  -CP         (STG) Swallow 2    (STG) Swallow 2 Pt will participate in ongoing swallow assessment to include VFSS if indicated, and caregiver teaching.  -CP      Time Frame (Swallow Short Term Goal 2) 1 week  -CP                User Key  (r) = Recorded By, (t) = Taken By, (c) = Cosigned By      Initials Name Provider Type    CP Elissa Carlisle SLP Speech and Language Pathologist                         Time Calculation:                BERENICE Blackwell  10/25/2024

## 2024-10-25 NOTE — PLAN OF CARE
Goal Outcome Evaluation:  Plan of Care Reviewed With: patient, child        Progress: no change          Pt VSS, is slightly confused at times and anxious, placed a castro catheter with bladder scan volume at 402 ml and because patient had not urinated since the middle of the night,  okay with Dr. Medrano to place castro since patient would likely get one during surgery tomorrow, urology consult was placed. Patient will sometimes pull at catheter due to discomfort, a wrap was placed around penis with brief over top to prevent catheter being pulled. Has been very sleepy this shift, so pain meds were given sparingly. Daughter is at bedside and attentive to patient. Plan of care ongoing.

## 2024-10-25 NOTE — PLAN OF CARE
Goal Outcome Evaluation:   Pt seen for clinical swallow eval. Pt was brought upright in bed, but unable to be placed fully at 90 degrees due to hip fractuire. Pt was responsive and able to follow simple commands, however very [place] and used an auditory  to hear. Pt having some cinfusion from baseline per family. His speech was mildly dysartric with some dec intelligibility. Pt was seen during breakfast meal, being fed by CNA. Pt was seen consuming no, pancake, oatmeal, soda by straw, milk by straw and NT juice by straw. Mastication was very extended with pt unable to fully masticate the first bite of no. Pt expectorated this. Mastication of soft pancke with syrup was also extended with oral residual after each trial. No further solids given. Oral transit for puree was slow and disorganized. Digital palpation suggests delayed swallow initiation, with multiple swallows on each trial of thin liquid. There was immediate coughing following 6/7 trials of thin liquids. No other overt s/s of aspiration occurred on any consistency.     Further eval of swallow with VFSS is rec for this pt, however pt is to have hip surgery tomorrow and it is felt that VFSS should be completed after the surgery. It is rec that pt initiate a puree diet with NTL until VFSS can be completed, due to no overt s/s of aspiration on those consistencies. Pt should take meds crushed in puree. Monitor closely for s/s of aspiration and make NPO if pt shows these s/s. Pt should be upright 90 degrees for all PO. Pt should be a full feed and be fed at a slow rate. ST will follow to complete VFSS after surgery and make further recs as indicated.  -                            SLP Swallowing Diagnosis: moderate, oral dysphagia, R/O pharyngeal dysphagia (10/25/24 1500)

## 2024-10-25 NOTE — SIGNIFICANT NOTE
10/25/24 0752   OTHER   Discipline physical therapist   Rehab Time/Intention   Session Not Performed unable to evaluate, medical status change;other (see comments)  (sx moved to 10/26; sx 1024 cancelled due to low HgB. Will eval post op)   Recommendation   PT - Next Appointment 10/26/24

## 2024-10-25 NOTE — PROGRESS NOTES
CARDIOLOGY PROGRESS NOTE:    Alexei Merino III  74 y.o.  male  1950  [id number]      Referring Provider: Norbert Medrano MD     Reason for follow-up: s/p fall and hip fracture, preop risk stratification      Patient Care Team:  Raheem Mckeon MD as PCP - General (Internal Medicine)  Haley Shipman APRN as Nurse Practitioner (Cardiology)    Subjective .  Patient seen and examined.  Labs and chart reviewed.  Patient is doing well from cardiology standpoint and denies chest pain/pressure, shortness of breath, edema  Right hip arthroplasty canceled yesterday due to anemia requiring PRBC  Plan for surgery today    Objective  Patient lying in bed resting comfortably      Review of Systems   Constitutional: Positive for decreased appetite and malaise/fatigue. Negative for chills and fever.   Cardiovascular:  Negative for chest pain, near-syncope, palpitations and syncope.   Respiratory:  Negative for cough and shortness of breath.    Gastrointestinal:  Negative for abdominal pain, nausea and vomiting.   Neurological:  Positive for weakness. Negative for dizziness and headaches.       Allergies: Morphine    Scheduled Meds:atorvastatin, 40 mg, Oral, Nightly  [START ON 10/26/2024] ethyl alcohol, 2 Swab, Nasal, Once  isosorbide mononitrate, 30 mg, Oral, Q24H  losartan, 100 mg, Oral, Q24H  metoprolol succinate XL, 25 mg, Oral, Q24H      Continuous Infusions:dextrose 5 % and sodium chloride 0.9 %, 100 mL/hr, Last Rate: 100 mL/hr (10/24/24 1530)      PRN Meds:.  Calcium Replacement - Follow Nurse / BPA Driven Protocol    cyclobenzaprine    HYDROmorphone    Magnesium Standard Dose Replacement - Follow Nurse / BPA Driven Protocol    ondansetron ODT **OR** ondansetron    oxyCODONE    Phosphorus Replacement - Follow Nurse / BPA Driven Protocol    Potassium Replacement - Follow Nurse / BPA Driven Protocol    [COMPLETED] Insert Peripheral IV **AND** sodium chloride    traMADol        VITAL SIGNS  Vitals:    10/24/24  "2126 10/25/24 0429 10/25/24 0552 10/25/24 0858   BP: 140/73 128/78  135/69   BP Location: Right arm Right arm     Patient Position: Lying Lying     Pulse: 66 83  83   Resp:  16     Temp: 98.2 °F (36.8 °C) 98.2 °F (36.8 °C)     TempSrc: Oral Oral     SpO2: 96%  93%    Weight:       Height:           Flowsheet Rows      Flowsheet Row First Filed Value   Admission Height 175.3 cm (69\") Documented at 10/22/2024 1419   Admission Weight 53.1 kg (117 lb) Documented at 10/22/2024 1419             TELEMETRY: sinus rhythm    Physical Exam:  Vitals reviewed.   Constitutional:       Appearance: Underweight. Frail.   Eyes:      Pupils: Pupils are equal, round, and reactive to light.   Pulmonary:      Effort: Pulmonary effort is normal.      Breath sounds: Normal breath sounds.   Cardiovascular:      Normal rate. Regular rhythm.   Pulses:     Intact distal pulses.   Edema:     Peripheral edema absent.   Abdominal:      Palpations: Abdomen is soft.   Musculoskeletal:      Cervical back: Normal range of motion and neck supple. Skin:     General: Skin is warm and dry.   Neurological:      General: No focal deficit present.      Mental Status: Alert.          Results Review:   I reviewed the patient's new clinical results.  Lab Results (last 24 hours)       Procedure Component Value Units Date/Time    Basic Metabolic Panel [[id number]]  (Abnormal) Collected: 10/24/24 2218    Specimen: Blood Updated: 10/24/24 2255     Glucose 160 mg/dL      BUN 22 mg/dL      Creatinine 0.96 mg/dL      Sodium 138 mmol/L      Potassium 3.9 mmol/L      Chloride 100 mmol/L      CO2 28.1 mmol/L      Calcium 9.2 mg/dL      BUN/Creatinine Ratio 22.9     Anion Gap 9.9 mmol/L      eGFR 82.9 mL/min/1.73     Narrative:      GFR Normal >60  Chronic Kidney Disease <60  Kidney Failure <15    The GFR formula is only valid for adults with stable renal function between ages 18 and 70.    CBC & Differential [[id number]]  (Abnormal) Collected: 10/24/24 2218    Specimen: " Blood Updated: 10/24/24 2244    Narrative:      The following orders were created for panel order CBC & Differential.  Procedure                               Abnormality         Status                     ---------                               -----------         ------                     CBC Auto Differential[[id number]]        Abnormal            Final result               Scan Slide[[id number]]                                                                    Please view results for these tests on the individual orders.    CBC Auto Differential [[id number]]  (Abnormal) Collected: 10/24/24 2218    Specimen: Blood Updated: 10/24/24 2244     WBC 9.93 10*3/mm3      RBC 3.02 10*6/mm3      Hemoglobin 9.1 g/dL      Hematocrit 27.5 %      MCV 91.1 fL      MCH 30.1 pg      MCHC 33.1 g/dL      RDW 14.2 %      RDW-SD 47.4 fl      MPV 10.5 fL      Platelets 135 10*3/mm3      Comment: Result checked          Neutrophil % 75.7 %      Lymphocyte % 9.6 %      Monocyte % 12.2 %      Eosinophil % 2.0 %      Basophil % 0.1 %      Immature Grans % 0.4 %      Neutrophils, Absolute 7.52 10*3/mm3      Lymphocytes, Absolute 0.95 10*3/mm3      Monocytes, Absolute 1.21 10*3/mm3      Eosinophils, Absolute 0.20 10*3/mm3      Basophils, Absolute 0.01 10*3/mm3      Immature Grans, Absolute 0.04 10*3/mm3      nRBC 0.0 /100 WBC     Extra Tubes [[id number]] Collected: 10/24/24 2218    Specimen: Blood, Venous Line Updated: 10/24/24 2232    Narrative:      The following orders were created for panel order Extra Tubes.  Procedure                               Abnormality         Status                     ---------                               -----------         ------                     Green Top (Gel)[[id number]]                                  Final result                 Please view results for these tests on the individual orders.    Green Top (Gel) [[id number]] Collected: 10/24/24 2218    Specimen: Blood Updated: 10/24/24 2232     Extra  Tube Hold for add-ons.     Comment: Auto resulted.       Hemoglobin & Hematocrit, Blood [[id number]]  (Abnormal) Collected: 10/24/24 1801    Specimen: Blood from Arm, Left Updated: 10/24/24 1818     Hemoglobin 9.1 g/dL      Comment: Result checked          Hematocrit 27.4 %     Basic Metabolic Panel [[id number]]  (Abnormal) Collected: 10/24/24 1313    Specimen: Blood Updated: 10/24/24 1410     Glucose 119 mg/dL      BUN 22 mg/dL      Creatinine 0.97 mg/dL      Sodium 139 mmol/L      Potassium 3.8 mmol/L      Chloride 101 mmol/L      CO2 30.1 mmol/L      Calcium 8.9 mg/dL      BUN/Creatinine Ratio 22.7     Anion Gap 7.9 mmol/L      eGFR 81.9 mL/min/1.73     Narrative:      GFR Normal >60  Chronic Kidney Disease <60  Kidney Failure <15    The GFR formula is only valid for adults with stable renal function between ages 18 and 70.    CBC & Differential [[id number]]  (Abnormal) Collected: 10/24/24 1313    Specimen: Blood Updated: 10/24/24 1354    Narrative:      The following orders were created for panel order CBC & Differential.  Procedure                               Abnormality         Status                     ---------                               -----------         ------                     CBC Auto Differential[[id number]]        Abnormal            Final result               Scan Slide[[id number]]                                       Final result                 Please view results for these tests on the individual orders.    Scan Slide [[id number]] Collected: 10/24/24 1313    Specimen: Blood Updated: 10/24/24 1354     [place] Cells Slight/1+     Elliptocytes Mod/2+     RBC Fragments Slight/1+     WBC Morphology Normal     Platelet Estimate Decreased    CBC Auto Differential [[id number]]  (Abnormal) Collected: 10/24/24 1313    Specimen: Blood Updated: 10/24/24 1354     WBC 12.27 10*3/mm3      RBC 2.30 10*6/mm3      Hemoglobin 6.9 g/dL      Hematocrit 21.4 %      MCV 93.0 fL      MCH 30.0 pg      MCHC 32.2 g/dL       RDW 13.1 %      RDW-SD 44.7 fl      MPV 9.5 fL      Platelets 137 10*3/mm3      Neutrophil % 79.2 %      Lymphocyte % 7.3 %      Monocyte % 11.8 %      Eosinophil % 1.1 %      Basophil % 0.2 %      Immature Grans % 0.4 %      Neutrophils, Absolute 9.72 10*3/mm3      Lymphocytes, Absolute 0.90 10*3/mm3      Monocytes, Absolute 1.45 10*3/mm3      Eosinophils, Absolute 0.13 10*3/mm3      Basophils, Absolute 0.02 10*3/mm3      Immature Grans, Absolute 0.05 10*3/mm3      nRBC 0.0 /100 WBC     CANDIDA AURIS PCR - Swab, Axilla Right, Axilla Left and Groin [[id number]]  (Normal) Collected: 10/22/24 2056    Specimen: Swab from Axilla Right, Axilla Left and Groin Updated: 10/24/24 1137     CANDIDA AURIS PCR Not Detected    aPTT [[id number]]  (Abnormal) Collected: 10/24/24 0932    Specimen: Blood from Arm, Left Updated: 10/24/24 0950     PTT 33.0 seconds     Protime-INR [[id number]]  (Abnormal) Collected: 10/24/24 0932    Specimen: Blood from Arm, Left Updated: 10/24/24 0950     Protime 12.6 Seconds      INR 1.17            Imaging Results (Last 24 Hours)       ** No results found for the last 24 hours. **            EKG        I personally viewed and interpreted the patient's EKG/Telemetry data:    ECHOCARDIOGRAM:  Results for orders placed during the hospital encounter of 08/23/24    Adult Transthoracic Echo Complete w/ Color, Spectral and Contrast if Necessary Per Protocol    Interpretation Summary    Left ventricular systolic function is severely decreased. Left ventricular ejection fraction appears to be 21 - 25%.  Akinetic mid to distal anterior wall and apex noted    The left ventricular cavity is mildly dilated.    Left ventricular wall thickness is consistent with mild concentric hypertrophy.    Left ventricular diastolic function was normal.    Estimated right ventricular systolic pressure from tricuspid regurgitation is normal (<35 mmHg).       STRESS MYOVIEW:       CARDIAC CATHETERIZATION:  Results for orders placed  during the hospital encounter of 08/23/24    Cardiac Catheterization/Vascular Study    Conclusion  CARDIAC CATHETERIZATION REPORT      DATE OF PROCEDURE:  8/24/2024    INDICATION FOR PROCEDURE:    Non-ST elevation myocardial infarction  Angina pectoris  CAD  CABG  Hypertension    PROCEDURE PERFORMED:    Ultrasound-guided access of femoral artery  Left heart catheterization  coronary angiography  Angiography of SVG graft and left HANNA.  left ventriculography    PROCEDURE COMMENTS:    After informed consent was obtained, the patient was prepped and draped in the usual sterile manner.  Mild to moderate sedation was administered.  Right femoral artery was accessed without difficulty under fluoroscopy and ultrasound guidance and 6 [place] arterial sheath was inserted.  Sheath was flushed with heparinized saline.    Using 6 [place] Tita catheters, first left coronary artery and the right coronary was electively engaged and appropriate views were taken.  A 6 [place] JR4 catheter was used to cross aortic valve and left heart catheterization was performed.  Left ventriculography was done in a WEBER view    Patient tolerated the procedure well.    FINDINGS:    1. HEMODYNAMICS:    Aortic pressure: 145/70 mmHg    LVEDP: 5 to 10 mmHg    Gradient across aortic valve on pullback: No gradient across aortic valve      2. LEFT VENTRICULOGRAPHY: 40%      3. CORONARY ANGIOGRAPHY:    A: Left main coronary artery: 25 to 30% stenosis in the ostium of left main.  At the bifurcation there is a 99% heavily calcified plaque/stenosis noted.    B: Left anterior descending artery: 100% occlusion of LAD in the proximal segment after the second .  LIMA to LAD is patent but distal to anastomosis there is diffuse disease of LAD followed by subtotal occlusion in the distal segment at the apex.  LIMA to LAD is attached to diagonal branch and LAD.    C: Left circumflex coronary artery: 100% occlusion of LCx at the ostium.  SVG graft to LCx is  patent.  It retrograde fills second marginal.    D: Right coronary artery: RCA is diffusely diseased.  It is dominant.  Patient has 60 to 70% stenosis in the midsegment.  PLB branch which is a small caliber vessel less than 2 mm is diffusely diseased up to 80 to 90%.  It is not amenable to percutaneous intervention.  PDA has mild disease but no high-grade stenosis.    E: LIMA to LAD and diagonal is patent.  SVG graft to OM is patent    SUMMARY:    Three-vessel coronary artery disease  3/3 bypasses are patent  Severe disease of native coronary arteries  RCA is on grafted and has attenuated lesion in the midsegment.  Moderate left ventricular dysfunction    RECOMMENDATIONS:    Medical treatment       OTHER:         Assessment & Plan     Principal Problem:    Fracture of femoral neck, right       Fracture of right femoral neck  S/p fall  Orthopedic surgery following  Plan for right hip arthroplasty today  Surgery canceled yesterday due to anemia with H&H of 6.9/21.4  Improved following PRBC  Cardiac history to include severe CAD with 3/3 patent bypasses, severe disease of native coronaries and diffuse disease in RCA  Severe LVEF dysfunction  Patient is moderate risk for surgery  Dr. Do discussed with patient and family  PT/OT    CAD  S/p CABG x 3 (2016)   Cardiac catheterization with 3 out of 3 bypass grafts patent, diffuse disease to RCA  Continue statin, Imdur, Ranexa, beta-blocker  DAPT on hold for surgery  Restart when okay with Ortho    HFrEF  Ischemic cardiomyopathy  Echocardiogram with significantly reduced LVEF of 21 to 25%  GDMT to include metoprolol succinate, losartan  Home hydralazine on hold  Consider SGLT2 inhibitor following surgical procedure    Hypertension  Blood pressure well-controlled  Continue Imdur, losartan, metoprolol succinate    Dyslipidemia  High intensity statin therapy  Total cholesterol 109, , HDL 44  Goal LDL less than 70    Malnutrition  Decreased appetite  Nutrition  consult    I discussed the patients findings and my recommendations with patient and nurse    RAMESH Gutierrez  10/25/24  09:35 EDT

## 2024-10-25 NOTE — PROGRESS NOTES
[facility] MEDICINE SERVICE  DAILY PROGRESS NOTE    NAME: Alexei Merino III  : 1950  MRN: [id number]      LOS: 3 days     PROVIDER OF SERVICE: Norbert Medrano MD    Chief Complaint: Fracture of femoral neck, right    Subjective:     Interval History:  History taken from: patient chart      History of Present Illness: Alexei Merino III is a 74 y.o. male with a CMH of anxiety, aortic aneurysm, CAD, dementia, HTN, HLD, and history of STEMI who presented to [facility] on 10/22/2024 with  right hip pain s/p a fall. Patient lives at a long term care facility. He was trying to go the bathroom by himself when he fell. Xray of right femur shows subcapital hip fracture. Xr of the right hip shows acute right femoral neck fracture with maintained right hip joint alignment. Orthopedic surgery has been consulted. Hospitalist team to admit for further medical management.      10/23/24 patient seen and examined in bed no acute distress, vital signs stable, discussed with RN, for surgery in a.m.,  10/24/24 patient examined in bed no acute distress, vital signs stable, discussed with RN, for surgery today.  10/25/24 patient seen and examined in bed no acute distress, vital signs stable, for surgery in a.m.    Review of Systems   Constitutional:  Positive for appetite change and fatigue.   Musculoskeletal:  Positive for arthralgias and gait problem.   Neurological:  Positive for weakness.     Objective:     Vital Signs  Temp:  [97.7 °F (36.5 °C)-98.9 °F (37.2 °C)] 98.2 °F (36.8 °C)  Heart Rate:  [66-83] 83  Resp:  [16-18] 16  BP: (120-140)/(60-78) 135/69  Flow (L/min) (Oxygen Therapy):  [3-4] 3   Body mass index is 17.28 kg/m².    Physical Exam   Vitals and nursing note reviewed.   Constitutional:       Appearance: He is underweight. He is ill-appearing.   HENT:      Head: Normocephalic and atraumatic.      Nose: Nose normal.      Mouth/Throat:      Mouth: Mucous membranes are moist.   Eyes:       Extraocular Movements: Extraocular movements intact.      Pupils: Pupils are equal, round, and reactive to light.   Cardiovascular:      Rate and Rhythm: Normal rate and regular rhythm.      Pulses: Normal pulses.   Pulmonary:      Effort: Pulmonary effort is normal.      Breath sounds: Normal breath sounds.   Abdominal:      General: Bowel sounds are normal.      Palpations: Abdomen is soft.   Musculoskeletal:         General: Swelling (right hip) and tenderness (right hip) present.      Cervical back: Normal range of motion and neck supple.      Comments: ROM limited of right leg secondary to pain      Skin:     General: Skin is warm.      Capillary Refill: Capillary refill takes less than 2 seconds.      Coloration: Skin is pale.   Neurological:      Mental Status: He is oriented to person, place, and time.        Diagnostic Data    Results from last 7 days   Lab Units 10/24/24  2218 10/22/24  1608 10/22/24  1508   WBC 10*3/mm3 9.93   < > 8.55   HEMOGLOBIN g/dL 9.1*   < > 8.7*   HEMATOCRIT % 27.5*   < > 26.3*   PLATELETS 10*3/mm3 135*   < > 151   GLUCOSE mg/dL 160*   < > 115*   CREATININE mg/dL 0.96   < > 1.14   BUN mg/dL 22   < > 20   SODIUM mmol/L 138   < > 140   POTASSIUM mmol/L 3.9   < > 4.0   AST (SGOT) U/L  --   --  23   ALT (SGPT) U/L  --   --  15   ALK PHOS U/L  --   --  92   BILIRUBIN mg/dL  --   --  2.3*   ANION GAP mmol/L 9.9   < > 9.8    < > = values in this interval not displayed.       No radiology results for the last day      I reviewed the patient's new clinical results.    Assessment/Plan:     Active and Resolved Problems  Active Hospital Problems    Diagnosis  POA    **Fracture of femoral neck, right [S72.001A]  Yes      Resolved Hospital Problems   No resolved problems to display.     Right hip fracture  - XR hip: right femoral neck fracture  - XR femur: subcapital fracture   - prn pain medication- dilaudid ordered at recommendation of pharmacist, has morphine allergy but has tolerated  dilaudid in the past.   - surgery consulted>Surgery will be scheduled for tomorrow morning   - NPO     Coronary artery disease-consulted cardiology  Patient had coronary bypass surgery with a LIMA to the LAD and diagonal branch and also SVG to the circumflex artery which are patent but his RCA and PDA have diffuse disease and is not grafted and is on medical therapy including beta-blockers     CHF-HFrEF  Patient has severe LV dysfunction and is on GDMT with beta-blockers hydralazine losartan.   -continue the same medicines for now.     Hyperlipidemia   - continue home statin      Anxiety   - continue home statin      Hypertension   - BP stable at 148/77  - continue home BP meds once reconciled    VTE Prophylaxis:  Mechanical VTE prophylaxis orders are present.    Disposition Planning:     Barriers to Discharge:hip fx  Anticipated Date of Discharge: 10/26  Place of Discharge: [facility]      Time: 30 minutes     Code Status and Medical Interventions: CPR (Attempt to Resuscitate); Full Support   Ordered at: 10/22/24 1514     Level Of Support Discussed With:    Patient     Code Status (Patient has no pulse and is not breathing):    CPR (Attempt to Resuscitate)     Medical Interventions (Patient has pulse or is breathing):    Full Support       Signature: Electronically signed by Norbert Medrano MD, 10/25/24, 11:21 EDT.  [facility] Hospitalist Team

## 2024-10-25 NOTE — CONSULTS
Urology Consult Note    Patient:Alexei Merino III :1950  Room:[facility]  Admit Date10/  Age:74 y.o.     SEX:male     DOS:10/25/2024     MR:[id number]     Visit:[id number]       Attending: Norbert Medrano MD  Referring Provider: Dr Medrano  Reason for Consultation: urinary retention    Patient Care Team:  Raheem Mckeon MD as PCP - General (Internal Medicine)  Haley Shipman APRN as Nurse Practitioner (Cardiology)    Chief complaint unable to void    Subjective .     History of present illness: 74-year-old male who was admitted with a right hip fracture.  He was undergoing medical evaluation prior to repair.  Patient had not voided for most the entire day.  Bladder scan revealed over 400 mL in his bladder.  A Chatterjee catheter was placed.  Patient is fairly sleepy and is not a very good historian.  History is therefore from the chart and his family.  He does not usually have any issues with frequency urgency or incontinence though he admittedly does not drink very much fluid.  He does have some slowing of his stream.      Review of Systems  10 point review of systems were reviewed and are negative except for:  Constitution:  positive for See HPI    History  Past Medical History:   Diagnosis Date    Anxiety     Aortic aneurysm     Coronary artery disease     Dementia 2017    per EASTON Lopez, patient was diagnosed 7 years ago    Emphysema lung     Gastroparesis     per Jessica ESTRADA (hard of hearing)     complete deaf    Hyperlipidemia     Hypertension     Myocardial infarction     STEMI (ST elevation myocardial infarction) 2024     Past Surgical History:   Procedure Laterality Date    ABDOMINAL AORTIC ANEURYSM REPAIR  2022    CARDIAC CATHETERIZATION Right 2024    Procedure: LEFT HEART CATH with possible PCI;  Surgeon: Omer Vieyra MD;  Location: [facility] CATH INVASIVE LOCATION;  Service: Cardiovascular;  Laterality: Right;  Local and IV sedation    CORONARY ARTERY BYPASS GRAFT   03/13/2016    X3  Dr Hong     Social History     Socioeconomic History    Marital status:    Tobacco Use    Smoking status: Former     Average packs/day: 0.3 packs/day for 50.0 years (12.5 ttl pk-yrs)     Types: Cigarettes     Start date:      Quit date: 1964     Years since quittin.8     Passive exposure: Past    Smokeless tobacco: Never   Vaping Use    Vaping status: Never Used   Substance and Sexual Activity    Alcohol use: No    Drug use: Yes     Types: Marijuana     Comment: occasional marijuana usage    Sexual activity: Defer     Family History   Problem Relation Age of Onset    Hypertension Other     Heart disease Mother     Heart disease Father      Allergy  Allergies   Allergen Reactions    Morphine Anaphylaxis     Prior to Admission medications    Medication Sig Start Date End Date Taking? Authorizing Provider   aspirin 81 MG EC tablet Take 1 tablet by mouth Daily.   Yes Dave Fields MD   atorvastatin (LIPITOR) 40 MG tablet Take 1 tablet by mouth Every Night. 24  Yes Guillermo Bear MD   cetirizine (zyrTEC) 5 MG tablet Take 1 tablet by mouth Daily.   Yes Dave Fields MD   clopidogrel (PLAVIX) 75 MG tablet Take 1 tablet by mouth Daily. 24  Yes Guillermo Bear MD   ferrous gluconate 324 (37.5 Fe) MG tablet tablet Take 1 tablet by mouth Daily As Needed.   Yes Dave Fields MD   hydrALAZINE (APRESOLINE) 25 MG tablet Take 1 tablet by mouth 2 (Two) Times a Day. Give if SBP greater than 160 or DBP greater than 90.   Yes Dave Fields MD   hydrOXYzine (ATARAX) 25 MG tablet Take 1 tablet by mouth 2 (Two) Times a Day.   Yes Dave Fields MD   hydrOXYzine (ATARAX) 25 MG tablet Take 1 tablet by mouth Daily As Needed for Anxiety.   Yes Dave Fields MD   ibuprofen (ADVIL,MOTRIN) 200 MG tablet Take 2 tablets by mouth Every 6 (Six) Hours As Needed for Mild Pain.   Yes Dave Fields MD   isosorbide mononitrate (IMDUR) 30 MG 24 hr tablet  Take 1 tablet by mouth Daily. Hold for BP <100/60   Yes Dave Fields MD   losartan (COZAAR) 100 MG tablet Take 1 tablet by mouth Daily.   Yes Dave Fields MD   magnesium hydroxide (MILK OF MAGNESIA) 2400 MG/10ML suspension suspension Take 30 mL by mouth Daily As Needed.   Yes Dave Fields MD   ranolazine (RANEXA) 500 MG 12 hr tablet Take 1 tablet by mouth Every 12 (Twelve) Hours. 24  Yes Guillermo Bear MD   sodium bicarbonate 650 MG tablet Take 1 tablet by mouth 3 (Three) Times a Day.   Yes Dave Fields MD   ondansetron (ZOFRAN) 4 MG tablet Take 1 tablet by mouth Every 6 (Six) Hours As Needed for Nausea or Vomiting.    Dave Fields MD   polyethylene glycol (MIRALAX) 17 g packet Take 17 g by mouth Daily.    Dave Fields MD         Objective     tMax 24 hours:  Temp (24hrs), Av.3 °F (36.8 °C), Min:98.2 °F (36.8 °C), Max:98.6 °F (37 °C)    Vital Sign Ranges:  Temp:  [98.2 °F (36.8 °C)-98.6 °F (37 °C)] 98.6 °F (37 °C)  Heart Rate:  [66-83] 83  Resp:  [16-20] 20  BP: (128-146)/(69-78) 146/77  Intake and Output Last 3 Shifts:  I/O last 3 completed shifts:  In: 355 [Blood:355]  Out: -       Physical Exam:   General Appearance alert, appears stated age, hard of hearing, cooperative, and cachectic  Head normocephalic, without obvious abnormality and atraumatic  Abdomen no guarding and no rebound tenderness  Male Genitalia Chatterjee catheter with very concentrated appearing urine  Skin no bleeding, bruising or rash  Neurologic Mental Status orientated to person, place, time and situation    Results Review:     Lab Results (last 24 hours)       Procedure Component Value Units Date/Time    Basic Metabolic Panel [[id number]]  (Abnormal) Collected: 10/24/24 2218    Specimen: Blood Updated: 10/24/24 2255     Glucose 160 mg/dL      BUN 22 mg/dL      Creatinine 0.96 mg/dL      Sodium 138 mmol/L      Potassium 3.9 mmol/L      Chloride 100 mmol/L      CO2 28.1 mmol/L       Calcium 9.2 mg/dL      BUN/Creatinine Ratio 22.9     Anion Gap 9.9 mmol/L      eGFR 82.9 mL/min/1.73     Narrative:      GFR Normal >60  Chronic Kidney Disease <60  Kidney Failure <15    The GFR formula is only valid for adults with stable renal function between ages 18 and 70.    CBC & Differential [[id number]]  (Abnormal) Collected: 10/24/24 2218    Specimen: Blood Updated: 10/24/24 2244    Narrative:      The following orders were created for panel order CBC & Differential.  Procedure                               Abnormality         Status                     ---------                               -----------         ------                     CBC Auto Differential[[id number]]        Abnormal            Final result               Scan Slide[[id number]]                                                                    Please view results for these tests on the individual orders.    CBC Auto Differential [[id number]]  (Abnormal) Collected: 10/24/24 2218    Specimen: Blood Updated: 10/24/24 2244     WBC 9.93 10*3/mm3      RBC 3.02 10*6/mm3      Hemoglobin 9.1 g/dL      Hematocrit 27.5 %      MCV 91.1 fL      MCH 30.1 pg      MCHC 33.1 g/dL      RDW 14.2 %      RDW-SD 47.4 fl      MPV 10.5 fL      Platelets 135 10*3/mm3      Comment: Result checked          Neutrophil % 75.7 %      Lymphocyte % 9.6 %      Monocyte % 12.2 %      Eosinophil % 2.0 %      Basophil % 0.1 %      Immature Grans % 0.4 %      Neutrophils, Absolute 7.52 10*3/mm3      Lymphocytes, Absolute 0.95 10*3/mm3      Monocytes, Absolute 1.21 10*3/mm3      Eosinophils, Absolute 0.20 10*3/mm3      Basophils, Absolute 0.01 10*3/mm3      Immature Grans, Absolute 0.04 10*3/mm3      nRBC 0.0 /100 WBC     Extra Tubes [[id number]] Collected: 10/24/24 2218    Specimen: Blood, Venous Line Updated: 10/24/24 2232    Narrative:      The following orders were created for panel order Extra Tubes.  Procedure                               Abnormality         Status         "             ---------                               -----------         ------                     Green Top (Gel)[[id number]]                                  Final result                 Please view results for these tests on the individual orders.    Green Top (Gel) [[id number]] Collected: 10/24/24 2218    Specimen: Blood Updated: 10/24/24 2232     Extra Tube Hold for add-ons.     Comment: Auto resulted.       Hemoglobin & Hematocrit, Blood [[id number]]  (Abnormal) Collected: 10/24/24 1801    Specimen: Blood from Arm, Left Updated: 10/24/24 1818     Hemoglobin 9.1 g/dL      Comment: Result checked          Hematocrit 27.4 %            No results found for: \"URINECX\"     Imaging Results (Last 7 Days)       Procedure Component Value Units Date/Time    XR Chest 1 View [[id number]] Collected: 10/22/24 2245     Updated: 10/22/24 2250    Narrative:      XR CHEST 1 VW    Date of Exam: 10/22/2024 9:25 PM EDT    Indication: pre op hip fracture    Comparison: 8/23/2024, 1/26/2022    Findings:  The lungs are grossly clear. Cardiac, hilar, and mediastinal silhouettes are radiographically stable with cardiomegaly and evidence of previous CABG. There are senescent changes in the thoracic aorta as well. No pneumothorax or pleural effusions. The   trachea is midline. Pulmonary vascularity is normal. Visualized bony structures are intact.        Impression:      Impression:  Stable cardiomegaly with evidence of previous CABG. No active cardiopulmonary disease.      Electronically Signed: Leoncio Tan DO    10/22/2024 10:48 PM EDT    Workstation ID: [id number]    XR Femur 2 View Right [[id number]] Collected: 10/22/24 1606     Updated: 10/22/24 1609    Narrative:      XR FEMUR 2 VW RIGHT    Date of Exam: 10/22/2024 3:56 PM EDT    Indication: hip fx, eval femur    Comparison: None available.    Findings:  4 films. A subcapital right hip fracture is again identified. There is no evidence of a fracture of the femoral shaft. There is " atherosclerotic vascular calcification. The femoral head remains seated within the acetabulum.      Impression:      Impression:  Subcapital left hip fracture. No additional femoral fractures are identified.      Electronically Signed: Emilie Nesbitt MD    10/22/2024 4:07 PM EDT    Workstation ID: [id number]    XR Hip With or Without Pelvis 2 - 3 View Right [[id number]] Collected: 10/22/24 1455     Updated: 10/22/24 1459    Narrative:      XR HIP W OR WO PELVIS 2-3 VIEW RIGHT    Date of Exam: 10/22/2024 2:53 PM EDT    Indication: trauma    Comparison: None available.    Findings:  Osseous demineralization. There is an acute mildly displaced fracture involving right femoral neck, likely transcervical versus basicervical in location. No clear involvement of lesser or greater trochanter. Mild fracture foreshortening and varus   angulation. Alignment of the right hip joint appears maintained. Degenerative osteoarthritis of the SI joints and hip joints. No other convincing fracture within limitations of demineralization. Vascular stents noted. Peripheral vascular disease. Soft   tissues radiographically unremarkable.      Impression:      Impression:  Acute right femoral neck fracture not clearly involving the lesser or greater trochanter. Maintained right hip joint alignment.      Electronically Signed: José Miguel Dumas MD    10/22/2024 2:57 PM EDT    Workstation ID: [id number]            Inpatient Meds:   Scheduled Meds:atorvastatin, 40 mg, Oral, Nightly  [START ON 10/26/2024] ethyl alcohol, 2 Swab, Nasal, Once  isosorbide mononitrate, 30 mg, Oral, Q24H  losartan, 100 mg, Oral, Q24H  metoprolol succinate XL, 25 mg, Oral, Q24H  tamsulosin, 0.4 mg, Oral, Daily       Continuous Infusions:dextrose 5 % and sodium chloride 0.9 %, 100 mL/hr, Last Rate: 100 mL/hr (10/24/24 1530)       PRN Meds:.  Calcium Replacement - Follow Nurse / BPA Driven Protocol    cyclobenzaprine    HYDROmorphone    hydrOXYzine    Magnesium Standard Dose  Replacement - Follow Nurse / BPA Driven Protocol    ondansetron ODT **OR** ondansetron    oxyCODONE    Phosphorus Replacement - Follow Nurse / BPA Driven Protocol    Potassium Replacement - Follow Nurse / BPA Driven Protocol    [COMPLETED] Insert Peripheral IV **AND** sodium chloride    traMADol      Assessment & Plan     Principal Problem:    Fracture of femoral neck, right    Urinary retention  BPH with lower urinary tract symptoms exacerbated by weakness of acute illness and decreased mobility    Plan  Continue Chatterjee catheter until after surgery and patient is ambulatory  Begin Flomax  Check urinalysis  I will see the patient again on Monday      I discussed the patient's findings and my recommendations with patient and family    Thank you for this  consult    Cristhian Rich MD  10/25/24  17:55 EDT

## 2024-10-26 ENCOUNTER — ANESTHESIA EVENT (OUTPATIENT)
Dept: PERIOP | Facility: HOSPITAL | Age: 74
End: 2024-10-26
Payer: MEDICARE

## 2024-10-26 ENCOUNTER — ANESTHESIA (OUTPATIENT)
Dept: PERIOP | Facility: HOSPITAL | Age: 74
End: 2024-10-26
Payer: MEDICARE

## 2024-10-26 PROCEDURE — 25810000003 LACTATED RINGERS PER 1000 ML: Performed by: NURSE ANESTHETIST, CERTIFIED REGISTERED

## 2024-10-26 PROCEDURE — 25010000002 PHENYLEPHRINE 10 MG/ML SOLUTION 5 ML VIAL: Performed by: NURSE ANESTHETIST, CERTIFIED REGISTERED

## 2024-10-26 PROCEDURE — 25010000002 ONDANSETRON PER 1 MG: Performed by: NURSE ANESTHETIST, CERTIFIED REGISTERED

## 2024-10-26 PROCEDURE — 25010000002 DEXAMETHASONE PER 1 MG: Performed by: NURSE ANESTHETIST, CERTIFIED REGISTERED

## 2024-10-26 PROCEDURE — 25010000002 LIDOCAINE PF 1% 1 % SOLUTION: Performed by: NURSE ANESTHETIST, CERTIFIED REGISTERED

## 2024-10-26 PROCEDURE — 25010000002 PROPOFOL 200 MG/20ML EMULSION: Performed by: NURSE ANESTHETIST, CERTIFIED REGISTERED

## 2024-10-26 PROCEDURE — 25010000002 SUGAMMADEX 200 MG/2ML SOLUTION: Performed by: NURSE ANESTHETIST, CERTIFIED REGISTERED

## 2024-10-26 PROCEDURE — 25010000002 FENTANYL CITRATE (PF) 50 MCG/ML SOLUTION: Performed by: NURSE ANESTHETIST, CERTIFIED REGISTERED

## 2024-10-26 PROCEDURE — 25810000003 SODIUM CHLORIDE 0.9 % SOLUTION 250 ML FLEX CONT: Performed by: NURSE ANESTHETIST, CERTIFIED REGISTERED

## 2024-10-26 RX ORDER — ONDANSETRON 2 MG/ML
INJECTION INTRAMUSCULAR; INTRAVENOUS AS NEEDED
Status: DISCONTINUED | OUTPATIENT
Start: 2024-10-26 | End: 2024-10-26 | Stop reason: SURG

## 2024-10-26 RX ORDER — SODIUM CHLORIDE, SODIUM LACTATE, POTASSIUM CHLORIDE, CALCIUM CHLORIDE 600; 310; 30; 20 MG/100ML; MG/100ML; MG/100ML; MG/100ML
INJECTION, SOLUTION INTRAVENOUS CONTINUOUS PRN
Status: DISCONTINUED | OUTPATIENT
Start: 2024-10-26 | End: 2024-10-26 | Stop reason: SURG

## 2024-10-26 RX ORDER — DEXAMETHASONE SODIUM PHOSPHATE 4 MG/ML
INJECTION, SOLUTION INTRA-ARTICULAR; INTRALESIONAL; INTRAMUSCULAR; INTRAVENOUS; SOFT TISSUE AS NEEDED
Status: DISCONTINUED | OUTPATIENT
Start: 2024-10-26 | End: 2024-10-26 | Stop reason: SURG

## 2024-10-26 RX ORDER — LIDOCAINE HYDROCHLORIDE 10 MG/ML
INJECTION, SOLUTION EPIDURAL; INFILTRATION; INTRACAUDAL; PERINEURAL AS NEEDED
Status: DISCONTINUED | OUTPATIENT
Start: 2024-10-26 | End: 2024-10-26 | Stop reason: SURG

## 2024-10-26 RX ORDER — PROPOFOL 10 MG/ML
INJECTION, EMULSION INTRAVENOUS AS NEEDED
Status: DISCONTINUED | OUTPATIENT
Start: 2024-10-26 | End: 2024-10-26 | Stop reason: SURG

## 2024-10-26 RX ORDER — FENTANYL CITRATE 50 UG/ML
INJECTION, SOLUTION INTRAMUSCULAR; INTRAVENOUS AS NEEDED
Status: DISCONTINUED | OUTPATIENT
Start: 2024-10-26 | End: 2024-10-26 | Stop reason: SURG

## 2024-10-26 RX ORDER — ROCURONIUM BROMIDE 10 MG/ML
INJECTION, SOLUTION INTRAVENOUS AS NEEDED
Status: DISCONTINUED | OUTPATIENT
Start: 2024-10-26 | End: 2024-10-26 | Stop reason: SURG

## 2024-10-26 RX ORDER — PHENYLEPHRINE HCL IN 0.9% NACL 1 MG/10 ML
SYRINGE (ML) INTRAVENOUS AS NEEDED
Status: DISCONTINUED | OUTPATIENT
Start: 2024-10-26 | End: 2024-10-26 | Stop reason: SURG

## 2024-10-26 RX ORDER — LIDOCAINE HYDROCHLORIDE 40 MG/ML
SOLUTION TOPICAL AS NEEDED
Status: DISCONTINUED | OUTPATIENT
Start: 2024-10-26 | End: 2024-10-26 | Stop reason: SURG

## 2024-10-26 RX ADMIN — Medication 200 MCG: at 10:51

## 2024-10-26 RX ADMIN — PROPOFOL 100 MG: 10 INJECTION, EMULSION INTRAVENOUS at 10:42

## 2024-10-26 RX ADMIN — LIDOCAINE HYDROCHLORIDE 1 EACH: 40 SOLUTION TOPICAL at 10:44

## 2024-10-26 RX ADMIN — TRANEXAMIC ACID 1000 MG: 10 INJECTION, SOLUTION INTRAVENOUS at 11:46

## 2024-10-26 RX ADMIN — DEXAMETHASONE SODIUM PHOSPHATE 4 MG: 4 INJECTION, SOLUTION INTRAMUSCULAR; INTRAVENOUS at 11:47

## 2024-10-26 RX ADMIN — LIDOCAINE HYDROCHLORIDE 10 MG: 10 INJECTION, SOLUTION EPIDURAL; INFILTRATION; INTRACAUDAL; PERINEURAL at 10:42

## 2024-10-26 RX ADMIN — TRANEXAMIC ACID 1000 MG: 10 INJECTION, SOLUTION INTRAVENOUS at 10:56

## 2024-10-26 RX ADMIN — FENTANYL CITRATE 50 MCG: 50 INJECTION, SOLUTION INTRAMUSCULAR; INTRAVENOUS at 10:42

## 2024-10-26 RX ADMIN — Medication 100 MCG: at 10:42

## 2024-10-26 RX ADMIN — PHENYLEPHRINE HYDROCHLORIDE 0.4 MCG/KG/MIN: 10 INJECTION INTRAVENOUS at 10:42

## 2024-10-26 RX ADMIN — SUGAMMADEX 200 MG: 100 INJECTION, SOLUTION INTRAVENOUS at 12:04

## 2024-10-26 RX ADMIN — ONDANSETRON 4 MG: 2 INJECTION, SOLUTION INTRAMUSCULAR; INTRAVENOUS at 11:47

## 2024-10-26 RX ADMIN — ROCURONIUM BROMIDE 50 MG: 10 INJECTION, SOLUTION INTRAVENOUS at 10:42

## 2024-10-26 RX ADMIN — SODIUM CHLORIDE, SODIUM LACTATE, POTASSIUM CHLORIDE, AND CALCIUM CHLORIDE: .6; .31; .03; .02 INJECTION, SOLUTION INTRAVENOUS at 10:31

## 2024-10-26 NOTE — PLAN OF CARE
Goal Outcome Evaluation: Patient resting between care with family and remote sitter at bedside. Patient is disoriented x 4. Surgery this AM due to cancellation on 10/24/24 for low hemoglobin. Labs this shift show hemoglobin at  7.9 however before was 9.1 before. On call provider notified to make sure another unit is not needed. Order was received for 1 PRBC to be infused and recheck in 1 hour post completion.    Patient has never been diagnosed with sleep apnea but pauses when he sleeps. He is diagnosed with COPD, however, he is on 3L. Patient 02 levels drop in the 80's due to mouth breathing. Once he stops it alters him to wake up and his 02 jumps to 95-96. On call provider notified. Order received for venous blood gas.    Care on going.

## 2024-10-26 NOTE — OP NOTE
TOTAL HIP ARTHROPLASTY ANTERIOR WITH HANA TABLE  Procedure Note    Alexei Merino III  10/26/2024    Pre-op Diagnosis: Right  Femoral neck fracture  Post-op Diagnosis: Same  Procedure:  Anterior approach Right  Bipolar arthroplasty  Surgeon:  Cade Velasquez MD  Anesthesia: General, Anesthesiologist: Clark Duarte MD  CRNA: Chung April F, CRNA  Staff: Circulator: Jose Martell RN  Scrub Person: Kelsey Murdock Rochelle  Vendor Representative: Silverio Styles  Assistant: Aurelia Xiao APRN  Estimated Blood Loss: 100ml  Specimens: * No orders in the log *  Drains: none  Complications: None    Components Utilized:       Implant Name Type Inv. Item Serial No.  Lot No. LRB No. Used Action   CMT BONE PALACOS R HI/VISC 1X40 - [id number] Implant CMT BONE PALACOS R HI/VISC 1X40  [facility] [employer] [id number] Right 2 Implanted   KT BONE BIO PREP 6EA C/S - [id number] Implant KT BONE BIO PREP 6EA C/S  [employer] [id number] Right 1 Implanted   STEM FEM/HIP AVENIR CMT STD SZ4 - [id number] Implant STEM FEM/HIP AVENIR CMT STD SZ4  [employer] [id number] Right 1 Implanted   CUP ACET RINGLOC BIPOL 28MM 54MM - [id number] Implant CUP ACET RINGLOC BIPOL 28MM 54MM  [employer] [id number] Right 1 Implanted   HD FEM/HIP UNIV COCR 12/14TPR 28MM MIN3.5 - [id number] Implant HD FEM/HIP UNIV COCR 12/14TPR 28MM MIN3.5  [employer] [id number] Right 1 Implanted       Indication for Procedure:   The patient is a 74 y.o. male presents today for a bipolar arthroplasty  procedure because of fracture of the left femoral neck.  He was originally going to have surgery performed by an outside orthopedic surgeon however his surgery was delayed secondary to anemia.  I was asked to take over care of his hip fracture.  The patient and family was educated in risks of surgery that could include possible risk of infection, deep venous thrombosis, pulmonary embolism, fracture, neurovascular injury, leg length discrepancy, dislocation, possible  persistent pain, need for additional surgeries, anesthetic risks, medical risks including heart attack and stroke, and death.  The discussion occurred in the pre-operative area, and patient had the opportunity to ask questions, and concerns about the proposed surgery.  They also understood that medicine is not an exact science, and that outcomes of the surgical procedure may be less than desired. The patient's daughter wished to proceed.      Protocols for intravenous antibiotics and venous thrombosis were followed for this patient.  IV antibiotics were infused prior to surgery and will be discontinued within 24 hours of completion of the surgical procedure.  Thrombosis prophylaxis will be initiated within 24 hours of the completion of the surgical procedure.      Procedure:   After the patient was identified in the preoperative holding area, the operative site was marked and confirmed. The patient was brought to the operating suite, placed supine on the operating table. General endotracheal anesthesia was placed uneventfully. The patient was placed in the supine position on the Barry table and was secured to the bed with the perineal post and bilateral lower extremity traction.  The operative extremity was then prepped and draped in the usual sterile fashion. Timeout procedure was performed. IV Ancef antibiotics were infused.  Then using a anterior approach was utilized a #10 blade scalpel. The TFL fascia was split longitudinally.  The sartorius was mobilized medially and the TFL was mobilized laterally.  The anterior hip capsule was exposed.  It was then released.  The femoral neck and fracture were encountered.  The femoral neck osteotomy was refreshed and with the reciprocating saw.  The femoral head was removed and measured.  All loose bone was removed from the acetabulum.     Following this, the femoral retractors were placed.  Short external rotators and the piriformis were released off the proximal femur.   The right leg was extended externally rotated and adducted.  This brought the proximal femur into the wound nicely.  The box-cutter was used for initial entry point into the proximal femur followed by the rattail rasp.  The starter broach was then used, and sequentially increased until the above final size, which had a good cortical chatter and good rotational stability. The trial neck and head balls were placed on the broach. The above head ball was placed and the hip was reduced and found to be stable in all planes. Trial stem, head ball, and neck were removed. The final femoral components were cemented into place using contemporary cement techniques.  The cement was allowed to harden.  Then the final head ball was impacted onto the trunnion.  Hip was again reduced, checked for stability, both anteriorly and posteriorly, was copiously irrigated with 3 liters of  normal saline using pulsatile lavage.  0 Vicryl was used to close the TFL fascia in a running fashion.  2-0 Vicryl close the deep dermis and 3-0 Monocryl followed by Dermabond skin glue was used to close the skin.  Sterile dressings were applied. The patient was awakened from anesthesia and returned to recovery room in stable condition. No intraoperative complications.        Cade Velasquez MD     Date: 10/26/2024  Time: 11:55 EDT

## 2024-10-26 NOTE — PLAN OF CARE
Goal Outcome Evaluation:  Plan of Care Reviewed With: patient, child        Progress: improving        Pt HGB stable. Pt VS stable. Pt on pureed diet with nectar thick fluid no s/s of aspiration. Personal items and call light within reach. Family at bedside. plan of care ongoing.

## 2024-10-26 NOTE — ANESTHESIA PREPROCEDURE EVALUATION
Anesthesia Evaluation     NPO Solid Status: > 8 hours  NPO Liquid Status: > 8 hours           Airway   Mallampati: I  TM distance: >3 FB  Neck ROM: full  No difficulty expected  Dental - normal exam     Pulmonary - normal exam   (+) COPD mild,  Cardiovascular - normal exam    (+) hypertension, past MI  >12 months, CAD, CABG >6 Months, hyperlipidemia      Neuro/Psych  (+) psychiatric history, dementia  GI/Hepatic/Renal/Endo      Musculoskeletal     Abdominal  - normal exam    Bowel sounds: normal.   Substance History      OB/GYN          Other                    Anesthesia Plan    ASA 4     general     intravenous induction     Anesthetic plan, risks, benefits, and alternatives have been provided, discussed and informed consent has been obtained with: patient.  Pre-procedure education provided  Plan discussed with CRNA.    CODE STATUS:    Level Of Support Discussed With: Patient  Code Status (Patient has no pulse and is not breathing): CPR (Attempt to Resuscitate)  Medical Interventions (Patient has pulse or is breathing): Full Support

## 2024-10-26 NOTE — ANESTHESIA PROCEDURE NOTES
Airway  Urgency: elective    Date/Time: 10/26/2024 10:44 AM  Airway not difficult    General Information and Staff    Patient location during procedure: OR  CRNA/CAA: Chung April ROMAN, CRNA    Indications and Patient Condition  Indications for airway management: airway protection    Preoxygenated: yes  Mask difficulty assessment: 2 - vent by mask + OA or adjuvant +/- NMBA    Final Airway Details  Final airway type: endotracheal airway      Successful airway: ETT  Cuffed: yes   Successful intubation technique: video laryngoscopy  Facilitating devices/methods: intubating stylet  Endotracheal tube insertion site: oral  Blade: Melara  Blade size: 3  ETT size (mm): 7.0  Cormack-Lehane Classification: grade I - full view of glottis  Placement verified by: capnometry   Measured from: lips  ETT/EBT  to lips (cm): 20  Number of attempts at approach: 1  Assessment: lips, teeth, and gum same as pre-op and atraumatic intubation

## 2024-10-26 NOTE — PROGRESS NOTES
[facility] MEDICINE SERVICE  DAILY PROGRESS NOTE    NAME: Alexei Merino III  : 1950  MRN: [id number]      LOS: 4 days     PROVIDER OF SERVICE: Norbert Medrano MD    Chief Complaint: Fracture of femoral neck, right    Subjective:     Interval History:  History taken from: patient chart      History of Present Illness: Alexei Merino III is a 74 y.o. male with a CMH of anxiety, aortic aneurysm, CAD, dementia, HTN, HLD, and history of STEMI who presented to [facility] on 10/22/2024 with  right hip pain s/p a fall. Patient lives at a long term care facility. He was trying to go the bathroom by himself when he fell. Xray of right femur shows subcapital hip fracture. Xr of the right hip shows acute right femoral neck fracture with maintained right hip joint alignment. Orthopedic surgery has been consulted. Hospitalist team to admit for further medical management.      10/23/24 patient seen and examined in bed no acute distress, vital signs stable, discussed with RN, for surgery in a.m.,  10/24/24 patient examined in bed no acute distress, vital signs stable, discussed with RN, for surgery today.  10/25/24 patient seen and examined in bed no acute distress, vital signs stable, for surgery in a.m.  10/26/24 seen in bed NAD, vss, no new complaints, family at bed side, for surgery today.    Review of Systems   Constitutional:  Positive for appetite change and fatigue.   Musculoskeletal:  Positive for arthralgias and gait problem.   Neurological:  Positive for weakness.     Objective:     Vital Signs  Temp:  [97.6 °F (36.4 °C)-98.1 °F (36.7 °C)] 97.6 °F (36.4 °C)  Heart Rate:  [86-92] 86  Resp:  [16-32] 32  BP: (114-155)/(65-89) 141/89  Flow (L/min) (Oxygen Therapy):  [2-3] 3   Body mass index is 17.28 kg/m².    Physical Exam   Vitals and nursing note reviewed.   Constitutional:       Appearance: He is underweight. He is ill-appearing.   HENT:      Head: Normocephalic and atraumatic.      Nose:  Nose normal.      Mouth/Throat:      Mouth: Mucous membranes are moist.   Eyes:      Extraocular Movements: Extraocular movements intact.      Pupils: Pupils are equal, round, and reactive to light.   Cardiovascular:      Rate and Rhythm: Normal rate and regular rhythm.      Pulses: Normal pulses.   Pulmonary:      Effort: Pulmonary effort is normal.      Breath sounds: Normal breath sounds.   Abdominal:      General: Bowel sounds are normal.      Palpations: Abdomen is soft.   Musculoskeletal:         General: Swelling (right hip) and tenderness (right hip) present.      Cervical back: Normal range of motion and neck supple.      Comments: ROM limited of right leg secondary to pain      Skin:     General: Skin is warm.      Capillary Refill: Capillary refill takes less than 2 seconds.      Coloration: Skin is pale.   Neurological:      Mental Status: He is oriented to person, place, and time.        Diagnostic Data    Results from last 7 days   Lab Units 10/26/24  0803 10/25/24  2332 10/22/24  1608 10/22/24  1508   WBC 10*3/mm3  --  8.76   < > 8.55   HEMOGLOBIN g/dL 9.5* 7.9*   < > 8.7*   HEMATOCRIT % 28.9* 23.4*   < > 26.3*   PLATELETS 10*3/mm3  --  128*   < > 151   GLUCOSE mg/dL  --  163*   < > 115*   CREATININE mg/dL  --  0.91   < > 1.14   BUN mg/dL  --  24*   < > 20   SODIUM mmol/L  --  139   < > 140   POTASSIUM mmol/L  --  3.7   < > 4.0   AST (SGOT) U/L  --   --   --  23   ALT (SGPT) U/L  --   --   --  15   ALK PHOS U/L  --   --   --  92   BILIRUBIN mg/dL  --   --   --  2.3*   ANION GAP mmol/L  --  9.1   < > 9.8    < > = values in this interval not displayed.       XR Chest 1 View    Result Date: 10/26/2024  Impression: Compared to 10/22/2024 chest x-ray, there are diffusely increased airspace and interstitial opacities throughout both lungs, suggestive of pulmonary edema or less likely atypical infection. Electronically Signed: Rosi Peoples  10/26/2024 9:12 AM EDT  Workstation ID: [id number]       I reviewed  the patient's new clinical results.    Assessment/Plan:     Active and Resolved Problems  Active Hospital Problems    Diagnosis  POA    **Fracture of femoral neck, right [S72.001A]  Yes      Resolved Hospital Problems   No resolved problems to display.     Right hip fracture  - XR hip: right femoral neck fracture  - XR femur: subcapital fracture   - prn pain medication- dilaudid ordered at recommendation of pharmacist, has morphine allergy but has tolerated dilaudid in the past.   - surgery consulted>Surgery will be scheduled for today  - NPO     Coronary artery disease-consulted cardiology  Patient had coronary bypass surgery with a LIMA to the LAD and diagonal branch and also SVG to the circumflex artery which are patent but his RCA and PDA have diffuse disease and is not grafted and is on medical therapy including beta-blockers     CHF-HFrEF  Patient has severe LV dysfunction and is on GDMT with beta-blockers hydralazine losartan.   -continue the same medicines for now.     Hyperlipidemia   - continue home statin      Anxiety   - continue home statin      Hypertension   - BP stable at 148/77  - continue home BP meds once reconciled    VTE Prophylaxis:  Mechanical VTE prophylaxis orders are present.    Disposition Planning:     Barriers to Discharge:hip fx  Anticipated Date of Discharge: 10/26  Place of Discharge: [facility]    Advance care planning, spent greater than 30 minutes discussing with family, palliative care consulted.      Code Status and Medical Interventions: CPR (Attempt to Resuscitate); Full Support   Ordered at: 10/22/24 2240     Level Of Support Discussed With:    Patient     Code Status (Patient has no pulse and is not breathing):    CPR (Attempt to Resuscitate)     Medical Interventions (Patient has pulse or is breathing):    Full Support       Signature: Electronically signed by Norbert Medrano MD, 10/26/24, 11:57 EDT.  [facility] Hospitalist Team

## 2024-10-26 NOTE — PROGRESS NOTES
DATE OF ADMISSION: 10/22/2024  1:58 PM     LOS: 4 days     Subjective :   No new complaints.  Daughter at bedside.  Blood transfusion ongoing.    Objective :    Vital signs in last 24 hours:  Vitals:    10/25/24 2124 10/26/24 0414 10/26/24 0447 10/26/24 0705   BP: 155/80 135/80 114/65 137/85   BP Location: Right arm   Right arm   Patient Position: Lying   Lying   Pulse: 88 92 88 87   Resp: 18 16 16    Temp: 98.1 °F (36.7 °C) 98 °F (36.7 °C) 97.6 °F (36.4 °C) 97.8 °F (36.6 °C)   TempSrc: Oral Oral Oral Axillary   SpO2: 95% 92% 94%    Weight:       Height:         Body mass index is 17.28 kg/m².    PHYSICAL EXAM:  Patient is calm, in no acute distress, awake and oriented x 3.  Skin on right hip is clean, dry and intact.  No signs of infection.  Swelling is appropriate in amount.  Homans test is negative.  Patient is neurovascularly intact distally.    LABS:  Results from last 7 days   Lab Units 10/25/24  2332   WBC 10*3/mm3 8.76   HEMOGLOBIN g/dL 7.9*   HEMATOCRIT % 23.4*   PLATELETS 10*3/mm3 128*     Results from last 7 days   Lab Units 10/25/24  2332   SODIUM mmol/L 139   POTASSIUM mmol/L 3.7   CHLORIDE mmol/L 104   CO2 mmol/L 25.9   BUN mg/dL 24*   CREATININE mg/dL 0.91   GLUCOSE mg/dL 163*   CALCIUM mg/dL 9.0     Results from last 7 days   Lab Units 10/24/24  0932 10/22/24  1508   INR  1.17* 1.20*   APTT seconds 33.0* 29.6       ASSESSMENT:  Right femoral neck fracture  Acute blood loss anemia    Plan:  Will repeat H/H after blood transfusion. Will have another unit of blood available.    Will plan for right bipolar later this AM instead of total hip due to patients baseline low functional status.  Risks and benefits discussed with daughter and she is agreeable to proceed as discussed.      Cade Velasquez MD    Date: 10/26/2024  Time: 07:12 EDT

## 2024-10-26 NOTE — SIGNIFICANT NOTE
10/26/24 1534   OTHER   Discipline physical therapist   Rehab Time/Intention   Session Not Performed   (Will follow up for PT eval POD 1)   Recommendation   PT - Next Appointment 10/27/24

## 2024-10-26 NOTE — PROGRESS NOTES
Referring Provider: Norbert Medrano MD    Reason for follow-up: Status post fall  Right hip fracture.  Preoperative and perioperative cardiovascular follow-up     Patient Care Team:  Raheem Mckeon MD as PCP - General (Internal Medicine)  Haley Shipman APRN as Nurse Practitioner (Cardiology)    Subjective .      ROS  Today, the patient has been without any chest discomfort ,shortness of breath, palpitations, dizziness or syncope.  Denies having any headache ,abdominal pain ,nausea, vomiting , diarrhea constipation, loss of weight or loss of appetite.  Denies having any excessive bruising ,hematuria or blood in the stool.    Review of all systems negative except as indicated    History  Past Medical History:   Diagnosis Date    Anxiety     Aortic aneurysm     Coronary artery disease     Dementia     per EASTON Lopez, patient was diagnosed 7 years ago    Emphysema lung     Gastroparesis     per Jessica    [place] (hard of hearing)     complete deaf    Hyperlipidemia     Hypertension     Myocardial infarction     STEMI (ST elevation myocardial infarction) 2024       Past Surgical History:   Procedure Laterality Date    ABDOMINAL AORTIC ANEURYSM REPAIR  2022    CARDIAC CATHETERIZATION Right 2024    Procedure: LEFT HEART CATH with possible PCI;  Surgeon: Omer Vieyra MD;  Location: [facility] CATH INVASIVE LOCATION;  Service: Cardiovascular;  Laterality: Right;  Local and IV sedation    CORONARY ARTERY BYPASS GRAFT  03/13/2016    X3  Dr Hong       Family History   Problem Relation Age of Onset    Hypertension Other     Heart disease Mother     Heart disease Father        Social History     Tobacco Use    Smoking status: Former     Average packs/day: 0.3 packs/day for 50.0 years (12.5 ttl pk-yrs)     Types: Cigarettes     Start date:      Quit date: 1964     Years since quittin.8     Passive exposure: Past    Smokeless tobacco: Never   Vaping Use    Vaping status: Never Used   Substance  Use Topics    Alcohol use: No    Drug use: Yes     Types: Marijuana     Comment: occasional marijuana usage        Medications Prior to Admission   Medication Sig Dispense Refill Last Dose/Taking    aspirin 81 MG EC tablet Take 1 tablet by mouth Daily.   10/22/2024    atorvastatin (LIPITOR) 40 MG tablet Take 1 tablet by mouth Every Night.   10/21/2024    cetirizine (zyrTEC) 5 MG tablet Take 1 tablet by mouth Daily.   10/22/2024    clopidogrel (PLAVIX) 75 MG tablet Take 1 tablet by mouth Daily.   10/22/2024    ferrous gluconate 324 (37.5 Fe) MG tablet tablet Take 1 tablet by mouth Daily As Needed.   10/21/2024    hydrALAZINE (APRESOLINE) 25 MG tablet Take 1 tablet by mouth 2 (Two) Times a Day. Give if SBP greater than 160 or DBP greater than 90.   10/21/2024    hydrOXYzine (ATARAX) 25 MG tablet Take 1 tablet by mouth 2 (Two) Times a Day.   10/22/2024    hydrOXYzine (ATARAX) 25 MG tablet Take 1 tablet by mouth Daily As Needed for Anxiety.   10/21/2024    ibuprofen (ADVIL,MOTRIN) 200 MG tablet Take 2 tablets by mouth Every 6 (Six) Hours As Needed for Mild Pain.   10/21/2024    isosorbide mononitrate (IMDUR) 30 MG 24 hr tablet Take 1 tablet by mouth Daily. Hold for BP <100/60   10/22/2024    losartan (COZAAR) 100 MG tablet Take 1 tablet by mouth Daily.   10/22/2024    magnesium hydroxide (MILK OF MAGNESIA) 2400 MG/10ML suspension suspension Take 30 mL by mouth Daily As Needed.   10/21/2024    ranolazine (RANEXA) 500 MG 12 hr tablet Take 1 tablet by mouth Every 12 (Twelve) Hours.   10/22/2024    sodium bicarbonate 650 MG tablet Take 1 tablet by mouth 3 (Three) Times a Day.   10/22/2024    ondansetron (ZOFRAN) 4 MG tablet Take 1 tablet by mouth Every 6 (Six) Hours As Needed for Nausea or Vomiting.       polyethylene glycol (MIRALAX) 17 g packet Take 17 g by mouth Daily.          Allergies  Morphine    Scheduled Meds:[Transfer Hold] atorvastatin, 40 mg, Oral, Nightly  [Transfer Hold] isosorbide mononitrate, 30 mg, Oral,  "Q24H  losartan, 100 mg, Oral, Q24H  metoprolol succinate XL, 25 mg, Oral, Q24H  [Transfer Hold] tamsulosin, 0.4 mg, Oral, Daily      Continuous Infusions:dextrose 5 % and sodium chloride 0.9 %, 100 mL/hr, Last Rate: 100 mL/hr (10/26/24 0703)      PRN Meds:.  bupivacaine    [Transfer Hold] Calcium Replacement - Follow Nurse / BPA Driven Protocol    [Transfer Hold] cyclobenzaprine    [Transfer Hold] HYDROmorphone    [Transfer Hold] hydrOXYzine    [Transfer Hold] Magnesium Standard Dose Replacement - Follow Nurse / BPA Driven Protocol    [Transfer Hold] ondansetron ODT **OR** [Transfer Hold] ondansetron    [Transfer Hold] oxyCODONE    [Transfer Hold] Phosphorus Replacement - Follow Nurse / BPA Driven Protocol    Potassium Replacement - Follow Nurse / BPA Driven Protocol    sodium chloride    [COMPLETED] Insert Peripheral IV **AND** [Transfer Hold] sodium chloride    [Transfer Hold] traMADol    Objective     VITAL SIGNS  Vitals:    10/26/24 0414 10/26/24 0447 10/26/24 0705 10/26/24 0950   BP: 135/80 114/65 137/85 141/89   BP Location:   Right arm    Patient Position:   Lying Lying   Pulse: 92 88 87 86   Resp: 16 16  (!) 32   Temp: 98 °F (36.7 °C) 97.6 °F (36.4 °C) 97.8 °F (36.6 °C) 97.6 °F (36.4 °C)   TempSrc: Oral Oral Axillary Oral   SpO2: 92% 94%  93%   Weight:       Height:           Flowsheet Rows      Flowsheet Row First Filed Value   Admission Height 175.3 cm (69\") Documented at 10/22/2024 1419   Admission Weight 53.1 kg (117 lb) Documented at 10/22/2024 1419              Intake/Output Summary (Last 24 hours) at 10/26/2024 1152  Last data filed at 10/26/2024 1146  Gross per 24 hour   Intake 200 ml   Output 300 ml   Net -100 ml        TELEMETRY:     Physical Exam:  The patient is alert, oriented and in no distress.  Vital signs as noted above.  Head and neck revealed no carotid bruits or jugular venous distention.  No thyromegaly or lymphadenopathy is present  Lungs clear.  No wheezing.  Breath sounds are normal " bilaterally.  Heart normal first and second heart sounds.  No murmur. No precordial rub is present.  No gallop is present.  Abdomen soft and nontender.  No organomegaly is present.  Extremities with good peripheral pulses without any pedal edema.  Status post hip surgery  Skin warm and dry.  Musculoskeletal system is grossly normal  CNS grossly normal    Reviewed and updated.  Results Review:   I reviewed the patient's new clinical results.  Lab Results (last 24 hours)       Procedure Component Value Units Date/Time    Hemoglobin & Hematocrit, Blood [[id number]]  (Abnormal) Collected: 10/26/24 0803    Specimen: Blood from Arm, Right Updated: 10/26/24 0814     Hemoglobin 9.5 g/dL      Hematocrit 28.9 %     Blood Gas, Venous - [[id number]]  (Abnormal) Collected: 10/26/24 0608    Specimen: Venous Blood Updated: 10/26/24 0618     pH, Venous 7.420 pH Units      pCO2, Venous 40.3 mm Hg      pO2, Venous 10.4 mm Hg      HCO3, Venous 26.1 mmol/L      Base Excess, Venous 1.5 mmol/L      Comment: Serial Number: [id number]:  [id number]        O2 Saturation, Venous 10.7 %      CO2 Content 27.4 mmol/L      Barometric Pressure for Blood Gas --     Comment: N/A        Modality Cannula     FIO2 32 %      Notified Who cesario RN    Basic Metabolic Panel [[id number]]  (Abnormal) Collected: 10/25/24 2332    Specimen: Blood from Arm, Right Updated: 10/26/24 0006     Glucose 163 mg/dL      BUN 24 mg/dL      Creatinine 0.91 mg/dL      Sodium 139 mmol/L      Potassium 3.7 mmol/L      Chloride 104 mmol/L      CO2 25.9 mmol/L      Calcium 9.0 mg/dL      BUN/Creatinine Ratio 26.4     Anion Gap 9.1 mmol/L      eGFR 88.4 mL/min/1.73     Narrative:      GFR Normal >60  Chronic Kidney Disease <60  Kidney Failure <15    The GFR formula is only valid for adults with stable renal function between ages 18 and 70.    CBC & Differential [[id number]]  (Abnormal) Collected: 10/25/24 2332    Specimen: Blood from Arm, Right Updated: 10/25/24 4270    Narrative:       The following orders were created for panel order CBC & Differential.  Procedure                               Abnormality         Status                     ---------                               -----------         ------                     CBC Auto Differential[[id number]]        Abnormal            Final result               Scan Slide[[id number]]                                                                    Please view results for these tests on the individual orders.    CBC Auto Differential [[id number]]  (Abnormal) Collected: 10/25/24 2332    Specimen: Blood from Arm, Right Updated: 10/25/24 2350     WBC 8.76 10*3/mm3      RBC 2.59 10*6/mm3      Hemoglobin 7.9 g/dL      Hematocrit 23.4 %      MCV 90.3 fL      MCH 30.5 pg      MCHC 33.8 g/dL      RDW 14.4 %      RDW-SD 47.2 fl      MPV 10.6 fL      Platelets 128 10*3/mm3      Comment: Result checked          Neutrophil % 82.4 %      Lymphocyte % 4.9 %      Monocyte % 12.0 %      Eosinophil % 0.1 %      Basophil % 0.1 %      Immature Grans % 0.5 %      Neutrophils, Absolute 7.22 10*3/mm3      Lymphocytes, Absolute 0.43 10*3/mm3      Monocytes, Absolute 1.05 10*3/mm3      Eosinophils, Absolute 0.01 10*3/mm3      Basophils, Absolute 0.01 10*3/mm3      Immature Grans, Absolute 0.04 10*3/mm3      nRBC 0.0 /100 WBC     Urinalysis, Microscopic Only - Indwelling Urethral Catheter [[id number]]  (Abnormal) Collected: 10/25/24 1823    Specimen: Urine from Indwelling Urethral Catheter Updated: 10/25/24 1921     RBC, UA 3-5 /HPF      WBC, UA 3-5 /HPF      Comment: Urine culture not indicated.        Bacteria, UA 1+ /HPF      Squamous Epithelial Cells, UA 3-6 /HPF      Yeast, UA Small/1+ Yeast /HPF      Hyaline Casts, UA 0-2 /LPF      WBC Casts 0-2 /LPF      Granular Casts, UA 0-2 /LPF      Amorphous Crystals, UA Small/1+ /HPF      Sperm, UA Moderate/2+ /HPF      Methodology Manual Light Microscopy    Urinalysis With Culture If Indicated - Indwelling Urethral  Catheter [[id number]]  (Abnormal) Collected: 10/25/24 1823    Specimen: Urine from Indwelling Urethral Catheter Updated: 10/25/24 1835     Color, UA Dark Yellow     Appearance, UA Cloudy     pH, UA 5.5     Specific Gravity, UA 1.024     Glucose, UA Negative     Ketones, UA Negative     Bilirubin, UA Small (1+)     Comment: Confirmation testing is unavailable.  A serum bilirubin is recommended for further assessment.        Blood, UA Large (3+)     Protein, UA >=300 mg/dL (3+)     Leuk Esterase, UA Small (1+)     Nitrite, UA Positive     Urobilinogen, UA 1.0 E.U./dL    Narrative:      In absence of clinical symptoms, the presence of pyuria, bacteria, and/or nitrites on the urinalysis result does not correlate with infection.            Imaging Results (Last 24 Hours)       Procedure Component Value Units Date/Time    XR Chest 1 View [[id number]] Collected: 10/26/24 0910     Updated: 10/26/24 0914    Narrative:      XR CHEST 1 VW    Date of Exam: 10/26/2024 4:54 AM EDT    Indication: Pre-Op / Pre-Procedure    Comparison: AP chest x-ray 10/22/2024    Findings:  Compared to 10/22/2024 chest x-ray, there are diffusely increased interstitial and airspace opacities throughout both lungs. No pneumothorax or large pleural effusion is seen. Cardiac silhouette is enlarged. Postoperative changes are redemonstrated from   CABG.      Impression:      Impression:  Compared to 10/22/2024 chest x-ray, there are diffusely increased airspace and interstitial opacities throughout both lungs, suggestive of pulmonary edema or less likely atypical infection.      Electronically Signed: Rosi Peoples    10/26/2024 9:12 AM EDT    Workstation ID: [id number]        LAB RESULTS (LAST 7 DAYS)    CBC  Results from last 7 days   Lab Units 10/26/24  0803 10/25/24  2332 10/24/24  2218 10/24/24  1801 10/24/24  1313 10/23/24  2324 10/23/24  0453 10/22/24  1508   WBC 10*3/mm3  --  8.76 9.93  --  12.27* 8.40 8.84 8.55   RBC 10*6/mm3  --  2.59* 3.02*  --   2.30* 2.45* 2.81* 2.82*   HEMOGLOBIN g/dL 9.5* 7.9* 9.1* 9.1* 6.9* 7.5* 8.7* 8.7*   HEMATOCRIT % 28.9* 23.4* 27.5* 27.4* 21.4* 22.5* 26.1* 26.3*   MCV fL  --  90.3 91.1  --  93.0 91.8 92.9 93.3   PLATELETS 10*3/mm3  --  128* 135*  --  137* 148 146 151       BMP  Results from last 7 days   Lab Units 10/25/24  2332 10/24/24  2218 10/24/24  1313 10/23/24  2324 10/23/24  0453 10/22/24  1608 10/22/24  1508   SODIUM mmol/L 139 138 139 138 139 137 140   POTASSIUM mmol/L 3.7 3.9 3.8 3.7 4.4 3.8 4.0   CHLORIDE mmol/L 104 100 101 99 99 99 100   CO2 mmol/L 25.9 28.1 30.1* 31.2* 29.0 28.3 30.2*   BUN mg/dL 24* 22 22 21 23 20 20   CREATININE mg/dL 0.91 0.96 0.97 0.96 1.17 1.15 1.14   GLUCOSE mg/dL 163* 160* 119* 149* 139* 112* 115*       CMP   Results from last 7 days   Lab Units 10/25/24  2332 10/24/24  2218 10/24/24  1313 10/23/24  2324 10/23/24  0453 10/22/24  1608 10/22/24  1508   SODIUM mmol/L 139 138 139 138 139 137 140   POTASSIUM mmol/L 3.7 3.9 3.8 3.7 4.4 3.8 4.0   CHLORIDE mmol/L 104 100 101 99 99 99 100   CO2 mmol/L 25.9 28.1 30.1* 31.2* 29.0 28.3 30.2*   BUN mg/dL 24* 22 22 21 23 20 20   CREATININE mg/dL 0.91 0.96 0.97 0.96 1.17 1.15 1.14   GLUCOSE mg/dL 163* 160* 119* 149* 139* 112* 115*   ALBUMIN g/dL  --   --   --   --   --   --  3.9   BILIRUBIN mg/dL  --   --   --   --   --   --  2.3*   ALK PHOS U/L  --   --   --   --   --   --  92   AST (SGOT) U/L  --   --   --   --   --   --  23   ALT (SGPT) U/L  --   --   --   --   --   --  15         BNP        TROPONIN        CoAg  Results from last 7 days   Lab Units 10/24/24  0932 10/22/24  1508   INR  1.17* 1.20*   APTT seconds 33.0* 29.6       Creatinine Clearance  Estimated Creatinine Clearance: 53.5 mL/min (by C-G formula based on SCr of 0.91 mg/dL).    ABG        Radiology  XR Chest 1 View    Result Date: 10/26/2024  Impression: Compared to 10/22/2024 chest x-ray, there are diffusely increased airspace and interstitial opacities throughout both lungs, suggestive of  pulmonary edema or less likely atypical infection. Electronically Signed: Rosi Peoples  10/26/2024 9:12 AM EDT  Workstation ID: [id number]         EKG                I personally viewed and interpreted the patient's EKG/Telemetry data:    ECHOCARDIOGRAM:    Results for orders placed during the hospital encounter of 08/23/24    Adult Transthoracic Echo Complete w/ Color, Spectral and Contrast if Necessary Per Protocol    Interpretation Summary    Left ventricular systolic function is severely decreased. Left ventricular ejection fraction appears to be 21 - 25%.  Akinetic mid to distal anterior wall and apex noted    The left ventricular cavity is mildly dilated.    Left ventricular wall thickness is consistent with mild concentric hypertrophy.    Left ventricular diastolic function was normal.    Estimated right ventricular systolic pressure from tricuspid regurgitation is normal (<35 mmHg).          STRESS TEST        Cardiolite (Tc-99m sestamibi) stress test    CARDIAC CATHETERIZATION  Results for orders placed during the hospital encounter of 08/23/24    Cardiac Catheterization/Vascular Study    Conclusion  CARDIAC CATHETERIZATION REPORT      DATE OF PROCEDURE:  8/24/2024    INDICATION FOR PROCEDURE:    Non-ST elevation myocardial infarction  Angina pectoris  CAD  CABG  Hypertension    PROCEDURE PERFORMED:    Ultrasound-guided access of femoral artery  Left heart catheterization  coronary angiography  Angiography of SVG graft and left HANNA.  left ventriculography    PROCEDURE COMMENTS:    After informed consent was obtained, the patient was prepped and draped in the usual sterile manner.  Mild to moderate sedation was administered.  Right femoral artery was accessed without difficulty under fluoroscopy and ultrasound guidance and 6 Mohawk arterial sheath was inserted.  Sheath was flushed with heparinized saline.    Using 6 Mohawk Tita catheters, first left coronary artery and the right coronary was electively  "engaged and appropriate views were taken.  A 6 Hebrew JR4 catheter was used to cross aortic valve and left heart catheterization was performed.  Left ventriculography was done in a WEBER view    Patient tolerated the procedure well.    FINDINGS:    1. HEMODYNAMICS:    Aortic pressure: 145/70 mmHg    LVEDP: 5 to 10 mmHg    Gradient across aortic valve on pullback: No gradient across aortic valve      2. LEFT VENTRICULOGRAPHY: 40%      3. CORONARY ANGIOGRAPHY:    A: Left main coronary artery: 25 to 30% stenosis in the ostium of left main.  At the bifurcation there is a 99% heavily calcified plaque/stenosis noted.    B: Left anterior descending artery: 100% occlusion of LAD in the proximal segment after the second .  LIMA to LAD is patent but distal to anastomosis there is diffuse disease of LAD followed by subtotal occlusion in the distal segment at the apex.  LIMA to LAD is attached to diagonal branch and LAD.    C: Left circumflex coronary artery: 100% occlusion of LCx at the ostium.  SVG graft to LCx is patent.  It retrograde fills second marginal.    D: Right coronary artery: RCA is diffusely diseased.  It is dominant.  Patient has 60 to 70% stenosis in the midsegment.  PLB branch which is a small caliber vessel less than 2 mm is diffusely diseased up to 80 to 90%.  It is not amenable to percutaneous intervention.  PDA has mild disease but no high-grade stenosis.    E: LIMA to LAD and diagonal is patent.  SVG graft to OM is patent    SUMMARY:    Three-vessel coronary artery disease  3/3 bypasses are patent  Severe disease of native coronary arteries  RCA is on grafted and has attenuated lesion in the midsegment.  Moderate left ventricular dysfunction    RECOMMENDATIONS:    Medical treatment                OTHER:         Assessment & Plan     Principal Problem:    Fracture of femoral neck, right      Primary cardiologist note was reviewed.    \"Fracture of right femoral neck  S/p fall  Orthopedic surgery " following  Plan for right hip arthroplasty today  Surgery canceled yesterday due to anemia with H&H of 6.9/21.4  Improved following PRBC  Cardiac history to include severe CAD with 3/3 patent bypasses, severe disease of native coronaries and diffuse disease in RCA  Severe LVEF dysfunction  Patient is moderate risk for surgery  Dr. Do discussed with patient and family  PT/OT     CAD  S/p CABG x 3 (2016)   Cardiac catheterization with 3 out of 3 bypass grafts patent, diffuse disease to RCA  Continue statin, Imdur, Ranexa, beta-blocker  DAPT on hold for surgery  Restart when okay with Ortho     HFrEF  Ischemic cardiomyopathy  Echocardiogram with significantly reduced LVEF of 21 to 25%  GDMT to include metoprolol succinate, losartan  Home hydralazine on hold  Consider SGLT2 inhibitor following surgical procedure     Hypertension  Blood pressure well-controlled  Continue Imdur, losartan, metoprolol succinate     Dyslipidemia  High intensity statin therapy  Total cholesterol 109, , HDL 44  Goal LDL less than 70     Malnutrition  Decreased appetite  Nutrition consult    ]]]]]]]]]]]]]]]]]]]]]]]  10/26/2024  Recent fall and ischial fracture.  Status post anterior approach Right  Bipolar arthroplasty 10/26/2024  Patient did not have any specific postoperative cardiac issues.    Status post CABG    Ischemic cardiomyopathy  HFrEF    Hypertension dyslipidemia    Discussed with patient's family at bedside.    Close cardiac monitoring.  Further plan will depend on patient's progress.  ]]]]]]]]]]]]]]]]]]    Shalom Rawls MD  10/26/24  11:52 EDT

## 2024-10-26 NOTE — ANESTHESIA POSTPROCEDURE EVALUATION
Patient: Alexei Merino III    Procedure Summary       Date: 10/26/24 Room / Location: [facility] OR 03 / [facility] MAIN OR    Anesthesia Start: 1031 Anesthesia Stop: 1225    Procedure: JUDSON HIP ARTHROPLASTY ANTERIOR (Right: Hip) Diagnosis:     Surgeons: Cade Velasquez MD Provider: Clark Duarte MD    Anesthesia Type: general ASA Status: 4            Anesthesia Type: general    Vitals  Vitals Value Taken Time   /72 10/26/24 1244   Temp 97.2 °F (36.2 °C) 10/26/24 1221   Pulse 76 10/26/24 1245   Resp 19 10/26/24 1236   SpO2 89 % 10/26/24 1245   Vitals shown include unfiled device data.        Post Anesthesia Care and Evaluation    Patient location during evaluation: PACU  Patient participation: complete - patient participated  Level of consciousness: awake  Pain scale: See nurse's notes for pain score.  Pain management: adequate    Airway patency: patent  Anesthetic complications: No anesthetic complications  PONV Status: none  Cardiovascular status: acceptable  Respiratory status: acceptable and spontaneous ventilation  Hydration status: acceptable    Comments: Patient seen and examined postoperatively; vital signs stable; SpO2 greater than or equal to 90%; cardiopulmonary status stable; nausea/vomiting adequately controlled; pain adequately controlled; no apparent anesthesia complications; patient discharged from anesthesia care when discharge criteria were met

## 2024-10-27 NOTE — SIGNIFICANT NOTE
10/27/24 1338   OTHER   Discipline physical therapist   Rehab Time/Intention   Session Not Performed other (see comments)  (Pt sedated & not following commands. Family consulting palliative care. Will f/u tomorrow as medically appropriate.)   Therapy Assessment/Plan (PT)   Criteria for Skilled Interventions Met (PT) yes   Recommendation   PT - Next Appointment 10/28/24

## 2024-10-27 NOTE — PROGRESS NOTES
Procedure(s):  JUDSON HIP ARTHROPLASTY ANTERIOR     LOS: 5 days     Subjective :   Events overnight noted.  Confused and in mitten restraints.  Daughter at bedside.    Objective :    Vital signs in last 24 hours:  Vitals:    10/27/24 0130 10/27/24 0302 10/27/24 0503 10/27/24 0816   BP: 129/80  143/82 123/76   BP Location: Right arm  Right arm Right arm   Patient Position: Lying  Lying Lying   Pulse: 88  86 79   Resp: 18  18 18   Temp: 98 °F (36.7 °C)  97.9 °F (36.6 °C) 97.5 °F (36.4 °C)   TempSrc: Axillary  Oral Axillary   SpO2: 94% 91% 100% 98%   Weight:       Height:           PHYSICAL EXAM:  Patient is calm, sleepy.  Dressing is clean, dry and intact.  No signs of infection.  Swelling is appropriate in amount.  Ecchymosis is appropriate in amount.  Homans test is negative.  Patient is neurovascularly intact distally.    LABS:  Results from last 7 days   Lab Units 10/26/24  2231 10/26/24  0803 10/25/24  2332   WBC 10*3/mm3  --   --  8.76   HEMOGLOBIN g/dL 8.9*   < > 7.9*   HEMATOCRIT % 26.7*   < > 23.4*   PLATELETS 10*3/mm3  --   --  128*    < > = values in this interval not displayed.     Results from last 7 days   Lab Units 10/26/24  2231   SODIUM mmol/L 142   POTASSIUM mmol/L 4.1   CHLORIDE mmol/L 106   CO2 mmol/L 22.0   BUN mg/dL 33*   CREATININE mg/dL 1.28*   GLUCOSE mg/dL 141*   CALCIUM mg/dL 8.8     Results from last 7 days   Lab Units 10/24/24  0932 10/22/24  1508   INR  1.17* 1.20*   APTT seconds 33.0* 29.6         ASSESSMENT:  Status post Procedure(s):  JUDSON HIP ARTHROPLASTY ANTERIOR      Plan:  Continue Physical Therapy, increase mobility and range of motion as tolerated.  Continue SCDs, Continue DVT prophylaxis.  Aspirin 81 mg BID after discharge.  Dispo planning for rehab placement when medically stable  WBAT, remove dressing in 7 days.  Anterior hip dislocation precautions.  Follow up in the office in 2 weeks.   Will sign off.  Call if questions.  [phone]    Cade Velasquez MD    Date:  10/27/2024  Time: 10:39 EDT

## 2024-10-27 NOTE — PROGRESS NOTES
[facility] MEDICINE SERVICE  DAILY PROGRESS NOTE    NAME: Alexei Merino III  : 1950  MRN: [id number]      LOS: 5 days     PROVIDER OF SERVICE: Norbert Medrano MD    Chief Complaint: Fracture of femoral neck, right    Subjective:     Interval History:  History taken from: patient chart    History of Present Illness: Alexei Merino III is a 74 y.o. male with a CMH of anxiety, aortic aneurysm, CAD, dementia, HTN, HLD, and history of STEMI who presented to [facility] on 10/22/2024 with  right hip pain s/p a fall. Patient lives at a long term care facility. He was trying to go the bathroom by himself when he fell. Xray of right femur shows subcapital hip fracture. Xr of the right hip shows acute right femoral neck fracture with maintained right hip joint alignment. Orthopedic surgery has been consulted. Hospitalist team to admit for further medical management.      10/23/24 patient seen and examined in bed no acute distress, vital signs stable, discussed with RN, for surgery in a.m.,  10/24/24 patient examined in bed no acute distress, vital signs stable, discussed with RN, for surgery today.  10/25/24 patient seen and examined in bed no acute distress, vital signs stable, for surgery in a.m.  10/26/24 seen in bed NAD, vss, no new complaints, family at bed side, for surgery today.  JUDSON HIP ARTHROPLASTY ANTERIOR    10/27/24 seen in bed NAD vss on 2 lts hgl 8.9, creat 1.29, Events overnight noted. Anxiety, Confusion, dyspnea and in mitten restraints. Daughter at bedside. Lasix started     Review of Systems   Constitutional:  Positive for appetite change and fatigue.   Musculoskeletal:  Positive for arthralgias and gait problem.   Neurological:  Positive for weakness.     Objective:     Vital Signs  Temp:  [96.9 °F (36.1 °C)-98 °F (36.7 °C)] 97.5 °F (36.4 °C)  Heart Rate:  [75-88] 84  Resp:  [12-28] 21  BP: ()/(62-87) 132/78  Flow (L/min) (Oxygen Therapy):  [3-10] 3   Body mass index is  17.28 kg/m².    Physical Exam   Vitals and nursing note reviewed.   Constitutional:       Appearance: He is underweight. He is ill-appearing.   HENT:      Head: Normocephalic and atraumatic.      Nose: Nose normal.      Mouth/Throat:      Mouth: Mucous membranes are moist.   Eyes:      Extraocular Movements: Extraocular movements intact.      Pupils: Pupils are equal, round, and reactive to light.   Cardiovascular:      Rate and Rhythm: Normal rate and regular rhythm.      Pulses: Normal pulses.   Pulmonary:      Effort: Pulmonary effort is normal.      Breath sounds: Normal breath sounds.   Abdominal:      General: Bowel sounds are normal.      Palpations: Abdomen is soft.   Musculoskeletal:         General: Swelling (right hip) and tenderness (right hip) present.      Cervical back: Normal range of motion and neck supple.      Comments: ROM limited of right leg secondary to pain   Skin:     General: Skin is warm.      Capillary Refill: Capillary refill takes less than 2 seconds.      Coloration: Skin is pale.   Neurological:      Mental Status: He is oriented to person, place, and time.        Diagnostic Data    Results from last 7 days   Lab Units 10/26/24  2231 10/26/24  0803 10/25/24  2332 10/22/24  1608 10/22/24  1508   WBC 10*3/mm3  --   --  8.76   < > 8.55   HEMOGLOBIN g/dL 8.9*   < > 7.9*   < > 8.7*   HEMATOCRIT % 26.7*   < > 23.4*   < > 26.3*   PLATELETS 10*3/mm3  --   --  128*   < > 151   GLUCOSE mg/dL 141*  --  163*   < > 115*   CREATININE mg/dL 1.28*  --  0.91   < > 1.14   BUN mg/dL 33*  --  24*   < > 20   SODIUM mmol/L 142  --  139   < > 140   POTASSIUM mmol/L 4.1  --  3.7   < > 4.0   AST (SGOT) U/L  --   --   --   --  23   ALT (SGPT) U/L  --   --   --   --  15   ALK PHOS U/L  --   --   --   --  92   BILIRUBIN mg/dL  --   --   --   --  2.3*   ANION GAP mmol/L 14.0  --  9.1   < > 9.8    < > = values in this interval not displayed.       XR Chest 1 View    Result Date: 10/27/2024  Impression:  Cardiomegaly and diffuse airspace opacity compatible with pulmonary edema. Findings slightly improved from the comparison. Atypical infection cannot be excluded Electronically Signed: Davis Catalan MD  10/27/2024 10:46 AM EDT  Workstation ID: [id number]    XR Hip 1 View Without Pelvis Right (Surgery Only)    Result Date: 10/26/2024  Impression: 1. Status post hemiarthroplasty of the right hip with bipolar hip prosthesis. No hardware complication. Electronically Signed: Pietro Richards MD  10/26/2024 1:11 PM EDT  Workstation ID: [id number]    XR Chest 1 View    Result Date: 10/26/2024  Impression: Compared to 10/22/2024 chest x-ray, there are diffusely increased airspace and interstitial opacities throughout both lungs, suggestive of pulmonary edema or less likely atypical infection. Electronically Signed: Rosi Peoples  10/26/2024 9:12 AM EDT  Workstation ID: [id number]       I reviewed the patient's new clinical results.  Scheduled Meds:acetaminophen, 1,000 mg, Oral, Q6H  aspirin, 81 mg, Oral, Q12H  atorvastatin, 40 mg, Oral, Nightly  docusate sodium, 200 mg, Oral, BID  furosemide, 20 mg, Intravenous, Q12H  isosorbide mononitrate, 30 mg, Oral, Q24H  losartan, 100 mg, Oral, Q24H  metoprolol succinate XL, 25 mg, Oral, Q24H  sodium chloride, 10 mL, Intravenous, Q12H  tamsulosin, 0.4 mg, Oral, Daily      Continuous Infusions:   PRN Meds:.  Calcium Replacement - Follow Nurse / BPA Driven Protocol    cyclobenzaprine    hydrALAZINE    HYDROcodone-acetaminophen    HYDROcodone-acetaminophen    HYDROmorphone **AND** naloxone    hydrOXYzine    Magnesium Standard Dose Replacement - Follow Nurse / BPA Driven Protocol    ondansetron ODT **OR** ondansetron    ondansetron ODT **OR** [DISCONTINUED] ondansetron    Phosphorus Replacement - Follow Nurse / BPA Driven Protocol    Potassium Replacement - Follow Nurse / BPA Driven Protocol    [COMPLETED] Insert Peripheral IV **AND** sodium chloride    sodium chloride    traMADol    Assessment/Plan:     Active and Resolved Problems  Active Hospital Problems    Diagnosis  POA    **Fracture of femoral neck, right [S72.001A]  Yes      Resolved Hospital Problems   No resolved problems to display.     Right hip fracture  - XR hip: right femoral neck fracture  - XR femur: subcapital fracture   - prn pain medication- dilaudid ordered at recommendation of pharmacist, has morphine allergy but has tolerated dilaudid in the past.   - surgery consulted>s/p Surgery JUDSON HIP ARTHROPLASTY ANTERIOR  10/26/24  - NPO >speech> swallowing eval>NGT>tube feeding    Coronary artery disease-consulted cardiology  Patient had coronary bypass surgery with a LIMA to the LAD and diagonal branch and also SVG to the circumflex artery which are patent but his RCA and PDA have diffuse disease and is not grafted and is on medical therapy including beta-blockers  S/p CABG x 3 (2016)   Cardiac catheterization with 3 out of 3 bypass grafts patent, diffuse disease to RCA  Continue statin, Imdur, Ranexa, beta-blocker  DAPT on hold for surgery     CHF-HFrEF  Patient has severe LV dysfunction and is on GDMT with beta-blockers hydralazine losartan.   -continue the same medicines for now.  Echocardiogram with significantly reduced LVEF of 21 to 25%  Cardiology following:  GDMT to include metoprolol succinate, losartan  Home hydralazine on hold  Consider SGLT2 inhibitor following surgical procedure     Hyperlipidemia   - continue home statin      Anxiety   - continue home statin      Hypertension   - BP stable at 148/77  - continue home BP meds once reconciled    Anorexia-poor po intake  -NGT per family request  -speech eval  -dietary consult     VTE Prophylaxis:  Mechanical VTE prophylaxis orders are present.    Disposition Planning:   Barriers to Discharge:hip fx  Anticipated Date of Discharge: 10/26  Place of Discharge: [facility]    Advance care planning, spent greater than 30 minutes discussing with family, palliative care  consulted.      Code Status and Medical Interventions: CPR (Attempt to Resuscitate); Full Support   Ordered at: 10/22/24 1704     Level Of Support Discussed With:    Patient     Code Status (Patient has no pulse and is not breathing):    CPR (Attempt to Resuscitate)     Medical Interventions (Patient has pulse or is breathing):    Full Support       Signature: Electronically signed by Norbert Medrano MD, 10/27/24, 11:46 EDT.  [facility] Hospitalist Team

## 2024-10-27 NOTE — SIGNIFICANT NOTE
10/26/24: Notified by patient's nurse that she was concerned about the patient's breathing and anxiety.  The family was requesting to speak with a provider regarding the results of a chest  xray done prior to surgery.     On arrival to the bedside, the patient was very anxious due to his work of breathing.  Chest xray was reviewed and showed pulmonary edema, which was discussed with the nurse, patient and his daughter.  The patient has been on continuous IV fluids while NPO.  He is awaiting a swallow study per SLP.  Due to his history of CHF with reduced EF of 21-25%, fluids were decreased.  He was also placed on CPAP overnight due to a concern for undiagnosed sleep apnea.      The patient has also not received any of his PO medications for his CHF while NPO.   I discussed this with his daughter and she is agreeable to place a temporary Dobbhoff tube for medications and nutrition if the patient fails his swallow study.

## 2024-10-27 NOTE — PLAN OF CARE
Goal Outcome Evaluation:   Pt was seen for re-eval of swallow. Pt was supposed to have VFSS this date for further evaluation of swallow. Pt had been placed on puree diet with NTL until after hip surgery and was to be placed back on that diet after surgery and until VFSS could be completed. Pt had diff with NTL yesterday after surgery and was made NPO. Pt is mostly lethargic today with brief waking and responding to questions. Pt agreed to oral care but quickly became too sleepy to continue. Pt was given water on a toothette for oral care. He was able to pull liquids from the sponge with mild labia spillage. Visualization of swallow suggests timely initiation. pt had no cough or other overt s/s of aspiration on the small amt of water from the toothette sponge, however pt's sitter reported coughing on water during oral care earlier. Pt became minimally responsive and re-eval was discontinued due to that.     It is continued to be rec that pt remain NPO. Alternative means of nutrition would benefit this pt. Frequent oral care should be completed. Further eval of swallow with VFSS is rec when pt can maintain alertness. Much education given to pt's s/o and daughter on rationale for continued NPO. and need for VFSS prior to PO. Both verbalized understanding. ST will continue to follow to re-eval swallow as indicated and complete VFSS when appropriate.                            SLP Swallowing Diagnosis: suspected pharyngeal dysphagia (10/27/24 1800)

## 2024-10-27 NOTE — NURSING NOTE
Since admission patient status has been changing. Currently holding oral medications for risk of aspiration. He is strict NPO and pending swallow and speech study. Chest x-ray was completed this AM but results have not been discussed with the family. Patient family is requesting to speak with a provider.     Hx of HRerf, MI and COPD. He is on 7.5L post surgery. He continues to have panic attacks due to SOA. Currently on LR at 100ml/hr due to dehydration and has a castro placed due to urine retention. Started flomax yesterday 10/25/24. Patient has developed hiccups over the last few days.    On call provider notified and called RT to adjust liter levels.    Currently at 3L stable.    Orders received to start CPAP, decrease fluids from 100 ml/hr to 25ml/hr, recheck Hgb, PRN hydralazine intravenous and Ativan PRN.    Provider communicated to have patient wear CPAP for 2hrs and no improvement with discuss patient going to a higher level of care.

## 2024-10-27 NOTE — PLAN OF CARE
Goal Outcome Evaluation: See other nursing notes. Patient resting in between care with Ativan and dilaudid alternating prn. Have been able to stretch out the dose frequency in between. Family remains at bedside, supportive and involved in patients care. Patient remains disoriented x 3. CPAP at beside and castro remains in place at this time. Surgery completed yesterday 10/26/24. Dressing remains dry, intact and clean. Ice alternating off and on surgical site. Fluids going at 25ml/hr. Remote sitter remains at bedside and continuing restraints. Care on going.

## 2024-10-27 NOTE — THERAPY RE-EVALUATION
Acute Care - Speech Language Pathology   Swallow Re-Evaluation [facility]     Patient Name: Alexei Merino III  : 1950  MRN: [id number]  Today's Date: 10/27/2024               Admit Date: 10/22/2024    Visit Dx:     ICD-10-CM ICD-9-CM   1. Closed fracture of right hip, initial encounter  S72.001A 820.8     Patient Active Problem List   Diagnosis    S/P CABG x 3    Coronary artery disease    Hyperlipidemia    Hypertension    Abdominal aortic aneurysm (AAA)    Actinic keratoses    Anxiety with depression    Chronic cough    Decreased hearing, bilateral    Delayed gastric emptying    Dementia    Dental infection    Exposure to venereal disease    Fatigue    Hay fever    Nausea and vomiting    Chronic sinusitis    Sinusitis, acute    Smoking greater than 30 pack years    Tobacco use    Unsteady gait    Weight loss    Underweight    Chronic obstructive pulmonary disease    B12 deficiency    NSTEMI (non-ST elevated myocardial infarction)    Severe malnutrition    Fracture of femoral neck, right     Past Medical History:   Diagnosis Date    Anxiety     Aortic aneurysm     Coronary artery disease     Dementia     per EASTON Lopez, patient was diagnosed 7 years ago    Emphysema lung     Gastroparesis     per Jessica    [place] (hard of hearing)     complete deaf    Hyperlipidemia     Hypertension     Myocardial infarction     STEMI (ST elevation myocardial infarction) 2024     Past Surgical History:   Procedure Laterality Date    ABDOMINAL AORTIC ANEURYSM REPAIR  2022    CARDIAC CATHETERIZATION Right 2024    Procedure: LEFT HEART CATH with possible PCI;  Surgeon: Omer Vieyra MD;  [facility]: [facility] INVAS[facility];  Service: Cardiovascular;  Laterality: Right;  Local and IV sedation    CORONARY ARTERY BYPASS GRAFT  03/13/2016    X3  Dr Hong       SLP Recommendation and Plan  SLP Swallowing Diagnosis: suspected pharyngeal dysphagia (10/27/24 1800)  SLP Diet Recommendation: NPO (10/27/24 1800)     SLP  Rec. for Method of Medication Administration: meds via alternate route (10/27/24 1800)        Recommended Diagnostics: reassess via VFSS ([facility]) (10/27/24 1800)  Swallow Criteria for Skilled Therapeutic Interventions Met: demonstrates skilled criteria (10/27/24 1800)     Rehab Potential/Prognosis, Swallowing: good, to achieve stated therapy goals (10/27/24 1800)  Therapy Frequency (Swallow): PRN (10/27/24 1800)  Predicted Duration Therapy Intervention (Days): until discharge (10/27/24 1800)  Oral Care Recommendations: Oral Care BID/PRN (10/27/24 1800)                                               SWALLOW EVALUATION (Last 72 Hours)       SLP Adult Swallow Evaluation       Row Name 10/27/24 1800       Rehab Evaluation    Document Type re-evaluation  -CP    Subjective Information no complaints  -CP    Patient Observations lethargic  -CP    Patient/Family/Caregiver Comments/Observations Pt's significant other and daughter present.  -CP    Patient Effort poor  -CP    Comment Pt was seen for re-eval of swallow. Pt was supposed to have VFSS this date for further evaluation of swallow. Pt had been placed on puree diet with NTL until after hip surgery and was to be placed back on that diet after surgery and until VFSS could be completed. Pt had diff with NTL yesterday after surgery and was made NPO. Pt is mostly lethargic today with brief waking and responding to questions. Pt agreed to oral care but quickly became too sleepy to continue. Pt was given water on a toothette for oral care. He was able to pull liquids from the sponge with mild labia spillage. Visualization of swallow suggests timely initiation. pt had no cough or other overt s/s of aspiration on the small amt of water from the toothette sponge, however pt's sitter reported coughing on water during oral care earlier. Pt became minimally respodnive and re-eval was discontinued due to that. It is continued to be rec that pt remain NPO. Alternative means of nutrition  would benefit this pt. Frequent oral care should be completed. Further eval of swallow with VFSS is rec when pt can maintain alertness. Much education given to pt's s/o and daughter on rationale for continued NPO. and need for VFSS prior to PO. Both verbalized understanding. ST will continue to follow to re-eval swallow as indicated and complete VFSS when appropriate.  -CP       General Information       SLP Evaluation Clinical Impression    SLP Swallowing Diagnosis suspected pharyngeal dysphagia  -CP    Functional Impact risk of aspiration/pneumonia;risk of malnutrition  -CP    Rehab Potential/Prognosis, Swallowing good, to achieve stated therapy goals  -CP    Swallow Criteria for Skilled Therapeutic Interventions Met demonstrates skilled criteria  -CP       SLP Treatment Clinical Impressions    Care Plan Review evaluation/treatment results reviewed;care plan/treatment goals reviewed;risks/benefits reviewed;patient/other agree to care plan  -CP    Care Plan Review, Other Participant(s) significant other;daughter  -CP       Recommendations    Therapy Frequency (Swallow) PRN  -CP    Predicted Duration Therapy Intervention (Days) until discharge  -CP    SLP Diet Recommendation NPO  -CP    Recommended Diagnostics reassess via VFSS (MBS)  -CP    Recommended Precautions and Strategies --    Oral Care Recommendations Oral Care BID/PRN  -CP    SLP Rec. for Method of Medication Administration meds via alternate route  -CP    Monitor for Signs of Aspiration --       Swallow Goals (SLP)    Swallow LTGs Swallow Long Term Goal (free text)  -CP    Swallow STGs diet tolerance goal selection (SLP)  -CP    Diet Tolerance Goal Selection (SLP) Swallow Short Term Goal 1;Swallow Short Term Goal 2  -CP       (LTG) Swallow    (LTG) Swallow Pt will maximize swallow function for least restrictive PO diet, exhibiting no complication associated with dysphagia, adequate PO intake, and demonstrating independent use of swallow compensations   -CP    Time Frame (Swallow Long Term Goal) by discharge  -CP    Barriers (Swallow Long Term Goal) AMS  -CP    Progress/Outcomes (Swallow Long Term Goal) goal ongoing  -CP    Comment (Swallow Long Term Goal) See above  -CP       (STG) Swallow 1    (STG) Swallow 1 The patient will participate in a meal/follow-up assessment to determine safety and adequacy of recommended diet, independent use of safe swallow compensations, pt/family education and additional goals/recommendations to follow.  -CP    Time Frame (Swallow Short Term Goal 1) 1 week  -CP    Progress/Outcomes (Swallow Short Term Goal 1) goal no longer appropriate  -CP       (STG) Swallow 2    (STG) Swallow 2 Pt will participate in ongoing swallow assessment to include VFSS if indicated, and caregiver teaching.  -CP    Time Frame (Swallow Short Term Goal 2) 1 week  -CP    Progress/Outcomes (Swallow Short Term Goal 2) goal ongoing  -CP    Comment (Swallow Short Term Goal 2) See above  -CP              User Key  (r) = Recorded By, (t) = Taken By, (c) = Cosigned By      Initials Name Effective Dates    CP Elissa Carlisle SLP 06/16/21 -                     EDUCATION  The patient has been educated in the following areas:   Dysphagia (Swallowing Impairment) Oral Care/Hydration NPO rationale.        SLP GOALS       Row Name 10/27/24 1800 10/25/24 1500          (LTG) Swallow    (LTG) Swallow Pt will maximize swallow function for least restrictive PO diet, exhibiting no complication associated with dysphagia, adequate PO intake, and demonstrating independent use of swallow compensations  -CP Pt will maximize swallow function for least restrictive PO diet, exhibiting no complication associated with dysphagia, adequate PO intake, and demonstrating independent use of swallow compensations  -CP     Time Frame (Swallow Long Term Goal) by discharge  -CP by discharge  -CP     Barriers (Swallow Long Term Goal) AMS  -CP --     Progress/Outcomes (Swallow Long Term Goal) goal  ongoing  -CP --     Comment (Swallow Long Term Goal) See above  -CP --        (STG) Swallow 1    (STG) Swallow 1 The patient will participate in a meal/follow-up assessment to determine safety and adequacy of recommended diet, independent use of safe swallow compensations, pt/family education and additional goals/recommendations to follow.  -CP The patient will participate in a meal/follow-up assessment to determine safety and adequacy of recommended diet, independent use of safe swallow compensations, pt/family education and additional goals/recommendations to follow.  -CP     Time Frame (Swallow Short Term Goal 1) 1 week  -CP 1 week  -CP     Progress/Outcomes (Swallow Short Term Goal 1) goal no longer appropriate  -CP --        (STG) Swallow 2    (STG) Swallow 2 Pt will participate in ongoing swallow assessment to include VFSS if indicated, and caregiver teaching.  -CP Pt will participate in ongoing swallow assessment to include VFSS if indicated, and caregiver teaching.  -CP     Time Frame (Swallow Short Term Goal 2) 1 week  -CP 1 week  -CP     Progress/Outcomes (Swallow Short Term Goal 2) goal ongoing  -CP --     Comment (Swallow Short Term Goal 2) See above  -CP --               User Key  (r) = Recorded By, (t) = Taken By, (c) = Cosigned By      Initials Name Provider Type    CP Elissa Carlisle, SLP Speech and Language Pathologist                         Time Calculation:                BERENICE Blackwell  10/27/2024

## 2024-10-27 NOTE — PROGRESS NOTES
Referring Provider: Norbert Medrano MD    Reason for follow-up: Status post fall  Right hip fracture.  Preoperative and perioperative cardiovascular follow-up     Patient Care Team:  Raheem Mckeon MD as PCP - General (Internal Medicine)  Haley Shipman APRN as Nurse Practitioner (Cardiology)    Subjective .      ROS  Patient had shortness of breath and anxiety last night.  proBNP is elevated.  Today, the patient has been without any chest discomfort , palpitations, dizziness or syncope.  Denies having any headache ,abdominal pain ,nausea, vomiting , diarrhea constipation, loss of weight or loss of appetite.  Denies having any excessive bruising ,hematuria or blood in the stool.    Review of all systems negative except as indicated    History  Past Medical History:   Diagnosis Date    Anxiety     Aortic aneurysm     Coronary artery disease     Dementia     per EASTON Lopez, patient was diagnosed 7 years ago    Emphysema lung     Gastroparesis     per Jessica ESTRADA (hard of hearing)     complete deaf    Hyperlipidemia     Hypertension     Myocardial infarction     STEMI (ST elevation myocardial infarction) 2024       Past Surgical History:   Procedure Laterality Date    ABDOMINAL AORTIC ANEURYSM REPAIR  2022    CARDIAC CATHETERIZATION Right 2024    Procedure: LEFT HEART CATH with possible PCI;  Surgeon: Omer Vieyra MD;  Location: [facility] CATH INVASIVE LOCATION;  Service: Cardiovascular;  Laterality: Right;  Local and IV sedation    CORONARY ARTERY BYPASS GRAFT  03/13/2016    X3  Dr Hong       Family History   Problem Relation Age of Onset    Hypertension Other     Heart disease Mother     Heart disease Father        Social History     Tobacco Use    Smoking status: Former     Average packs/day: 0.3 packs/day for 50.0 years (12.5 ttl pk-yrs)     Types: Cigarettes     Start date:      Quit date: 1964     Years since quittin.8     Passive exposure: Past    Smokeless tobacco:  Never   Vaping Use    Vaping status: Never Used   Substance Use Topics    Alcohol use: No    Drug use: Yes     Types: Marijuana     Comment: occasional marijuana usage        Medications Prior to Admission   Medication Sig Dispense Refill Last Dose/Taking    aspirin 81 MG EC tablet Take 1 tablet by mouth Daily.   10/22/2024    atorvastatin (LIPITOR) 40 MG tablet Take 1 tablet by mouth Every Night.   10/21/2024    cetirizine (zyrTEC) 5 MG tablet Take 1 tablet by mouth Daily.   10/22/2024    clopidogrel (PLAVIX) 75 MG tablet Take 1 tablet by mouth Daily.   10/22/2024    ferrous gluconate 324 (37.5 Fe) MG tablet tablet Take 1 tablet by mouth Daily As Needed.   10/21/2024    hydrALAZINE (APRESOLINE) 25 MG tablet Take 1 tablet by mouth 2 (Two) Times a Day. Give if SBP greater than 160 or DBP greater than 90.   10/21/2024    hydrOXYzine (ATARAX) 25 MG tablet Take 1 tablet by mouth 2 (Two) Times a Day.   10/22/2024    hydrOXYzine (ATARAX) 25 MG tablet Take 1 tablet by mouth Daily As Needed for Anxiety.   10/21/2024    ibuprofen (ADVIL,MOTRIN) 200 MG tablet Take 2 tablets by mouth Every 6 (Six) Hours As Needed for Mild Pain.   10/21/2024    isosorbide mononitrate (IMDUR) 30 MG 24 hr tablet Take 1 tablet by mouth Daily. Hold for BP <100/60   10/22/2024    losartan (COZAAR) 100 MG tablet Take 1 tablet by mouth Daily.   10/22/2024    magnesium hydroxide (MILK OF MAGNESIA) 2400 MG/10ML suspension suspension Take 30 mL by mouth Daily As Needed.   10/21/2024    ranolazine (RANEXA) 500 MG 12 hr tablet Take 1 tablet by mouth Every 12 (Twelve) Hours.   10/22/2024    sodium bicarbonate 650 MG tablet Take 1 tablet by mouth 3 (Three) Times a Day.   10/22/2024    ondansetron (ZOFRAN) 4 MG tablet Take 1 tablet by mouth Every 6 (Six) Hours As Needed for Nausea or Vomiting.       polyethylene glycol (MIRALAX) 17 g packet Take 17 g by mouth Daily.          Allergies  Morphine    Scheduled Meds:acetaminophen, 1,000 mg, Oral, Q6H  aspirin, 81  "mg, Oral, Q12H  atorvastatin, 40 mg, Oral, Nightly  docusate sodium, 200 mg, Oral, BID  furosemide, 20 mg, Intravenous, Q12H  isosorbide mononitrate, 30 mg, Oral, Q24H  losartan, 100 mg, Oral, Q24H  metoprolol succinate XL, 25 mg, Oral, Q24H  sodium chloride, 10 mL, Intravenous, Q12H  tamsulosin, 0.4 mg, Oral, Daily      Continuous Infusions:     PRN Meds:.  Calcium Replacement - Follow Nurse / BPA Driven Protocol    cyclobenzaprine    hydrALAZINE    HYDROcodone-acetaminophen    HYDROcodone-acetaminophen    HYDROmorphone **AND** naloxone    hydrOXYzine    LORazepam    Magnesium Standard Dose Replacement - Follow Nurse / BPA Driven Protocol    ondansetron ODT **OR** ondansetron    ondansetron ODT **OR** [DISCONTINUED] ondansetron    Phosphorus Replacement - Follow Nurse / BPA Driven Protocol    Potassium Replacement - Follow Nurse / BPA Driven Protocol    [COMPLETED] Insert Peripheral IV **AND** sodium chloride    sodium chloride    traMADol    Objective     VITAL SIGNS  Vitals:    10/27/24 0130 10/27/24 0302 10/27/24 0503 10/27/24 0816   BP: 129/80  143/82 123/76   BP Location: Right arm  Right arm Right arm   Patient Position: Lying  Lying Lying   Pulse: 88  86 79   Resp: 18  18 18   Temp: 98 °F (36.7 °C)  97.9 °F (36.6 °C) 97.5 °F (36.4 °C)   TempSrc: Axillary  Oral Axillary   SpO2: 94% 91% 100% 98%   Weight:       Height:           Flowsheet Rows      Flowsheet Row First Filed Value   Admission Height 175.3 cm (69\") Documented at 10/22/2024 1419   Admission Weight 53.1 kg (117 lb) Documented at 10/22/2024 1419              Intake/Output Summary (Last 24 hours) at 10/27/2024 0906  Last data filed at 10/26/2024 2338  Gross per 24 hour   Intake 1067.24 ml   Output 325 ml   Net 742.24 ml        TELEMETRY: Sinus rhythm    Physical Exam:  The patient is alert, oriented and in no distress.  Vital signs as noted above.  Head and neck revealed no carotid bruits or jugular venous distention.  No thyromegaly or " lymphadenopathy is present  Lungs clear.  No wheezing.  Breath sounds are normal bilaterally.  Heart normal first and second heart sounds.  No murmur. No precordial rub is present.  No gallop is present.  Abdomen soft and nontender.  No organomegaly is present.  Extremities with good peripheral pulses without any pedal edema.  Status post hip surgery  Skin warm and dry.  Musculoskeletal system is grossly normal  CNS grossly normal    Reviewed and updated.  Results Review:   I reviewed the patient's new clinical results.  Lab Results (last 24 hours)       Procedure Component Value Units Date/Time    BNP [[id number]]  (Abnormal) Collected: 10/26/24 2231    Specimen: Blood from Arm, Right Updated: 10/27/24 0719     proBNP >70,000.0 pg/mL     Narrative:      This assay is used as an aid in the diagnosis of individuals suspected of having heart failure. It can be used as an aid in the diagnosis of acute decompensated heart failure (ADHF) in patients presenting with signs and symptoms of ADHF to the emergency department (ED). In addition, NT-proBNP of <300 pg/mL indicates ADHF is not likely.    Age Range Result Interpretation  NT-proBNP Concentration (pg/mL:      <50             Positive            >450                   Gray                 300-450                    Negative             <300    50-75           Positive            >900                  Gray                300-900                  Negative            <300      >75             Positive            >1800                  Gray                300-1800                  Negative            <300    Blood Gas, Arterial - [[id number]]  (Abnormal) Collected: 10/26/24 2245    Specimen: Arterial Blood Updated: 10/26/24 2258     Site Right Brachial     Raj's Test Positive     pH, Arterial 7.445 pH units      pCO2, Arterial 31.9 mm Hg      pO2, Arterial 94.2 mm Hg      HCO3, Arterial 21.9 mmol/L      Base Excess, Arterial -1.8 mmol/L      Comment: Serial Number:  [id number]:  [id number]        O2 Saturation, Arterial 97.7 %      CO2 Content 22.9 mmol/L      Barometric Pressure for Blood Gas --     Comment: N/A        Modality CPAP     FIO2 30 %      Hemodilution No     PO2/FIO2 314    Basic Metabolic Panel [[id number]]  (Abnormal) Collected: 10/26/24 2231    Specimen: Blood from Arm, Right Updated: 10/26/24 2255     Glucose 141 mg/dL      BUN 33 mg/dL      Creatinine 1.28 mg/dL      Sodium 142 mmol/L      Potassium 4.1 mmol/L      Chloride 106 mmol/L      CO2 22.0 mmol/L      Calcium 8.8 mg/dL      BUN/Creatinine Ratio 25.8     Anion Gap 14.0 mmol/L      eGFR 58.7 mL/min/1.73     Narrative:      GFR Normal >60  Chronic Kidney Disease <60  Kidney Failure <15    The GFR formula is only valid for adults with stable renal function between ages 18 and 70.    Hemoglobin & Hematocrit, Blood [[id number]]  (Abnormal) Collected: 10/26/24 2231    Specimen: Blood from Arm, Right Updated: 10/26/24 2237     Hemoglobin 8.9 g/dL      Hematocrit 26.7 %             Imaging Results (Last 24 Hours)       Procedure Component Value Units Date/Time    XR Chest 1 View [[id number]] Resulted: 10/27/24 0647     Updated: 10/27/24 0647    XR Hip 1 View Without Pelvis Right (Surgery Only) [[id number]] Collected: 10/26/24 1310     Updated: 10/26/24 1313    Narrative:      XR HIP 1 VIEW WO PELVIS RIGHT    Date of Exam: 10/26/2024 12:54 PM EDT    Indication: Post-Op bipolar Hip Arthroplasty    Comparison: 10/22/2024    Findings:  Patient is now status post hemiarthroplasty of the right hip with bipolar hip prosthesis. Prosthetic components are in anatomic position. There is no evidence of hardware complication. There is expected postoperative gas within the soft tissues.      Impression:      Impression:    1. Status post hemiarthroplasty of the right hip with bipolar hip prosthesis. No hardware complication.      Electronically Signed: Pietro Richards MD    10/26/2024 1:11 PM EDT    Workstation ID: [id number]     XR Chest 1 View [[id number]] Collected: 10/26/24 0910     Updated: 10/26/24 0914    Narrative:      XR CHEST 1 VW    Date of Exam: 10/26/2024 4:54 AM EDT    Indication: Pre-Op / Pre-Procedure    Comparison: AP chest x-ray 10/22/2024    Findings:  Compared to 10/22/2024 chest x-ray, there are diffusely increased interstitial and airspace opacities throughout both lungs. No pneumothorax or large pleural effusion is seen. Cardiac silhouette is enlarged. Postoperative changes are redemonstrated from   CABG.      Impression:      Impression:  Compared to 10/22/2024 chest x-ray, there are diffusely increased airspace and interstitial opacities throughout both lungs, suggestive of pulmonary edema or less likely atypical infection.      Electronically Signed: Rosi Peoples    10/26/2024 9:12 AM EDT    Workstation ID: [id number]        LAB RESULTS (LAST 7 DAYS)    CBC  Results from last 7 days   Lab Units 10/26/24  2231 10/26/24  0803 10/25/24  2332 10/24/24  2218 10/24/24  1801 10/24/24  1313 10/23/24  2324 10/23/24  0453 10/22/24  1508   WBC 10*3/mm3  --   --  8.76 9.93  --  12.27* 8.40 8.84 8.55   RBC 10*6/mm3  --   --  2.59* 3.02*  --  2.30* 2.45* 2.81* 2.82*   HEMOGLOBIN g/dL 8.9* 9.5* 7.9* 9.1* 9.1* 6.9* 7.5* 8.7* 8.7*   HEMATOCRIT % 26.7* 28.9* 23.4* 27.5* 27.4* 21.4* 22.5* 26.1* 26.3*   MCV fL  --   --  90.3 91.1  --  93.0 91.8 92.9 93.3   PLATELETS 10*3/mm3  --   --  128* 135*  --  137* 148 146 151       BMP  Results from last 7 days   Lab Units 10/26/24  2231 10/25/24  2332 10/24/24  2218 10/24/24  1313 10/23/24  2324 10/23/24  0453 10/22/24  1608   SODIUM mmol/L 142 139 138 139 138 139 137   POTASSIUM mmol/L 4.1 3.7 3.9 3.8 3.7 4.4 3.8   CHLORIDE mmol/L 106 104 100 101 99 99 99   CO2 mmol/L 22.0 25.9 28.1 30.1* 31.2* 29.0 28.3   BUN mg/dL 33* 24* 22 22 21 23 20   CREATININE mg/dL 1.28* 0.91 0.96 0.97 0.96 1.17 1.15   GLUCOSE mg/dL 141* 163* 160* 119* 149* 139* 112*       CMP   Results from last 7 days   Lab Units  10/26/24  2231 10/25/24  2332 10/24/24  2218 10/24/24  1313 10/23/24  2324 10/23/24  0453 10/22/24  1608 10/22/24  1508   SODIUM mmol/L 142 139 138 139 138 139 137 140   POTASSIUM mmol/L 4.1 3.7 3.9 3.8 3.7 4.4 3.8 4.0   CHLORIDE mmol/L 106 104 100 101 99 99 99 100   CO2 mmol/L 22.0 25.9 28.1 30.1* 31.2* 29.0 28.3 30.2*   BUN mg/dL 33* 24* 22 22 21 23 20 20   CREATININE mg/dL 1.28* 0.91 0.96 0.97 0.96 1.17 1.15 1.14   GLUCOSE mg/dL 141* 163* 160* 119* 149* 139* 112* 115*   ALBUMIN g/dL  --   --   --   --   --   --   --  3.9   BILIRUBIN mg/dL  --   --   --   --   --   --   --  2.3*   ALK PHOS U/L  --   --   --   --   --   --   --  92   AST (SGOT) U/L  --   --   --   --   --   --   --  23   ALT (SGPT) U/L  --   --   --   --   --   --   --  15         BNP        TROPONIN        CoAg  Results from last 7 days   Lab Units 10/24/24  0932 10/22/24  1508   INR  1.17* 1.20*   APTT seconds 33.0* 29.6       Creatinine Clearance  Estimated Creatinine Clearance: 38 mL/min (A) (by C-G formula based on SCr of 1.28 mg/dL (H)).    ABG  Results from last 7 days   Lab Units 10/26/24  2245   PH, ARTERIAL pH units 7.445   PCO2, ARTERIAL mm Hg 31.9*   PO2 ART mm Hg 94.2   O2 SATURATION ART % 97.7   BASE EXCESS ART mmol/L -1.8*       Radiology  XR Hip 1 View Without Pelvis Right (Surgery Only)    Result Date: 10/26/2024  Impression: 1. Status post hemiarthroplasty of the right hip with bipolar hip prosthesis. No hardware complication. Electronically Signed: Pietro Richards MD  10/26/2024 1:11 PM EDT  Workstation ID: [id number]    XR Chest 1 View    Result Date: 10/26/2024  Impression: Compared to 10/22/2024 chest x-ray, there are diffusely increased airspace and interstitial opacities throughout both lungs, suggestive of pulmonary edema or less likely atypical infection. Electronically Signed: Rosi Peoples  10/26/2024 9:12 AM EDT  Workstation ID: [id number]         EKG                I personally viewed and interpreted the patient's  EKG/Telemetry data:    ECHOCARDIOGRAM:    Results for orders placed during the hospital encounter of 08/23/24    Adult Transthoracic Echo Complete w/ Color, Spectral and Contrast if Necessary Per Protocol    Interpretation Summary    Left ventricular systolic function is severely decreased. Left ventricular ejection fraction appears to be 21 - 25%.  Akinetic mid to distal anterior wall and apex noted    The left ventricular cavity is mildly dilated.    Left ventricular wall thickness is consistent with mild concentric hypertrophy.    Left ventricular diastolic function was normal.    Estimated right ventricular systolic pressure from tricuspid regurgitation is normal (<35 mmHg).          STRESS TEST        Cardiolite (Tc-99m sestamibi) stress test    CARDIAC CATHETERIZATION  Results for orders placed during the hospital encounter of 08/23/24    Cardiac Catheterization/Vascular Study    Conclusion  CARDIAC CATHETERIZATION REPORT      DATE OF PROCEDURE:  8/24/2024    INDICATION FOR PROCEDURE:    Non-ST elevation myocardial infarction  Angina pectoris  CAD  CABG  Hypertension    PROCEDURE PERFORMED:    Ultrasound-guided access of femoral artery  Left heart catheterization  coronary angiography  Angiography of SVG graft and left HANNA.  left ventriculography    PROCEDURE COMMENTS:    After informed consent was obtained, the patient was prepped and draped in the usual sterile manner.  Mild to moderate sedation was administered.  Right femoral artery was accessed without difficulty under fluoroscopy and ultrasound guidance and 6 Mongolian arterial sheath was inserted.  Sheath was flushed with heparinized saline.    Using 6 Mongolian Tita catheters, first left coronary artery and the right coronary was electively engaged and appropriate views were taken.  A 6 Mongolian JR4 catheter was used to cross aortic valve and left heart catheterization was performed.  Left ventriculography was done in a WEBER view    Patient tolerated the  "procedure well.    FINDINGS:    1. HEMODYNAMICS:    Aortic pressure: 145/70 mmHg    LVEDP: 5 to 10 mmHg    Gradient across aortic valve on pullback: No gradient across aortic valve      2. LEFT VENTRICULOGRAPHY: 40%      3. CORONARY ANGIOGRAPHY:    A: Left main coronary artery: 25 to 30% stenosis in the ostium of left main.  At the bifurcation there is a 99% heavily calcified plaque/stenosis noted.    B: Left anterior descending artery: 100% occlusion of LAD in the proximal segment after the second .  LIMA to LAD is patent but distal to anastomosis there is diffuse disease of LAD followed by subtotal occlusion in the distal segment at the apex.  LIMA to LAD is attached to diagonal branch and LAD.    C: Left circumflex coronary artery: 100% occlusion of LCx at the ostium.  SVG graft to LCx is patent.  It retrograde fills second marginal.    D: Right coronary artery: RCA is diffusely diseased.  It is dominant.  Patient has 60 to 70% stenosis in the midsegment.  PLB branch which is a small caliber vessel less than 2 mm is diffusely diseased up to 80 to 90%.  It is not amenable to percutaneous intervention.  PDA has mild disease but no high-grade stenosis.    E: LIMA to LAD and diagonal is patent.  SVG graft to OM is patent    SUMMARY:    Three-vessel coronary artery disease  3/3 bypasses are patent  Severe disease of native coronary arteries  RCA is on grafted and has attenuated lesion in the midsegment.  Moderate left ventricular dysfunction    RECOMMENDATIONS:    Medical treatment                OTHER:         Assessment & Plan     Principal Problem:    Fracture of femoral neck, right      Primary cardiologist note was reviewed.    \"Fracture of right femoral neck  S/p fall  Orthopedic surgery following  Plan for right hip arthroplasty today  Surgery canceled yesterday due to anemia with H&H of 6.9/21.4  Improved following PRBC  Cardiac history to include severe CAD with 3/3 patent bypasses, severe disease " of native coronaries and diffuse disease in RCA  Severe LVEF dysfunction  Patient is moderate risk for surgery  Dr. Do discussed with patient and family  PT/OT     CAD  S/p CABG x 3 (2016)   Cardiac catheterization with 3 out of 3 bypass grafts patent, diffuse disease to RCA  Continue statin, Imdur, Ranexa, beta-blocker  DAPT on hold for surgery  Restart when okay with Ortho     HFrEF  Ischemic cardiomyopathy  Echocardiogram with significantly reduced LVEF of 21 to 25%  GDMT to include metoprolol succinate, losartan  Home hydralazine on hold  Consider SGLT2 inhibitor following surgical procedure     Hypertension  Blood pressure well-controlled  Continue Imdur, losartan, metoprolol succinate     Dyslipidemia  High intensity statin therapy  Total cholesterol 109, , HDL 44  Goal LDL less than 70     Malnutrition  Decreased appetite  Nutrition consult    ]]]]]]]]]]]]]]]]]]]]]]]  10/27/2024.    Patient had shortness of breath and anxiety last night.  New problem  proBNP is elevated.  (More than 70,000)  Patient will be given low-dose intravenous Lasix with close observation of renal function.    Recent fall and ischial fracture.  Status post anterior approach Right  Bipolar arthroplasty 10/26/2024  Patient did not have any specific postoperative cardiac issues.    Status post CABG  Patient is not having any angina pectoris.    Ischemic cardiomyopathy  HFrEF    Renal dysfunction  BUNs/creatinine 33/1.28: 10/27/2024    Hypertension dyslipidemia    Discussed with patient's family at bedside.    Close cardiac monitoring.    Further plan will depend on patient's progress.    Reviewed and updated 10/27/2024    Primary cardiologist Dr. Do will see the patient tomorrow.  ]]]]]]]]]]]]]]]]]]    Shalom Rawls MD  10/27/24  09:06 EDT

## 2024-10-27 NOTE — PLAN OF CARE
Goal Outcome Evaluation:  Plan of Care Reviewed With: patient, child        Progress: no change        Pt confused. Pt pain treated with dilaudid and anxiety treated with ativan. NGT not placed as family refused. Pt VS stable. Pt on 3L O2. Sitter at bedside. Personal items and call light within reach. Plan of care ongoing.

## 2024-10-27 NOTE — SIGNIFICANT NOTE
10/27/24 8144   OTHER   Discipline occupational therapist   Rehab Time/Intention   Session Not Performed other (see comments)  (Nursing requested therapy to hold OT due to pt being sedated & not following commands. Family also contemplating palliative care. Will f/u tomorrow as medically appropriate.)   Recommendation   OT - Next Appointment 10/28/24

## 2024-10-27 NOTE — NURSING NOTE
Patient presenting with Cheyne-Pablo since starting on the CPAP. Provider notified and ordered ABG.    RT notified and adjusted CPAP to A-VAP.

## 2024-10-28 NOTE — CONSULTS
Palliative Care Social Work Progress Note    Code Status:do not resuscitate    Goals of Care: Limited Additonal Intervention     Narrative: Per CM, [facility] has already been consulted. No palliative needs.     Plan: [facility] referral          Sade Jang

## 2024-10-28 NOTE — PLAN OF CARE
Goal Outcome Evaluation:      Patient is currently with hospice and the family is at bedside. Applied silicone dressing to coccyx and started using the wedge for Q2 turning. Heels boots currently on. FC in place. Incontinent care done. Not eating or drinking. Plan of care ongoing.

## 2024-10-28 NOTE — PROGRESS NOTES
[facility] MEDICINE SERVICE  DAILY PROGRESS NOTE    NAME: Alexei Merino III  : 1950  MRN: [id number]      LOS: 6 days     PROVIDER OF SERVICE: Guillermo Bear MD    Chief Complaint: Fracture of femoral neck, right    Subjective:     Interval History: Patient remains fairly unresponsive, currently on continuous BiPAP        Review of Systems   Constitutional:  Positive for appetite change and fatigue.   Musculoskeletal:  Positive for arthralgias and gait problem.   Neurological:  Positive for weakness.     Objective:     Vital Signs  Temp:  [96.8 °F (36 °C)-97.8 °F (36.6 °C)] 97.8 °F (36.6 °C)  Heart Rate:  [] 100  Resp:  [17-46] 21  BP: (125-149)/(75-97) 149/78  Flow (L/min) (Oxygen Therapy):  [3] 3   Body mass index is 17.28 kg/m².    Physical Exam   Vitals and nursing note reviewed.   Constitutional:       Appearance: He is underweight. He is ill-appearing.   HENT:      Head: Normocephalic and atraumatic.      Nose: Nose normal.      Mouth/Throat:      Mouth: Mucous membranes are moist.   Eyes:      Extraocular Movements: Extraocular movements intact.      Pupils: Pupils are equal, round, and reactive to light.   Cardiovascular:      Rate and Rhythm: Normal rate and regular rhythm.      Pulses: Normal pulses.   Pulmonary:      Effort: Pulmonary effort is normal.      Breath sounds: Normal breath sounds.   Abdominal:      General: Bowel sounds are normal.      Palpations: Abdomen is soft.   Musculoskeletal:         General: Swelling (right hip) and tenderness (right hip) present.      Cervical back: Normal range of motion and neck supple.      Comments: ROM limited of right leg secondary to pain   Skin:     General: Skin is warm.      Capillary Refill: Capillary refill takes less than 2 seconds.      Coloration: Skin is pale.   Neurological:      Mental Status: He is oriented to person, place, and time.        Diagnostic Data    Results from last 7 days   Lab Units 10/28/24  1710 10/22/24  7410  10/22/24  1508   WBC 10*3/mm3 10.09   < > 8.55   HEMOGLOBIN g/dL 8.9*   < > 8.7*   HEMATOCRIT % 27.8*   < > 26.3*   PLATELETS 10*3/mm3 55*   < > 151   GLUCOSE mg/dL 112*   < > 115*   CREATININE mg/dL 2.28*   < > 1.14   BUN mg/dL 61*   < > 20   SODIUM mmol/L 144   < > 140   POTASSIUM mmol/L 5.0   < > 4.0   AST (SGOT) U/L  --   --  23   ALT (SGPT) U/L  --   --  15   ALK PHOS U/L  --   --  92   BILIRUBIN mg/dL  --   --  2.3*   ANION GAP mmol/L 15.1*   < > 9.8    < > = values in this interval not displayed.       XR Chest 1 View    Result Date: 10/27/2024  Impression: Cardiomegaly and diffuse airspace opacity compatible with pulmonary edema. Findings slightly improved from the comparison. Atypical infection cannot be excluded Electronically Signed: Davis Catalan MD  10/27/2024 10:46 AM EDT  Workstation ID: [id number]       I reviewed the patient's new clinical results.  Scheduled Meds:acetaminophen, 1,000 mg, Oral, Q6H  aspirin, 81 mg, Oral, Q12H  atorvastatin, 40 mg, Oral, Nightly  docusate sodium, 200 mg, Oral, BID  furosemide, 20 mg, Intravenous, Daily  isosorbide mononitrate, 30 mg, Oral, Q24H  [Held by provider] losartan, 12.5 mg, Oral, Q24H  metoprolol succinate XL, 25 mg, Oral, Q24H  sodium chloride, 10 mL, Intravenous, Q12H  tamsulosin, 0.4 mg, Oral, Daily      Continuous Infusions:dextrose 5 % and sodium chloride 0.9 %, 50 mL/hr, Last Rate: Stopped (10/28/24 1243)      PRN Meds:.  Calcium Replacement - Follow Nurse / BPA Driven Protocol    cyclobenzaprine    hydrALAZINE    HYDROcodone-acetaminophen    HYDROcodone-acetaminophen    HYDROmorphone **AND** naloxone    hydrOXYzine    LORazepam    Magnesium Standard Dose Replacement - Follow Nurse / BPA Driven Protocol    ondansetron ODT **OR** ondansetron    ondansetron ODT **OR** [DISCONTINUED] ondansetron    Phosphorus Replacement - Follow Nurse / BPA Driven Protocol    Potassium Replacement - Follow Nurse / BPA Driven Protocol    [COMPLETED] Insert Peripheral IV  **AND** sodium chloride    sodium chloride    traMADol   Assessment/Plan:     Active and Resolved Problems  Active Hospital Problems    Diagnosis  POA    **Fracture of femoral neck, right [S72.001A]  Yes      Resolved Hospital Problems   No resolved problems to display.     Right hip fracture  -Underwent right AMY on 10/26  -Postoperatively has been unable to work with PT given his acute condition  -Requiring IV medication for pain control    Coronary artery disease-consulted cardiology  -Patient has previous CABG in 2016  -Currently on GDMT with antiplatelet therapy, statin     CHF-HFrEF  -Recent echocardiogram showed ejection fraction of 20 to 25%  -Currently on GDMT     Hyperlipidemia   -On statin therapy     Anxiety   -Patient has been requiring Ativan for agitation     Hypertension  -BP stable postoperatively,    Anorexia  -Patient has had decline in his p.o. intake over the last several weeks prior to admission per the family  -Family does not want feeding tube    VTE Prophylaxis:  Mechanical VTE prophylaxis orders are present.    Disposition Planning: I had a long discussion with the patient's family at the bedside on 10/28.  They agree that the patient's overall quality of life and condition have deteriorated significantly over the last several weeks.  They do not anticipate any significant improvement in his functional status currently.  They are agreeable to transition the patient to comfort care.  I will initiate comfort care medications and I have spoken to hospice regarding possible transition to inpatient hospice level of care.        Code Status and Medical Interventions: No CPR (Do Not Attempt to Resuscitate); Limited Support; No intubation (DNI)   Ordered at: 10/28/24 0440     Medical Intervention Limits:    No intubation (DNI)     Code Status (Patient has no pulse and is not breathing):    No CPR (Do Not Attempt to Resuscitate)     Medical Interventions (Patient has pulse or is breathing):     Limited Support       Signature: Electronically signed by Guillermo Bear MD, 10/28/24, 13:40 EDT.  [facility], [place] Hospitalist Team

## 2024-10-28 NOTE — CASE MANAGEMENT/SOCIAL WORK
Continued Stay Note   Cristino     Patient Name: Alexei Merino III  MRN: [id number]  Today's Date: 10/28/2024    Admit Date: 10/22/2024    Plan: Referral to [facility]. Pending evaluation.   Patient from [facility] ( since 8/28/24).  Okay to return if needed . No precert needed. No new PASRR  needed.   Discharge Plan       Row Name 10/28/24 1219       Plan    Plan Referral to [facility]. Pending evaluation.   Patient from [facility] ( since 8/28/24).  Okay to return if needed . No precert needed. No new PASRR  needed.    Plan Comments Dc barriers: [facility] consult.  pod #2 right hip surgery.                  Expected Discharge Date and Time       Expected Discharge Date Expected Discharge Time    Oct 29, 2024           Shefali Phelan RN    SIPS 1  [email or web address]  Office [phone]  Cell [phone]

## 2024-10-28 NOTE — PLAN OF CARE
Goal Outcome Evaluation: Upon rounding with day shift pt noted with 40 seconds of apnea on 3 liters. O2 sat remains WNL. Spoke with RT and pt placed on CPAP. Pt medicated for pain and pt remains less agitated. Pt remains mitt restraints and sitter at bedside.  Family will be in this am. Pt Plan of care ongoing

## 2024-10-28 NOTE — PLAN OF CARE
Goal Outcome Evaluation:     ST consulted w/ nursing this morning. Pt is going on hospice care, therefore ST will sign off this date. Please re-consult if needed.

## 2024-10-28 NOTE — NURSING NOTE
Dietary and patient's family requested to DC the regular diet order so that they were not disturbed since the patient cannot currently eat or drink. If the patient wakes up at any point and wants to eat/drink he is allowed a regular diet per MD.

## 2024-10-28 NOTE — DISCHARGE SUMMARY
[facility] Medicine Services  Discharge Summary    Date of Service: 10/28/2024  Patient Name: Alexei Merino III  : 1950  MRN: [id number]    Date of Admission: 10/22/2024  Discharge Diagnosis:   Right femoral neck fracture  CAD  Chronic systolic heart failure  Dyslipidemia  Anxiety  Hypertension  Anorexia  Acute apnea  Severe protein calorie malnutrition  Stage 2 partial thickness pressure injury to low left sacrum (POA)      Date of Discharge: 10/28/2024  Primary Care Physician: Raheem Mckeon MD      Presenting Problem:   Fracture of femoral neck, right [S72.001A]  Closed fracture of right hip, initial encounter [S72.001A]    Active and Resolved Hospital Problems:  Active Hospital Problems    Diagnosis POA    **Fracture of femoral neck, right [S72.001A] Yes      Resolved Hospital Problems   No resolved problems to display.         Hospital Course     HPI:    Patient is a 74-year-old male who presented to the hospital with right hip pain after fall.  Please see H&P for details.    Hospital Course:  The patient was admitted to the hospital with right hip fracture and underwent AMY on 10/26.  Postoperatively he was unable to work with PT as he had progression of his dementia with acute delirium.  Prior to admission, however the patient's family states that he had all overall a decline in his mental and physical capacity over the last several weeks with poor p.o. intake.  He overall had a very poor quality of life prior to admission.  During his hospital stay he developed acute episodes of apnea and hypoxia and was on continued BiPAP therapy for the last 24 hours prior to transitioning to hospice care.  I spoke with the patient's family at the bedside and they agreed that given the patient's overall poor prognosis, that he would benefit from comfort care needs.  He was receiving IV medications for pain control and I consulted hospice.  The patient is an excellent candidate for GIP.  He  will be transitioned to [facility] hospice.          Day of Discharge     Vital Signs:  Temp:  [96.8 °F (36 °C)-97.8 °F (36.6 °C)] 97.8 °F (36.6 °C)  Heart Rate:  [] 100  Resp:  [17-46] 21  BP: (125-149)/(75-97) 149/78  Flow (L/min) (Oxygen Therapy):  [3] 3    Physical Exam:  Physical Exam   General Appearance: Minimally responsive  Head:  Normocephalic, without obvious abnormality, atraumatic  Eyes:  PERRL, conjunctiva/corneas clear, EOM's intact, fundi benign, both eyes  Ears:  Normal TM's and external ear canals, both ears  Nose: Nares normal, septum midline, mucosa normal, no drainage or sinus tenderness  Throat: Lips, mucosa, and tongue normal; teeth and gums normal  Neck: Supple, symmetrical, trachea midline, no adenopathy, thyroid: not enlarged, symmetric, no tenderness/mass/nodules, no carotid bruit or JVD  Lungs:   Clear to auscultation bilaterally, respirations unlabored  Heart:  Regular rate and rhythm, S1, S2 normal, no murmur, rub or gallop  Abdomen:  Soft, non-tender, bowel sounds active all four quadrants,  no masses, no organomegaly  Extremities: Extremities normal, atraumatic, no cyanosis or edema  Pulses: 2+ and symmetric  Skin: Skin color, texture, turgor normal, no rashes or lesions  Neurologic: Normal        Pertinent  and/or Most Recent Results     LAB RESULTS:      Lab 10/28/24  0429 10/26/24  2231 10/26/24  0803 10/25/24  2332 10/24/24  2218 10/24/24  1801 10/24/24  1313 10/24/24  0932 10/23/24  2324 10/23/24  0453 10/22/24  1508   WBC 10.09  --   --  8.76 9.93  --  12.27*  --  8.40   < > 8.55   HEMOGLOBIN 8.9* 8.9* 9.5* 7.9* 9.1*   < > 6.9*  --  7.5*   < > 8.7*   HEMATOCRIT 27.8* 26.7* 28.9* 23.4* 27.5*   < > 21.4*  --  22.5*   < > 26.3*   PLATELETS 55*  --   --  128* 135*  --  137*  --  148   < > 151   NEUTROS ABS 7.93*  --   --  7.22* 7.52*  --  9.72*  --  6.19   < > 6.90   IMMATURE GRANS (ABS) 0.06*  --   --  0.04 0.04  --  0.05  --  0.02   < > 0.03   LYMPHS ABS 0.41*  --   --  0.43* 0.95   --  0.90  --  0.86   < > 0.78   MONOS ABS 1.68*  --   --  1.05* 1.21*  --  1.45*  --  0.99*   < > 0.81   EOS ABS 0.00  --   --  0.01 0.20  --  0.13  --  0.31   < > 0.02   MCV 93.0  --   --  90.3 91.1  --  93.0  --  91.8   < > 93.3   PROTIME  --   --   --   --   --   --   --  12.6*  --   --  12.9*   APTT  --   --   --   --   --   --   --  33.0*  --   --  29.6    < > = values in this interval not displayed.         Lab 10/28/24  0429 10/26/24  2231 10/25/24  2332 10/24/24  2218 10/24/24  1313   SODIUM 144 142 139 138 139   POTASSIUM 5.0 4.1 3.7 3.9 3.8   CHLORIDE 109* 106 104 100 101   CO2 19.9* 22.0 25.9 28.1 30.1*   ANION GAP 15.1* 14.0 9.1 9.9 7.9   BUN 61* 33* 24* 22 22   CREATININE 2.28* 1.28* 0.91 0.96 0.97   EGFR 29.4* 58.7* 88.4 82.9 81.9   GLUCOSE 112* 141* 163* 160* 119*   CALCIUM 8.5* 8.8 9.0 9.2 8.9         Lab 10/22/24  1508   TOTAL PROTEIN 5.6*   ALBUMIN 3.9   GLOBULIN 1.7   ALT (SGPT) 15   AST (SGOT) 23   BILIRUBIN 2.3*   ALK PHOS 92         Lab 10/26/24  2231 10/24/24  0932 10/22/24  1508   PROBNP >70,000.0*  --   --    PROTIME  --  12.6* 12.9*   INR  --  1.17* 1.20*             Lab 10/26/24  0225 10/22/24  1516   ABO TYPING A A   RH TYPING Positive Positive   ANTIBODY SCREEN Negative Negative         Lab 10/26/24  2245 10/26/24  0608   PH, ARTERIAL 7.445  --    PCO2, ARTERIAL 31.9*  --    PO2 ART 94.2  --    O2 SATURATION ART 97.7  --    FIO2 30 32   HCO3 ART 21.9  --    BASE EXCESS ART -1.8*  --      Brief Urine Lab Results  (Last result in the past 365 days)        Color   Clarity   Blood   Leuk Est   Nitrite   Protein   CREAT   Urine HCG        10/25/24 1823 Dark Yellow   Cloudy   Large (3+)   Small (1+)   Positive   >=300 mg/dL (3+)                 Microbiology Results (last 10 days)       Procedure Component Value - Date/Time    CANDIDA AURIS PCR - Swab, Axilla Right, Axilla Left and Groin [[id number]]  (Normal) Collected: 10/22/24 2056    Lab Status: Final result Specimen: Swab from Axilla Right,  Axilla Left and Groin Updated: 10/24/24 1137     CANDIDA AURIS PCR Not Detected            XR Chest 1 View    Result Date: 10/27/2024  Impression: Impression: Cardiomegaly and diffuse airspace opacity compatible with pulmonary edema. Findings slightly improved from the comparison. Atypical infection cannot be excluded Electronically Signed: Davis Catalan MD  10/27/2024 10:46 AM EDT  Workstation ID: [id number]    XR Hip 1 View Without Pelvis Right (Surgery Only)    Result Date: 10/26/2024  Impression: Impression: 1. Status post hemiarthroplasty of the right hip with bipolar hip prosthesis. No hardware complication. Electronically Signed: Pietro Richards MD  10/26/2024 1:11 PM EDT  Workstation ID: [id number]    XR Chest 1 View    Result Date: 10/26/2024  Impression: Impression: Compared to 10/22/2024 chest x-ray, there are diffusely increased airspace and interstitial opacities throughout both lungs, suggestive of pulmonary edema or less likely atypical infection. Electronically Signed: Rosi Peoples  10/26/2024 9:12 AM EDT  Workstation ID: [id number]    XR Chest 1 View    Result Date: 10/22/2024  Impression: Impression: Stable cardiomegaly with evidence of previous CABG. No active cardiopulmonary disease. Electronically Signed: Leoncio Tan DO  10/22/2024 10:48 PM EDT  Workstation ID: [id number]    XR Femur 2 View Right    Result Date: 10/22/2024  Impression: Impression: Subcapital left hip fracture. No additional femoral fractures are identified. Electronically Signed: Emilie Nesbitt MD  10/22/2024 4:07 PM EDT  Workstation ID: [id number]    XR Hip With or Without Pelvis 2 - 3 View Right    Result Date: 10/22/2024  Impression: Impression: Acute right femoral neck fracture not clearly involving the lesser or greater trochanter. Maintained right hip joint alignment. Electronically Signed: José Miguel Dumas MD  10/22/2024 2:57 PM EDT  Workstation ID: [id number]     Results for orders placed during the hospital encounter of  08/23/24    Doppler Arterial Multi Level Lower Extremity - Bilateral CAR    Interpretation Summary    Right Conclusion: The right PIO is moderately reduced. Mild digital insufficiency.    Left Conclusion: The left PIO is normal. Normal digital pressures.      Results for orders placed during the hospital encounter of 08/23/24    Doppler Arterial Multi Level Lower Extremity - Bilateral CAR    Interpretation Summary    Right Conclusion: The right PIO is moderately reduced. Mild digital insufficiency.    Left Conclusion: The left PIO is normal. Normal digital pressures.      Results for orders placed during the hospital encounter of 08/23/24    Adult Transthoracic Echo Complete w/ Color, Spectral and Contrast if Necessary Per Protocol    Interpretation Summary    Left ventricular systolic function is severely decreased. Left ventricular ejection fraction appears to be 21 - 25%.  Akinetic mid to distal anterior wall and apex noted    The left ventricular cavity is mildly dilated.    Left ventricular wall thickness is consistent with mild concentric hypertrophy.    Left ventricular diastolic function was normal.    Estimated right ventricular systolic pressure from tricuspid regurgitation is normal (<35 mmHg).      Labs Pending at Discharge:      Procedures Performed  Procedure(s):  JUDSON HIP ARTHROPLASTY ANTERIOR         Consults:   Consults       Date and Time Order Name Status Description    10/28/2024 10:28 AM Inpatient Nephrology Consult      10/25/2024  2:29 PM Inpatient Urology Consult Completed     10/23/2024  3:29 AM Inpatient Cardiology Consult Completed     10/22/2024  3:11 PM Hospitalist (on-call MD unless specified)      10/22/2024  3:10 PM Inpatient Orthopedic Surgery Consult Completed               Discharge Details        Discharge Medications        ASK your doctor about these medications        Instructions Start Date   aspirin 81 MG EC tablet   81 mg, Daily      atorvastatin 40 MG tablet  Commonly known  as: LIPITOR   40 mg, Oral, Nightly      cetirizine 5 MG tablet  Commonly known as: zyrTEC   5 mg, Daily      clopidogrel 75 MG tablet  Commonly known as: PLAVIX   75 mg, Oral, Daily      ferrous gluconate 324 (37.5 Fe) MG tablet tablet   324 mg, Daily PRN      hydrALAZINE 25 MG tablet  Commonly known as: APRESOLINE  Ask about: Which instructions should I use?   25 mg, Oral, 2 Times Daily, Give if SBP greater than 160 or DBP greater than 90.      hydrOXYzine 25 MG tablet  Commonly known as: ATARAX  Ask about: Which instructions should I use?   25 mg, Oral, 2 Times Daily      hydrOXYzine 25 MG tablet  Commonly known as: ATARAX  Ask about: Which instructions should I use?   25 mg, Oral, Daily PRN      ibuprofen 200 MG tablet  Commonly known as: ADVIL,MOTRIN   400 mg, Every 6 Hours PRN      isosorbide mononitrate 30 MG 24 hr tablet  Commonly known as: IMDUR  Ask about: Which instructions should I use?   30 mg, Oral, Daily, Hold for BP <100/60      losartan 100 MG tablet  Commonly known as: COZAAR  Ask about: Which instructions should I use?   100 mg, Oral, Daily      magnesium hydroxide 2400 MG/10ML suspension suspension  Commonly known as: MILK OF MAGNESIA   30 mL, Oral, Daily PRN      ondansetron 4 MG tablet  Commonly known as: ZOFRAN  Ask about: Which instructions should I use?   4 mg, Oral, Every 6 Hours PRN      polyethylene glycol 17 g packet  Commonly known as: MIRALAX   17 g, Oral, Daily      ranolazine 500 MG 12 hr tablet  Commonly known as: RANEXA   500 mg, Oral, Every 12 Hours Scheduled      sodium bicarbonate 650 MG tablet   650 mg, 3 Times Daily               Allergies   Allergen Reactions    Morphine Anaphylaxis         Discharge Disposition: GIP hospice      Diet:  Hospital:  Diet Order   Procedures    NPO Diet NPO Type: Strict NPO         Discharge Activity:         CODE STATUS:  Code Status and Medical Interventions: No CPR (Do Not Attempt to Resuscitate); Limited Support; No intubation (DNI)   Ordered  at: 10/28/24 0440     Medical Intervention Limits:    No intubation (DNI)     Code Status (Patient has no pulse and is not breathing):    No CPR (Do Not Attempt to Resuscitate)     Medical Interventions (Patient has pulse or is breathing):    Limited Support         Future Appointments   Date Time Provider Department Center   3/13/2025  3:45 PM Avila Do MD [facility]           Time spent on Discharge including face to face service: Greater than 30 minutes    Signature: Electronically signed by Guillermo Bear MD, 10/28/24, 14:08 EDT.  [facility], [place] Hospitalist Team

## 2024-10-28 NOTE — SIGNIFICANT NOTE
10/28/24 1447   OTHER   Discipline occupational therapist   Rehab Time/Intention   Session Not Performed unable to evaluate, medical status change;other (see comments)  (Pt sedated and not following commands. OT will follow up at another time.)   Recommendation   OT - Next Appointment 10/29/24

## 2024-10-28 NOTE — PROGRESS NOTES
Urology Progress Note    Patient Identification:  Name:  Alexei Merino III  Age:  74 y.o.  Sex:  male  :  1950  MRN:  [id number]    Chief Complaint:  Patient is very confused    History of Present Illness: Patient is very confused and agitated at times.  He is not able to get out of bed yet.  He has had surgical repair of his right hip fracture.  Chatterjee catheter in place with yellow urine.    Problem List:    Fracture of femoral neck, right     Past Medical History:  Past Medical History:   Diagnosis Date    Anxiety     Aortic aneurysm     Coronary artery disease     Dementia     per EASTON Lopez, patient was diagnosed 7 years ago    Emphysema lung     Gastroparesis     per Jessica    [place] (hard of hearing)     complete deaf    Hyperlipidemia     Hypertension     Myocardial infarction     STEMI (ST elevation myocardial infarction) 2024     Past Surgical History:  Past Surgical History:   Procedure Laterality Date    ABDOMINAL AORTIC ANEURYSM REPAIR  2022    CARDIAC CATHETERIZATION Right 2024    Procedure: LEFT HEART CATH with possible PCI;  Surgeon: Omer Vieyra MD;  Location: [facility] CATH INVASIVE LOCATION;  Service: Cardiovascular;  Laterality: Right;  Local and IV sedation    CORONARY ARTERY BYPASS GRAFT  03/13/2016    X3  Dr Hong     Home Meds:  Medications Prior to Admission   Medication Sig Dispense Refill Last Dose/Taking    aspirin 81 MG EC tablet Take 1 tablet by mouth Daily.   10/22/2024    atorvastatin (LIPITOR) 40 MG tablet Take 1 tablet by mouth Every Night.   10/21/2024    cetirizine (zyrTEC) 5 MG tablet Take 1 tablet by mouth Daily.   10/22/2024    clopidogrel (PLAVIX) 75 MG tablet Take 1 tablet by mouth Daily.   10/22/2024    ferrous gluconate 324 (37.5 Fe) MG tablet tablet Take 1 tablet by mouth Daily As Needed.   10/21/2024    hydrALAZINE (APRESOLINE) 25 MG tablet Take 1 tablet by mouth 2 (Two) Times a Day. Give if SBP greater than 160 or DBP greater than 90.   10/21/2024     hydrOXYzine (ATARAX) 25 MG tablet Take 1 tablet by mouth 2 (Two) Times a Day.   10/22/2024    hydrOXYzine (ATARAX) 25 MG tablet Take 1 tablet by mouth Daily As Needed for Anxiety.   10/21/2024    ibuprofen (ADVIL,MOTRIN) 200 MG tablet Take 2 tablets by mouth Every 6 (Six) Hours As Needed for Mild Pain.   10/21/2024    isosorbide mononitrate (IMDUR) 30 MG 24 hr tablet Take 1 tablet by mouth Daily. Hold for BP <100/60   10/22/2024    losartan (COZAAR) 100 MG tablet Take 1 tablet by mouth Daily.   10/22/2024    magnesium hydroxide (MILK OF MAGNESIA) 2400 MG/10ML suspension suspension Take 30 mL by mouth Daily As Needed.   10/21/2024    ranolazine (RANEXA) 500 MG 12 hr tablet Take 1 tablet by mouth Every 12 (Twelve) Hours.   10/22/2024    sodium bicarbonate 650 MG tablet Take 1 tablet by mouth 3 (Three) Times a Day.   10/22/2024    ondansetron (ZOFRAN) 4 MG tablet Take 1 tablet by mouth Every 6 (Six) Hours As Needed for Nausea or Vomiting.       polyethylene glycol (MIRALAX) 17 g packet Take 17 g by mouth Daily.        Current Meds:    Current Facility-Administered Medications:     acetaminophen (TYLENOL) tablet 1,000 mg, 1,000 mg, Oral, Q6H, Cade Velasquez MD, 1,000 mg at 10/26/24 1636    aspirin EC tablet 81 mg, 81 mg, Oral, Q12H, Cade Velasquez MD    atorvastatin (LIPITOR) tablet 40 mg, 40 mg, Oral, Nightly, Cade Velasquez MD, 40 mg at 10/24/24 2050    Calcium Replacement - Follow Nurse / BPA Driven Protocol, , Does not apply, PRNEva David Philip, MD    cyclobenzaprine (FLEXERIL) tablet 5 mg, 5 mg, Oral, TID PRN, Cade Velasquez MD, 5 mg at 10/24/24 1945    dextrose 5 % and sodium chloride 0.9 % infusion, 50 mL/hr, Intravenous, Continuous, Norbert Medrano MD, Last Rate: 50 mL/hr at 10/27/24 1709, 50 mL/hr at 10/27/24 1709    docusate sodium (COLACE) capsule 200 mg, 200 mg, Oral, BID, Cade Velasquez MD    furosemide (LASIX) injection 20 mg, 20 mg, Intravenous, Daily,  Norbert Medrano MD    hydrALAZINE (APRESOLINE) injection 10 mg, 10 mg, Intravenous, Q4H PRN, Lula Amador APRN    HYDROcodone-acetaminophen (NORCO) 7.5-325 MG per tablet 1 tablet, 1 tablet, Oral, Q4H PRN, Cade Velasquez MD    HYDROcodone-acetaminophen (NORCO) 7.5-325 MG per tablet 2 tablet, 2 tablet, Oral, Q4H PRN, Cade Velasquez MD    HYDROmorphone (DILAUDID) injection 0.5 mg, 0.5 mg, Intravenous, Q2H PRN, 0.5 mg at 10/27/24 2140 **AND** naloxone (NARCAN) injection 0.1 mg, 0.1 mg, Intravenous, Q5 Min PRN, Cade Velasquez MD    hydrOXYzine (ATARAX) tablet 25 mg, 25 mg, Oral, TID PRN, Norbert Medrano MD, 25 mg at 10/26/24 1652    isosorbide mononitrate (IMDUR) 24 hr tablet 30 mg, 30 mg, Oral, Q24H, Cade Velasquez MD, 30 mg at 10/25/24 0858    LORazepam (ATIVAN) injection 0.5 mg, 0.5 mg, Intravenous, Q4H PRN, Norbert Medrano MD, 0.5 mg at 10/28/24 0751    losartan (COZAAR) tablet 12.5 mg, 12.5 mg, Oral, Q24H, Norbert Medrano MD    Magnesium Standard Dose Replacement - Follow Nurse / BPA Driven Protocol, , Does not apply, PRN, Cade Velasquez MD    metoprolol succinate XL (TOPROL-XL) 24 hr tablet 25 mg, 25 mg, Oral, Q24H, Cade Velasquez MD, 25 mg at 10/25/24 0858    ondansetron ODT (ZOFRAN-ODT) disintegrating tablet 4 mg, 4 mg, Translingual, Q6H PRN **OR** ondansetron (ZOFRAN) injection 4 mg, 4 mg, Intravenous, Q6H PRN, Cade Velasquez MD    ondansetron ODT (ZOFRAN-ODT) disintegrating tablet 4 mg, 4 mg, Oral, Q6H PRN **OR** [DISCONTINUED] ondansetron (ZOFRAN) injection 4 mg, 4 mg, Intravenous, Q6H PRN, Cade Velasquez MD, 4 mg at 10/22/24 2031    Phosphorus Replacement - Follow Nurse / BPA Driven Protocol, , Does not apply, PREva GONZALEZ David Philip, MD    Potassium Replacement - Follow Nurse / BPA Driven Protocol, , Does not apply, Eva MENDEZ David Philip, MD    [COMPLETED] Insert Peripheral IV, , , Once **AND** sodium chloride 0.9 % flush  "10 mL, 10 mL, Intravenous, PRN, Cade Velasquez MD    sodium chloride 0.9 % flush 10 mL, 10 mL, Intravenous, Q12H, Cade Velasquez MD, 10 mL at 10/27/24 0800    sodium chloride 0.9 % flush 10 mL, 10 mL, Intravenous, PRN, Cade Velasquez MD    tamsulosin (FLOMAX) 24 hr capsule 0.4 mg, 0.4 mg, Oral, Daily, Cade Velasquez MD, 0.4 mg at 10/25/24 1819    traMADol (ULTRAM) tablet 50 mg, 50 mg, Oral, Q6H PRN, Cade Velasquez MD  Allergies:  Morphine    Review of Systems     Objective:  tMax 24 hours:  Temp (24hrs), Av.2 °F (36.2 °C), Min:96.9 °F (36.1 °C), Max:97.6 °F (36.4 °C)    Vital Sign Ranges:  Temp:  [96.9 °F (36.1 °C)-97.6 °F (36.4 °C)] 97.6 °F (36.4 °C)  Heart Rate:  [] 94  Resp:  [17-46] 32  BP: (123-142)/(76-97) 135/90  Intake and Output Last 3 Shifts:  I/O last 3 completed shifts:  In: 519.8 [I.V.:519.8]  Out: 375 [Urine:375]    Exam:  /90 (BP Location: Right arm, Patient Position: Lying)   Pulse 94   Temp 97.6 °F (36.4 °C) (Axillary)   Resp (!) 32   Ht 175.3 cm (69\")   Wt 53.1 kg (117 lb)   SpO2 100%   BMI 17.28 kg/m²    General Appearance:    no acute distress, general         appearance is normal   Head:    Normocephalic, without obvious abnormality, atraumatic   Eyes:            Pupils/Irises normal. Exterior inspection conjunctivae       and lids normal.   Ears:    Normal external inspection   Nose:   Exterior inspection of nose is normal   Throat:   Lips, mucosa, and tongue normal   Lungs:     Respirations unlabored; normal effort, no audible     abnormality   CV:   Regular rhythm and normal rate, no edema   Abdomen:     examination of the abdomen is normal with     no masses, tenderness, or distension    : Chatterjee with yellow urine     Data Review:  All labs (24hrs):    Lab Results (last 24 hours)       Procedure Component Value Units Date/Time    CBC & Differential [[id number]]  (Abnormal) Collected: 10/28/24 0429    Specimen: Blood from Arm, Right " Updated: 10/28/24 0638    Narrative:      The following orders were created for panel order CBC & Differential.  Procedure                               Abnormality         Status                     ---------                               -----------         ------                     CBC Auto Differential[[id number]]        Abnormal            Final result               Scan Slide[[id number]]                                       Final result                 Please view results for these tests on the individual orders.    CBC Auto Differential [[id number]]  (Abnormal) Collected: 10/28/24 0429    Specimen: Blood from Arm, Right Updated: 10/28/24 0638     WBC 10.09 10*3/mm3      RBC 2.99 10*6/mm3      Hemoglobin 8.9 g/dL      Hematocrit 27.8 %      MCV 93.0 fL      MCH 29.8 pg      MCHC 32.0 g/dL      RDW 14.9 %      RDW-SD 50.4 fl      MPV --     Comment: Unable to Calculate          Platelets 55 10*3/mm3      Neutrophil % 78.5 %      Lymphocyte % 4.1 %      Monocyte % 16.7 %      Eosinophil % 0.0 %      Basophil % 0.1 %      Immature Grans % 0.6 %      Neutrophils, Absolute 7.93 10*3/mm3      Lymphocytes, Absolute 0.41 10*3/mm3      Monocytes, Absolute 1.68 10*3/mm3      Eosinophils, Absolute 0.00 10*3/mm3      Basophils, Absolute 0.01 10*3/mm3      Immature Grans, Absolute 0.06 10*3/mm3      nRBC 0.0 /100 WBC     Scan Slide [[id number]] Collected: 10/28/24 0429    Specimen: Blood from Arm, Right Updated: 10/28/24 0638     Crenated RBC's Large/3+     RBC Fragments Mod/2+     WBC Morphology Normal     Platelet Estimate Decreased    Basic Metabolic Panel [[id number]]  (Abnormal) Collected: 10/28/24 0429    Specimen: Blood from Arm, Right Updated: 10/28/24 0529     Glucose 112 mg/dL      BUN 61 mg/dL      Creatinine 2.28 mg/dL      Sodium 144 mmol/L      Potassium 5.0 mmol/L      Comment: Specimen hemolyzed.  Result may be falsely elevated.        Chloride 109 mmol/L      CO2 19.9 mmol/L      Calcium 8.5 mg/dL       BUN/Creatinine Ratio 26.8     Anion Gap 15.1 mmol/L      eGFR 29.4 mL/min/1.73     Narrative:      GFR Normal >60  Chronic Kidney Disease <60  Kidney Failure <15    The GFR formula is only valid for adults with stable renal function between ages 18 and 70.          Radiology:   Imaging Results (Last 72 Hours)       Procedure Component Value Units Date/Time    XR Chest 1 View [[id number]] Collected: 10/27/24 1045     Updated: 10/27/24 1048    Narrative:      XR CHEST 1 VW    Date of Exam: 10/27/2024 6:47 AM EDT    Indication: pulmonary edema    Comparison: 10/26/2024 and prior    Findings:  Study is limited by overlying support and monitoring apparatus. Patient is status post median sternotomy and CABG. The heart size is enlarged and the pulmonary vascularity is congested and indistinct diffuse increased groundglass and interstitial   opacities with a perihilar and bibasilar predominance are similar to slightly improved from comparison accounting for differences in technique. No significant pleural effusion is noted.      Impression:      Impression:  Cardiomegaly and diffuse airspace opacity compatible with pulmonary edema. Findings slightly improved from the comparison. Atypical infection cannot be excluded      Electronically Signed: Davis Catalan MD    10/27/2024 10:46 AM EDT    Workstation ID: [id number]    XR Hip 1 View Without Pelvis Right (Surgery Only) [[id number]] Collected: 10/26/24 1310     Updated: 10/26/24 1313    Narrative:      XR HIP 1 VIEW WO PELVIS RIGHT    Date of Exam: 10/26/2024 12:54 PM EDT    Indication: Post-Op bipolar Hip Arthroplasty    Comparison: 10/22/2024    Findings:  Patient is now status post hemiarthroplasty of the right hip with bipolar hip prosthesis. Prosthetic components are in anatomic position. There is no evidence of hardware complication. There is expected postoperative gas within the soft tissues.      Impression:      Impression:    1. Status post hemiarthroplasty of the  right hip with bipolar hip prosthesis. No hardware complication.      Electronically Signed: Pietro Richards MD    10/26/2024 1:11 PM EDT    Workstation ID: [id number]    XR Chest 1 View [[id number]] Collected: 10/26/24 0910     Updated: 10/26/24 0914    Narrative:      XR CHEST 1 VW    Date of Exam: 10/26/2024 4:54 AM EDT    Indication: Pre-Op / Pre-Procedure    Comparison: AP chest x-ray 10/22/2024    Findings:  Compared to 10/22/2024 chest x-ray, there are diffusely increased interstitial and airspace opacities throughout both lungs. No pneumothorax or large pleural effusion is seen. Cardiac silhouette is enlarged. Postoperative changes are redemonstrated from   CABG.      Impression:      Impression:  Compared to 10/22/2024 chest x-ray, there are diffusely increased airspace and interstitial opacities throughout both lungs, suggestive of pulmonary edema or less likely atypical infection.      Electronically Signed: Rosi Peoples    10/26/2024 9:12 AM EDT    Workstation ID: [id number]            Assessment/Plan:    Principal Problem:    Fracture of femoral neck, right    Urinary retention  BPH with lower urinary tract symptoms exacerbated weakness of acute illness and decreased mobility    Plan  Continue Flomax  Voiding trial when patient is ambulatory      Cristhian Rich MD  10/28/2024  07:57 EDT

## 2024-10-28 NOTE — DISCHARGE PLACEMENT REQUEST
"Bella Orellana III \"Chip\" (74 y.o. Male)       Date of Birth   1950    Social Security Number       Address   [address] [place] [address] [place] [facility]    Home Phone   [phone]    MRN   [id number]       [employer]   None    Marital Status                               Admission Date   10/22/24    Admission Type   Emergency    Admitting Provider   Amira Traore MD    Attending Provider   Guillermo Bear MD    Department, Room/Bed   [facility] SURGICAL INPATIENT, 4121/1       Discharge Date       Discharge Disposition       Discharge Destination                                 Attending Provider: Guillermo Bear MD    Allergies: Morphine    Isolation: None   Infection: None   Code Status: No CPR    Ht: 175.3 cm (69\")   Wt: 53.1 kg (117 lb)    Admission Cmt: None   Principal Problem: Fracture of femoral neck, right [S72.001A]                   Active Insurance as of 10/22/2024       Primary Coverage       Payor Plan Insurance Group Employer/Plan Group    MEDICARE MEDICARE A & B        Payor Plan Address Payor Plan Phone Number Payor Plan Fax Number Effective Dates    [address] [phone]  10/1/2015 - None Entered    [facility] [id number]         Subscriber Name Subscriber Birth Date Member ID       BELLA ORELLANA III 1950 [id number]               Secondary Coverage       Payor Plan Insurance Group Employer/Plan Group    [employer] [place] [employer] [place]        Payor Plan Address Payor Plan Phone Number Payor Plan Fax Number Effective Dates    [address] [employer] [place] [address] [phone]  10/1/2015 - None Entered    [facility] [id number]         Subscriber Name Subscriber Birth Date Member ID       BELLA ORELLANA III 1950 [id number]                     Emergency Contacts        (Rel.) Home Phone Work Phone Mobile Phone    Jessica Arias (Care Giver) [phone]    Lindsey Varela (Daughter) -- -- [phone]    WilberRadha (Daughter) [phone] " [phone]

## 2024-10-28 NOTE — PROGRESS NOTES
CARDIOLOGY PROGRESS NOTE:    Alexei Merino III  74 y.o.  male  1950  [id number]      Referring Provider: Norbert Medrano MD     Reason for follow-up: s/p fall and hip fracture, preop risk stratification      Patient Care Team:  Raheem Mckeon MD as PCP - General (Internal Medicine)  Haley Shipman APRN as Nurse Practitioner (Cardiology)    Subjective .  Patient seen and examined.  Labs and chart reviewed.  Patient POD 2 right hip arthroplasty, remains confused. proBNP significantly elevated, IV diuresis initiated,  renal function worsening     Objective  Patient lying in bed resting comfortably in restraints with sitter at beside      Review of Systems   Unable to perform ROS: Mental status change       Allergies: Morphine    Scheduled Meds:acetaminophen, 1,000 mg, Oral, Q6H  aspirin, 81 mg, Oral, Q12H  atorvastatin, 40 mg, Oral, Nightly  docusate sodium, 200 mg, Oral, BID  furosemide, 20 mg, Intravenous, Daily  isosorbide mononitrate, 30 mg, Oral, Q24H  losartan, 12.5 mg, Oral, Q24H  metoprolol succinate XL, 25 mg, Oral, Q24H  sodium chloride, 10 mL, Intravenous, Q12H  tamsulosin, 0.4 mg, Oral, Daily      Continuous Infusions:dextrose 5 % and sodium chloride 0.9 %, 50 mL/hr, Last Rate: 50 mL/hr (10/27/24 1709)      PRN Meds:.  Calcium Replacement - Follow Nurse / BPA Driven Protocol    cyclobenzaprine    hydrALAZINE    HYDROcodone-acetaminophen    HYDROcodone-acetaminophen    HYDROmorphone **AND** naloxone    hydrOXYzine    LORazepam    Magnesium Standard Dose Replacement - Follow Nurse / BPA Driven Protocol    ondansetron ODT **OR** ondansetron    ondansetron ODT **OR** [DISCONTINUED] ondansetron    Phosphorus Replacement - Follow Nurse / BPA Driven Protocol    Potassium Replacement - Follow Nurse / BPA Driven Protocol    [COMPLETED] Insert Peripheral IV **AND** sodium chloride    sodium chloride    traMADol      VITAL SIGNS  Vitals:    10/28/24 0327 10/28/24 0329 10/28/24 0816 10/28/24 0822   BP:   "135/90 131/75    BP Location:  Right arm Left arm    Patient Position:  Lying Lying    Pulse: 82 94 99    Resp: 17 (!) 32 24    Temp:  97.6 °F (36.4 °C) 96.8 °F (36 °C)    TempSrc:  Axillary Axillary    SpO2: 100% 100% 98% 100%   Weight:       Height:           Flowsheet Rows      Flowsheet Row First Filed Value   Admission Height 175.3 cm (69\") Documented at 10/22/2024 1419   Admission Weight 53.1 kg (117 lb) Documented at 10/22/2024 1419             TELEMETRY: sinus rhythm    Physical Exam:  Vitals reviewed.   Constitutional:       Appearance: Underweight. Frail.   Eyes:      Pupils: Pupils are equal, round, and reactive to light.   Pulmonary:      Effort: Pulmonary effort is normal.      Breath sounds: Rhonchi present.   Cardiovascular:      Normal rate. Regular rhythm.   Pulses:     Intact distal pulses.   Edema:     Peripheral edema absent.   Abdominal:      Palpations: Abdomen is soft.   Musculoskeletal:      Cervical back: Normal range of motion and neck supple. Skin:     General: Skin is warm and dry.   Neurological:      Mental Status: Confused.          Results Review:   I reviewed the patient's new clinical results.  Lab Results (last 24 hours)       Procedure Component Value Units Date/Time    CBC & Differential [[id number]]  (Abnormal) Collected: 10/28/24 0429    Specimen: Blood from Arm, Right Updated: 10/28/24 0638    Narrative:      The following orders were created for panel order CBC & Differential.  Procedure                               Abnormality         Status                     ---------                               -----------         ------                     CBC Auto Differential[[id number]]        Abnormal            Final result               Scan Slide[[id number]]                                       Final result                 Please view results for these tests on the individual orders.    CBC Auto Differential [[id number]]  (Abnormal) Collected: 10/28/24 0429    Specimen: Blood " from Arm, Right Updated: 10/28/24 0638     WBC 10.09 10*3/mm3      RBC 2.99 10*6/mm3      Hemoglobin 8.9 g/dL      Hematocrit 27.8 %      MCV 93.0 fL      MCH 29.8 pg      MCHC 32.0 g/dL      RDW 14.9 %      RDW-SD 50.4 fl      MPV --     Comment: Unable to Calculate          Platelets 55 10*3/mm3      Neutrophil % 78.5 %      Lymphocyte % 4.1 %      Monocyte % 16.7 %      Eosinophil % 0.0 %      Basophil % 0.1 %      Immature Grans % 0.6 %      Neutrophils, Absolute 7.93 10*3/mm3      Lymphocytes, Absolute 0.41 10*3/mm3      Monocytes, Absolute 1.68 10*3/mm3      Eosinophils, Absolute 0.00 10*3/mm3      Basophils, Absolute 0.01 10*3/mm3      Immature Grans, Absolute 0.06 10*3/mm3      nRBC 0.0 /100 WBC     Scan Slide [[id number]] Collected: 10/28/24 0429    Specimen: Blood from Arm, Right Updated: 10/28/24 0638     Crenated RBC's Large/3+     RBC Fragments Mod/2+     WBC Morphology Normal     Platelet Estimate Decreased    Basic Metabolic Panel [[id number]]  (Abnormal) Collected: 10/28/24 0429    Specimen: Blood from Arm, Right Updated: 10/28/24 0529     Glucose 112 mg/dL      BUN 61 mg/dL      Creatinine 2.28 mg/dL      Sodium 144 mmol/L      Potassium 5.0 mmol/L      Comment: Specimen hemolyzed.  Result may be falsely elevated.        Chloride 109 mmol/L      CO2 19.9 mmol/L      Calcium 8.5 mg/dL      BUN/Creatinine Ratio 26.8     Anion Gap 15.1 mmol/L      eGFR 29.4 mL/min/1.73     Narrative:      GFR Normal >60  Chronic Kidney Disease <60  Kidney Failure <15    The GFR formula is only valid for adults with stable renal function between ages 18 and 70.            Imaging Results (Last 24 Hours)       Procedure Component Value Units Date/Time    XR Chest 1 View [[id number]] Collected: 10/27/24 1045     Updated: 10/27/24 1048    Narrative:      XR CHEST 1 VW    Date of Exam: 10/27/2024 6:47 AM EDT    Indication: pulmonary edema    Comparison: 10/26/2024 and prior    Findings:  Study is limited by overlying support  and monitoring apparatus. Patient is status post median sternotomy and CABG. The heart size is enlarged and the pulmonary vascularity is congested and indistinct diffuse increased groundglass and interstitial   opacities with a perihilar and bibasilar predominance are similar to slightly improved from comparison accounting for differences in technique. No significant pleural effusion is noted.      Impression:      Impression:  Cardiomegaly and diffuse airspace opacity compatible with pulmonary edema. Findings slightly improved from the comparison. Atypical infection cannot be excluded      Electronically Signed: Davis Catalan MD    10/27/2024 10:46 AM EDT    Workstation ID: [id number]            EKG        I personally viewed and interpreted the patient's EKG/Telemetry data:    ECHOCARDIOGRAM:  Results for orders placed during the hospital encounter of 08/23/24    Adult Transthoracic Echo Complete w/ Color, Spectral and Contrast if Necessary Per Protocol    Interpretation Summary    Left ventricular systolic function is severely decreased. Left ventricular ejection fraction appears to be 21 - 25%.  Akinetic mid to distal anterior wall and apex noted    The left ventricular cavity is mildly dilated.    Left ventricular wall thickness is consistent with mild concentric hypertrophy.    Left ventricular diastolic function was normal.    Estimated right ventricular systolic pressure from tricuspid regurgitation is normal (<35 mmHg).       STRESS MYOVIEW:       CARDIAC CATHETERIZATION:  Results for orders placed during the hospital encounter of 08/23/24    Cardiac Catheterization/Vascular Study    Conclusion  CARDIAC CATHETERIZATION REPORT      DATE OF PROCEDURE:  8/24/2024    INDICATION FOR PROCEDURE:    Non-ST elevation myocardial infarction  Angina pectoris  CAD  CABG  Hypertension    PROCEDURE PERFORMED:    Ultrasound-guided access of femoral artery  Left heart catheterization  coronary angiography  Angiography of  SVG graft and left HANNA.  left ventriculography    PROCEDURE COMMENTS:    After informed consent was obtained, the patient was prepped and draped in the usual sterile manner.  Mild to moderate sedation was administered.  Right femoral artery was accessed without difficulty under fluoroscopy and ultrasound guidance and 6 [place] arterial sheath was inserted.  Sheath was flushed with heparinized saline.    Using 6 [place] Tita catheters, first left coronary artery and the right coronary was electively engaged and appropriate views were taken.  A 6 [place] JR4 catheter was used to cross aortic valve and left heart catheterization was performed.  Left ventriculography was done in a WEBER view    Patient tolerated the procedure well.    FINDINGS:    1. HEMODYNAMICS:    Aortic pressure: 145/70 mmHg    LVEDP: 5 to 10 mmHg    Gradient across aortic valve on pullback: No gradient across aortic valve      2. LEFT VENTRICULOGRAPHY: 40%      3. CORONARY ANGIOGRAPHY:    A: Left main coronary artery: 25 to 30% stenosis in the ostium of left main.  At the bifurcation there is a 99% heavily calcified plaque/stenosis noted.    B: Left anterior descending artery: 100% occlusion of LAD in the proximal segment after the second .  LIMA to LAD is patent but distal to anastomosis there is diffuse disease of LAD followed by subtotal occlusion in the distal segment at the apex.  LIMA to LAD is attached to diagonal branch and LAD.    C: Left circumflex coronary artery: 100% occlusion of LCx at the ostium.  SVG graft to LCx is patent.  It retrograde fills second marginal.    D: Right coronary artery: RCA is diffusely diseased.  It is dominant.  Patient has 60 to 70% stenosis in the midsegment.  PLB branch which is a small caliber vessel less than 2 mm is diffusely diseased up to 80 to 90%.  It is not amenable to percutaneous intervention.  PDA has mild disease but no high-grade stenosis.    E: LIMA to LAD and diagonal is patent.  SVG  graft to OM is patent    SUMMARY:    Three-vessel coronary artery disease  3/3 bypasses are patent  Severe disease of native coronary arteries  RCA is on grafted and has attenuated lesion in the midsegment.  Moderate left ventricular dysfunction    RECOMMENDATIONS:    Medical treatment       OTHER:         Assessment & Plan     Principal Problem:    Fracture of femoral neck, right       Fracture of right femoral neck  S/p fall  Orthopedic surgery following  POD 2 anterior approach Right  Bipolar arthroplasty   Cardiac history to include severe CAD with 3/3 patent bypasses, severe disease of native coronaries and diffuse disease in RCA  Severe LVEF dysfunction  PT/OT    CAD  S/p CABG x 3 (2016)   Cardiac catheterization with 3 out of 3 bypass grafts patent, diffuse disease to RCA  Continue statin, Imdur, Ranexa, beta-blocker  DAPT on hold for surgery  Restart when okay with Ortho    HFrEF  Ischemic cardiomyopathy  proBNP >70,000  Echocardiogram with significantly reduced LVEF of 21 to 25%  CXR with cardiomegaly and pulmonary edema  GDMT to include metoprolol succinate  Restart hydralazine if BP allows   Losartan on hold due to DEE  Consider SGLT2 inhibitor  IV diuresis   Worsening renal function following IV diuresis, consult nephrology      Hypertension  Blood pressure well-controlled  Continue Imdur, metoprolol succinate    Dyslipidemia  High intensity statin therapy  Total cholesterol 109, , HDL 44  Goal LDL less than 70    Malnutrition  Decreased appetite  Nutrition consult  NG in place   Speech therapy following    DEE  Creatinine 2.28  Nephrology consult  Hold losartan    Patient DNR/DNI.  Palliative care consulted for goals of care discussion    I discussed the patients findings and my recommendations with patient and nurse    Haley Shipman, ARMESH  10/28/24  09:39 EDT

## 2024-10-28 NOTE — SIGNIFICANT NOTE
10/28/24 1037   OTHER   Discipline physical therapist   Rehab Time/Intention   Session Not Performed unable to evaluate, medical status change;other (see comments)  (pt currently sedates; not following commands per RN; in soft restraints)   Recommendation   PT - Next Appointment 10/29/24

## 2024-10-28 NOTE — PROGRESS NOTES
RENAL/K[facility]:    Received consult for DEE.  Family has just decided to transition to comfort measures.  Will cancel consult but appreciate the consideration.    Cade Nelson M.D.  [facility]

## 2024-10-28 NOTE — NURSING NOTE
Call received from pt daughter Radha Merino requesting pt be made a DNR. Reached out to NP on call . Awaiting order

## 2024-10-28 NOTE — CONSULTS
Nutrition Services    Patient Name: Alexei Merino III  YOB: 1950  MRN: [id number]  Admission date: 10/22/2024    PROGRESS NOTE      Encounter Information: Nursing admission screen received. RN reported plans for hospice and no plans to move forward with tube feeding. Noted code status has not yet been changed to comfort. Will leave tube feeding recommendations for if needed. Please put in a tube feeding consult if plans change. Speech therapy recommends NPO. Pt does not have feeding access at this time.        PO Diet: NPO Diet NPO Type: Strict NPO   PO Supplements: None   PO Intake:  Pt is NPO       Current nutrition support: -   Nutrition support review: -       Labs (reviewed below): Reviewed, management per attending       GI Function:  BM 10/28        Nutrition Intervention Updates: Estimated/Assessed Needs       Energy Requirements 1878-9745 kcal/day    EST Needs, Method, Wt used 30-35 kcal/kg using CBW        Protein Requirements 64-80 g/day    EST Needs, Method, Wt used 1.2-1.5 g/kg using CBW        Fluid Requirements     Estimated Needs (mL/day) 1 ml/kcal       Initial Goal:  *initial goal conservative d/t risk of RFS     Novasource Renal at 20mL/hr + 45mL/hr water flush      End Goal:    Novasource Renal at 40 mL/hr + 45mL/hr water flush      Calories  1760 kcals (100%)    Protein  80 g (100%)    Free water  625 mL   Flushes  990 ml     The above end goal rate is for 22 hrs/day to assume interruptions for ADLs. Water flushes adjusted based on clinical picture + Rx flushes/IV fluids     Specialized formula chosen r/t DEE, worsening renal function with upper end of normal K+       Results from last 7 days   Lab Units 10/28/24  0429 10/26/24  2231 10/25/24  2332 10/22/24  1608 10/22/24  1508   SODIUM mmol/L 144 142 139   < > 140   POTASSIUM mmol/L 5.0 4.1 3.7   < > 4.0   CHLORIDE mmol/L 109* 106 104   < > 100   CO2 mmol/L 19.9* 22.0 25.9   < > 30.2*   BUN mg/dL 61* 33* 24*   < > 20   CREATININE  mg/dL 2.28* 1.28* 0.91   < > 1.14   CALCIUM mg/dL 8.5* 8.8 9.0   < > 9.1   BILIRUBIN mg/dL  --   --   --   --  2.3*   ALK PHOS U/L  --   --   --   --  92   ALT (SGPT) U/L  --   --   --   --  15   AST (SGOT) U/L  --   --   --   --  23   GLUCOSE mg/dL 112* 141* 163*   < > 115*    < > = values in this interval not displayed.     Results from last 7 days   Lab Units 10/28/24  0429   HEMOGLOBIN g/dL 8.9*   HEMATOCRIT % 27.8*     COVID19   Date Value Ref Range Status   01/26/2022 Not Detected Not Detected - Ref. Range Final     Lab Results   Component Value Date    HGBA1C 5.92 (H) 08/23/2024       Wt Readings from Last 10 Encounters:   10/22/24 1419 53.1 kg (117 lb)   09/11/24 1449 58.4 kg (128 lb 12.8 oz)   08/27/24 0500 56.2 kg (123 lb 14.4 oz)   08/25/24 0500 54.8 kg (120 lb 13 oz)   08/24/24 0944 68.9 kg (152 lb)   08/23/24 2001 69 kg (152 lb 1.9 oz)   08/25/23 1804 69 kg (152 lb 1.9 oz)   03/14/23 1541 62.1 kg (137 lb)   02/10/22 1410 61.2 kg (135 lb)   01/26/22 1931 59.9 kg (132 lb)   01/18/22 1311 61.3 kg (135 lb 3.2 oz)   01/17/22 1304 63 kg (139 lb)   01/14/22 1156 62.1 kg (137 lb)       RD to follow up per protocol.    Electronically signed by:  Kary Rich RD  10/28/24 12:41 EDT

## 2024-10-29 NOTE — PLAN OF CARE
Goal Outcome Evaluation: Pt remains on comfort care. Pain managed per MAR. Family remains at bedside

## 2024-10-29 NOTE — PLAN OF CARE
Problem: Adult Inpatient Plan of Care  Goal: Plan of Care Review  Outcome: Progressing  Goal: Patient-Specific Goal (Individualized)  Outcome: Progressing  Goal: Absence of Hospital-Acquired Illness or Injury  Outcome: Progressing  Intervention: Identify and Manage Fall Risk  Description: Perform standard risk assessment on admission using a validated tool or comprehensive approach appropriate to the patient; reassess fall risk frequently, with change in status or transfer to another level of care.  Communicate risk to interprofessional healthcare team; ensure fall risk visible cue.  Determine need for increased observation, equipment and environmental modification, as well as use of supportive, nonskid footwear.  Adjust safety measures to individual needs and identified risk factors.  Reinforce the importance of active participation with fall risk prevention, safety, and physical activity with the patient and family.  Perform regular intentional rounding to assess need for position change, pain assessment and personal needs, including assistance with toileting.  Recent Flowsheet Documentation  Taken 10/29/2024 0402 by Gisele Espino LPN  Safety Promotion/Fall Prevention: safety round/check completed  Taken 10/29/2024 0157 by Gisele Espino LPN  Safety Promotion/Fall Prevention: safety round/check completed  Taken 10/29/2024 0001 by Gisele Espino LPN  Safety Promotion/Fall Prevention: safety round/check completed  Taken 10/28/2024 2243 by Gisele Espino LPN  Safety Promotion/Fall Prevention:   activity supervised   clutter free environment maintained   assistive device/personal items within reach   safety round/check completed   room organization consistent  Taken 10/28/2024 2044 by Gisele Espino LPN  Safety Promotion/Fall Prevention: (fami;ly at bedside) safety round/check completed  Intervention: Prevent Skin Injury  Description: Perform a screening for skin injury risk, such as pressure or  moisture-associated skin damage on admission and at regular intervals throughout hospital stay.  Keep all areas of skin (especially folds) clean and dry.  Maintain adequate skin hydration.  Relieve and redistribute pressure and protect bony prominences and skin at risk for injury; implement measures based on patient-specific risk factors.  Match turning and repositioning schedule to clinical condition.  Encourage weight shift frequently; assist with reposition if unable to complete independently.  Float heels off bed; avoid pressure on the Achilles tendon.  Keep skin free from extended contact with medical devices.  Optimize nutrition and hydration.  Encourage functional activity and mobility, as early as tolerated.  Use aids (e.g., slide boards, mechanical lift) during transfer.  Recent Flowsheet Documentation  Taken 10/29/2024 0001 by Gisele Espino LPN  Body Position: (family refused) other (see comments)  Taken 10/28/2024 2044 by Gisele Espino LPN  Body Position: (family refused) other (see comments)  Intervention: Prevent Infection  Description: Maintain skin and mucous membrane integrity; promote hand, oral and pulmonary hygiene.  Optimize fluid balance, nutrition, sleep and glycemic control to maximize infection resistance.  Identify potential sources of infection early to prevent or mitigate progression of infection (e.g., wound, lines, devices).  Evaluate ongoing need for invasive devices; remove promptly when no longer indicated.  Review vaccination status.  Recent Flowsheet Documentation  Taken 10/29/2024 0001 by Gisele Espino LPN  Infection Prevention:   single patient room provided   rest/sleep promoted   hand hygiene promoted  Goal: Optimal Comfort and Wellbeing  Outcome: Progressing  Intervention: Monitor Pain and Promote Comfort  Description: Assess pain level, treatment efficacy and patient response at regular intervals using a consistent pain scale.  Consider the presence and impact of  preexisting chronic pain.  Encourage patient and caregiver involvement in pain assessment, interventions and safety measures.  Promote activity; balance with sleep and rest to enhance healing.  Recent Flowsheet Documentation  Taken 10/29/2024 0300 by Gisele Espino LPN  Pain Management Interventions: (comfort care)   pain medication given   prescribed exercises encouraged  Goal: Readiness for Transition of Care  Outcome: Progressing     Problem: Fall Injury Risk  Goal: Absence of Fall and Fall-Related Injury  Outcome: Progressing  Intervention: Identify and Manage Contributors  Description: Develop a fall prevention plan, considering patient-centered interventions and family/caregiver involvement; identify and address patient's facilitators and barriers.  Provide reorientation, appropriate sensory stimulation and routines with changes in mental status to decrease risk of fall.  Promote use of personal vision and auditory aids.  Assess assistance level required for safe and effective self-care; provide support as needed, such as toileting and mobilization. For age 65 and older, implement timed toileting with assistance.  Encourage physical activity, such as performance of mobility and self-care at highest level of patient ability, multicomponent exercise program and provision of appropriate assistive devices.  If fall occurs, assess the severity of injury; implement fall injury protocol. Determine the cause and revise fall injury prevention plan.  Regularly review and advocate for medication adjustment to decrease fall risk; consider administration times, polypharmacy and age.  Balance adequate pain management with potential for oversedation.  Recent Flowsheet Documentation  Taken 10/28/2024 2044 by Gisele Espino LPN  Medication Review/Management: medications reviewed  Intervention: Promote Injury-Free Environment  Description: Provide a safe, barrier-free environment that encourages independent activity.  Keep  care area uncluttered and well-lighted.  Determine need for increased observation or monitoring.  Avoid use of devices that minimize mobility, such as restraints or indwelling urinary catheter.  Recent Flowsheet Documentation  Taken 10/29/2024 0402 by Gisele Espino LPN  Safety Promotion/Fall Prevention: safety round/check completed  Taken 10/29/2024 0157 by Gisele Espino LPN  Safety Promotion/Fall Prevention: safety round/check completed  Taken 10/29/2024 0001 by Gisele Espino LPN  Safety Promotion/Fall Prevention: safety round/check completed  Taken 10/28/2024 2243 by Gisele Espino LPN  Safety Promotion/Fall Prevention:   activity supervised   clutter free environment maintained   assistive device/personal items within reach   safety round/check completed   room organization consistent  Taken 10/28/2024 2044 by Gisele Espino LPN  Safety Promotion/Fall Prevention: (fami;ly at bedside) safety round/check completed     Problem: Skin Injury Risk Increased  Goal: Skin Health and Integrity  Outcome: Progressing  Intervention: Optimize Skin Protection  Description: Perform a full pressure injury risk assessment, as indicated by screening, upon admission to care unit.  Reassess skin (full inspection and injury risk, including skin temperature, consistency and color) frequently (e.g., scheduled interval, with change in condition) to provide optimal early detection and prevention.  Maintain adequate tissue perfusion (e.g., encourage fluid balance; avoid crossing legs, constrictive clothing or devices) to promote tissue oxygenation.  Maintain head of bed at lowest degree of elevation tolerated, considering medical condition and other restrictions. Use positioning supports to prevent sliding and friction. Consider low friction textiles.  Avoid positioning onto an area that remains reddened or on bony prominences.  Minimize incontinence and moisture (e.g., toileting schedule; moisture-wicking pad, diaper or  incontinence collection device; skin moisture barrier).  Cleanse skin promptly and gently, when soiled, utilizing a pH-balanced cleanser.  Relieve and redistribute pressure (e.g., scheduled position changes, weight shifts, use of support surface, medical device repositioning, protective dressing application, use of positioning device, microclimate control, use of pressure-injury-monitor  Encourage increased activity, such as sitting in a chair at the bedside or early mobilization, when able to tolerate. Avoid prolonged sitting.  Recent Flowsheet Documentation  Taken 10/29/2024 0001 by Gisele Espino LPN  Head of Bed (HOB) Positioning: HOB elevated     Problem: Comorbidity Management  Goal: Maintenance of Behavioral Health Symptom Control  Outcome: Progressing  Intervention: Maintain Behavioral Health Symptom Control  Description: Confirm mental health diagnosis and current treatment.  Evaluate participation with previously identified self-management plan.  Advocate continuation of home strategies, including medication.  Evaluate effectiveness of self-management strategies and coping skills.  Consider impact of mental health diagnosis on the current acute medical condition.  Communicate with providers to ensure continuity and follow-up at transition.  Recent Flowsheet Documentation  Taken 10/28/2024 2044 by Gisele Espino LPN  Medication Review/Management: medications reviewed  Goal: Blood Pressure in Desired Range  Outcome: Progressing  Intervention: Maintain Blood Pressure Management  Description: Evaluate adherence to home antihypertensive regimen (e.g., exercise and activity, diet modification, medication).  Provide scheduled antihypertensive medication; consider administration time and effects (e.g., avoid giving diuretic prior to bedtime).  Monitor response to antihypertensive medication therapy (e.g., blood pressure, electrolyte levels, medication effects).  Minimize risk of orthostatic hypotension;  encourage caution with position changes, particularly if elderly.  Recent Flowsheet Documentation  Taken 10/28/2024 2044 by Gisele Espino LPN  Medication Review/Management: medications reviewed     Problem: Malnutrition  Goal: Improved Nutritional Intake  Outcome: Progressing     Problem: Noninvasive Ventilation Acute  Goal: Effective Unassisted Ventilation and Oxygenation  Outcome: Progressing     Problem: Restraint, Nonviolent  Goal: Absence of Harm or Injury  Outcome: Progressing  Intervention: Implement Least Restrictive Safety Strategies  Description: Consider personal and environmental factors contributing to unsafe nonviolent, self-destructive behavior.  Utilize diversional activity or alternative device protection.  Document alternative strategies and less restrictive intervention attempts and their effects.  Use the least restrictive method of restraint.  Recognize restraint should only be used if needed to improve the patient's wellbeing and less restrictive interventions have been determined to be ineffective.  Reassess continued need frequently.  Recent Flowsheet Documentation  Taken 10/28/2024 2044 by Gisele Espino LPN  Diversional Activities: music  Intervention: Protect Skin and Joint Integrity  Description: Frequently assess restraint application site for skin integrity, edema and perfusion distal to the site; document findings.  Consider protective skin barrier with risk of skin injury.  Release and replace restraint at regular intervals.  Assist with frequent joint range of motion activity.  Recent Flowsheet Documentation  Taken 10/29/2024 0001 by Gisele Espino LPN  Body Position: (family refused) other (see comments)  Taken 10/28/2024 2044 by Gisele Espino LPN  Body Position: (family refused) other (see comments)   Goal Outcome Evaluation:

## 2024-10-29 NOTE — NURSING NOTE
Called to pt room by family. Pt verified by 2 nurses with no pulse  or respiration. Family at bedside .  home verified  with family

## 2024-10-29 NOTE — CASE MANAGEMENT/SOCIAL WORK
Case Management Discharge Note      Final Note:  UNDER HOSPICE    Provided Post Acute Provider List?: N/A  N/A Provider List Comment: came from [facility] agreeable to return or if PT recommends IPR okay to go to [facility] [place] or [facility]    Selected Continued Care - Discharged on 10/29/2024 Admission date: 10/22/2024 - Discharge disposition:  in Medical Facility                 Final Discharge Disposition Code: 51 - hospice medical facility

## 2024-10-29 NOTE — NURSING NOTE
Pt has been unresponsive all shift. Hospice care provided. Pt  family request to remove tele, no vital and no turning of pt. Pt remains mottled in upper and lower extremities. Comfort care in place. Continue to keep pt comfortable. Family remains at bedisde

## 2024-10-30 NOTE — PROGRESS NOTES
"Enter Query Response Below      Query Response: Chronic systolic heart failure secondary to ischemic disease, ischemic cardiomyopathy, HFrEF             If applicable, please update the problem list.   Patient: Alexei Merino Iii \"Chip\"        : 1950  Account: [id number]           Admit Date: 10/22/2024        How to Respond to this query:       a. Click New Note     b. Answer query within the yellow box.                c. Update the Problem List, if applicable.      If you have any questions about this query contact me at: [email or web address]     Haley Shipman,       74yr male to ED with reports of right hip pain. H&P reflects \"Fracture of femoral neck, right\". Cardiology consulted on 10/23/24 reflecting patient with a medical history of HFrEF and HTN. Patient denies any symptoms of chest pain or shortness of breath. Cardiology reflects \"HFrEF, Patient has severe LV dysfunction and is on GDMT with beta-blockers hydralazine losartan.  I will continue the same medicines for now\" on pn 10/23/24. Echo on 24 reflects EF-21-25%. Progress note, 10/28/24, \"HFrEF, Ischemic cardiomyopathy, proBNP >70,000, Echocardiogram with significantly reduced LVEF of 21 to 25%, CXR with cardiomegaly and pulmonary edema\" treated with IV Lasix X 2 given on 10/27/24.     Please clarify the type of heart failure treated/monitored:    Acute on chronic Systolic heart failure/HFrEF  Other- specify_________  By submitting this query, we are merely seeking further clarification of documentation to accurately reflect all conditions that you are monitoring, evaluating, treating or that extend the hospitalization or utilize additional resources of care. Please utilize your independent clinical judgment when addressing the question(s) above.     This query and your response, once completed, will be entered into the legal medical record.    Sincerely,  Laura FOWLER Rn  Clinical Documentation Integrity Program   "

## (undated) DEVICE — UNIVERSAL PACK: Brand: CARDINAL HEALTH

## (undated) DEVICE — APPL CHLORAPREP HI/LITE 26ML ORNG

## (undated) DEVICE — COAXIAL FEMORAL CANAL TIP

## (undated) DEVICE — SUT ETHIB 2 CV V37 MS/4 30IN MX69G

## (undated) DEVICE — HANDPIECE SET WITH COAXIAL MULTI-ORIFICE TIP AND SUCTION TUBE: Brand: INTERPULSE

## (undated) DEVICE — SHEET, DRAPE, SPLIT, STERILE: Brand: MEDLINE

## (undated) DEVICE — DGW .035 FC J3MM 260CM TEF: Brand: EMERALD

## (undated) DEVICE — SOL ISO/ALC 70PCT 4OZ

## (undated) DEVICE — PICO 7 10CM X 20CM: Brand: PICO™ 7

## (undated) DEVICE — THE STERILE CAMERA HANDLE COVER IS FOR USE WITH THE STERIS SURGICAL LIGHTING AND VISUALIZATION SYSTEMS.

## (undated) DEVICE — DRSNG SLVR/ANTIBAC PRIMASEAL POST/OP ADHS 3.5X10IN

## (undated) DEVICE — CATH DIAG IMPULSE FL4 6F 100CM

## (undated) DEVICE — THE STERILE LIGHT HANDLE COVER IS USED WITH STERIS SURGICAL LIGHTING AND VISUALIZATION SYSTEMS.

## (undated) DEVICE — BIPOLAR SEALER 23-112-1 AQM 6.0: Brand: AQUAMANTYS™

## (undated) DEVICE — 3M™ STERI-DRAPE™  ISOLATION DRAPE WITH INCISE FILM AND POUCH 1017: Brand: STERI-DRAPE™

## (undated) DEVICE — CATH DIAG IMPULSE PIG .056 6F 110CM

## (undated) DEVICE — ANTIBACTERIAL UNDYED BRAIDED (POLYGLACTIN 910), SYNTHETIC ABSORBABLE SUTURE: Brand: COATED VICRYL

## (undated) DEVICE — GLV SURG SENSICARE PI LF PF 8 GRN STRL

## (undated) DEVICE — RECIPROCATING BLADE HEAVY DUTY LONG, OFFSET  (77.6 X 0.77 X 11.2MM)

## (undated) DEVICE — ELECTRD DEFIB M/FUNC PROPADZ RADIOL 2PK

## (undated) DEVICE — 3M™ IOBAN™ 2 ANTIMICROBIAL INCISE DRAPE 6651EZ: Brand: IOBAN™ 2

## (undated) DEVICE — CATH DIAG IMPULSE FR4 6F 100CM

## (undated) DEVICE — 6617 IOBAN II PATIENT ISOLATION DRAPE 5/BX,4BX/CS: Brand: STERI-DRAPE™ IOBAN™ 2

## (undated) DEVICE — ADHS SKIN SURG TISS VISC PREMIERPRO EXOFIN HI/VISC FAST/DRY

## (undated) DEVICE — WRP COMPR HIP SMI/COLDTHERAPY/PREM 4GELBG3HR/24 1P/U 6PK

## (undated) DEVICE — ADHS SKIN PREMIERPRO EXOFIN TOPICAL HI/VISC .5ML

## (undated) DEVICE — PACK,UNIVERSAL,NO GOWNS: Brand: MEDLINE

## (undated) DEVICE — ADHS LIQ MASTISOL 2/3ML

## (undated) DEVICE — GLV SURG BIOGEL LTX PF 8

## (undated) DEVICE — CVR PROB ULTRASND CIVFLEX GEN/PURP TELESCP/FOLD 5.5X96IN LF

## (undated) DEVICE — DRAPE SHEET ULTRAGARD: Brand: MEDLINE

## (undated) DEVICE — SUT MNCRYL 2/0 CT1 36IN UD MCP945H

## (undated) DEVICE — DRAPE,U/ SHT,SPLIT,PLAS,STERIL: Brand: MEDLINE

## (undated) DEVICE — KT PT POSITION SUPINE HANA/PROFX TABL

## (undated) DEVICE — SUT MNCRYL 3/0 SH 27IN UD MCP416H

## (undated) DEVICE — GLOVE,SURG,SENSICARE SLT,LF,PF,7.5: Brand: MEDLINE

## (undated) DEVICE — MAT FLR ABSORBENT LG 4FT 10 2.5FT

## (undated) DEVICE — GLV SURG BIOGEL LTX PF 8 1/2

## (undated) DEVICE — NEEDLE, QUINCKE, 18GX3.5": Brand: MEDLINE

## (undated) DEVICE — PINNACLE INTRODUCER SHEATH: Brand: PINNACLE

## (undated) DEVICE — PK TRY HEART CATH 50

## (undated) DEVICE — PK TOTL HIP 50

## (undated) DEVICE — SOL ISO/ALC RUB 91PCT 16OZ

## (undated) DEVICE — DGW .035 FC J3MM 150CM TEF HEP: Brand: EMERALD

## (undated) DEVICE — PCH SURG INVISISHIELD FLD/COL W/DRN/PRT 20X6IN

## (undated) DEVICE — SOL IRRIG SOD CHL 0.9PCT 3000ML

## (undated) DEVICE — DUAL CUT SAGITTAL BLADE

## (undated) DEVICE — IRRIGATOR BULB ASEPTO 60CC STRL

## (undated) DEVICE — PATIENT RETURN ELECTRODE, SINGLE-USE, CONTACT QUALITY MONITORING, ADULT, WITH 9FT CORD, FOR PATIENTS WEIGING OVER 33LBS. (15KG): Brand: MEGADYNE

## (undated) DEVICE — NEEDLE, QUINCKE, 20GX3.5": Brand: MEDLINE

## (undated) DEVICE — ELECTRD BLD EZ CLN STD 6.5IN

## (undated) DEVICE — C-ARM: Brand: UNBRANDED

## (undated) DEVICE — CEMENT MIXING SYSTEM WITH FEMORAL BREAKWAY NOZZLE: Brand: REVOLUTION

## (undated) DEVICE — 3M™ IOBAN™ 2 ANTIMICROBIAL INCISE DRAPE 6650EZ: Brand: IOBAN™ 2

## (undated) DEVICE — BOWL PLSTC MD 16OZ BLU STRL

## (undated) DEVICE — KT SURG TURNOVER 050

## (undated) DEVICE — CATH DIAG IMPULSE IMT 6F 100CM